# Patient Record
Sex: FEMALE | Race: WHITE | NOT HISPANIC OR LATINO | Employment: OTHER | URBAN - METROPOLITAN AREA
[De-identification: names, ages, dates, MRNs, and addresses within clinical notes are randomized per-mention and may not be internally consistent; named-entity substitution may affect disease eponyms.]

---

## 2017-01-03 ENCOUNTER — ALLSCRIPTS OFFICE VISIT (OUTPATIENT)
Dept: OTHER | Facility: OTHER | Age: 62
End: 2017-01-03

## 2017-03-29 DIAGNOSIS — Z12.11 ENCOUNTER FOR SCREENING FOR MALIGNANT NEOPLASM OF COLON: ICD-10-CM

## 2017-06-14 ENCOUNTER — ALLSCRIPTS OFFICE VISIT (OUTPATIENT)
Dept: OTHER | Facility: OTHER | Age: 62
End: 2017-06-14

## 2017-06-14 DIAGNOSIS — F41.1 GENERALIZED ANXIETY DISORDER: ICD-10-CM

## 2017-06-14 DIAGNOSIS — K21.9 GASTRO-ESOPHAGEAL REFLUX DISEASE WITHOUT ESOPHAGITIS: ICD-10-CM

## 2017-06-14 DIAGNOSIS — E78.5 HYPERLIPIDEMIA: ICD-10-CM

## 2017-06-14 DIAGNOSIS — R60.0 LOCALIZED EDEMA: ICD-10-CM

## 2017-10-19 ENCOUNTER — ALLSCRIPTS OFFICE VISIT (OUTPATIENT)
Dept: OTHER | Facility: OTHER | Age: 62
End: 2017-10-19

## 2017-11-24 ENCOUNTER — ALLSCRIPTS OFFICE VISIT (OUTPATIENT)
Dept: OTHER | Facility: OTHER | Age: 62
End: 2017-11-24

## 2017-11-25 NOTE — PROGRESS NOTES
Assessment    1  Acute bronchitis (466 0) (J20 9)    Plan  Acute bronchitis    · DrRx Zithromax Z-Jhonatan 250 MG #6 pill pack; Take 2 pills on day 1, take 1 pill ondays 2-5   · PredniSONE 5 MG Oral Tablet; Days 1 and 2 take 4 tablets, days 3 and 4 take 3tablets, days 5 and 6 take 2 tablets, days 7 and 8 take 1 tablet  Encounter for screening mammogram for malignant neoplasm of breast    · MAMMO SCREENING BILATERAL W 3D & CAD; Status:Active; Requested for:24Nov2017; Increased BMI    · We recommend that you bring your body mass index down to 26 ; Status:Complete;  Done: 07SQM2956    Discussion/Summary  The patient was counseled regarding instructions for management,-- risk factor reductions,-- prognosis,-- patient and family education,-- risks and benefits of treatment options,-- importance of compliance with treatment  Possible side effects of new medications were reviewed with the patient/guardian today  The treatment plan was reviewed with the patient/guardian  The patient/guardian understands and agrees with the treatment plan      Chief Complaint    1  Cold Symptoms  Singh Mejia is here today with chest cold symptoms x few days  She complains of wheezing and productive cough x 1 week  Denies fevers/chills, denies ST  History of Present Illness  HPI: See chief complaint  Review of Systems   Constitutional: no fever-- and-- no chills  ENT: no earache,-- no sore throat-- and-- no nasal discharge  Cardiovascular: no chest pain-- and-- no palpitations  Respiratory: cough,-- wheezing-- and-- shortness of breath during exertion, but-- no PND  Gastrointestinal: no abdominal pain,-- no constipation-- and-- no diarrhea  Genitourinary: no dysuria-- and-- no incontinence  Musculoskeletal: no arthralgias-- and-- no myalgias  Integumentary: no rashes  ROS reviewed  Active Problems    1  Acute bronchitis (466 0) (J20 9)   2  Acute upper respiratory infection (465 9) (J06 9)   3   Allergic reaction (995 3) (T78 40XA)   4  Allergic rhinitis (477 9) (J30 9)   5  Backache (724 5) (M54 9)   6  Colon cancer screening (V76 51) (Z12 11)   7  Diarrhea (787 91) (R19 7)   8  Encounter for screening mammogram for malignant neoplasm of breast (V76 12) (Z12 31)   9  Esophageal reflux (530 81) (K21 9)   10  Fatigue (780 79) (R53 83)   11  Generalized anxiety disorder (300 02) (F41 1)   12  Hyperlipidemia (272 4) (E78 5)   13  Impetigo (684) (L01 00)   14  Increased BMI (783 9) (R63 8)   15  Insomnia (780 52) (G47 00)   16  Microscopic hematuria (599 72) (R31 29)   17  Need for pneumococcal vaccination (V03 82) (Z23)   18  Need for prophylactic vaccination and inoculation against influenza (V04 81) (Z23)   19  Nephrolithiasis (592 0) (N20 0)   20  Pain of finger, unspecified laterality (729 5) (M79 646)   21  Pap smear for cervical cancer screening (V76 2) (Z12 4)   22  Pedal edema (782 3) (R60 0)   23  Renal colic (179 4) (F01)   24  Renal cyst, acquired (593 2) (N28 1)   25  Restrictive lung disease (518 89) (J98 4)   26  Screening for depression (V79 0) (Z13 89)   27  Shortness of breath (786 05) (R06 02)   28  Sleep apnea (780 57) (G47 30)   29  SVT (supraventricular tachycardia) (427 89) (I47 1)   30  Viral gastroenteritis (008 8) (A08 4)   31  Wheezing (786 07) (R06 2)    Past Medical History  1  Need for pneumococcal vaccination (V03 82) (Z23)   2  Need for prophylactic vaccination and inoculation against influenza (V04 81) (Z23)   3  History of Screening for depression (V79 0) (Z13 89)  Active Problems And Past Medical History Reviewed: The active problems and past medical history were reviewed and updated today  Family History  Mother    1  Family history of Malignant neoplasm of lung, unspecified laterality  Father    2  Family history of Acute Anterobasal Myocardial Infarction (410 10)  Family History    3  Family history of Rheumatoid arthritis with rheumatoid factor (714 0) (M05 9)  Family History Reviewed:    The family history was reviewed and updated today  Social History   · Denied: History of Current Every Day Smoker   · Former smoker (Y26 46) (I75 286)   ·   The social history was reviewed and updated today  The social history was reviewed and is unchanged  Surgical History    1  History of Cholecystectomy   2  History of Diagnostic Cystoscopy   3  History of Tubal Ligation  Surgical History Reviewed: The surgical history was reviewed and updated today  Current Meds   1  Asmanex 30 Metered Doses 220 MCG/INH Inhalation Aerosol Powder Breath Activated; INHALE 1 PUFF BY MOUTH TWICE DAILY; Therapy: 60ZSZ7290 to (Last Rx:84Voh3940)  Requested for: 91XCY4392 Ordered   2  Fluticasone Propionate 50 MCG/ACT Nasal Suspension; USE 2 SPRAYS IN EACH NOSTRIL ONCE DAILY; Therapy: 99XVS3259 to (Evaluate:72Qxj7287)  Requested for: 47Dqi5854; Last Rx:46Ssb9028; Status: ACTIVE - Transmit to Pharmacy - Awaiting Verification Ordered   3  Melatonin TABS Recorded   4  Pantoprazole Sodium 40 MG Oral Tablet Delayed Release; TAKE 1 TABLET TWICE DAILY 30 MINUTES BEFORE BREAKFAST AND DINNER Recorded   5  Simvastatin 20 MG Oral Tablet; TAKE 1 TABLET BY MOUTH AT  BEDTIME; Therapy: 61XYP7163 to (Evaluate:63Pch1732)  Requested for: 61Jkg3240; Last Rx:01Alu0328 Ordered   6  Ventolin  (90 Base) MCG/ACT Inhalation Aerosol Solution; INHALE 2 PUFFS EVERY 6 HOURS AS NEEDED FOR WHEEZING; Therapy: 78VCC6024 to (Evaluate:23Apr2017)  Requested for: 95Ckn8746; Last Rx:08Pqi0549 Ordered   7  Vitamin D3 2000 UNIT Oral Capsule; take 1 capsule daily Recorded    The medication list was reviewed and updated today  Allergies  1   Codeine Sulfate TABS    Vitals   Recorded: 40JGZ2351 10:19AM Recorded: 01XUF5494 09:05AM   Temperature  05 3 F   Systolic  153, LUE, Sitting   Diastolic  76, LUE, Sitting   Height  5 ft 3 5 in   Weight  169 lb 4 0 oz   BMI Calculated  29 51   BSA Calculated  1 81   O2 Saturation 97        Signatures Electronically signed by : Christy Sommers, [facility]; Nov 24 2017 10:20AM EST                       (Author)    Electronically signed by : David Peña DO; Nov 24 2017 12:25PM EST

## 2018-01-11 NOTE — PROGRESS NOTES
Assessment    1  Generalized anxiety disorder (300 02) (F41 1)    Plan  Generalized anxiety disorder    · Follow-up visit in 1 month Evaluation and Treatment  Follow-up  Status: Hold For -  Scheduling  Requested for: 98EON4574  Generalized anxiety disorder, Insomnia    · From  PARoxetine HCl - 10 MG Oral Tablet TAKE 1 TABLET BY MOUTH ONCE  DAILY To PARoxetine HCl - 20 MG Oral Tablet TAKE 1 TABLET DAILY AS DIRECTED    Discussion/Summary    We will increase her Paxil to 20 mg daily in hopes that this will help her sleep better  She will call us in the next week or so if she is having trouble tolerating it  We will see her back in 1-2 months or when necessary  Possible side effects of new medications were reviewed with the patient/guardian today  The treatment plan was reviewed with the patient/guardian  The patient/guardian understands and agrees with the treatment plan      Chief Complaint  Followup      History of Present Illness  Patient here today for followup on anxiety  Patient states that was taking the Paxil at night, and was feeling shaky  Start it in the morning, feels better  Still not able to stay asleep, though she feels more tired when she does get up at night   The patient is being seen for follow-up of generalized anxiety disorder  The patient reports no change in the condition  She has no comorbid illnesses  Interval symptoms:  improved anxiety and stable sleep disruption  Associated symptoms: no grandiosity, no racing thoughts, no periods of euphoria, no hallucinations and no suicidal ideation  Medications:  the patient is adherent to her medication regimen, but she denies medication side effects  Review of Systems    Constitutional: No fever, no chills, feels well, no tiredness, no recent weight gain or weight loss  Cardiovascular: No complaints of slow heart rate, no fast heart rate, no chest pain, no palpitations, no leg claudication, no lower extremity edema     Respiratory: No complaints of shortness of breath, no wheezing, no cough, no SOB on exertion, no orthopnea, no PND  Gastrointestinal: No complaints of abdominal pain, no constipation, no nausea or vomiting, no diarrhea, no bloody stools  Genitourinary: No complaints of dysuria, no incontinence, no pelvic pain, no dysmenorrhea, no vaginal discharge or bleeding  Psychiatric: as noted in HPI  Active Problems    1  Acute bronchitis (466 0) (J20 9)   2  Acute upper respiratory infection (465 9) (J06 9)   3  Allergic reaction (995 3) (T78 40XA)   4  Allergic rhinitis (477 9) (J30 9)   5  Backache (724 5) (M54 9)   6  Encounter for screening mammogram for malignant neoplasm of breast (V76 12)   (Z12 31)   7  Esophageal reflux (530 81) (K21 9)   8  Fatigue (780 79) (R53 83)   9  Generalized anxiety disorder (300 02) (F41 1)   10  Hyperlipidemia (272 4) (E78 5)   11  Impetigo (684) (L01 00)   12  Insomnia (780 52) (G47 00)   13  Microscopic hematuria (599 72) (R31 2)   14  Need for pneumococcal vaccination (V03 82) (Z23)   15  Need for prophylactic vaccination and inoculation against influenza (V04 81) (Z23)   16  Nephrolithiasis (592 0) (N20 0)   17  Pain of finger, unspecified laterality (729 5) (M79 646)   18  Renal colic (853 3) (W93)   19  Renal cyst, acquired (593 2) (N28 1)   20  Screening for depression (V79 0) (Z13 89)   21  Shortness of breath (786 05) (R06 02)   22  Sleep apnea (780 57) (G47 30)   23  SVT (supraventricular tachycardia) (427 89) (I47 1)   24  Viral gastroenteritis (008 8) (A08 4)    Past Medical History    1  Need for pneumococcal vaccination (V03 82) (Z23)   2  Need for prophylactic vaccination and inoculation against influenza (V04 81) (Z23)   3  History of Screening for depression (V79 0) (Z13 89)    The active problems and past medical history were reviewed and updated today  Surgical History    1  History of Cholecystectomy   2  History of Diagnostic Cystoscopy   3   History of Tubal Ligation    The surgical history was reviewed and updated today  Family History    1  Family history of Malignant neoplasm of lung, unspecified laterality    2  Family history of Acute Anterobasal Myocardial Infarction (410 10)    3  Family history of Rheumatoid arthritis with rheumatoid factor (714 0) (M05 9)    The family history was reviewed and updated today  Social History    · Denied: History of Current Every Day Smoker   · Former smoker (T22 76) (I20 425)   ·   The social history was reviewed and updated today  The social history was reviewed and is unchanged  Current Meds   1  ALPRAZolam 0 25 MG Oral Tablet; 1 tab every 6 hours as needed for anxiety; Therapy: 25JVX9214 to (Last Rx:06Jan2016) Ordered   2  Asmanex 30 Metered Doses 220 MCG/INH Inhalation Aerosol Powder Breath Activated;   INHALE 1 PUFF BY MOUTH TWICE DAILY; Therapy: 33TQO6799 to (Last Rx:29Dec2015) Ordered   3  Fluticasone Propionate 50 MCG/ACT Nasal Suspension; USE 2 SPRAYS IN EACH   NOSTRIL ONCE DAILY; Therapy: 70ZJW0273 to (Evaluate:10Jan2016)  Requested for: 34NOX3149; Last   Rx:53Qzf4985 Ordered   4  NexIUM 20 MG Oral Capsule Delayed Release; TAKE 1 CAPSULE Daily Recorded   5  PARoxetine HCl - 10 MG Oral Tablet; TAKE 1 TABLET BY MOUTH ONCE DAILY; Therapy: 81YVX0419 to (Evaluate:04Jul2016)  Requested for: 42TTN8205; Last   Rx:06Jan2016 Ordered   6  PredniSONE 10 MG Oral Tablet; 2 tabs by mouth daily for 3 days, then one tab by mouth   daily for 3 days, then half pill daily for 3 days; Therapy: 30HFV5368 to (Last Rx:39Sih5559)  Requested for: 29Dec2015 Ordered   7  Simvastatin 20 MG Oral Tablet; TAKE 1 TABLET AT BEDTIME; Therapy: 08MLX1356 to (Evaluate:19Mar2016)  Requested for: 26KSC1995; Last   Rx:25Mar2015 Ordered   8  Ventolin  (90 Base) MCG/ACT Inhalation Aerosol Solution; INHALE 2 PUFFS   EVERY 6 HOURS AS NEEDED FOR WHEEZING;    Therapy: 54EPA2122 to (Evaluate:18Jan2016)  Requested for: 50WXG3197; Last   Rx:53Shw8412 Ordered   9  Vitamin D3 2000 UNIT Oral Capsule; take 1 capsule daily Recorded    The medication list was reviewed and updated today  Allergies    1  Codeine Sulfate TABS    Vitals  Vital Signs [Data Includes: Current Encounter]    Recorded: 76JPS3332 35:11QR   Systolic 358, LUE, Sitting   Diastolic 80, LUE, Sitting   Height 5 ft 4 in   Weight 152 lb    BMI Calculated 26 09   BSA Calculated 1 75     Physical Exam    Constitutional   General appearance: No acute distress, well appearing and well nourished  Health Management  Encounter for screening mammogram for malignant neoplasm of breast   Digital Bilateral Screening Mammogram With CAD; every 1 year; Last 01UBC7342; Next Due:  75SIM6596;  Active    Future Appointments    Date/Time Provider Specialty Site   03/09/2016 06:00 PM Elizabet Frias DO Family Medicine [facility]     Signatures   Electronically signed by : Bonita Bae DO; Feb  3 2016  7:10PM EST                       (Author)

## 2018-01-13 VITALS
DIASTOLIC BLOOD PRESSURE: 60 MMHG | BODY MASS INDEX: 28.19 KG/M2 | WEIGHT: 165.13 LBS | HEIGHT: 64 IN | SYSTOLIC BLOOD PRESSURE: 138 MMHG

## 2018-01-13 VITALS
DIASTOLIC BLOOD PRESSURE: 76 MMHG | TEMPERATURE: 98.3 F | HEIGHT: 64 IN | WEIGHT: 169.25 LBS | BODY MASS INDEX: 28.89 KG/M2 | SYSTOLIC BLOOD PRESSURE: 128 MMHG | OXYGEN SATURATION: 97 %

## 2018-01-14 VITALS
WEIGHT: 163.25 LBS | BODY MASS INDEX: 27.87 KG/M2 | TEMPERATURE: 98 F | HEIGHT: 64 IN | DIASTOLIC BLOOD PRESSURE: 68 MMHG | SYSTOLIC BLOOD PRESSURE: 120 MMHG

## 2018-01-15 NOTE — RESULT NOTES
Verified Results  (1) COMPREHENSIVE METABOLIC PANEL 73RAX0180 59:46PP [facility] Sides     Test Name Result Flag Reference   GLUCOSE,RANDM 91 mg/dL     If the patient is fasting, the ADA then defines impaired fasting glucose as > 100 mg/dL and diabetes as > or equal to 123 mg/dL  SODIUM 142 mmol/L  136-145   POTASSIUM 4 2 mmol/L  3 5-5 3   CHLORIDE 107 mmol/L  100-108   CARBON DIOXIDE 27 mmol/L  21-32   ANION GAP (CALC) 8 mmol/L  4-13   BLOOD UREA NITROGEN 17 mg/dL  5-25   CREATININE 0 84 mg/dL  0 60-1 30   Standardized to IDMS reference method   CALCIUM 9 3 mg/dL  8 3-10 1   BILI, TOTAL 1 40 mg/dL H 0 20-1 00   ALK PHOSPHATAS 67 U/L     ALT (SGPT) 63 U/L  12-78   AST(SGOT) 37 U/L  5-45   ALBUMIN 4 3 g/dL  3 5-5 0   TOTAL PROTEIN 7 9 g/dL  6 4-8 2   eGFR Non-African American      >60 0 ml/min/1 73sq [facility] Disease Education Program recommendations are as follows:  GFR calculation is accurate only with a steady state creatinine  Chronic Kidney disease less than 60 ml/min/1 73 sq  meters  Kidney failure less than 15 ml/min/1 73 sq  meters  (1) CBC/PLT/DIFF 92ODR1406 10:28AM Tore[facility] Sides     Test Name Result Flag Reference   WBC COUNT 6 83 Thousand/uL  4 31-10 16   RBC COUNT 4 88 Million/uL  3 81-5 12   HEMOGLOBIN 14 5 g/dL  11 5-15 4   HEMATOCRIT 43 9 %  34 8-46  1   MCV 90 fL  82-98   MCH 29 7 pg  26 8-34 3   MCHC 33 0 g/dL  31 4-37 4   RDW 13 1 %  11 6-15 1   MPV 13 2 fL H 8 9-12 7   PLATELET COUNT 404 Thousands/uL  149-390   nRBC AUTOMATED 0 /100 WBCs     NEUTROPHILS RELATIVE PERCENT 60 %  43-75   LYMPHOCYTES RELATIVE PERCENT 29 %  14-44   MONOCYTES RELATIVE PERCENT 7 %  4-12   EOSINOPHILS RELATIVE PERCENT 3 %  0-6   BASOPHILS RELATIVE PERCENT 1 %  0-1   NEUTROPHILS ABSOLUTE COUNT 4 12 Thousands/?L  1 85-7 62   LYMPHOCYTES ABSOLUTE COUNT 1 97 Thousands/?L  0 60-4 47   MONOCYTES ABSOLUTE COUNT 0 45 Thousand/?L  0 17-1 22   EOSINOPHILS ABSOLUTE COUNT 0 21 Thousand/?L  0 00-0 61 BASOPHILS ABSOLUTE COUNT 0 07 Thousands/?L  0 00-0 10     (1) LIPID PANEL, FASTING 15Ywu3518 10:28AM [facility]     Test Name Result Flag Reference   CHOLESTEROL 213 mg/dL H    HDL,DIRECT 53 mg/dL  40-60   Specimen collection should occur prior to Metamizole administration due to the potential for falsely depressed results  LDL CHOLESTEROL CALCULATED 125 mg/dL H 0-100   Triglyceride:         Normal              <150 mg/dl       Borderline High    150-199 mg/dl       High               200-499 mg/dl       Very High          >499 mg/dl  Cholesterol:         Desirable        <200 mg/dl      Borderline High  200-239 mg/dl      High             >239 mg/dl  HDL Cholesterol:        High    >59 mg/dL      Low     <41 mg/dL  LDL CALCULATED:    This screening LDL is a calculated result  It does not have the accuracy of the Direct Measured LDL in the monitoring of patients with hyperlipidemia and/or statin therapy  Direct Measure LDL (ESI709) must be ordered separately in these patients  TRIGLYCERIDES 177 mg/dL H <=150   Specimen collection should occur prior to N-Acetylcysteine or Metamizole administration due to the potential for falsely depressed results  (1) TSH 53ZXO8523 10:28AM [facility]     Test Name Result Flag Reference   TSH 1 170 uIU/mL  0 358-3 740   Patients undergoing fluorescein dye angiography may retain small amounts of fluorescein in the body for 48-72 hours post procedure  Samples containing fluorescein can produce falsely depressed TSH values  If the patient had this procedure,a specimen should be resubmitted post fluorescein clearance            The recommended reference ranges for TSH during pregnancy are as follows:  First trimester 0 1 to 2 5 uIU/mL  Second trimester  0 2 to 3 0 uIU/mL  Third trimester 0 3 to 3 0 uIU/m

## 2018-01-18 ENCOUNTER — ALLSCRIPTS OFFICE VISIT (OUTPATIENT)
Dept: OTHER | Facility: OTHER | Age: 63
End: 2018-01-18

## 2018-01-19 NOTE — PROGRESS NOTES
Assessment   1  Moderate persistent reactive airway disease without complication (966 03) (W40 73)    Plan   Moderate persistent reactive airway disease without complication    · Tudorza Pressair 400 MCG/ACT Inhalation Aerosol Powder Breath Activated;    INHALE 1 PUFFS Twice daily   · Azithromycin 250 MG Oral Tablet; TAKE 2 TABLETS ON DAY 1 THEN TAKE 1    TABLET A DAY FOR 4 DAYS   · Follow Up if Not Better Evaluation and Treatment  Follow-up  Status: Complete  Done:    59JLH9390 07:53AM   · Follow-up visit in 4 Months Evaluation and Treatment  Follow-up  Status: Hold For -    Scheduling  Requested for: 35BJI9907  PMH: History of allergic rhinitis    · Ventolin  (90 Base) MCG/ACT Inhalation Aerosol Solution; INHALE 2    PUFFS EVERY 6 HOURS AS NEEDED FOR WHEEZING  Screening for cervical cancer    · (1) PAP SMEAR CONVENTIONAL; Status:Temporary Deferral - Pt requests deferral;       1/18/2019  Maturation index required? : No  Screening for colon cancer    · COLONOSCOPY; Status:Temporary Deferral - Pt requests deferral;    1/18/2019    Discussion/Summary      Patient will continue her Asmanex twice a day  Sample of [facility] given  she will take that at least for the next week or so to come her lungs down  If her cold symptoms get any worse, she will start a Z-Jhonatan  She will call us if any problems  Follow-up in 4 months or p r n  Possible side effects of new medications were reviewed with the patient/guardian today  The treatment plan was reviewed with the patient/guardian  The patient/guardian understands and agrees with the treatment plan      Chief Complaint   1  Cold Symptoms  Increase in wheezing      History of Present Illness   HPI: Increase in wheezing over the past 2 weeks  Mild increase in cough  No F/C  no nausea vomiting or diarrhea    Cold Symptoms:      Associated symptoms include dry cough, but-- no fever-- and-- no chills        The patient presents with complaints of constant episodes of moderate wheezing  Episodes started about 2 weeks ago  She is currently experiencing wheezing  Symptoms are unchanged  Review of Systems        Constitutional: No fever, no chills, feels well, no tiredness, no recent weight gain or loss  Cardiovascular: no complaints of slow or fast heart rate, no chest pain, no palpitations, no leg claudication or lower extremity edema  Respiratory: as noted in HPI  Gastrointestinal: no complaints of abdominal pain, no constipation, no nausea or diarrhea, no vomiting, no bloody stools  Genitourinary: no complaints of dysuria, no incontinence, no pelvic pain, no dysmenorrhea, no vaginal discharge or abnormal vaginal bleeding  Active Problems   1  Acute bronchitis (466 0) (J20 9)   2  Esophageal reflux (530 81) (K21 9)   3  Hyperlipidemia (272 4) (E78 5)   4  Insomnia (780 52) (G47 00)   5  Screening for cervical cancer (V76 2) (Z12 4)   6  Screening for colon cancer (V76 51) (Z12 11)   7  Sleep apnea (780 57) (G47 30)    Past Medical History   1  History of Acute upper respiratory infection (465 9) (J06 9)   2  History of Generalized anxiety disorder (300 02) (F41 1)   3  History of allergic reaction (V15 09) (Z88 9)   4  History of allergic rhinitis (V12 69) (Z87 09)   5  History of back pain (V13 59) (Z87 39)   6  History of diarrhea (V12 79) (Z87 898)   7  History of fatigue (V13 89) (Z87 898)   8  History of impetigo (V13 3) (Z87 2)   9  History of renal calculi (V13 01) (Z87 442)   10  History of renal colic (N58 93) (Z83 553)   11  History of restrictive lung disease (V12 69) (Z87 09)   12  History of shortness of breath (V13 89) (Z87 898)   13  History of supraventricular tachycardia (V12 59) (Z86 79)   14  History of viral gastroenteritis (V12 09) (Z86 19)   15  History of wheezing (V12 69) (Z87 898)   16  History of Microscopic hematuria (599 72) (R31 29)   17  History of Pain of finger, unspecified laterality (729 5) (M79 646)   18   History of Pedal edema (782 3) (R60 0)   19  History of Renal cyst, acquired (593 2) (N28 1)   20  History of Screening for depression (V79 0) (Z13 89)  Active Problems And Past Medical History Reviewed: The active problems and past medical history were reviewed and updated today  Family History   Mother    1  Family history of Malignant neoplasm of lung, unspecified laterality  Father    2  Family history of Acute Anterobasal Myocardial Infarction (410 10)  Family History    3  Family history of Rheumatoid arthritis with rheumatoid factor (714 0) (M05 9)  Family History Reviewed: The family history was reviewed and updated today  Social History    · Denied: History of Current Every Day Smoker   · Former smoker (D09 40) (Q81 431)   ·   The social history was reviewed and updated today  The social history was reviewed and is unchanged  Surgical History   1  History of Cholecystectomy   2  History of Diagnostic Cystoscopy   3  History of Tubal Ligation  Surgical History Reviewed: The surgical history was reviewed and updated today  Current Meds    1  Asmanex 30 Metered Doses 220 MCG/INH Inhalation Aerosol Powder Breath Activated;     INHALE 1 PUFF BY MOUTH TWICE DAILY; Therapy: 71SUW5568 to (Last Rx:38Qfn1939)  Requested for: 80EIK9207 Ordered   2  Fluticasone Propionate 50 MCG/ACT Nasal Suspension; USE 2 SPRAYS IN EACH     NOSTRIL ONCE DAILY; Therapy: 71OBK8787 to (Evaluate:84Xtw1982)  Requested for: 51Aqx5786; Last     Rx:29Usb1639; Status: ACTIVE - Transmit to Pharmacy - Awaiting Verification Ordered   3  Melatonin TABS Recorded   4  Pantoprazole Sodium 40 MG Oral Tablet Delayed Release; TAKE 1 TABLET TWICE     DAILY 30 MINUTES BEFORE BREAKFAST AND DINNER Recorded   5  Simvastatin 20 MG Oral Tablet; TAKE 1 TABLET BY MOUTH AT  BEDTIME; Therapy: 69RUF2508 to (Evaluate:91Lua7121)  Requested for: 07Gky3977; Last     Rx:65Ijb3535 Ordered   6   Ventolin  (90 Base) MCG/ACT Inhalation Aerosol Solution; INHALE 2 PUFFS     EVERY 6 HOURS AS NEEDED FOR WHEEZING; Therapy: 11EVV5913 to (Evaluate:23Apr2017)  Requested for: 25Oct2016; Last     Rx:81Tzf7859 Ordered   7  Vitamin D3 2000 UNIT Oral Capsule; take 1 capsule daily Recorded     The medication list was reviewed and updated today  Allergies   1  Codeine Sulfate TABS    Vitals    Recorded: 01GSO2075 07:26AM   Temperature 46 4 F   Systolic 031   Diastolic 80   Height 5 ft 3 5 in   Weight 167 lb 8 oz   BMI Calculated 29 21   BSA Calculated 1 8     Physical Exam        Constitutional      General appearance: No acute distress, well appearing and well nourished  Pulmonary      Respiratory effort: No increased work of breathing or signs of respiratory distress  Auscultation of lungs: Abnormal   wheezing over both bases  Cardiovascular      Auscultation of heart: Normal rate and rhythm, normal S1 and S2, without murmurs         Examination of extremities for edema and/or varicosities: Normal        Psychiatric      Orientation to person, place, and time: Normal        Mood and affect: Normal           Signatures    Electronically signed by : Rody Rendon DO; Jan 18 2018  8:02AM EST                       (Author)

## 2018-01-23 VITALS
BODY MASS INDEX: 28.6 KG/M2 | WEIGHT: 167.5 LBS | HEIGHT: 64 IN | TEMPERATURE: 99.1 F | SYSTOLIC BLOOD PRESSURE: 136 MMHG | DIASTOLIC BLOOD PRESSURE: 80 MMHG

## 2018-05-09 ENCOUNTER — APPOINTMENT (OUTPATIENT)
Dept: LAB | Facility: CLINIC | Age: 63
End: 2018-05-09
Payer: COMMERCIAL

## 2018-05-09 ENCOUNTER — TRANSCRIBE ORDERS (OUTPATIENT)
Dept: LAB | Facility: CLINIC | Age: 63
End: 2018-05-09

## 2018-05-09 DIAGNOSIS — E78.5 HYPERLIPIDEMIA, UNSPECIFIED HYPERLIPIDEMIA TYPE: ICD-10-CM

## 2018-05-09 DIAGNOSIS — F41.1 GENERALIZED ANXIETY DISORDER: ICD-10-CM

## 2018-05-09 DIAGNOSIS — R60.0 LOCALIZED EDEMA: ICD-10-CM

## 2018-05-09 DIAGNOSIS — K21.9 GASTROESOPHAGEAL REFLUX DISEASE WITHOUT ESOPHAGITIS: Primary | ICD-10-CM

## 2018-05-09 DIAGNOSIS — K21.9 GASTROESOPHAGEAL REFLUX DISEASE WITHOUT ESOPHAGITIS: ICD-10-CM

## 2018-05-09 LAB
CHOLEST SERPL-MCNC: 177 MG/DL (ref 50–200)
HDLC SERPL-MCNC: 54 MG/DL (ref 40–60)
LDLC SERPL CALC-MCNC: 94 MG/DL (ref 0–100)
NONHDLC SERPL-MCNC: 123 MG/DL
TRIGL SERPL-MCNC: 144 MG/DL

## 2018-05-09 PROCEDURE — 80061 LIPID PANEL: CPT

## 2018-07-20 DIAGNOSIS — J45.909 REACTIVE AIRWAY DISEASE WITHOUT COMPLICATION, UNSPECIFIED ASTHMA SEVERITY, UNSPECIFIED WHETHER PERSISTENT: Primary | ICD-10-CM

## 2018-08-17 ENCOUNTER — OFFICE VISIT (OUTPATIENT)
Dept: FAMILY MEDICINE CLINIC | Facility: CLINIC | Age: 63
End: 2018-08-17
Payer: COMMERCIAL

## 2018-08-17 VITALS
WEIGHT: 170.6 LBS | SYSTOLIC BLOOD PRESSURE: 122 MMHG | HEIGHT: 64 IN | DIASTOLIC BLOOD PRESSURE: 82 MMHG | BODY MASS INDEX: 29.12 KG/M2

## 2018-08-17 DIAGNOSIS — G43.009 MIGRAINE WITHOUT AURA AND WITHOUT STATUS MIGRAINOSUS, NOT INTRACTABLE: ICD-10-CM

## 2018-08-17 DIAGNOSIS — J01.90 ACUTE SINUSITIS, RECURRENCE NOT SPECIFIED, UNSPECIFIED LOCATION: Primary | ICD-10-CM

## 2018-08-17 DIAGNOSIS — G47.33 OBSTRUCTIVE SLEEP APNEA SYNDROME: ICD-10-CM

## 2018-08-17 PROCEDURE — 99214 OFFICE O/P EST MOD 30 MIN: CPT | Performed by: FAMILY MEDICINE

## 2018-08-17 PROCEDURE — 3008F BODY MASS INDEX DOCD: CPT | Performed by: FAMILY MEDICINE

## 2018-08-17 RX ORDER — BUTALBITAL, ACETAMINOPHEN AND CAFFEINE 50; 325; 40 MG/1; MG/1; MG/1
1 TABLET ORAL EVERY 4 HOURS PRN
Qty: 30 TABLET | Refills: 1 | Status: SHIPPED | OUTPATIENT
Start: 2018-08-17 | End: 2022-06-22

## 2018-08-17 RX ORDER — ACETAMINOPHEN 160 MG
1 TABLET,DISINTEGRATING ORAL DAILY
COMMUNITY

## 2018-08-17 RX ORDER — FLUTICASONE PROPIONATE 50 MCG
2 SPRAY, SUSPENSION (ML) NASAL DAILY
Qty: 16 G | Refills: 0
Start: 2018-08-17 | End: 2019-06-10 | Stop reason: SDUPTHER

## 2018-08-17 RX ORDER — ALBUTEROL SULFATE 90 UG/1
1 AEROSOL, METERED RESPIRATORY (INHALATION) EVERY 6 HOURS
COMMUNITY
End: 2022-06-15

## 2018-08-17 RX ORDER — AMOXICILLIN AND CLAVULANATE POTASSIUM 875; 125 MG/1; MG/1
1 TABLET, FILM COATED ORAL EVERY 12 HOURS SCHEDULED
Qty: 14 TABLET | Refills: 0 | Status: SHIPPED | OUTPATIENT
Start: 2018-08-17 | End: 2018-08-24

## 2018-08-17 RX ORDER — PANTOPRAZOLE SODIUM 40 MG/1
1 TABLET, DELAYED RELEASE ORAL 2 TIMES DAILY
COMMUNITY

## 2018-08-17 RX ORDER — SIMVASTATIN 20 MG
1 TABLET ORAL
COMMUNITY
Start: 2012-01-26 | End: 2019-03-19 | Stop reason: SDUPTHER

## 2018-08-17 NOTE — PROGRESS NOTES
Assessment/Plan: For her sinuses, Rx for Augmentin  Continue Flonase and take Mucinex over-the-counter  Push fluids  Call symptoms continue or increase  For her migraines, Rx for Fioricet, which has helped her in the past   We will order a hospital placed sleep study on her for her sleep apnea  We will see her back in the next couple months or p r n  No problem-specific Assessment & Plan notes found for this encounter  Diagnoses and all orders for this visit:    Acute sinusitis, recurrence not specified, unspecified location  -     amoxicillin-clavulanate (AUGMENTIN) 875-125 mg per tablet; Take 1 tablet by mouth every 12 (twelve) hours for 7 days  -     fluticasone (FLONASE) 50 mcg/act nasal spray; 2 sprays into each nostril daily    Obstructive sleep apnea syndrome  -     Diagnostic Sleep Study; Future    Migraine without aura and without status migrainosus, not intractable  -     butalbital-acetaminophen-caffeine (FIORICET,ESGIC) -40 mg per tablet; Take 1 tablet by mouth every 4 (four) hours as needed for headaches    Other orders  -     albuterol (PROVENTIL HFA,VENTOLIN HFA) 90 mcg/act inhaler; Inhale 1 puff every 6 (six) hours  -     pantoprazole (PROTONIX) 40 mg tablet; Take 1 tablet by mouth  -     simvastatin (ZOCOR) 20 mg tablet; Take 1 tablet by mouth  -     Cholecalciferol (VITAMIN D3) 2000 units capsule; Take 1 capsule by mouth daily          Subjective:      Patient ID: Antoinette Osuna is a 57 y o  female  Patient here today stating since last week has had sinus congestion on the right side  She was using Flonase, but forgot to take it when she was away for the weekend  Symptoms have increased  When she gets sinus pressure, her migraines reoccur  She gets migraines infrequently, maybe once or twice a year  In the past when she has had them, Fiorinal helped  Patient also has trouble with sleep  Only sleeps about 2-3 hours at a time    She does snore, and her  stated that she has apneic episodes  Sinusitis   This is a new problem  The current episode started in the past 7 days  The problem has been gradually worsening since onset  There has been no fever  The pain is moderate  Associated symptoms include congestion, headaches, sinus pressure and swollen glands  Pertinent negatives include no coughing, shortness of breath or sore throat  Past treatments include acetaminophen (Flonase)  The treatment provided mild relief  Migraine    This is a chronic problem  The problem occurs intermittently  The pain is located in the right unilateral region  The pain does not radiate  The quality of the pain is described as aching  The pain is moderate  Associated symptoms include nausea, phonophobia, photophobia, sinus pressure and swollen glands  Pertinent negatives include no coughing, sore throat or vomiting  Nothing aggravates the symptoms  She has tried NSAIDs for the symptoms  The treatment provided mild relief  The following portions of the patient's history were reviewed and updated as appropriate:   She  has no past medical history on file  She   Patient Active Problem List    Diagnosis Date Noted    Obstructive sleep apnea syndrome 08/17/2018    Migraine without aura and without status migrainosus, not intractable 08/17/2018    Reactive airway disease 01/18/2018    Esophageal reflux 03/21/2014    Hyperlipidemia 04/17/2013     She  has no past surgical history on file  Her family history is not on file  She  reports that she has quit smoking  She has never used smokeless tobacco  She reports that she drinks alcohol  Her drug history is not on file    Current Outpatient Prescriptions   Medication Sig Dispense Refill    mometasone (ASMANEX 30 METERED DOSES) 220 MCG/INH inhaler Inhale 1 puff 2 (two) times a day 3 Inhaler 3    simvastatin (ZOCOR) 20 mg tablet Take 1 tablet by mouth      albuterol (PROVENTIL HFA,VENTOLIN HFA) 90 mcg/act inhaler Inhale 1 puff every 6 (six) hours      amoxicillin-clavulanate (AUGMENTIN) 875-125 mg per tablet Take 1 tablet by mouth every 12 (twelve) hours for 7 days 14 tablet 0    butalbital-acetaminophen-caffeine (FIORICET,ESGIC) -40 mg per tablet Take 1 tablet by mouth every 4 (four) hours as needed for headaches 30 tablet 1    Cholecalciferol (VITAMIN D3) 2000 units capsule Take 1 capsule by mouth daily      fluticasone (FLONASE) 50 mcg/act nasal spray 2 sprays into each nostril daily 16 g 0    pantoprazole (PROTONIX) 40 mg tablet Take 1 tablet by mouth       No current facility-administered medications for this visit  Current Outpatient Prescriptions on File Prior to Visit   Medication Sig    mometasone (ASMANEX 30 METERED DOSES) 220 MCG/INH inhaler Inhale 1 puff 2 (two) times a day     No current facility-administered medications on file prior to visit  She is allergic to codeine       Review of Systems   Constitutional: Negative  HENT: Positive for congestion and sinus pressure  Negative for sore throat  Eyes: Positive for photophobia  Respiratory: Negative for cough and shortness of breath  Cardiovascular: Negative  Gastrointestinal: Positive for nausea  Negative for vomiting  Genitourinary: Negative  Neurological: Positive for headaches  Psychiatric/Behavioral: Positive for sleep disturbance  Objective:      /82   Ht 5' 3 5" (1 613 m)   Wt 77 4 kg (170 lb 9 6 oz)   BMI 29 75 kg/m²          Physical Exam   Constitutional: She is oriented to person, place, and time  She appears well-developed and well-nourished  No distress  HENT:   Right Ear: Tympanic membrane normal    Left Ear: Tympanic membrane normal    Nose: Rhinorrhea present  Clear to white postnasal drip   Cardiovascular: Normal rate, regular rhythm and normal heart sounds  Exam reveals no gallop and no friction rub  No murmur heard  Musculoskeletal: She exhibits no edema     Neurological: She is alert and oriented to person, place, and time  Skin: She is not diaphoretic  Psychiatric: She has a normal mood and affect  Her behavior is normal  Judgment and thought content normal    Vitals reviewed

## 2018-08-21 ENCOUNTER — TRANSCRIBE ORDERS (OUTPATIENT)
Dept: SLEEP CENTER | Facility: HOSPITAL | Age: 63
End: 2018-08-21

## 2018-08-21 DIAGNOSIS — G47.33 OBSTRUCTIVE SLEEP APNEA: Primary | ICD-10-CM

## 2018-08-24 ENCOUNTER — TELEPHONE (OUTPATIENT)
Dept: OTOLARYNGOLOGY | Facility: CLINIC | Age: 63
End: 2018-08-24

## 2018-08-24 DIAGNOSIS — G47.33 OBSTRUCTIVE SLEEP APNEA SYNDROME: Primary | ICD-10-CM

## 2018-08-29 ENCOUNTER — TELEPHONE (OUTPATIENT)
Dept: SLEEP CENTER | Facility: HOSPITAL | Age: 63
End: 2018-08-29

## 2018-08-29 DIAGNOSIS — G47.33 OBSTRUCTIVE SLEEP APNEA (ADULT) (PEDIATRIC): Primary | ICD-10-CM

## 2018-09-12 ENCOUNTER — HOSPITAL ENCOUNTER (OUTPATIENT)
Dept: SLEEP CENTER | Facility: HOSPITAL | Age: 63
Discharge: HOME | End: 2018-09-12
Payer: COMMERCIAL

## 2018-09-12 DIAGNOSIS — G47.33 OBSTRUCTIVE SLEEP APNEA: ICD-10-CM

## 2018-09-12 PROCEDURE — G0399 HOME SLEEP TEST/TYPE 3 PORTA: HCPCS

## 2018-10-26 ENCOUNTER — IMMUNIZATION (OUTPATIENT)
Dept: FAMILY MEDICINE CLINIC | Facility: CLINIC | Age: 63
End: 2018-10-26
Payer: COMMERCIAL

## 2018-10-26 DIAGNOSIS — Z23 NEED FOR INFLUENZA VACCINATION: Primary | ICD-10-CM

## 2018-10-26 PROCEDURE — 90471 IMMUNIZATION ADMIN: CPT

## 2018-10-26 PROCEDURE — 90662 IIV NO PRSV INCREASED AG IM: CPT

## 2018-11-16 ENCOUNTER — APPOINTMENT (OUTPATIENT)
Dept: RADIOLOGY | Facility: MEDICAL CENTER | Age: 63
End: 2018-11-16
Payer: COMMERCIAL

## 2018-11-16 ENCOUNTER — OFFICE VISIT (OUTPATIENT)
Dept: FAMILY MEDICINE CLINIC | Facility: CLINIC | Age: 63
End: 2018-11-16
Payer: COMMERCIAL

## 2018-11-16 VITALS
OXYGEN SATURATION: 94 % | HEART RATE: 98 BPM | BODY MASS INDEX: 28.24 KG/M2 | HEIGHT: 64 IN | WEIGHT: 165.4 LBS | SYSTOLIC BLOOD PRESSURE: 142 MMHG | DIASTOLIC BLOOD PRESSURE: 82 MMHG

## 2018-11-16 DIAGNOSIS — Z23 ENCOUNTER FOR IMMUNIZATION: ICD-10-CM

## 2018-11-16 DIAGNOSIS — R07.81 RIB PAIN: Primary | ICD-10-CM

## 2018-11-16 DIAGNOSIS — R07.81 RIB PAIN: ICD-10-CM

## 2018-11-16 PROCEDURE — 71111 X-RAY EXAM RIBS/CHEST4/> VWS: CPT

## 2018-11-16 PROCEDURE — 1036F TOBACCO NON-USER: CPT | Performed by: FAMILY MEDICINE

## 2018-11-16 PROCEDURE — 99213 OFFICE O/P EST LOW 20 MIN: CPT | Performed by: FAMILY MEDICINE

## 2018-11-16 PROCEDURE — 1101F PT FALLS ASSESS-DOCD LE1/YR: CPT | Performed by: FAMILY MEDICINE

## 2018-11-16 PROCEDURE — 3008F BODY MASS INDEX DOCD: CPT | Performed by: FAMILY MEDICINE

## 2018-11-16 RX ORDER — PREDNISONE 10 MG/1
TABLET ORAL
Qty: 30 TABLET | Refills: 0 | Status: SHIPPED | OUTPATIENT
Start: 2018-11-16 | End: 2018-12-07 | Stop reason: ALTCHOICE

## 2018-11-16 NOTE — PROGRESS NOTES
Assessment/Plan: We will get a rib series on her  Rx for prednisone taper to help with her wheezing and pain  We will call her with results of the x-ray  She will call us if symptoms continue or increase  No problem-specific Assessment & Plan notes found for this encounter  Diagnoses and all orders for this visit:    Rib pain  -     XR ribs bilateral 4+ vw w pa chest; Future  -     predniSONE 10 mg tablet; 4 tablets daily for 3 days, then 3 tablets daily for 3 days, then 2 tablets daily for 3 days, then 1 tablet daily for 3 days    Encounter for immunization  -     Cancel: PNEUMOCOCCAL CONJUGATE VACCINE 13-VALENT GREATER THAN 6 MONTHS          Subjective:      Patient ID: Walter Mathew is a 70 y o  female  Patient here today with rib pain, left greater than right on the lateral sides  Patient states this has been the case for the past 2 weeks  Has felt a little more fatigued and is wheezing more  No fever chills  Not coughing more than usual   No nausea vomiting or diarrhea  Chest Pain    This is a new problem  The current episode started 1 to 4 weeks ago  The problem occurs constantly  The problem has been unchanged  The pain is present in the lateral region  The pain is moderate  The quality of the pain is described as sharp  The pain does not radiate  Associated symptoms include malaise/fatigue  Pertinent negatives include no cough, palpitations or shortness of breath  The pain is aggravated by deep breathing (Palpation)  She has tried nothing for the symptoms  The following portions of the patient's history were reviewed and updated as appropriate:   She  has no past medical history on file    She   Patient Active Problem List    Diagnosis Date Noted    Obstructive sleep apnea 08/17/2018    Migraine without aura and without status migrainosus, not intractable 08/17/2018    Reactive airway disease 01/18/2018    Esophageal reflux 03/21/2014    Hyperlipidemia 04/17/2013     She has no past surgical history on file  Her family history is not on file  She  reports that she has quit smoking  She has never used smokeless tobacco  She reports that she drinks alcohol  Her drug history is not on file  Current Outpatient Prescriptions   Medication Sig Dispense Refill    albuterol (PROVENTIL HFA,VENTOLIN HFA) 90 mcg/act inhaler Inhale 1 puff every 6 (six) hours      butalbital-acetaminophen-caffeine (FIORICET,ESGIC) -40 mg per tablet Take 1 tablet by mouth every 4 (four) hours as needed for headaches 30 tablet 1    Cholecalciferol (VITAMIN D3) 2000 units capsule Take 1 capsule by mouth daily      fluticasone (FLONASE) 50 mcg/act nasal spray 2 sprays into each nostril daily 16 g 0    mometasone (ASMANEX 30 METERED DOSES) 220 MCG/INH inhaler Inhale 1 puff 2 (two) times a day 3 Inhaler 3    pantoprazole (PROTONIX) 40 mg tablet Take 1 tablet by mouth      simvastatin (ZOCOR) 20 mg tablet Take 1 tablet by mouth      predniSONE 10 mg tablet 4 tablets daily for 3 days, then 3 tablets daily for 3 days, then 2 tablets daily for 3 days, then 1 tablet daily for 3 days 30 tablet 0     No current facility-administered medications for this visit        Current Outpatient Prescriptions on File Prior to Visit   Medication Sig    albuterol (PROVENTIL HFA,VENTOLIN HFA) 90 mcg/act inhaler Inhale 1 puff every 6 (six) hours    butalbital-acetaminophen-caffeine (FIORICET,ESGIC) -40 mg per tablet Take 1 tablet by mouth every 4 (four) hours as needed for headaches    Cholecalciferol (VITAMIN D3) 2000 units capsule Take 1 capsule by mouth daily    fluticasone (FLONASE) 50 mcg/act nasal spray 2 sprays into each nostril daily    mometasone (ASMANEX 30 METERED DOSES) 220 MCG/INH inhaler Inhale 1 puff 2 (two) times a day    pantoprazole (PROTONIX) 40 mg tablet Take 1 tablet by mouth    simvastatin (ZOCOR) 20 mg tablet Take 1 tablet by mouth     No current facility-administered medications on file prior to visit  She is allergic to codeine       Review of Systems   Constitutional: Positive for malaise/fatigue  Respiratory: Positive for wheezing  Negative for cough and shortness of breath  Cardiovascular: Positive for chest pain  Negative for palpitations  Gastrointestinal: Negative  Genitourinary: Negative  Objective:      /82   Pulse 98   Ht 5' 3 5" (1 613 m)   Wt 75 kg (165 lb 6 4 oz)   SpO2 94%   BMI 28 84 kg/m²          Physical Exam   Constitutional: She is oriented to person, place, and time  She appears well-developed and well-nourished  No distress  HENT:   Head: Normocephalic and atraumatic  Eyes: Conjunctivae are normal    Cardiovascular: Normal rate, regular rhythm and normal heart sounds  Exam reveals no gallop and no friction rub  No murmur heard  Pulmonary/Chest: Effort normal  No respiratory distress  She has wheezes (End expiratory wheezes in the bases bilaterally)  She has no rales  She exhibits tenderness (Tenderness on the lateral sides of her lower chest, left greater than right  )  Musculoskeletal: She exhibits no edema  Neurological: She is alert and oriented to person, place, and time  Skin: She is not diaphoretic  Psychiatric: She has a normal mood and affect  Her behavior is normal  Judgment and thought content normal    Vitals reviewed

## 2018-12-07 ENCOUNTER — APPOINTMENT (OUTPATIENT)
Dept: RADIOLOGY | Facility: MEDICAL CENTER | Age: 63
End: 2018-12-07
Payer: COMMERCIAL

## 2018-12-07 ENCOUNTER — OFFICE VISIT (OUTPATIENT)
Dept: FAMILY MEDICINE CLINIC | Facility: CLINIC | Age: 63
End: 2018-12-07
Payer: COMMERCIAL

## 2018-12-07 VITALS
WEIGHT: 163 LBS | HEART RATE: 68 BPM | OXYGEN SATURATION: 96 % | HEIGHT: 64 IN | SYSTOLIC BLOOD PRESSURE: 150 MMHG | DIASTOLIC BLOOD PRESSURE: 84 MMHG | TEMPERATURE: 99.3 F | BODY MASS INDEX: 27.83 KG/M2

## 2018-12-07 DIAGNOSIS — J06.9 UPPER RESPIRATORY TRACT INFECTION, UNSPECIFIED TYPE: ICD-10-CM

## 2018-12-07 DIAGNOSIS — J06.9 UPPER RESPIRATORY TRACT INFECTION, UNSPECIFIED TYPE: Primary | ICD-10-CM

## 2018-12-07 PROCEDURE — 99214 OFFICE O/P EST MOD 30 MIN: CPT | Performed by: PHYSICIAN ASSISTANT

## 2018-12-07 PROCEDURE — 71046 X-RAY EXAM CHEST 2 VIEWS: CPT

## 2018-12-07 RX ORDER — AZITHROMYCIN 250 MG/1
TABLET, FILM COATED ORAL
Qty: 6 TABLET | Refills: 0 | Status: SHIPPED | OUTPATIENT
Start: 2018-12-07 | End: 2018-12-11

## 2018-12-07 RX ORDER — BENZONATATE 100 MG/1
100 CAPSULE ORAL 3 TIMES DAILY PRN
Qty: 20 CAPSULE | Refills: 0 | Status: SHIPPED | OUTPATIENT
Start: 2018-12-07 | End: 2020-03-21 | Stop reason: ALTCHOICE

## 2018-12-07 NOTE — PROGRESS NOTES
Assessment/Plan:     Diagnoses and all orders for this visit:    Upper respiratory tract infection, unspecified type  -     XR chest pa & lateral; Future  -     azithromycin (ZITHROMAX) 250 mg tablet; Take 2 tablets today then 1 tablet daily x 4 days  -     benzonatate (TESSALON PERLES) 100 mg capsule; Take 1 capsule (100 mg total) by mouth 3 (three) times a day as needed for cough        Will get chest Xray  Started patient on zithromax  Advised patient to use tessalon PRN cough and get Mucinex OTC for symptomatic relief  Advised her to use her albuterol inhaler PRN wheezing  Push fluids  If symptoms do not improve or worsen she was advised to let us know  Subjective:      Patient ID: Huyen Beavers is a 70 y o  female  Patient is a 70year old female who presents with cold symptoms since Sunday  She states that she started with headache, sore throat, runny nose, fatigue, and ear pain  The runny nose has improved, but now she has a cough and a lot of nasal congestion  She states the cough is worse at night when lying down and causes her to become short of breath at times  She states that she has had to use her albuterol inhaler twice this week  She tried NyQuill, which did not provide any relief  She has been taking Allegra, which helped mildly with the runny nose  She denies any nausea, vomiting, chest pains, or abdominal pains  The following portions of the patient's history were reviewed and updated as appropriate:   She  has no past medical history on file  She   Patient Active Problem List    Diagnosis Date Noted    Obstructive sleep apnea 08/17/2018    Migraine without aura and without status migrainosus, not intractable 08/17/2018    Reactive airway disease 01/18/2018    Esophageal reflux 03/21/2014    Hyperlipidemia 04/17/2013     She  has a past surgical history that includes Cholecystectomy; Tubal ligation; and TONSILLECTOMY    Her family history includes Cancer in her mother; Diabetes in her paternal grandmother; Heart attack in her father and maternal grandmother  She  reports that she has quit smoking  She has never used smokeless tobacco  She reports that she drinks alcohol  Her drug history is not on file  Current Outpatient Prescriptions   Medication Sig Dispense Refill    albuterol (PROVENTIL HFA,VENTOLIN HFA) 90 mcg/act inhaler Inhale 1 puff every 6 (six) hours      butalbital-acetaminophen-caffeine (FIORICET,ESGIC) -40 mg per tablet Take 1 tablet by mouth every 4 (four) hours as needed for headaches 30 tablet 1    Cholecalciferol (VITAMIN D3) 2000 units capsule Take 1 capsule by mouth daily      fluticasone (FLONASE) 50 mcg/act nasal spray 2 sprays into each nostril daily 16 g 0    mometasone (ASMANEX 30 METERED DOSES) 220 MCG/INH inhaler Inhale 1 puff 2 (two) times a day 3 Inhaler 3    pantoprazole (PROTONIX) 40 mg tablet Take 1 tablet by mouth      simvastatin (ZOCOR) 20 mg tablet Take 1 tablet by mouth      azithromycin (ZITHROMAX) 250 mg tablet Take 2 tablets today then 1 tablet daily x 4 days 6 tablet 0    benzonatate (TESSALON PERLES) 100 mg capsule Take 1 capsule (100 mg total) by mouth 3 (three) times a day as needed for cough 20 capsule 0     No current facility-administered medications for this visit        Current Outpatient Prescriptions on File Prior to Visit   Medication Sig    albuterol (PROVENTIL HFA,VENTOLIN HFA) 90 mcg/act inhaler Inhale 1 puff every 6 (six) hours    butalbital-acetaminophen-caffeine (FIORICET,ESGIC) -40 mg per tablet Take 1 tablet by mouth every 4 (four) hours as needed for headaches    Cholecalciferol (VITAMIN D3) 2000 units capsule Take 1 capsule by mouth daily    fluticasone (FLONASE) 50 mcg/act nasal spray 2 sprays into each nostril daily    mometasone (ASMANEX 30 METERED DOSES) 220 MCG/INH inhaler Inhale 1 puff 2 (two) times a day    pantoprazole (PROTONIX) 40 mg tablet Take 1 tablet by mouth    simvastatin (ZOCOR) 20 mg tablet Take 1 tablet by mouth    [DISCONTINUED] predniSONE 10 mg tablet 4 tablets daily for 3 days, then 3 tablets daily for 3 days, then 2 tablets daily for 3 days, then 1 tablet daily for 3 days     No current facility-administered medications on file prior to visit  She is allergic to codeine       Review of Systems   Constitutional: Negative for chills, diaphoresis, fatigue and fever  HENT: Positive for congestion, ear pain, postnasal drip, rhinorrhea, sinus pressure and sore throat  Negative for trouble swallowing  Eyes: Negative for pain and visual disturbance  Respiratory: Positive for cough, shortness of breath and wheezing  Negative for apnea  Cardiovascular: Negative for chest pain and palpitations  Gastrointestinal: Negative for abdominal pain, constipation, diarrhea, nausea and vomiting  Genitourinary: Negative for dysuria and hematuria  Musculoskeletal: Negative for arthralgias, gait problem and myalgias  Neurological: Positive for headaches  Negative for dizziness, syncope, weakness, light-headedness and numbness  Psychiatric/Behavioral: Negative for suicidal ideas  The patient is not nervous/anxious  Objective:    /84 (BP Location: Left arm, Patient Position: Sitting)   Temp 99 3 °F (37 4 °C)   Ht 5' 3 5" (1 613 m)   Wt 73 9 kg (163 lb)   BMI 28 42 kg/m²      Physical Exam   Constitutional: She is oriented to person, place, and time  She appears well-developed and well-nourished  HENT:   Head: Normocephalic and atraumatic  Right Ear: External ear and ear canal normal  Tympanic membrane is bulging  Tympanic membrane is not erythematous  Left Ear: External ear and ear canal normal  Tympanic membrane is bulging  Tympanic membrane is not erythematous  Nose: Mucosal edema and rhinorrhea present  Mouth/Throat: Mucous membranes are normal  Posterior oropharyngeal erythema present  No oropharyngeal exudate or posterior oropharyngeal edema  Fluid behind bilateral TMs   Eyes: Pupils are equal, round, and reactive to light  EOM are normal    Neck: Normal range of motion  Neck supple  Cardiovascular: Normal rate, regular rhythm and normal heart sounds  Exam reveals no gallop and no friction rub  No murmur heard  Pulmonary/Chest: Effort normal  No respiratory distress  She has wheezes  She has no rales  Coarse breath sounds in bilateral lung fields with scattered rhonchi and wheezes  Musculoskeletal: Normal range of motion  Lymphadenopathy:     She has no cervical adenopathy  Neurological: She is alert and oriented to person, place, and time  Skin: Skin is warm and dry  Psychiatric: She has a normal mood and affect  Her behavior is normal  Judgment and thought content normal    Vitals reviewed

## 2019-03-14 ENCOUNTER — TELEPHONE (OUTPATIENT)
Dept: FAMILY MEDICINE CLINIC | Facility: CLINIC | Age: 64
End: 2019-03-14

## 2019-03-14 DIAGNOSIS — J06.9 UPPER RESPIRATORY TRACT INFECTION, UNSPECIFIED TYPE: Primary | ICD-10-CM

## 2019-03-14 RX ORDER — PREDNISONE 10 MG/1
TABLET ORAL
Qty: 30 TABLET | Refills: 0 | Status: SHIPPED | OUTPATIENT
Start: 2019-03-14 | End: 2020-03-21 | Stop reason: ALTCHOICE

## 2019-03-14 RX ORDER — AZITHROMYCIN 250 MG/1
TABLET, FILM COATED ORAL
Qty: 6 TABLET | Refills: 0 | Status: SHIPPED | OUTPATIENT
Start: 2019-03-14 | End: 2019-03-19

## 2019-03-14 NOTE — TELEPHONE ENCOUNTER
C/O SINUS INFECTION, CHEST, DRAINING, COUGH  CAN YOU RX ANTIBIOTIC AND STEROID  SHE WANTS TO COME IN FOR APPT BUT UNABLE TO GET OFF WORK   WILL YOU RX?

## 2019-03-14 NOTE — TELEPHONE ENCOUNTER
I put in for a Z-Jhonatan and prednisone taper  Push fluids    Make appointment if symptoms continue or increase

## 2019-03-19 DIAGNOSIS — E78.2 MIXED HYPERLIPIDEMIA: Primary | ICD-10-CM

## 2019-03-19 RX ORDER — SIMVASTATIN 20 MG
20 TABLET ORAL
Qty: 90 TABLET | Refills: 3 | Status: SHIPPED | OUTPATIENT
Start: 2019-03-19 | End: 2020-03-01 | Stop reason: SDUPTHER

## 2019-03-20 ENCOUNTER — OFFICE VISIT (OUTPATIENT)
Dept: FAMILY MEDICINE CLINIC | Facility: CLINIC | Age: 64
End: 2019-03-20
Payer: COMMERCIAL

## 2019-03-20 VITALS — HEIGHT: 64 IN | WEIGHT: 163 LBS | BODY MASS INDEX: 27.83 KG/M2

## 2019-03-20 DIAGNOSIS — Z12.39 SCREENING FOR BREAST CANCER: Primary | ICD-10-CM

## 2019-03-20 DIAGNOSIS — R07.89 RIGHT-SIDED CHEST WALL PAIN: ICD-10-CM

## 2019-03-20 DIAGNOSIS — Z11.59 ENCOUNTER FOR HEPATITIS C SCREENING TEST FOR LOW RISK PATIENT: ICD-10-CM

## 2019-03-20 PROCEDURE — 1036F TOBACCO NON-USER: CPT | Performed by: FAMILY MEDICINE

## 2019-03-20 PROCEDURE — 3008F BODY MASS INDEX DOCD: CPT | Performed by: FAMILY MEDICINE

## 2019-03-20 PROCEDURE — 1160F RVW MEDS BY RX/DR IN RCRD: CPT | Performed by: FAMILY MEDICINE

## 2019-03-20 PROCEDURE — 99213 OFFICE O/P EST LOW 20 MIN: CPT | Performed by: FAMILY MEDICINE

## 2019-03-20 NOTE — PROGRESS NOTES
Assessment/Plan: We will get a CT scan of her chest without contrast to evaluate her right-sided chest wall pain  She will call us if symptoms continue or increase  Otherwise we will see her back in 2-3 months or p r n  No problem-specific Assessment & Plan notes found for this encounter  Diagnoses and all orders for this visit:    Screening for breast cancer  -     Mammo screening bilateral w 3d & cad; Future    Encounter for hepatitis C screening test for low risk patient  -     Hepatitis C antibody; Future    Right-sided chest wall pain  -     CT chest wo contrast; Future          Subjective:      Patient ID: Jefe Cronin is a 70 y o  female  Patient here today stating that she has got right-sided chest wall pain  It hurts worse when she is twisting  She has been doing a lot of twisting at work lately  Today she did not do as much, and it feels better  She denies any shortness of breath with it  Chest Pain    This is a recurrent problem  The current episode started in the past 7 days  The problem occurs constantly  The problem has been gradually improving  The pain is present in the lateral region  The pain is moderate  Quality: Achy  The pain does not radiate  Pertinent negatives include no back pain, diaphoresis, dizziness, exertional chest pressure, fever, irregular heartbeat, malaise/fatigue, nausea or shortness of breath  The pain is aggravated by movement  She has tried NSAIDs for the symptoms  The treatment provided moderate relief  There are no known risk factors  The following portions of the patient's history were reviewed and updated as appropriate:   She  has a past medical history of Allergic reaction, Allergic rhinitis, Generalized anxiety disorder, Microscopic hematuria, Renal calculi, Renal colic, Renal cyst, acquired, Restrictive lung disease, and Supraventricular tachycardia ([facility] [address] )    She   Patient Active Problem List    Diagnosis Date Noted    Right-sided chest wall pain 03/20/2019    Obstructive sleep apnea 08/17/2018    Migraine without aura and without status migrainosus, not intractable 08/17/2018    Reactive airway disease 01/18/2018    Esophageal reflux 03/21/2014    Hyperlipidemia 04/17/2013     She  has a past surgical history that includes Cholecystectomy; Tubal ligation; TONSILLECTOMY; and CYSTOSCOPY (05/07/2014)  Her family history includes Cancer in her mother; Diabetes in her paternal grandmother; Heart attack in her father and maternal grandmother; Lung cancer in her mother; Rheum arthritis in her family  She  reports that she has quit smoking  She has never used smokeless tobacco  She reports that she drinks alcohol  Her drug history is not on file  Current Outpatient Medications   Medication Sig Dispense Refill    albuterol (PROVENTIL HFA,VENTOLIN HFA) 90 mcg/act inhaler Inhale 1 puff every 6 (six) hours      benzonatate (TESSALON PERLES) 100 mg capsule Take 1 capsule (100 mg total) by mouth 3 (three) times a day as needed for cough 20 capsule 0    butalbital-acetaminophen-caffeine (FIORICET,ESGIC) -40 mg per tablet Take 1 tablet by mouth every 4 (four) hours as needed for headaches 30 tablet 1    Cholecalciferol (VITAMIN D3) 2000 units capsule Take 1 capsule by mouth daily      fluticasone (FLONASE) 50 mcg/act nasal spray 2 sprays into each nostril daily 16 g 0    mometasone (ASMANEX 30 METERED DOSES) 220 MCG/INH inhaler Inhale 1 puff 2 (two) times a day 3 Inhaler 3    pantoprazole (PROTONIX) 40 mg tablet Take 1 tablet by mouth      predniSONE 10 mg tablet 4 tablets daily for 3 days, then 3 tablets daily for 3 days, then 2 tablets daily for 3 days, then 1 tablet daily for 3 days 30 tablet 0    simvastatin (ZOCOR) 20 mg tablet Take 1 tablet (20 mg total) by mouth daily at bedtime 90 tablet 3     No current facility-administered medications for this visit        Current Outpatient Medications on File Prior to Visit   Medication Sig    albuterol (PROVENTIL HFA,VENTOLIN HFA) 90 mcg/act inhaler Inhale 1 puff every 6 (six) hours    [] azithromycin (ZITHROMAX) 250 mg tablet Take 2 tablets today then 1 tablet daily x 4 days    benzonatate (TESSALON PERLES) 100 mg capsule Take 1 capsule (100 mg total) by mouth 3 (three) times a day as needed for cough    butalbital-acetaminophen-caffeine (FIORICET,ESGIC) -40 mg per tablet Take 1 tablet by mouth every 4 (four) hours as needed for headaches    Cholecalciferol (VITAMIN D3) 2000 units capsule Take 1 capsule by mouth daily    fluticasone (FLONASE) 50 mcg/act nasal spray 2 sprays into each nostril daily    mometasone (ASMANEX 30 METERED DOSES) 220 MCG/INH inhaler Inhale 1 puff 2 (two) times a day    pantoprazole (PROTONIX) 40 mg tablet Take 1 tablet by mouth    predniSONE 10 mg tablet 4 tablets daily for 3 days, then 3 tablets daily for 3 days, then 2 tablets daily for 3 days, then 1 tablet daily for 3 days    simvastatin (ZOCOR) 20 mg tablet Take 1 tablet (20 mg total) by mouth daily at bedtime     No current facility-administered medications on file prior to visit  She is allergic to codeine       Review of Systems   Constitutional: Negative for diaphoresis, fever and malaise/fatigue  Respiratory: Negative  Negative for shortness of breath  Cardiovascular: Positive for chest pain  Gastrointestinal: Negative  Negative for nausea  Genitourinary: Negative  Musculoskeletal: Negative for back pain  Neurological: Negative for dizziness  Objective:      Ht 5' 3 5" (1 613 m)   Wt 73 9 kg (163 lb)   BMI 28 42 kg/m²          Physical Exam   Constitutional: She is oriented to person, place, and time  She appears well-developed and well-nourished  No distress  HENT:   Head: Normocephalic and atraumatic  Eyes: Conjunctivae are normal    Cardiovascular: Normal rate, regular rhythm and normal heart sounds  Exam reveals no gallop and no friction rub     No murmur heard   Pulmonary/Chest: Effort normal and breath sounds normal  No respiratory distress  She has no wheezes  She has no rales  She exhibits tenderness (Positive chest tenderness around the lateral 5th and 6th ribs)  Musculoskeletal: She exhibits no edema  Neurological: She is alert and oriented to person, place, and time  Skin: She is not diaphoretic  Psychiatric: She has a normal mood and affect  Her behavior is normal  Judgment and thought content normal    Vitals reviewed

## 2019-03-25 ENCOUNTER — HOSPITAL ENCOUNTER (OUTPATIENT)
Dept: CT IMAGING | Facility: HOSPITAL | Age: 64
Discharge: HOME | End: 2019-03-25
Payer: COMMERCIAL

## 2019-03-25 ENCOUNTER — LAB (OUTPATIENT)
Dept: LAB | Facility: HOSPITAL | Age: 64
End: 2019-03-25
Payer: COMMERCIAL

## 2019-03-25 DIAGNOSIS — R07.89 RIGHT-SIDED CHEST WALL PAIN: ICD-10-CM

## 2019-03-25 DIAGNOSIS — Z11.59 ENCOUNTER FOR HEPATITIS C SCREENING TEST FOR LOW RISK PATIENT: ICD-10-CM

## 2019-03-25 PROCEDURE — 71250 CT THORAX DX C-: CPT

## 2019-03-25 PROCEDURE — 86803 HEPATITIS C AB TEST: CPT

## 2019-03-25 PROCEDURE — 36415 COLL VENOUS BLD VENIPUNCTURE: CPT

## 2019-03-26 LAB — HCV AB SER QL: NORMAL

## 2019-05-30 ENCOUNTER — HOSPITAL ENCOUNTER (OUTPATIENT)
Dept: MAMMOGRAPHY | Facility: HOSPITAL | Age: 64
Discharge: HOME | End: 2019-05-30
Payer: COMMERCIAL

## 2019-05-30 VITALS — BODY MASS INDEX: 27.83 KG/M2 | HEIGHT: 64 IN | WEIGHT: 163 LBS

## 2019-05-30 DIAGNOSIS — Z12.39 SCREENING FOR BREAST CANCER: ICD-10-CM

## 2019-05-30 PROCEDURE — 77067 SCR MAMMO BI INCL CAD: CPT

## 2019-05-30 PROCEDURE — 77063 BREAST TOMOSYNTHESIS BI: CPT

## 2019-06-10 ENCOUNTER — TELEPHONE (OUTPATIENT)
Dept: FAMILY MEDICINE CLINIC | Facility: CLINIC | Age: 64
End: 2019-06-10

## 2019-06-10 DIAGNOSIS — J32.9 SINUSITIS, UNSPECIFIED CHRONICITY, UNSPECIFIED LOCATION: Primary | ICD-10-CM

## 2019-06-10 DIAGNOSIS — J01.90 ACUTE SINUSITIS, RECURRENCE NOT SPECIFIED, UNSPECIFIED LOCATION: ICD-10-CM

## 2019-06-10 RX ORDER — AMOXICILLIN AND CLAVULANATE POTASSIUM 875; 125 MG/1; MG/1
1 TABLET, FILM COATED ORAL EVERY 12 HOURS SCHEDULED
Qty: 14 TABLET | Refills: 0 | Status: SHIPPED | OUTPATIENT
Start: 2019-06-10 | End: 2019-06-17

## 2019-06-10 RX ORDER — FLUTICASONE PROPIONATE 50 MCG
2 SPRAY, SUSPENSION (ML) NASAL DAILY
Qty: 16 G | Refills: 3 | Status: SHIPPED | OUTPATIENT
Start: 2019-06-10 | End: 2020-04-08 | Stop reason: SDUPTHER

## 2019-10-16 ENCOUNTER — HOSPITAL ENCOUNTER (OUTPATIENT)
Dept: GASTROENTEROLOGY | Facility: HOSPITAL | Age: 64
Setting detail: OUTPATIENT SURGERY
Discharge: HOME | End: 2019-10-16
Attending: INTERNAL MEDICINE

## 2019-11-20 ENCOUNTER — TELEPHONE (OUTPATIENT)
Dept: FAMILY MEDICINE CLINIC | Facility: CLINIC | Age: 64
End: 2019-11-20

## 2019-11-20 DIAGNOSIS — J06.9 UPPER RESPIRATORY TRACT INFECTION, UNSPECIFIED TYPE: Primary | ICD-10-CM

## 2019-11-20 RX ORDER — AZITHROMYCIN 250 MG/1
TABLET, FILM COATED ORAL
Qty: 6 TABLET | Refills: 0 | Status: SHIPPED | OUTPATIENT
Start: 2019-11-20 | End: 2019-11-25

## 2019-11-20 NOTE — TELEPHONE ENCOUNTER
I put in for a Z-Jhonatan  Push fluids  Consider getting a room humidifier    Call us if symptoms continue or increase

## 2019-11-20 NOTE — TELEPHONE ENCOUNTER
Patient called to request an antibiotic be called in for a sore throat, headache and sinus congestion

## 2020-02-29 DIAGNOSIS — E78.2 MIXED HYPERLIPIDEMIA: ICD-10-CM

## 2020-03-01 RX ORDER — SIMVASTATIN 20 MG
TABLET ORAL
Qty: 90 TABLET | Refills: 3 | Status: SHIPPED | OUTPATIENT
Start: 2020-03-01 | End: 2020-12-15

## 2020-03-21 ENCOUNTER — HOSPITAL ENCOUNTER (EMERGENCY)
Facility: HOSPITAL | Age: 65
Discharge: HOME | End: 2020-03-21
Attending: EMERGENCY MEDICINE | Admitting: EMERGENCY MEDICINE
Payer: COMMERCIAL

## 2020-03-21 ENCOUNTER — APPOINTMENT (EMERGENCY)
Dept: CT IMAGING | Facility: HOSPITAL | Age: 65
End: 2020-03-21
Payer: COMMERCIAL

## 2020-03-21 VITALS
HEART RATE: 69 BPM | TEMPERATURE: 98.4 F | BODY MASS INDEX: 28.35 KG/M2 | SYSTOLIC BLOOD PRESSURE: 158 MMHG | HEIGHT: 63 IN | OXYGEN SATURATION: 98 % | RESPIRATION RATE: 16 BRPM | WEIGHT: 160 LBS | DIASTOLIC BLOOD PRESSURE: 68 MMHG

## 2020-03-21 DIAGNOSIS — R07.81 RIB PAIN: Primary | ICD-10-CM

## 2020-03-21 LAB
ALBUMIN SERPL BCP-MCNC: 4.2 G/DL (ref 3.5–5)
ALP SERPL-CCNC: 74 U/L (ref 46–116)
ALT SERPL W P-5'-P-CCNC: 38 U/L (ref 12–78)
ANION GAP SERPL CALCULATED.3IONS-SCNC: 9 MMOL/L (ref 4–13)
APTT PPP: 31 SECONDS (ref 23–37)
AST SERPL W P-5'-P-CCNC: 20 U/L (ref 5–45)
BASOPHILS # BLD AUTO: 0.08 THOUSANDS/ΜL (ref 0–0.1)
BASOPHILS NFR BLD AUTO: 1 % (ref 0–1)
BILIRUB SERPL-MCNC: 1.2 MG/DL (ref 0.2–1)
BUN SERPL-MCNC: 16 MG/DL (ref 5–25)
CALCIUM SERPL-MCNC: 9.4 MG/DL (ref 8.3–10.1)
CHLORIDE SERPL-SCNC: 104 MMOL/L (ref 100–108)
CO2 SERPL-SCNC: 30 MMOL/L (ref 21–32)
CREAT SERPL-MCNC: 0.85 MG/DL (ref 0.6–1.3)
D DIMER PPP FEU-MCNC: 0.66 UG/ML FEU
EOSINOPHIL # BLD AUTO: 0.08 THOUSAND/ΜL (ref 0–0.61)
EOSINOPHIL NFR BLD AUTO: 1 % (ref 0–6)
ERYTHROCYTE [DISTWIDTH] IN BLOOD BY AUTOMATED COUNT: 11.9 % (ref 11.6–15.1)
GFR SERPL CREATININE-BSD FRML MDRD: 72 ML/MIN/1.73SQ M
GLUCOSE SERPL-MCNC: 86 MG/DL (ref 65–140)
HCT VFR BLD AUTO: 46.2 % (ref 34.8–46.1)
HGB BLD-MCNC: 13.5 G/DL (ref 11.5–15.4)
IMM GRANULOCYTES # BLD AUTO: 0.01 THOUSAND/UL (ref 0–0.2)
IMM GRANULOCYTES NFR BLD AUTO: 0 % (ref 0–2)
INR PPP: 0.96 (ref 0.84–1.19)
LIPASE SERPL-CCNC: 165 U/L (ref 73–393)
LYMPHOCYTES # BLD AUTO: 1.52 THOUSANDS/ΜL (ref 0.6–4.47)
LYMPHOCYTES NFR BLD AUTO: 27 % (ref 14–44)
MAGNESIUM SERPL-MCNC: 1.8 MG/DL (ref 1.6–2.6)
MCH RBC QN AUTO: 29.6 PG (ref 26.8–34.3)
MCHC RBC AUTO-ENTMCNC: 29.2 G/DL (ref 31.4–37.4)
MCV RBC AUTO: 101 FL (ref 82–98)
MONOCYTES # BLD AUTO: 0.45 THOUSAND/ΜL (ref 0.17–1.22)
MONOCYTES NFR BLD AUTO: 8 % (ref 4–12)
NEUTROPHILS # BLD AUTO: 3.57 THOUSANDS/ΜL (ref 1.85–7.62)
NEUTS SEG NFR BLD AUTO: 63 % (ref 43–75)
NRBC BLD AUTO-RTO: 0 /100 WBCS
NT-PROBNP SERPL-MCNC: 83 PG/ML
PLATELET # BLD AUTO: 150 THOUSANDS/UL (ref 149–390)
PMV BLD AUTO: 13.3 FL (ref 8.9–12.7)
POTASSIUM SERPL-SCNC: 3.8 MMOL/L (ref 3.5–5.3)
PROT SERPL-MCNC: 7.7 G/DL (ref 6.4–8.2)
PROTHROMBIN TIME: 12.8 SECONDS (ref 11.6–14.5)
RBC # BLD AUTO: 4.56 MILLION/UL (ref 3.81–5.12)
SODIUM SERPL-SCNC: 143 MMOL/L (ref 136–145)
TROPONIN I SERPL-MCNC: <0.02 NG/ML
WBC # BLD AUTO: 5.71 THOUSAND/UL (ref 4.31–10.16)

## 2020-03-21 PROCEDURE — 85025 COMPLETE CBC W/AUTO DIFF WBC: CPT | Performed by: PHYSICIAN ASSISTANT

## 2020-03-21 PROCEDURE — 99284 EMERGENCY DEPT VISIT MOD MDM: CPT | Performed by: PHYSICIAN ASSISTANT

## 2020-03-21 PROCEDURE — 84484 ASSAY OF TROPONIN QUANT: CPT | Performed by: PHYSICIAN ASSISTANT

## 2020-03-21 PROCEDURE — 83880 ASSAY OF NATRIURETIC PEPTIDE: CPT | Performed by: PHYSICIAN ASSISTANT

## 2020-03-21 PROCEDURE — 96360 HYDRATION IV INFUSION INIT: CPT

## 2020-03-21 PROCEDURE — 74177 CT ABD & PELVIS W/CONTRAST: CPT

## 2020-03-21 PROCEDURE — 99284 EMERGENCY DEPT VISIT MOD MDM: CPT

## 2020-03-21 PROCEDURE — 93005 ELECTROCARDIOGRAM TRACING: CPT

## 2020-03-21 PROCEDURE — 36415 COLL VENOUS BLD VENIPUNCTURE: CPT | Performed by: PHYSICIAN ASSISTANT

## 2020-03-21 PROCEDURE — 85730 THROMBOPLASTIN TIME PARTIAL: CPT | Performed by: PHYSICIAN ASSISTANT

## 2020-03-21 PROCEDURE — 71275 CT ANGIOGRAPHY CHEST: CPT

## 2020-03-21 PROCEDURE — 85379 FIBRIN DEGRADATION QUANT: CPT | Performed by: PHYSICIAN ASSISTANT

## 2020-03-21 PROCEDURE — 80053 COMPREHEN METABOLIC PANEL: CPT | Performed by: PHYSICIAN ASSISTANT

## 2020-03-21 PROCEDURE — 83690 ASSAY OF LIPASE: CPT | Performed by: PHYSICIAN ASSISTANT

## 2020-03-21 PROCEDURE — 83735 ASSAY OF MAGNESIUM: CPT | Performed by: PHYSICIAN ASSISTANT

## 2020-03-21 PROCEDURE — 85610 PROTHROMBIN TIME: CPT | Performed by: PHYSICIAN ASSISTANT

## 2020-03-21 RX ADMIN — IOHEXOL 100 ML: 350 INJECTION, SOLUTION INTRAVENOUS at 10:53

## 2020-03-21 RX ADMIN — SODIUM CHLORIDE 1000 ML: 0.9 INJECTION, SOLUTION INTRAVENOUS at 09:45

## 2020-03-21 NOTE — ED PROVIDER NOTES
History  Chief Complaint   Patient presents with    Rib Pain     patient reports bilateral rib pain starting two days ago, states she has a hx of hernia     Patient presents to the emergency department today with family offering a chief complaint lower bilateral rib pain left greater than right  Worse to touch  Denies trauma  No cough no fever  She does admit to nausea  Sometimes pain is worse after meals however she knows she has underlying diagnosis of hiatal hernia  She denies lower abdominal pain  Denies urinary urgency frequency burning  No vomiting  No abnormal stool  Denies back pain  She denies any history of irregular heartbeat however patient appears to have irregular rhythm via auscultation and via radial pulse  Prior to Admission Medications   Prescriptions Last Dose Informant Patient Reported? Taking?    Cholecalciferol (VITAMIN D3) 2000 units capsule   Yes Yes   Sig: Take 1 capsule by mouth daily   albuterol (PROVENTIL HFA,VENTOLIN HFA) 90 mcg/act inhaler   Yes Yes   Sig: Inhale 1 puff every 6 (six) hours   butalbital-acetaminophen-caffeine (FIORICET,ESGIC) -40 mg per tablet   No Yes   Sig: Take 1 tablet by mouth every 4 (four) hours as needed for headaches   fluticasone (FLONASE) 50 mcg/act nasal spray   No Yes   Si sprays into each nostril daily   mometasone (ASMANEX 30 METERED DOSES) 220 MCG/INH inhaler   No Yes   Sig: Inhale 1 puff 2 (two) times a day   pantoprazole (PROTONIX) 40 mg tablet   Yes Yes   Sig: Take 1 tablet by mouth   simvastatin (ZOCOR) 20 mg tablet   No Yes   Sig: TAKE 1 TABLET BY MOUTH  DAILY AT BEDTIME      Facility-Administered Medications: None       Past Medical History:   Diagnosis Date    Allergic reaction     Resolved 2017     Allergic rhinitis     Resolved 2017     Generalized anxiety disorder     Resolved 2017     High cholesterol     Microscopic hematuria     Resolved 2017     Renal calculi     Resolved 2017  Renal colic     Resolved 41/76/5864     Renal cyst, acquired     Resolved 11/24/2017     Restrictive lung disease     Resolved 11/24/2017     Supraventricular tachycardia ([address] )     Resolved 11/24/2017        Past Surgical History:   Procedure Laterality Date    CHOLECYSTECTOMY      CYSTOSCOPY  05/07/2014    Diagnostic  Last assessed 5/7/2014      TONSILLECTOMY      TUBAL LIGATION         Family History   Problem Relation Age of Onset    Cancer Mother     Lung cancer Mother     Heart attack Father     Heart attack Maternal Grandmother     Diabetes Paternal Grandmother     Rheum arthritis Family         Rheumatoid arthritis with rheumatoid factor     No Known Problems Sister     No Known Problems Sister     No Known Problems Sister     Colon cancer Maternal Aunt     Esophageal cancer Maternal Aunt     No Known Problems Maternal Aunt     No Known Problems Paternal Aunt      I have reviewed and agree with the history as documented  E-Cigarette/Vaping    E-Cigarette Use Never User      E-Cigarette/Vaping Substances     Social History     Tobacco Use    Smoking status: Former Smoker    Smokeless tobacco: Never Used   Substance Use Topics    Alcohol use: Yes     Comment: occasional    Drug use: Never       Review of Systems   Constitutional: Negative  Negative for chills and fever  HENT: Negative  Negative for nosebleeds, sore throat and trouble swallowing  Eyes: Negative  Respiratory: Negative  Negative for cough, shortness of breath and wheezing  Cardiovascular: Positive for chest pain  Negative for leg swelling  Gastrointestinal: Positive for nausea  Negative for abdominal pain, blood in stool and vomiting  Endocrine: Negative  Genitourinary: Negative  Musculoskeletal: Negative  Negative for neck stiffness  Skin: Negative  Allergic/Immunologic: Negative  Neurological: Negative    Negative for dizziness, seizures, speech difficulty, weakness, light-headedness, numbness and headaches  Hematological: Negative  Psychiatric/Behavioral: Negative  All other systems reviewed and are negative  Physical Exam  Physical Exam   Constitutional: She is oriented to person, place, and time  Vital signs are normal  She appears well-developed and well-nourished  She does not have a sickly appearance  She does not appear ill  No distress  HENT:   Right Ear: External ear normal  No swelling  Tympanic membrane is not bulging  Left Ear: External ear normal  No swelling  Tympanic membrane is not bulging  Nose: Nose normal    Mouth/Throat: Oropharynx is clear and moist  No oropharyngeal exudate  Eyes: Pupils are equal, round, and reactive to light  Conjunctivae, EOM and lids are normal    Neck: Normal range of motion  Neck supple  No JVD present  No tracheal deviation, no edema and normal range of motion present  No thyromegaly present  Cardiovascular: Normal rate, normal heart sounds, intact distal pulses and normal pulses  Exam reveals no gallop and no friction rub  No murmur heard  Irregular rhythm   Pulmonary/Chest: Effort normal and breath sounds normal  No stridor  No respiratory distress  She has no wheezes  She has no rales  She exhibits tenderness  Abdominal: Soft  Bowel sounds are normal  She exhibits no distension and no mass  There is no tenderness  There is no rebound, no guarding and negative Limon's sign  No hernia  Musculoskeletal: Normal range of motion  She exhibits no edema or tenderness  Arms:  Patient does exhibit minimal point tenderness in the region of the lower ribs left midclavicular line  There is no evidence of changes in the skin suggesting rash contusion  Lymphadenopathy:     She has no cervical adenopathy  Neurological: She is alert and oriented to person, place, and time  She has normal strength and normal reflexes  No cranial nerve deficit or sensory deficit  GCS eye subscore is 4   GCS verbal subscore is 5  GCS motor subscore is 6  Skin: Skin is warm and dry  Capillary refill takes less than 2 seconds  No rash noted  She is not diaphoretic  No erythema  No pallor  Psychiatric: She has a normal mood and affect  Her speech is normal and behavior is normal    Vitals reviewed        Vital Signs  ED Triage Vitals [03/21/20 0923]   Temperature Pulse Respirations Blood Pressure SpO2   98 4 °F (36 9 °C) 87 16 141/89 98 %      Temp Source Heart Rate Source Patient Position - Orthostatic VS BP Location FiO2 (%)   Temporal Monitor Sitting Left arm --      Pain Score       2           Vitals:    03/21/20 0923 03/21/20 0945 03/21/20 1030   BP: 141/89 168/77 158/69   Pulse: 87 77 71   Patient Position - Orthostatic VS: Sitting Sitting Sitting         Visual Acuity      ED Medications  Medications   sodium chloride 0 9 % bolus 1,000 mL (1,000 mL Intravenous New Bag 3/21/20 0945)   iohexol (OMNIPAQUE) 350 MG/ML injection (SINGLE-DOSE) 100 mL (100 mL Intravenous Given 3/21/20 1053)       Diagnostic Studies  Results Reviewed     Procedure Component Value Units Date/Time    D-Dimer [[id number]]  (Abnormal) Collected:  03/21/20 0941    Lab Status:  Final result Specimen:  Blood from Arm, Left Updated:  03/21/20 1020     D-Dimer, Quant 0 66 ug/ml FEU     NT-BNP PRO [[id number]]  (Normal) Collected:  03/21/20 0941    Lab Status:  Final result Specimen:  Blood from Arm, Left Updated:  03/21/20 1013     NT-proBNP 83 pg/mL     Lipase [[id number]]  (Normal) Collected:  03/21/20 0941    Lab Status:  Final result Specimen:  Blood from Arm, Left Updated:  03/21/20 1013     Lipase 165 u/L     Magnesium [[id number]]  (Normal) Collected:  03/21/20 0941    Lab Status:  Final result Specimen:  Blood from Arm, Left Updated:  03/21/20 1013     Magnesium 1 8 mg/dL     Protime-INR [[id number]]  (Normal) Collected:  03/21/20 0941    Lab Status:  Final result Specimen:  Blood from Arm, Left Updated:  03/21/20 1012     Protime 12 8 seconds      INR 0 96 APTT [[id number]]  (Normal) Collected:  03/21/20 0941    Lab Status:  Final result Specimen:  Blood from Arm, Left Updated:  03/21/20 1012     PTT 31 seconds     Troponin I [[id number]]  (Normal) Collected:  03/21/20 0941    Lab Status:  Final result Specimen:  Blood from Arm, Left Updated:  03/21/20 1009     Troponin I <0 02 ng/mL     CBC and differential [[id number]]  (Abnormal) Collected:  03/21/20 0941    Lab Status:  Final result Specimen:  Blood from Arm, Left Updated:  03/21/20 1009     WBC 5 71 Thousand/uL      RBC 4 56 Million/uL      Hemoglobin 13 5 g/dL      Hematocrit 46 2 %       fL      MCH 29 6 pg      MCHC 29 2 g/dL      RDW 11 9 %      MPV 13 3 fL      Platelets 937 Thousands/uL      nRBC 0 /100 WBCs      Neutrophils Relative 63 %      Immat GRANS % 0 %      Lymphocytes Relative 27 %      Monocytes Relative 8 %      Eosinophils Relative 1 %      Basophils Relative 1 %      Neutrophils Absolute 3 57 Thousands/µL      Immature Grans Absolute 0 01 Thousand/uL      Lymphocytes Absolute 1 52 Thousands/µL      Monocytes Absolute 0 45 Thousand/µL      Eosinophils Absolute 0 08 Thousand/µL      Basophils Absolute 0 08 Thousands/µL     Comprehensive metabolic panel [[id number]]  (Abnormal) Collected:  03/21/20 0941    Lab Status:  Final result Specimen:  Blood from Arm, Left Updated:  03/21/20 1008     Sodium 143 mmol/L      Potassium 3 8 mmol/L      Chloride 104 mmol/L      CO2 30 mmol/L      ANION GAP 9 mmol/L      BUN 16 mg/dL      Creatinine 0 85 mg/dL      Glucose 86 mg/dL      Calcium 9 4 mg/dL      AST 20 U/L      ALT 38 U/L      Alkaline Phosphatase 74 U/L      Total Protein 7 7 g/dL      Albumin 4 2 g/dL      Total Bilirubin 1 20 mg/dL      eGFR 72 ml/min/1 73sq m     Narrative:       [place] guidelines for Chronic Kidney Disease (CKD):     Stage 1 with normal or high GFR (GFR > 90 mL/min/1 73 square meters)    Stage 2 Mild CKD (GFR = 60-89 mL/min/1 73 square meters)    Stage 3A Moderate CKD (GFR = 45-59 mL/min/1 73 square meters)    Stage 3B Moderate CKD (GFR = 30-44 mL/min/1 73 square meters)    Stage 4 Severe CKD (GFR = 15-29 mL/min/1 73 square meters)    Stage 5 End Stage CKD (GFR <15 mL/min/1 73 square meters)  Note: GFR calculation is accurate only with a steady state creatinine                 PE Study with CT abdomen & pelvis with contrast   Final Result by Barbara Crocker MD (03/21 1108)      1  No PE  2  No findings to indicate infection                          Workstation performed: [id number]                    Procedures  ECG 12 Lead Documentation Only  Date/Time: 3/21/2020 9:43 AM  Performed by: Michelle Butler PA-C  Authorized by: Michelle Butler PA-C     Indications / Diagnosis:  Chest pain  ECG reviewed by me, the ED Provider: yes    Patient location:  ED  Previous ECG:     Comparison to cardiac monitor: Yes    Interpretation:     Interpretation: non-specific    Rate:     ECG rate:  83    ECG rate assessment: normal    Rhythm:     Rhythm: sinus rhythm    Ectopy:     Ectopy: bigeminy and PAC    QRS:     QRS axis:  Normal    QRS intervals:  Normal  Conduction:     Conduction: normal    ST segments:     ST segments:  Normal  T waves:     T waves: normal    Comments:      Normal sinus rhythm with frequent PACs at times resembling bigeminy             ED Course  ED Course as of Mar 21 1113   Sat Mar 21, 2020   0928 Blood Pressure: 141/89   0928 Temperature: 98 4 °F (36 9 °C)   0928 Pulse: 87   0928 Respirations: 16   0928 SpO2: 98 %   1013 Lipase: 165   1013 NT-proBNP: 83   1013 INR: 0 96   1013 Magnesium: 1 8   1013 PTT: 31   1013 Troponin I: <0 02   1013 WBC: 5 71   1013 Hemoglobin: 13 5   1013 Platelet Count: 462   1013 Sodium: 143   1013 Potassium: 3 8   1013 Creatinine: 0 85   1013 Glucose, Random: 86   1013 Calcium: 9 4   1013 ALT: 38   1013 Alkaline Phosphatase: 74   1014 eGFR: 72   1014 Total Protein: 7 7   1014 Alkaline Phosphatase: 74   1051 Pt at CT      1109 Awaiting CT read      1111 IMPRESSION:     1  No PE  2  No findings to indicate infection  HEART Risk Score      Most Recent Value   Heart Score Risk Calculator   History  0 Filed at: 03/21/2020 0944   ECG  0 Filed at: 03/21/2020 0944   Age  2 Filed at: 03/21/2020 0944   Risk Factors  1 Filed at: 03/21/2020 0944   Troponin  0 Filed at: 03/21/2020 0944   HEART Score  3 Filed at: 03/21/2020 1045                              MDM      Disposition  Final diagnoses:   Rib pain     Time reflects when diagnosis was documented in both MDM as applicable and the Disposition within this note     Time User Action Codes Description Comment    3/21/2020 11:13 AM Dieudonne ANTOINE Add [R07 81] Rib pain       ED Disposition     ED Disposition Condition Date/Time Comment    Discharge Stable Sat Mar 21, 2020 11:13 AM Silvino Martinez discharge to home/self care  Follow-up Information     Follow up With Specialties Details Why [address], DO Family Medicine Schedule an appointment as soon as possible for a visit  As needed [address]  [facility] [place] [address] [id number] [phone]            Patient's Medications   Discharge Prescriptions    No medications on file     No discharge procedures on file      PDMP Review     None          ED Provider  Electronically Signed by           Kirstin Murphy PA-C  03/21/20 9779

## 2020-03-21 NOTE — ED NOTES
Patient transported to [facility], [address] [place] [address]  03/21/20 4770

## 2020-03-22 LAB
ATRIAL RATE: 83 BPM
P AXIS: 62 DEGREES
PR INTERVAL: 142 MS
QRS AXIS: 50 DEGREES
QRSD INTERVAL: 84 MS
QT INTERVAL: 372 MS
QTC INTERVAL: 437 MS
T WAVE AXIS: 58 DEGREES
VENTRICULAR RATE: 83 BPM

## 2020-03-22 PROCEDURE — 93010 ELECTROCARDIOGRAM REPORT: CPT | Performed by: INTERNAL MEDICINE

## 2020-04-08 DIAGNOSIS — J01.90 ACUTE SINUSITIS, RECURRENCE NOT SPECIFIED, UNSPECIFIED LOCATION: ICD-10-CM

## 2020-04-08 RX ORDER — FLUTICASONE PROPIONATE 50 MCG
2 SPRAY, SUSPENSION (ML) NASAL DAILY
Qty: 3 BOTTLE | Refills: 3 | Status: SHIPPED | OUTPATIENT
Start: 2020-04-08 | End: 2022-07-25

## 2020-12-15 DIAGNOSIS — E78.2 MIXED HYPERLIPIDEMIA: ICD-10-CM

## 2020-12-15 RX ORDER — SIMVASTATIN 20 MG
TABLET ORAL
Qty: 90 TABLET | Refills: 3 | Status: SHIPPED | OUTPATIENT
Start: 2020-12-15 | End: 2021-10-26

## 2021-01-26 ENCOUNTER — HOSPITAL ENCOUNTER (EMERGENCY)
Facility: HOSPITAL | Age: 66
Discharge: HOME | End: 2021-01-26
Attending: EMERGENCY MEDICINE | Admitting: EMERGENCY MEDICINE
Payer: COMMERCIAL

## 2021-01-26 ENCOUNTER — APPOINTMENT (EMERGENCY)
Dept: RADIOLOGY | Facility: HOSPITAL | Age: 66
End: 2021-01-26
Payer: COMMERCIAL

## 2021-01-26 ENCOUNTER — APPOINTMENT (EMERGENCY)
Dept: CT IMAGING | Facility: HOSPITAL | Age: 66
End: 2021-01-26
Payer: COMMERCIAL

## 2021-01-26 VITALS
SYSTOLIC BLOOD PRESSURE: 152 MMHG | HEART RATE: 74 BPM | OXYGEN SATURATION: 99 % | WEIGHT: 169.31 LBS | TEMPERATURE: 98.2 F | RESPIRATION RATE: 19 BRPM | BODY MASS INDEX: 29.99 KG/M2 | DIASTOLIC BLOOD PRESSURE: 83 MMHG

## 2021-01-26 DIAGNOSIS — E86.0 DEHYDRATION: ICD-10-CM

## 2021-01-26 DIAGNOSIS — R55 NEAR SYNCOPE: Primary | ICD-10-CM

## 2021-01-26 DIAGNOSIS — I49.1 PREMATURE ATRIAL CONTRACTIONS: ICD-10-CM

## 2021-01-26 LAB
ANION GAP SERPL CALCULATED.3IONS-SCNC: 11 MMOL/L (ref 4–13)
BASOPHILS # BLD AUTO: 0.08 THOUSANDS/ΜL (ref 0–0.1)
BASOPHILS NFR BLD AUTO: 1 % (ref 0–1)
BUN SERPL-MCNC: 17 MG/DL (ref 5–25)
CALCIUM SERPL-MCNC: 9.3 MG/DL (ref 8.3–10.1)
CHLORIDE SERPL-SCNC: 105 MMOL/L (ref 100–108)
CO2 SERPL-SCNC: 26 MMOL/L (ref 21–32)
CREAT SERPL-MCNC: 1.2 MG/DL (ref 0.6–1.3)
D DIMER PPP FEU-MCNC: 0.69 UG/ML FEU
EOSINOPHIL # BLD AUTO: 0.09 THOUSAND/ΜL (ref 0–0.61)
EOSINOPHIL NFR BLD AUTO: 1 % (ref 0–6)
ERYTHROCYTE [DISTWIDTH] IN BLOOD BY AUTOMATED COUNT: 11.9 % (ref 11.6–15.1)
FLUAV RNA RESP QL NAA+PROBE: NEGATIVE
FLUBV RNA RESP QL NAA+PROBE: NEGATIVE
GFR SERPL CREATININE-BSD FRML MDRD: 47 ML/MIN/1.73SQ M
GLUCOSE SERPL-MCNC: 101 MG/DL (ref 65–140)
GLUCOSE SERPL-MCNC: 130 MG/DL (ref 65–140)
HCT VFR BLD AUTO: 45.5 % (ref 34.8–46.1)
HGB BLD-MCNC: 14.8 G/DL (ref 11.5–15.4)
IMM GRANULOCYTES # BLD AUTO: 0.03 THOUSAND/UL (ref 0–0.2)
IMM GRANULOCYTES NFR BLD AUTO: 0 % (ref 0–2)
LACTATE SERPL-SCNC: 1.4 MMOL/L (ref 0.5–2)
LACTATE SERPL-SCNC: 2.2 MMOL/L (ref 0.5–2)
LYMPHOCYTES # BLD AUTO: 1.88 THOUSANDS/ΜL (ref 0.6–4.47)
LYMPHOCYTES NFR BLD AUTO: 28 % (ref 14–44)
MCH RBC QN AUTO: 28.8 PG (ref 26.8–34.3)
MCHC RBC AUTO-ENTMCNC: 32.5 G/DL (ref 31.4–37.4)
MCV RBC AUTO: 89 FL (ref 82–98)
MONOCYTES # BLD AUTO: 0.38 THOUSAND/ΜL (ref 0.17–1.22)
MONOCYTES NFR BLD AUTO: 6 % (ref 4–12)
NEUTROPHILS # BLD AUTO: 4.25 THOUSANDS/ΜL (ref 1.85–7.62)
NEUTS SEG NFR BLD AUTO: 64 % (ref 43–75)
NRBC BLD AUTO-RTO: 0 /100 WBCS
PLATELET # BLD AUTO: 162 THOUSANDS/UL (ref 149–390)
PMV BLD AUTO: 12.5 FL (ref 8.9–12.7)
POTASSIUM SERPL-SCNC: 3.4 MMOL/L (ref 3.5–5.3)
RBC # BLD AUTO: 5.13 MILLION/UL (ref 3.81–5.12)
RSV RNA RESP QL NAA+PROBE: NEGATIVE
SARS-COV-2 RNA RESP QL NAA+PROBE: NEGATIVE
SODIUM SERPL-SCNC: 142 MMOL/L (ref 136–145)
TROPONIN I SERPL-MCNC: <0.02 NG/ML
WBC # BLD AUTO: 6.71 THOUSAND/UL (ref 4.31–10.16)

## 2021-01-26 PROCEDURE — 84484 ASSAY OF TROPONIN QUANT: CPT | Performed by: EMERGENCY MEDICINE

## 2021-01-26 PROCEDURE — 99284 EMERGENCY DEPT VISIT MOD MDM: CPT | Performed by: EMERGENCY MEDICINE

## 2021-01-26 PROCEDURE — 82948 REAGENT STRIP/BLOOD GLUCOSE: CPT

## 2021-01-26 PROCEDURE — 93005 ELECTROCARDIOGRAM TRACING: CPT

## 2021-01-26 PROCEDURE — 96365 THER/PROPH/DIAG IV INF INIT: CPT

## 2021-01-26 PROCEDURE — 99285 EMERGENCY DEPT VISIT HI MDM: CPT

## 2021-01-26 PROCEDURE — G1004 CDSM NDSC: HCPCS

## 2021-01-26 PROCEDURE — 36415 COLL VENOUS BLD VENIPUNCTURE: CPT | Performed by: EMERGENCY MEDICINE

## 2021-01-26 PROCEDURE — 96366 THER/PROPH/DIAG IV INF ADDON: CPT

## 2021-01-26 PROCEDURE — 0241U HB NFCT DS VIR RESP RNA 4 TRGT: CPT | Performed by: EMERGENCY MEDICINE

## 2021-01-26 PROCEDURE — 71045 X-RAY EXAM CHEST 1 VIEW: CPT

## 2021-01-26 PROCEDURE — 71275 CT ANGIOGRAPHY CHEST: CPT

## 2021-01-26 PROCEDURE — 83605 ASSAY OF LACTIC ACID: CPT | Performed by: EMERGENCY MEDICINE

## 2021-01-26 PROCEDURE — 85379 FIBRIN DEGRADATION QUANT: CPT | Performed by: EMERGENCY MEDICINE

## 2021-01-26 PROCEDURE — 96361 HYDRATE IV INFUSION ADD-ON: CPT

## 2021-01-26 PROCEDURE — 80048 BASIC METABOLIC PNL TOTAL CA: CPT | Performed by: EMERGENCY MEDICINE

## 2021-01-26 PROCEDURE — 85025 COMPLETE CBC W/AUTO DIFF WBC: CPT | Performed by: EMERGENCY MEDICINE

## 2021-01-26 RX ORDER — SODIUM CHLORIDE, SODIUM GLUCONATE, SODIUM ACETATE, POTASSIUM CHLORIDE, MAGNESIUM CHLORIDE, SODIUM PHOSPHATE, DIBASIC, AND POTASSIUM PHOSPHATE .53; .5; .37; .037; .03; .012; .00082 G/100ML; G/100ML; G/100ML; G/100ML; G/100ML; G/100ML; G/100ML
1000 INJECTION, SOLUTION INTRAVENOUS ONCE
Status: COMPLETED | OUTPATIENT
Start: 2021-01-26 | End: 2021-01-26

## 2021-01-26 RX ADMIN — SODIUM CHLORIDE 1000 ML: 0.9 INJECTION, SOLUTION INTRAVENOUS at 09:31

## 2021-01-26 RX ADMIN — SODIUM CHLORIDE, SODIUM GLUCONATE, SODIUM ACETATE, POTASSIUM CHLORIDE, MAGNESIUM CHLORIDE, SODIUM PHOSPHATE, DIBASIC, AND POTASSIUM PHOSPHATE 1000 ML: .53; .5; .37; .037; .03; .012; .00082 INJECTION, SOLUTION INTRAVENOUS at 10:42

## 2021-01-26 RX ADMIN — IOHEXOL 85 ML: 350 INJECTION, SOLUTION INTRAVENOUS at 10:12

## 2021-01-26 NOTE — DISCHARGE INSTRUCTIONS
Please keep track of your fluid intake (keep a small diary with the type of fluid and how much fluid you are drinking)  Try to substitute Ice Tea with water or vitamin water, or gatorade  Follow up with your primary care physician for re-evaluation of symptoms, and please reach out to cardiology  We do not believe your extra heart beats (premature atrial contractions) are of any concern, however, since you had hypotension today, you may benefit from a cardiac echocardiogram and any further evaluation by cardiology

## 2021-01-26 NOTE — ED PROVIDER NOTES
EMERGENCY DEPARTMENT ENCOUNTER NOTE    This note has been generated using a voice recognition software  There may be typographic, grammatic, or word substitution errors that have escaped editorial review  ? CHIEF COMPLAINT  Chief Complaint   Patient presents with    Dizziness     pt c/o dizziness and weakness and one episode of vomiting starting at 0730 this morning  STEFFANIE Velazquez is a 77 y o  female with past medical history of hyperlipidemia, anxiety, preacher atrial contractions, presenting with near syncope  Patient reports feeling overall well last night although she did go to bed a little earlier  This morning, she awakened and went downstairs to the kitchen, however, became very dizzy and lightheaded, needing to sit down  There were no palpitations or shortness of breath  Patient did feel somewhat short of breath  Patient did not lose consciousness  She had no room spinning sensation/vertigo  She did have 1 episode of vomiting when she was feeling very lightheaded  Symptoms persisted prompting patient to seek medical attention  On review of systems, patient has not had any recent illnesses, no fevers or chills  She has no headache  There is no chest pain or palpitations  Nausea and 1 episode of emesis occurred this morning and patient is no longer nauseated  She has not had any abdominal pain  She has not had any diarrhea  She reports eating and drinking without difficulty and reports she has been hydrating well  Patient does report drinking sugar free iced tea  No personal history of venous thromboembolic disease  No recent immobilization  No known sick contacts  REVIEW OF SYSTEMS  Constitutional: ?Denies fevers, chills  Eyes: No changes in vision  ENT: No runny nose, no sore throat  CV: No chest pain, reports history of frequent skipped beats, has been told by Cardiology this is nothing to worry about      Resp:  No cough, however, patient did have shortness of breath while feeling lightheaded  GI: No abdominal pain or diarrhea  : No urinary frequency, urgency, or hematuria  Skin: No new rashes  Neuro: No headaches  Ten systems were reviewed otherwise were unremarkable    PAST MEDICAL HISTORY  Past Medical History:   Diagnosis Date    Allergic reaction     Resolved 11/24/2017     Allergic rhinitis     Resolved 11/24/2017     Generalized anxiety disorder     Resolved 11/24/2017     High cholesterol     Microscopic hematuria     Resolved 11/24/2017     Renal calculi     Resolved 68/38/2719     Renal colic     Resolved 45/16/1201     Renal cyst, acquired     Resolved 11/24/2017     Restrictive lung disease     Resolved 11/24/2017     Supraventricular tachycardia ([address] )     Resolved 11/24/2017        SURGICAL HISTORY  Past Surgical History:   Procedure Laterality Date    CHOLECYSTECTOMY      CYSTOSCOPY  05/07/2014    Diagnostic  Last assessed 5/7/2014      TONSILLECTOMY      TUBAL LIGATION         FAMILY HISTORY  Family History   Problem Relation Age of Onset    Cancer Mother     Lung cancer Mother     Heart attack Father     Heart attack Maternal Grandmother     Diabetes Paternal Grandmother     Rheum arthritis Family         Rheumatoid arthritis with rheumatoid factor     No Known Problems Sister     No Known Problems Sister     No Known Problems Sister     Colon cancer Maternal Aunt     Esophageal cancer Maternal Aunt     No Known Problems Maternal Aunt     No Known Problems Paternal Aunt         CURRENT MEDICATIONS  No current facility-administered medications on file prior to encounter        Current Outpatient Medications on File Prior to Encounter   Medication Sig    albuterol (PROVENTIL HFA,VENTOLIN HFA) 90 mcg/act inhaler Inhale 1 puff every 6 (six) hours    butalbital-acetaminophen-caffeine (FIORICET,ESGIC) -40 mg per tablet Take 1 tablet by mouth every 4 (four) hours as needed for headaches    Cholecalciferol (VITAMIN D3) 2000 units capsule Take 1 capsule by mouth daily    fluticasone (FLONASE) 50 mcg/act nasal spray 2 sprays into each nostril daily    mometasone (ASMANEX 30 METERED DOSES) 220 MCG/INH inhaler Inhale 1 puff 2 (two) times a day    pantoprazole (PROTONIX) 40 mg tablet Take 1 tablet by mouth    simvastatin (ZOCOR) 20 mg tablet TAKE 1 TABLET BY MOUTH  DAILY AT BEDTIME       ALLERGIES  Allergies   Allergen Reactions    Codeine      Other reaction(s):  Other (See Comments)  headaches       SOCIAL HISTORY  Social History     Socioeconomic History    Marital status: /Civil Union     Spouse name: None    Number of children: None    Years of education: None    Highest education level: None   Occupational History    None   Social Needs    Financial resource strain: None    Food insecurity     Worry: None     Inability: None    Transportation needs     Medical: None     Non-medical: None   Tobacco Use    Smoking status: Former Smoker    Smokeless tobacco: Never Used   Substance and Sexual Activity    Alcohol use: Yes     Comment: occasional    Drug use: Never    Sexual activity: None   Lifestyle    Physical activity     Days per week: None     Minutes per session: None    Stress: None   Relationships    Social connections     Talks on phone: None     Gets together: None     Attends Voodoo service: None     Active member of club or organization: None     Attends meetings of clubs or organizations: None     Relationship status: None    Intimate partner violence     Fear of current or ex partner: None     Emotionally abused: None     Physically abused: None     Forced sexual activity: None   Other Topics Concern    None   Social History Narrative    None       PHYSICAL EXAM    /64 (BP Location: Left arm)   Pulse 99   Temp 98 2 °F (36 8 °C) (Temporal)   Resp 18   Wt 76 8 kg (169 lb 5 oz)   SpO2 97%   BMI 29 99 kg/m²   Vital signs and nursing notes reviewed  CONSTITUTIONAL: female appearing stated age resting in bed, in no acute distress  HEENT: atraumatic, normocephalic  Sclera anicteric, conjunctiva are not injected  HEENT exam is limited due to current COVID-19 pandemic, patient is wearing a mask  CARDIOVASCULAR/CHEST: RRR, no M/R/G  2+ radial pulses  PULMONARY: Breathing comfortably on RA  Breath sounds are equal and clear to auscultation  ABDOMEN: non-distended  BS present, normoactive  Non-tender  MSK: moves all extremities, no deformities, no peripheral edema, no calf asymmetry  NEURO: Awake, alert, and oriented x 3  Face symmetric  Moves all extremities spontaneously  No focal neurologic deficits  SKIN: Warm, appears well-perfused  MENTAL STATUS: Normal affect      ? LABS AND TESTS    Results Reviewed     Procedure Component Value Units Date/Time    Lactic acid 2 Hours [[id number]]  (Normal) Collected: 01/26/21 1154    Lab Status: Final result Specimen: Blood from Arm, Right Updated: 01/26/21 1216     LACTIC ACID 1 4 mmol/L     Narrative:      Result may be elevated if tourniquet was used during collection  COVID19, Influenza A/B, RSV PCR, [facility] [[id number]]  (Normal) Collected: 01/26/21 0921    Lab Status: Final result Specimen: Nares from Nasopharyngeal Swab Updated: 01/26/21 1007     SARS-CoV-2 Negative     INFLUENZA A PCR Negative     INFLUENZA B PCR Negative     RSV PCR Negative    Narrative: This test has been authorized by FDA under an EUA (Emergency Use Assay) for use by authorized laboratories  Clinical caution and judgement should be used with the interpretation of these results with consideration of the clinical impression and other laboratory testing  Testing reported as "Positive" or "Negative" has been proven to be accurate according to standard laboratory validation requirements  All testing is performed with control materials showing appropriate reactivity at standard intervals      Lactic acid [[id number]]  (Abnormal) Collected: 01/26/21 0921    Lab Status: Final result Specimen: Blood from Arm, Right Updated: 01/26/21 0950     LACTIC ACID 2 2 mmol/L     Narrative:      Result may be elevated if tourniquet was used during collection      Basic metabolic panel [[id number]]  (Abnormal) Collected: 01/26/21 0848    Lab Status: Final result Specimen: Blood from Arm, Right Updated: 01/26/21 0950     Sodium 142 mmol/L      Potassium 3 4 mmol/L      Chloride 105 mmol/L      CO2 26 mmol/L      ANION GAP 11 mmol/L      BUN 17 mg/dL      Creatinine 1 20 mg/dL      Glucose 130 mg/dL      Calcium 9 3 mg/dL      eGFR 47 ml/min/1 73sq m     Narrative:      [place] guidelines for Chronic Kidney Disease (CKD):     Stage 1 with normal or high GFR (GFR > 90 mL/min/1 73 square meters)    Stage 2 Mild CKD (GFR = 60-89 mL/min/1 73 square meters)    Stage 3A Moderate CKD (GFR = 45-59 mL/min/1 73 square meters)    Stage 3B Moderate CKD (GFR = 30-44 mL/min/1 73 square meters)    Stage 4 Severe CKD (GFR = 15-29 mL/min/1 73 square meters)    Stage 5 End Stage CKD (GFR <15 mL/min/1 73 square meters)  Note: GFR calculation is accurate only with a steady state creatinine    Troponin I [[id number]]  (Normal) Collected: 01/26/21 0848    Lab Status: Final result Specimen: Blood from Arm, Right Updated: 01/26/21 0946     Troponin I <0 02 ng/mL     D-Dimer [[id number]]  (Abnormal) Collected: 01/26/21 0848    Lab Status: Final result Specimen: Blood from Arm, Right Updated: 01/26/21 0943     D-Dimer, Quant 0 69 ug/ml FEU     CBC and differential [[id number]]  (Abnormal) Collected: 01/26/21 0848    Lab Status: Final result Specimen: Blood from Arm, Right Updated: 01/26/21 0928     WBC 6 71 Thousand/uL      RBC 5 13 Million/uL      Hemoglobin 14 8 g/dL      Hematocrit 45 5 %      MCV 89 fL      MCH 28 8 pg      MCHC 32 5 g/dL      RDW 11 9 %      MPV 12 5 fL      Platelets 511 Thousands/uL      nRBC 0 /100 WBCs      Neutrophils Relative 64 %      Immat GRANS % 0 % Lymphocytes Relative 28 %      Monocytes Relative 6 %      Eosinophils Relative 1 %      Basophils Relative 1 %      Neutrophils Absolute 4 25 Thousands/µL      Immature Grans Absolute 0 03 Thousand/uL      Lymphocytes Absolute 1 88 Thousands/µL      Monocytes Absolute 0 38 Thousand/µL      Eosinophils Absolute 0 09 Thousand/µL      Basophils Absolute 0 08 Thousands/µL     Fingerstick Glucose (POCT) [[id number]]  (Normal) Collected: 01/26/21 0915    Lab Status: Final result Updated: 01/26/21 0924     POC Glucose 101 mg/dl           CTA ED chest PE Study   Final Result by Kimberly Lara MD (01/26 1022)      No evidence of pulmonary embolus  Workstation performed: [id number]         XR chest 1 view portable   Final Result by Maurilio Zamorano MD (01/26 1763)      No acute cardiopulmonary disease  Workstation performed: [id number]             ED COURSE & MEDICAL DECISION MAKING  ECG 12 Lead Documentation Only    Date/Time: 1/26/2021 9:33 AM  Performed by: Gail Donald MD  Authorized by: Gail Donald MD     Comments:      Normal sinus rhythm, ventricular rate 85, ND interval 146, QRS 80, , normal axis, no ST/T-wave changes to suggest ischemia, no STEMI  Current EKG does not capture any atrial premature contractions which are present on telemetry  No significant change from prior EKG dated 03/21/2020                 Medications   sodium chloride 0 9 % bolus 1,000 mL (0 mL Intravenous Stopped 1/26/21 1031)   multi-electrolyte (ISOLYTE-S PH 7 4) bolus 1,000 mL (0 mL Intravenous Stopped 1/26/21 1227)   iohexol (OMNIPAQUE) 350 MG/ML injection (SINGLE-DOSE) 85 mL (85 mL Intravenous Given 1/26/21 82)     49-year-old female presenting with near syncope and 1 episode of emesis  Vital signs reviewed, afebrile, borderline tachycardic, not hypotensive, not hypoxic    Differential diagnosis includes near syncope due to dehydration/orthostatic hypotension, vasovagal event, transient dysrhythmia, underlying infection, PE, acute coronary syndrome/myocardial infarction, versus another etiology of symptoms  Clinically, patient appears mildly dehydrated given mild tachycardia  EKG obtained, is reviewed by me, as above, no ischemic changes, EKG does not capture premature atrial contractions seen on the telemetry, patient has no more than 10% APC burden to my review of her telemetry  Orthostatic VS:  01/26/21 0929 Standing 97 18 103/64 97 % None (Room air)   01/26/21 0928 Sitting 79 18 133/68 96 % None (Room air)   01/26/21 0927 Laying down 84 18 112/66 97 % None (Room air)     Orthostatic vital signs do reveal a drop in SBP of 30 mmHg between sitting up and standing up, consistent with orthostatic hypotension  1 L normal saline started  Labs obtained, revealing creatinine of 1 20, which is increased from prior of 0 85, there is mild hypokalemia of 3 4  Troponin x1 is within normal limits  CBC is without leukocytosis, without anemia or thrombocytopenia  Lactate is elevated at 2 2  Second L of crystalloid started (isolyte this time)  Since patient was short of breath during the event, I did obtain D-dimer and it was weakly positive at 0 69  CT chest angiography obtained, revealing no evidence of pulmonary embolism or other acute abnormalities  COVID-19 PCR is negative  After 2nd L of crystalloid, patient is feeling improved  Her lactate normalized at 1 4  I suspect patient has become dehydrated, am wondering if a part of it is due to the sugar free Iced tea which does have a sugar substitute which could act as an osmotic agent and could result in dehydration rather than hydration  Recommend keeping track of type of fluid and amount of fluid consumed during the day to keep track of fluid intake  Patient may benefit from a visit with cardiology for a formal echo and re-evaluation  Return to ER as needed or if symptoms recur or new symptoms develop         MDM  Number of Diagnoses or Management Options  Dehydration: new and requires workup  Near syncope: new and requires workup  Premature atrial contractions: minor     Amount and/or Complexity of Data Reviewed  Clinical lab tests: ordered and reviewed  Tests in the radiology section of CPT®: ordered and reviewed  Tests in the medicine section of CPT®: ordered and reviewed  Review and summarize past medical records: yes  Independent visualization of images, tracings, or specimens: yes    Risk of Complications, Morbidity, and/or Mortality  Presenting problems: high  Diagnostic procedures: high  Management options: moderate    Patient Progress  Patient progress: improved      CLINICAL IMPRESSION  Final diagnoses:   Near syncope   Premature atrial contractions   Dehydration       DISPOSITION  Time reflects when diagnosis was documented in both MDM as applicable and the Disposition within this note     Time User Action Codes Description Comment    1/26/2021  1:00 PM Tamera Garcia [R55] Near syncope     1/26/2021  1:00 PM Tamera Garcia [I49 1] Premature atrial contractions     1/26/2021  1:00 PM Tamera Garcia [E86 0] Dehydration       ED Disposition     ED Disposition Condition Date/Time Comment    Discharge Stable Tue Jan 26, 2021  1:00 PM Debora Crimes discharge to home/self care              Follow-up Information     Follow up With Specialties Details Why Contact Info Additional [facility] Family Medicine Call in 1 day Emergency Room Follow-up [address]  [place] [address] [id number] Cardiology Call in 1 day Emergency Room Follow-up [facility] [id number]  [facility] [place], [address] [place] [address], [address] Linda Mendoza MD  01/28/21 1310

## 2021-01-27 ENCOUNTER — OFFICE VISIT (OUTPATIENT)
Dept: FAMILY MEDICINE CLINIC | Facility: CLINIC | Age: 66
End: 2021-01-27
Payer: COMMERCIAL

## 2021-01-27 VITALS
HEIGHT: 63 IN | TEMPERATURE: 97.3 F | BODY MASS INDEX: 30.12 KG/M2 | DIASTOLIC BLOOD PRESSURE: 88 MMHG | WEIGHT: 170 LBS | SYSTOLIC BLOOD PRESSURE: 158 MMHG

## 2021-01-27 DIAGNOSIS — Z13.31 DEPRESSION SCREENING NEGATIVE: ICD-10-CM

## 2021-01-27 DIAGNOSIS — R42 POSTURAL DIZZINESS WITH PRESYNCOPE: Primary | ICD-10-CM

## 2021-01-27 DIAGNOSIS — I95.1 ORTHOSTATIC HYPOTENSION: ICD-10-CM

## 2021-01-27 DIAGNOSIS — Z09 HOSPITAL DISCHARGE FOLLOW-UP: ICD-10-CM

## 2021-01-27 DIAGNOSIS — G47.19 EXCESSIVE DAYTIME SLEEPINESS: ICD-10-CM

## 2021-01-27 DIAGNOSIS — Z12.31 ENCOUNTER FOR SCREENING MAMMOGRAM FOR MALIGNANT NEOPLASM OF BREAST: ICD-10-CM

## 2021-01-27 DIAGNOSIS — R55 POSTURAL DIZZINESS WITH PRESYNCOPE: Primary | ICD-10-CM

## 2021-01-27 LAB
ATRIAL RATE: 85 BPM
P AXIS: 47 DEGREES
PR INTERVAL: 146 MS
QRS AXIS: 47 DEGREES
QRSD INTERVAL: 80 MS
QT INTERVAL: 372 MS
QTC INTERVAL: 442 MS
T WAVE AXIS: 60 DEGREES
VENTRICULAR RATE: 85 BPM

## 2021-01-27 PROCEDURE — 99214 OFFICE O/P EST MOD 30 MIN: CPT | Performed by: PHYSICIAN ASSISTANT

## 2021-01-27 PROCEDURE — 1101F PT FALLS ASSESS-DOCD LE1/YR: CPT | Performed by: PHYSICIAN ASSISTANT

## 2021-01-27 PROCEDURE — 3008F BODY MASS INDEX DOCD: CPT | Performed by: PHYSICIAN ASSISTANT

## 2021-01-27 PROCEDURE — 1160F RVW MEDS BY RX/DR IN RCRD: CPT | Performed by: PHYSICIAN ASSISTANT

## 2021-01-27 PROCEDURE — 1036F TOBACCO NON-USER: CPT | Performed by: PHYSICIAN ASSISTANT

## 2021-01-27 PROCEDURE — 93010 ELECTROCARDIOGRAM REPORT: CPT | Performed by: INTERNAL MEDICINE

## 2021-01-27 PROCEDURE — 3288F FALL RISK ASSESSMENT DOCD: CPT | Performed by: PHYSICIAN ASSISTANT

## 2021-01-27 PROCEDURE — 3725F SCREEN DEPRESSION PERFORMED: CPT | Performed by: PHYSICIAN ASSISTANT

## 2021-01-27 NOTE — PROGRESS NOTES
Assessment/Plan:    Problem List Items Addressed This Visit     None      Visit Diagnoses     Postural dizziness with presyncope    -  Primary    Relevant Orders    Ambulatory referral to Cardiology    Orthostatic hypotension        Relevant Orders    Ambulatory referral to Cardiology    Hospital discharge follow-up        Relevant Orders    Ambulatory referral to Cardiology    Encounter for screening mammogram for malignant neoplasm of breast        Relevant Orders    Mammo screening bilateral w 3d & cad    Excessive daytime sleepiness        Relevant Orders    Ambulatory referral to Sleep Medicine    Depression screening negative        BMI 30 0-30 9,adult               Diagnoses and all orders for this visit:    Postural dizziness with presyncope  -     Ambulatory referral to Cardiology; Future    Orthostatic hypotension  -     Ambulatory referral to Cardiology; Future    Hospital discharge follow-up  -     Ambulatory referral to Cardiology; Future    Encounter for screening mammogram for malignant neoplasm of breast  -     Mammo screening bilateral w 3d & cad; Future    Excessive daytime sleepiness  -     Ambulatory referral to Sleep Medicine; Future    Depression screening negative    BMI 30 0-30 9,adult        BP today in office is slightly elevated  I am going to refer to cardiology to evaluate for orthostatic hypotension  ER does not have various BP readings documented, and Loida Pedroza does not remember readings  I am not going to start her on a BP medication today because if she is having hypotension, I do not want to drop her BP too low depending on what position she is in  I did encourage her to continue drinking water to stay better hydrated  If dizziness returns, she will let us know  I did tell her that there are multiple conditions to cause dizziness and if symptoms would return we would certainly start additional workup measures  As a side note, she did mention that she is very tired all of the time  Home sleep study in 2018 was not conclusive, and it actually recommended an in-lab study which was never done  I am referring her to Dr Jyotsna George for further evaluation/treatment  PHQ-9 Depression Screening    PHQ-9:   Frequency of the following problems over the past two weeks:      Little interest or pleasure in doing things: 0 - not at all  Feeling down, depressed, or hopeless: 0 - not at all  PHQ-2 Score: 0           Subjective:      Patient ID: Jose F James is a 77 y o  female  Ty Amanda is here today to follow up from ER visit yesterday for dizziness  She woke in the morning and made a cup of tea and felt so dizzy she could not stand up  She said everything looked "shiney " She was nauseated and vomited, and then went to the ER  While at the ER, was told that her BP dropped too much when she stood up, and that she was likely dehydrated as she drinks tea from the time she wakes until 2 pm  At 2 pm, she changes beverage to water  ER recommendation is for Ty Amanda to follow up with cardiology  Since discharge from the ER, she feels fine, has not had any dizziness since  She did stop drinking tea as of today and instead is drinking water to better hydrate herself  The following portions of the patient's history were reviewed and updated as appropriate:   She has a past medical history of Allergic reaction, Allergic rhinitis, Generalized anxiety disorder, High cholesterol, Microscopic hematuria, Renal calculi, Renal colic, Renal cyst, acquired, Restrictive lung disease, and Supraventricular tachycardia ([facility], [address] )  ,  does not have any pertinent problems on file  ,   has a past surgical history that includes Cholecystectomy; Tubal ligation; TONSILLECTOMY; and CYSTOSCOPY (05/07/2014)  ,  family history includes Cancer in her mother; Colon cancer in her maternal aunt; Diabetes in her paternal grandmother; Esophageal cancer in her maternal aunt;  Heart attack in her father and maternal grandmother; Lung cancer in her mother; No Known Problems in her maternal aunt, paternal aunt, sister, sister, and sister; Rheum arthritis in her family  ,   reports that she has quit smoking  She has never used smokeless tobacco  She reports current alcohol use  She reports that she does not use drugs  ,  is allergic to codeine     Current Outpatient Medications   Medication Sig Dispense Refill    butalbital-acetaminophen-caffeine (FIORICET,ESGIC) -40 mg per tablet Take 1 tablet by mouth every 4 (four) hours as needed for headaches 30 tablet 1    Cholecalciferol (VITAMIN D3) 2000 units capsule Take 1 capsule by mouth daily      fluticasone (FLONASE) 50 mcg/act nasal spray 2 sprays into each nostril daily 3 Bottle 3    pantoprazole (PROTONIX) 40 mg tablet Take 1 tablet by mouth      simvastatin (ZOCOR) 20 mg tablet TAKE 1 TABLET BY MOUTH  DAILY AT BEDTIME 90 tablet 3    albuterol (PROVENTIL HFA,VENTOLIN HFA) 90 mcg/act inhaler Inhale 1 puff every 6 (six) hours      mometasone (ASMANEX 30 METERED DOSES) 220 MCG/INH inhaler Inhale 1 puff 2 (two) times a day (Patient not taking: Reported on 1/27/2021) 3 Inhaler 3     No current facility-administered medications for this visit  Review of Systems   Constitutional: Negative for activity change, appetite change, chills, diaphoresis, fatigue, fever and unexpected weight change  HENT: Negative for congestion, ear pain, postnasal drip, rhinorrhea, sinus pressure, sinus pain, sneezing, sore throat, tinnitus and voice change  Eyes: Negative for pain, redness and visual disturbance  Respiratory: Negative for cough, chest tightness, shortness of breath and wheezing  Cardiovascular: Negative for chest pain, palpitations and leg swelling  Gastrointestinal: Negative for abdominal pain, blood in stool, constipation, diarrhea, nausea and vomiting  Genitourinary: Negative for difficulty urinating, dysuria, frequency, hematuria and urgency     Musculoskeletal: Negative for arthralgias, back pain, gait problem, joint swelling, myalgias, neck pain and neck stiffness  Skin: Negative for color change, pallor, rash and wound  Neurological: Negative for dizziness, tremors, weakness, light-headedness and headaches  Psychiatric/Behavioral: Negative for dysphoric mood, self-injury, sleep disturbance and suicidal ideas  The patient is not nervous/anxious  Objective:  Vitals:    01/27/21 1113   BP: 158/88   BP Location: Left arm   Patient Position: Sitting   Cuff Size: Standard   Temp: (!) 97 3 °F (36 3 °C)   TempSrc: Tympanic   Weight: 77 1 kg (170 lb)   Height: 5' 3" (1 6 m)     Body mass index is 30 11 kg/m²  Physical Exam  Vitals signs reviewed  Constitutional:       General: She is not in acute distress  Appearance: She is well-developed  She is not diaphoretic  HENT:      Head: Normocephalic and atraumatic  Right Ear: Hearing, tympanic membrane, ear canal and external ear normal       Left Ear: Hearing, tympanic membrane, ear canal and external ear normal       Mouth/Throat:      Pharynx: Uvula midline  No oropharyngeal exudate  Eyes:      General: No scleral icterus  Right eye: No discharge  Left eye: No discharge  Conjunctiva/sclera: Conjunctivae normal    Neck:      Musculoskeletal: Neck supple  Thyroid: No thyromegaly  Vascular: No carotid bruit  Cardiovascular:      Rate and Rhythm: Normal rate and regular rhythm  Heart sounds: Normal heart sounds  No murmur  Pulmonary:      Effort: Pulmonary effort is normal  No respiratory distress  Breath sounds: Normal breath sounds  No wheezing  Abdominal:      General: Bowel sounds are normal  There is no distension  Palpations: Abdomen is soft  There is no mass  Tenderness: There is no abdominal tenderness  There is no guarding or rebound  Musculoskeletal: Normal range of motion  General: No tenderness     Lymphadenopathy:      Cervical: No cervical adenopathy  Skin:     General: Skin is warm and dry  Findings: No erythema or rash  Neurological:      Mental Status: She is alert and oriented to person, place, and time  Psychiatric:         Behavior: Behavior normal          Thought Content: Thought content normal          Judgment: Judgment normal          BMI Counseling: Body mass index is 30 11 kg/m²  The BMI is above normal  Nutrition recommendations include reducing portion sizes, decreasing overall calorie intake and 3-5 servings of fruits/vegetables daily  Exercise recommendations include exercising 3-5 times per week

## 2021-03-10 DIAGNOSIS — Z23 ENCOUNTER FOR IMMUNIZATION: ICD-10-CM

## 2021-03-11 ENCOUNTER — OFFICE VISIT (OUTPATIENT)
Dept: CARDIOLOGY CLINIC | Facility: HOSPITAL | Age: 66
End: 2021-03-11
Payer: COMMERCIAL

## 2021-03-11 VITALS
HEART RATE: 90 BPM | DIASTOLIC BLOOD PRESSURE: 74 MMHG | BODY MASS INDEX: 29.95 KG/M2 | SYSTOLIC BLOOD PRESSURE: 142 MMHG | WEIGHT: 169 LBS | HEIGHT: 63 IN

## 2021-03-11 DIAGNOSIS — R55 POSTURAL DIZZINESS WITH PRESYNCOPE: ICD-10-CM

## 2021-03-11 DIAGNOSIS — I95.1 ORTHOSTATIC HYPOTENSION: ICD-10-CM

## 2021-03-11 DIAGNOSIS — R42 POSTURAL DIZZINESS WITH PRESYNCOPE: ICD-10-CM

## 2021-03-11 DIAGNOSIS — Z82.41 FH: SUDDEN CARDIAC DEATH (SCD): Primary | ICD-10-CM

## 2021-03-11 DIAGNOSIS — Z09 HOSPITAL DISCHARGE FOLLOW-UP: ICD-10-CM

## 2021-03-11 PROCEDURE — 1160F RVW MEDS BY RX/DR IN RCRD: CPT | Performed by: INTERNAL MEDICINE

## 2021-03-11 PROCEDURE — 93000 ELECTROCARDIOGRAM COMPLETE: CPT | Performed by: INTERNAL MEDICINE

## 2021-03-11 PROCEDURE — 3008F BODY MASS INDEX DOCD: CPT | Performed by: INTERNAL MEDICINE

## 2021-03-11 PROCEDURE — 1036F TOBACCO NON-USER: CPT | Performed by: INTERNAL MEDICINE

## 2021-03-11 PROCEDURE — 99202 OFFICE O/P NEW SF 15 MIN: CPT | Performed by: INTERNAL MEDICINE

## 2021-03-11 NOTE — PROGRESS NOTES
Cardiology Consultation     Salvador Reveles  [id number]  1955  [address] [place] [address] [facility] [place] [address] [place] [address] [address]      Diagnoses and all orders for this visit:    FH: sudden cardiac death (SCD)    Postural dizziness with presyncope  -     Ambulatory referral to Cardiology  -     POCT ECG    Orthostatic hypotension  -     Ambulatory referral to Cardiology  -     POCT ECG  -     Echo complete with contrast if indicated; Future    Hospital discharge follow-up  -     Ambulatory referral to Cardiology        I had the pleasure of seeing aSlvador Reveles for a consultation regarding DIZZINESS requested by Lizzette White    History of the Presenting Illness, Discussion/Summary and my Plan are as follows:::    Agustin Gardner is a very pleasant 18-year-old lady with a history of hyperlipidemia, PACs, no hypertension, no diabetes, no SVT or arrhythmias (history reviewed)  She was recently in the ED with an episode of dizziness-January 2021, woke up from sleep, came down, put her tea in the microwave and then felt profoundly dizzy, without any syncope  Since his symptoms did not improve, came to the ER  Did have nausea and also felt flushed at that time  No palpitations or chest pains  When described both presyncope and vertigo, she had difficulty picking one of them  At that time she was drinking hot tea as well as ice tea with artificial sweeteners  However in the ER she was evidently dehydrated with an elevated lactic acid level, creatinine of 1 2, with baseline of 0 8, orthostatics were also positive with a drop of 30 mm although does not recall if she became dizzy  She also underwent a CT scan that was negative for PE  She does have a family history of hyperlipidemia, father had a fatal heart attack at 61  Cardiac physical exam is unremarkable, ECG shows normal sinus rhythm    Prior ECGs have shown sinus rhythm with PACs  Symptoms have abated with good hydration  Plan:    Dizziness:  Likely secondary to orthostasis, will check an echocardiogram for completeness  Adequate Hydration was advised, counter pressure maneuvers were also advised and demonstrated  Family history of fatal heart attack in her father:  He was a runner but a prior smoker and hyperlipidemic  She did have a negative stress test in November 2015, no symptoms at this time  Follow-up in 3 months  May 2018: , , HDL 54, LDL 94    1  FH: sudden cardiac death (SCD)     2  Postural dizziness with presyncope  Ambulatory referral to Cardiology    POCT ECG   3  Orthostatic hypotension  Ambulatory referral to Cardiology    POCT ECG    Echo complete with contrast if indicated   4   Hospital discharge follow-up  Ambulatory referral to Cardiology     Patient Active Problem List   Diagnosis    Reactive airway disease    Esophageal reflux    Hyperlipidemia    Obstructive sleep apnea    Migraine without aura and without status migrainosus, not intractable    Right-sided chest wall pain    Postural dizziness with presyncope    Orthostatic hypotension   [facility] discharge follow-up     Past Medical History:   Diagnosis Date    Allergic reaction     Resolved 11/24/2017     Allergic rhinitis     Resolved 11/24/2017     Generalized anxiety disorder     Resolved 11/24/2017     High cholesterol     Microscopic hematuria     Resolved 11/24/2017     Renal calculi     Resolved 74/46/0924     Renal colic     Resolved 99/98/7280     Renal cyst, acquired     Resolved 11/24/2017     Restrictive lung disease     Resolved 11/24/2017     Supraventricular tachycardia ([address] )     Resolved 11/24/2017      Social History     Socioeconomic History    Marital status: /Civil Union     Spouse name: Not on file    Number of children: Not on file    Years of education: Not on file    Highest education level: Not on file   Occupational History    Not on file   Social Needs    Financial resource strain: Not on file    Food insecurity     Worry: Not on file     Inability: Not on file    Transportation needs     Medical: Not on file     Non-medical: Not on file   Tobacco Use    Smoking status: Former Smoker    Smokeless tobacco: Never Used   Substance and Sexual Activity    Alcohol use: Yes     Comment: occasional    Drug use: Never    Sexual activity: Not on file   Lifestyle    Physical activity     Days per week: Not on file     Minutes per session: Not on file    Stress: Not on file   Relationships    Social connections     Talks on phone: Not on file     Gets together: Not on file     Attends Worship service: Not on file     Active member of club or organization: Not on file     Attends meetings of clubs or organizations: Not on file     Relationship status: Not on file    Intimate partner violence     Fear of current or ex partner: Not on file     Emotionally abused: Not on file     Physically abused: Not on file     Forced sexual activity: Not on file   Other Topics Concern    Not on file   Social History Narrative    Not on file      Family History   Problem Relation Age of Onset    Cancer Mother     Lung cancer Mother     Heart attack Father     Heart attack Maternal Grandmother     Diabetes Paternal Grandmother     Rheum arthritis Family         Rheumatoid arthritis with rheumatoid factor     No Known Problems Sister     No Known Problems Sister     No Known Problems Sister     Colon cancer Maternal Aunt     Esophageal cancer Maternal Aunt     No Known Problems Maternal Aunt     No Known Problems Paternal Aunt      Past Surgical History:   Procedure Laterality Date    CHOLECYSTECTOMY      CYSTOSCOPY  05/07/2014    Diagnostic   Last assessed 5/7/2014      TONSILLECTOMY      TUBAL LIGATION         Current Outpatient Medications:     butalbital-acetaminophen-caffeine ([facility]) -40 mg per tablet, Take 1 tablet by mouth every 4 (four) hours as needed for headaches, Disp: 30 tablet, Rfl: 1    Cholecalciferol (VITAMIN D3) 2000 units capsule, Take 1 capsule by mouth daily, Disp: , Rfl:     fluticasone (FLONASE) 50 mcg/act nasal spray, 2 sprays into each nostril daily, Disp: 3 Bottle, Rfl: 3    pantoprazole (PROTONIX) 40 mg tablet, Take 1 tablet by mouth 2 (two) times a day , Disp: , Rfl:     simvastatin (ZOCOR) 20 mg tablet, TAKE 1 TABLET BY MOUTH  DAILY AT BEDTIME, Disp: 90 tablet, Rfl: 3    albuterol (PROVENTIL HFA,VENTOLIN HFA) 90 mcg/act inhaler, Inhale 1 puff every 6 (six) hours, Disp: , Rfl:     mometasone (ASMANEX 30 METERED DOSES) 220 MCG/INH inhaler, Inhale 1 puff 2 (two) times a day (Patient not taking: Reported on 1/27/2021), Disp: 3 Inhaler, Rfl: 3  Allergies   Allergen Reactions    Codeine      Other reaction(s): Other (See Comments)  headaches     Vitals:    03/11/21 1425   BP: 142/74   Pulse: 90   Weight: 76 7 kg (169 lb)   Height: 5' 3" (1 6 m)         Imaging: No results found  Review of Systems:  Review of Systems   Constitutional: Negative  HENT: Negative  Eyes: Negative  Respiratory: Negative  Endocrine: Negative  Musculoskeletal: Negative  Neurological: Negative  Physical Exam:    /74   Pulse 90   Ht 5' 3" (1 6 m)   Wt 76 7 kg (169 lb)   BMI 29 94 kg/m²   Physical Exam  Constitutional:       General: She is not in acute distress  Appearance: Normal appearance  She is not ill-appearing  HENT:      Nose: Nose normal  No congestion or rhinorrhea  Mouth/Throat:      Mouth: Mucous membranes are moist       Pharynx: No oropharyngeal exudate or posterior oropharyngeal erythema  Eyes:      General: No scleral icterus  Right eye: No discharge  Left eye: No discharge  Pupils: Pupils are equal, round, and reactive to light  Neck:      Musculoskeletal: Normal range of motion   No neck rigidity or muscular tenderness  Vascular: No carotid bruit  Cardiovascular:      Rate and Rhythm: Normal rate  Heart sounds: No murmur  No friction rub  Comments: No murmurs without or with Valsalva    Pulmonary:      Effort: Pulmonary effort is normal  No respiratory distress  Breath sounds: No stridor  No wheezing or rhonchi  Abdominal:      General: Abdomen is flat  There is no distension  Palpations: There is no mass  Tenderness: There is no abdominal tenderness  Hernia: No hernia is present  Musculoskeletal: Normal range of motion  General: No swelling, tenderness, deformity or signs of injury  Lymphadenopathy:      Cervical: No cervical adenopathy  Neurological:      Mental Status: She is alert  This note was completed in part utilizing [employer] direct voice recognition software  Grammatical errors, random word insertion, spelling mistakes, occasional wrong word or "sound-alike" substitutions and incomplete sentences may be an occasional consequence of the system secondary to software limitations, ambient noise and hardware issues  At the time of dictation, efforts were made to edit, clarify and /or correct errors  Please read the chart carefully and recognize, using context, where substitutions have occurred  If you have any questions or concerns about the context, text or information contained within the body of this dictation, please contact myself, the provider, for further clarification

## 2021-03-11 NOTE — PATIENT INSTRUCTIONS
To avoid dizzy spells the following precautions should be taken to minimize potential triggers and minimize potential harm  Dizziness is usually due to the brain not getting enough blood supply due to a transient lowering of your blood pressure  Try to lie down or at least sit down at the first sign of dizziness  After lying down or sitting down, elevating your legs will further help to restore blood supply to the brain  Drink a glass of ice cold water in the morning prior to waking up from be  (sleep with a flask of ice cold water at night)  Adequate hydration with water (75 ounces) or if not better Gatorade (upto 50 ounces) but watching weight and BP  Increase salt intake in the diet (chips, pretzels etc)  Avoid caffeinated beverages  The following Counterpressure maneuvers may stop or delay a passing out spell long enough for you to lie down    Leg pumping and let crossing while tensing the leg, abdominal and buttock muscles  Hand gripping like gripping a rubber ball or similar object as hard as possible  Tensing your arms with clenched fists  Arm Tensing: gripping one hand with the other simultaneously moving arms away from the body

## 2021-03-18 ENCOUNTER — HOSPITAL ENCOUNTER (OUTPATIENT)
Dept: NON INVASIVE DIAGNOSTICS | Facility: HOSPITAL | Age: 66
Discharge: HOME | End: 2021-03-18
Attending: INTERNAL MEDICINE
Payer: COMMERCIAL

## 2021-03-18 ENCOUNTER — IMMUNIZATIONS (OUTPATIENT)
Dept: FAMILY MEDICINE CLINIC | Facility: HOSPITAL | Age: 66
End: 2021-03-18
Payer: COMMERCIAL

## 2021-03-18 DIAGNOSIS — Z23 ENCOUNTER FOR IMMUNIZATION: Primary | ICD-10-CM

## 2021-03-18 DIAGNOSIS — I95.1 ORTHOSTATIC HYPOTENSION: ICD-10-CM

## 2021-03-18 PROCEDURE — 0011A SARS-COV-2 / COVID-19 MRNA VACCINE (MODERNA) 100 MCG: CPT

## 2021-03-18 PROCEDURE — 91301 SARS-COV-2 / COVID-19 MRNA VACCINE (MODERNA) 100 MCG: CPT

## 2021-03-18 PROCEDURE — 93306 TTE W/DOPPLER COMPLETE: CPT | Performed by: INTERNAL MEDICINE

## 2021-03-18 PROCEDURE — 93306 TTE W/DOPPLER COMPLETE: CPT

## 2021-04-08 ENCOUNTER — HOSPITAL ENCOUNTER (OUTPATIENT)
Dept: NON INVASIVE DIAGNOSTICS | Facility: HOSPITAL | Age: 66
Discharge: HOME | End: 2021-04-08
Attending: INTERNAL MEDICINE
Payer: COMMERCIAL

## 2021-04-08 DIAGNOSIS — Z82.41 FH: SUDDEN CARDIAC DEATH (SCD): ICD-10-CM

## 2021-04-08 PROCEDURE — 93017 CV STRESS TEST TRACING ONLY: CPT

## 2021-04-08 PROCEDURE — 93018 CV STRESS TEST I&R ONLY: CPT | Performed by: INTERNAL MEDICINE

## 2021-04-08 PROCEDURE — 93016 CV STRESS TEST SUPVJ ONLY: CPT | Performed by: INTERNAL MEDICINE

## 2021-04-12 ENCOUNTER — TELEPHONE (OUTPATIENT)
Dept: CARDIOLOGY CLINIC | Facility: HOSPITAL | Age: 66
End: 2021-04-12

## 2021-04-12 DIAGNOSIS — R94.39 ABNORMAL CARDIOVASCULAR STRESS TEST: Primary | ICD-10-CM

## 2021-04-12 NOTE — TELEPHONE ENCOUNTER
Per Dr Glory Hernandez S/w pt re: result of EST that it was equivocal and he is recommending a SE  Pt asking how imperative test is for her to have right now as she currently has a large medical bill that she is paying  Per Dr Glory Hernandez she may have test @ her convenience  Pt informed of same  Advised to call office if she develops any symptoms of chest discomfort  Verbalized understanding

## 2021-04-13 ENCOUNTER — DOCUMENTATION (OUTPATIENT)
Dept: NON INVASIVE DIAGNOSTICS | Facility: HOSPITAL | Age: 66
End: 2021-04-13

## 2021-04-13 NOTE — PROGRESS NOTES
Patient did have a prior stress test that was negative for ischemia-November 2015  Based on my prior office note, I did not see me having ordered an exercise treadmill stress test      however she did have an exercise treadmill stress test that shows borderline ECG changes only, not meeting criteria for ischemia  She is not symptomatic from a chest pain/angina standpoint and is currently reeling from still pending high medical bills-amounting to over 5000 dollars and requested to defer her stress echocardiogram for some time  She will complete her stress echocardiogram when feasible to her, since pretest likelihood is low

## 2021-04-14 LAB
CHEST PAIN STATEMENT: NORMAL
MAX DIASTOLIC BP: 80 MMHG
MAX HEART RATE: 148 BPM
MAX PREDICTED HEART RATE: 153 BPM
MAX. SYSTOLIC BP: 190 MMHG
PROTOCOL NAME: NORMAL
REASON FOR TERMINATION: NORMAL
TARGET HR FORMULA: NORMAL
TIME IN EXERCISE PHASE: NORMAL

## 2021-04-15 ENCOUNTER — IMMUNIZATIONS (OUTPATIENT)
Dept: FAMILY MEDICINE CLINIC | Facility: HOSPITAL | Age: 66
End: 2021-04-15

## 2021-04-15 DIAGNOSIS — Z23 ENCOUNTER FOR IMMUNIZATION: Primary | ICD-10-CM

## 2021-04-15 PROCEDURE — 0012A SARS-COV-2 / COVID-19 MRNA VACCINE (MODERNA) 100 MCG: CPT

## 2021-04-15 PROCEDURE — 91301 SARS-COV-2 / COVID-19 MRNA VACCINE (MODERNA) 100 MCG: CPT

## 2021-08-09 ENCOUNTER — OFFICE VISIT (OUTPATIENT)
Dept: CARDIOLOGY CLINIC | Facility: HOSPITAL | Age: 66
End: 2021-08-09
Payer: COMMERCIAL

## 2021-08-09 VITALS
BODY MASS INDEX: 30.3 KG/M2 | SYSTOLIC BLOOD PRESSURE: 140 MMHG | HEIGHT: 63 IN | HEART RATE: 90 BPM | OXYGEN SATURATION: 96 % | WEIGHT: 171 LBS | DIASTOLIC BLOOD PRESSURE: 89 MMHG

## 2021-08-09 DIAGNOSIS — E78.5 HYPERLIPIDEMIA, UNSPECIFIED HYPERLIPIDEMIA TYPE: ICD-10-CM

## 2021-08-09 DIAGNOSIS — I95.1 ORTHOSTATIC HYPOTENSION: Primary | ICD-10-CM

## 2021-08-09 PROCEDURE — 1036F TOBACCO NON-USER: CPT | Performed by: INTERNAL MEDICINE

## 2021-08-09 PROCEDURE — 1160F RVW MEDS BY RX/DR IN RCRD: CPT | Performed by: INTERNAL MEDICINE

## 2021-08-09 PROCEDURE — 3008F BODY MASS INDEX DOCD: CPT | Performed by: INTERNAL MEDICINE

## 2021-08-09 PROCEDURE — 99214 OFFICE O/P EST MOD 30 MIN: CPT | Performed by: INTERNAL MEDICINE

## 2021-08-09 NOTE — PROGRESS NOTES
Cardiology follow-up    Jeramy Delatorre  [id number]  1955  [address] [place] [address] [facility] [place] [address] [place] [address] [address]      There are no diagnoses linked to this encounter  I had the pleasure of seeing Jeramy Delatorre for a follow-up visit    History of the Presenting Illness, Discussion/Summary and my Plan are as follows:::    Lucius Garcia is a very pleasant 68-year-old lady with a history of hyperlipidemia, PACs, no hypertension, no diabetes, no SVT or arrhythmias (history reviewed)  She was in the ED with an episode of dizziness-January 2021, woke up from sleep, came down, put her tea in the microwave and then felt profoundly dizzy, without any syncope  Since his symptoms did not improve, came to the ER  Did have nausea and also felt flushed at that time  No palpitations or chest pains  At that time she was drinking hot tea as well as ice tea with artificial sweeteners  However in the ER she was evidently dehydrated with an elevated lactic acid level, creatinine of 1 2, with baseline of 0 8, orthostatics were also positive with a drop of 30 mm although does not recall if she became dizzy  She also underwent a CT scan that was negative for PE  She does have a family history of hyperlipidemia, father had a fatal heart attack at 61  Cardiac physical exam is unremarkable, ECG shows normal sinus rhythm  Prior ECGs have shown sinus rhythm with PACs  Symptoms have abated with good hydration  Plan:    Dizziness:  Likely secondary to orthostasis +/- vagal episode compounded by dehydration,   Normal echocardiogram - March 2021  Adequate Hydration was advised, counter pressure maneuvers were also advised and demonstrated  Family history of fatal heart attack in her father:  He was a runner but a prior smoker and hyperlipidemic    She did have a negative stress test in November 2015, no symptoms at this time  She was ordered another stress test that was borderline/equivocal-March 2021-recommended a stress echo but due to financial difficulties, she would like to Hold off  Pretest probability is low and this is reasonable since she was quite stressed out from her family's prior hospital bills  Has been doing a lot of walking and hiking without any cardiac symptoms  Follow-up in 6 months  May 2018: , , HDL 54, LDL 94    Results for Josep Brown (MRN [id number]) as of 8/9/2021 13:23   Ref  Range 9/8/2016 10:28 5/9/2018 08:03   Cholesterol Latest Ref Range: 50 - 200 mg/dL 213 (H) 177   Triglycerides Latest Ref Range: <=150 mg/dL 177 (H) 144   HDL Latest Ref Range: 40 - 60 mg/dL 53 54   Non-HDL Cholesterol Latest Units: mg/dl  123   LDL Calculated Latest Ref Range: 0 - 100 mg/dL 125 (H) 94       No diagnosis found    Patient Active Problem List   Diagnosis    Reactive airway disease    Esophageal reflux    Hyperlipidemia    Obstructive sleep apnea    Migraine without aura and without status migrainosus, not intractable    Right-sided chest wall pain    Postural dizziness with presyncope    Orthostatic hypotension   [facility] discharge follow-up     Past Medical History:   Diagnosis Date    Allergic reaction     Resolved 11/24/2017     Allergic rhinitis     Resolved 11/24/2017     Generalized anxiety disorder     Resolved 11/24/2017     High cholesterol     Microscopic hematuria     Resolved 11/24/2017     Renal calculi     Resolved 07/35/9597     Renal colic     Resolved 58/94/8473     Renal cyst, acquired     Resolved 11/24/2017     Restrictive lung disease     Resolved 11/24/2017     Supraventricular tachycardia ([address] )     Resolved 11/24/2017      Social History     Socioeconomic History    Marital status: /Civil Union     Spouse name: Not on file    Number of children: Not on file    Years of education: Not on file    Highest education level: Not on file   Occupational History    Not on file   Tobacco Use    Smoking status: Former Smoker    Smokeless tobacco: Never Used   Vaping Use    Vaping Use: Never used   Substance and Sexual Activity    Alcohol use: Yes     Comment: occasional    Drug use: Never    Sexual activity: Not on file   Other Topics Concern    Not on file   Social History Narrative    Not on file     Social Determinants of Health     Financial Resource Strain:     Difficulty of Paying Living Expenses:    Food Insecurity:     Worried About Running Out of Food in the Last Year:     [address] in the Last Year:    Transportation Needs:     Lack of Transportation (Medical):  Lack of Transportation (Non-Medical):    Physical Activity:     Days of Exercise per Week:     Minutes of Exercise per Session:    Stress:     Feeling of Stress :    Social Connections:     Frequency of Communication with Friends and Family:     Frequency of Social Gatherings with Friends and Family:     Attends [employer] Services:     Active Member of Clubs or Organizations:     Attends Club or Organization Meetings:     Marital Status:    Intimate Partner Violence:     Fear of Current or Ex-Partner:     Emotionally Abused:     Physically Abused:     Sexually Abused:       Family History   Problem Relation Age of Onset    Cancer Mother     Lung cancer Mother     Heart attack Father     Heart attack Maternal Grandmother     Diabetes Paternal Grandmother     Rheum arthritis Family         Rheumatoid arthritis with rheumatoid factor     No Known Problems Sister     No Known Problems Sister     No Known Problems Sister     Colon cancer Maternal Aunt     Esophageal cancer Maternal Aunt     No Known Problems Maternal Aunt     No Known Problems Paternal Aunt      Past Surgical History:   Procedure Laterality Date    CHOLECYSTECTOMY      CYSTOSCOPY  05/07/2014    Diagnostic   Last assessed 5/7/2014      TONSILLECTOMY      TUBAL LIGATION         Current Outpatient Medications:     albuterol (PROVENTIL HFA,VENTOLIN HFA) 90 mcg/act inhaler, Inhale 1 puff every 6 (six) hours, Disp: , Rfl:     butalbital-acetaminophen-caffeine (FIORICET,ESGIC) -40 mg per tablet, Take 1 tablet by mouth every 4 (four) hours as needed for headaches, Disp: 30 tablet, Rfl: 1    Cholecalciferol (VITAMIN D3) 2000 units capsule, Take 1 capsule by mouth daily, Disp: , Rfl:     fluticasone (FLONASE) 50 mcg/act nasal spray, 2 sprays into each nostril daily, Disp: 3 Bottle, Rfl: 3    pantoprazole (PROTONIX) 40 mg tablet, Take 1 tablet by mouth 2 (two) times a day , Disp: , Rfl:     simvastatin (ZOCOR) 20 mg tablet, TAKE 1 TABLET BY MOUTH  DAILY AT BEDTIME, Disp: 90 tablet, Rfl: 3    mometasone (ASMANEX 30 METERED DOSES) 220 MCG/INH inhaler, Inhale 1 puff 2 (two) times a day (Patient not taking: Reported on 1/27/2021), Disp: 3 Inhaler, Rfl: 3  Allergies   Allergen Reactions    Codeine      Other reaction(s): Other (See Comments)  headaches     Vitals:    08/09/21 1308   BP: 140/89   Pulse: 90   SpO2: 96%   Weight: 77 6 kg (171 lb)   Height: 5' 3" (1 6 m)         Imaging: No results found  Review of Systems:  Review of Systems   Constitutional: Negative  HENT: Negative  Eyes: Negative  Respiratory: Negative  Cardiovascular: Negative  Gastrointestinal: Negative  Endocrine: Negative  Musculoskeletal: Negative  Allergic/Immunologic: Negative  Neurological: Negative  Physical Exam:    /89   Pulse 90   Ht 5' 3" (1 6 m)   Wt 77 6 kg (171 lb)   SpO2 96%   BMI 30 29 kg/m²   Physical Exam  Constitutional:       General: She is not in acute distress  Appearance: Normal appearance  She is not ill-appearing  HENT:      Nose: Nose normal  No congestion or rhinorrhea  Mouth/Throat:      Mouth: Mucous membranes are moist       Pharynx: No oropharyngeal exudate or posterior oropharyngeal erythema     Eyes:      General: No scleral icterus  Right eye: No discharge  Left eye: No discharge  Pupils: Pupils are equal, round, and reactive to light  Neck:      Vascular: No carotid bruit  Cardiovascular:      Rate and Rhythm: Normal rate  Heart sounds: No murmur heard  No friction rub  Comments: No murmurs without or with Valsalva    Pulmonary:      Effort: Pulmonary effort is normal  No respiratory distress  Breath sounds: No stridor  No wheezing or rhonchi  Abdominal:      General: Abdomen is flat  There is no distension  Palpations: There is no mass  Tenderness: There is no abdominal tenderness  Hernia: No hernia is present  Musculoskeletal:         General: No swelling, tenderness, deformity or signs of injury  Normal range of motion  Cervical back: Normal range of motion  No rigidity  No muscular tenderness  Lymphadenopathy:      Cervical: No cervical adenopathy  Neurological:      Mental Status: She is alert  This note was completed in part utilizing [employer] direct voice recognition software  Grammatical errors, random word insertion, spelling mistakes, occasional wrong word or "sound-alike" substitutions and incomplete sentences may be an occasional consequence of the system secondary to software limitations, ambient noise and hardware issues  At the time of dictation, efforts were made to edit, clarify and /or correct errors  Please read the chart carefully and recognize, using context, where substitutions have occurred  If you have any questions or concerns about the context, text or information contained within the body of this dictation, please contact myself, the provider, for further clarification

## 2021-10-21 ENCOUNTER — IMMUNIZATIONS (OUTPATIENT)
Dept: FAMILY MEDICINE CLINIC | Facility: CLINIC | Age: 66
End: 2021-10-21
Payer: COMMERCIAL

## 2021-10-21 DIAGNOSIS — Z23 NEED FOR INFLUENZA VACCINATION: Primary | ICD-10-CM

## 2021-10-21 PROCEDURE — 90662 IIV NO PRSV INCREASED AG IM: CPT | Performed by: FAMILY MEDICINE

## 2021-10-21 PROCEDURE — 90471 IMMUNIZATION ADMIN: CPT | Performed by: FAMILY MEDICINE

## 2021-10-26 DIAGNOSIS — E78.2 MIXED HYPERLIPIDEMIA: ICD-10-CM

## 2021-10-26 RX ORDER — SIMVASTATIN 20 MG
TABLET ORAL
Qty: 90 TABLET | Refills: 3 | Status: SHIPPED | OUTPATIENT
Start: 2021-10-26

## 2021-12-28 ENCOUNTER — TELEPHONE (OUTPATIENT)
Dept: FAMILY MEDICINE CLINIC | Facility: CLINIC | Age: 66
End: 2021-12-28

## 2021-12-28 DIAGNOSIS — J01.90 ACUTE SINUSITIS, RECURRENCE NOT SPECIFIED, UNSPECIFIED LOCATION: Primary | ICD-10-CM

## 2021-12-28 RX ORDER — AZITHROMYCIN 250 MG/1
TABLET, FILM COATED ORAL
Qty: 6 TABLET | Refills: 0 | Status: SHIPPED | OUTPATIENT
Start: 2021-12-28 | End: 2022-01-01

## 2022-06-15 ENCOUNTER — OFFICE VISIT (OUTPATIENT)
Dept: CARDIOLOGY CLINIC | Facility: HOSPITAL | Age: 67
End: 2022-06-15
Payer: COMMERCIAL

## 2022-06-15 VITALS
HEIGHT: 63 IN | WEIGHT: 172.4 LBS | SYSTOLIC BLOOD PRESSURE: 150 MMHG | BODY MASS INDEX: 30.55 KG/M2 | DIASTOLIC BLOOD PRESSURE: 90 MMHG | HEART RATE: 108 BPM

## 2022-06-15 DIAGNOSIS — E78.5 HYPERLIPIDEMIA, UNSPECIFIED HYPERLIPIDEMIA TYPE: ICD-10-CM

## 2022-06-15 DIAGNOSIS — R42 POSTURAL DIZZINESS WITH PRESYNCOPE: ICD-10-CM

## 2022-06-15 DIAGNOSIS — I95.1 ORTHOSTATIC HYPOTENSION: Primary | ICD-10-CM

## 2022-06-15 DIAGNOSIS — R94.39 ABNORMAL STRESS TEST: ICD-10-CM

## 2022-06-15 DIAGNOSIS — R55 POSTURAL DIZZINESS WITH PRESYNCOPE: ICD-10-CM

## 2022-06-15 PROCEDURE — 99214 OFFICE O/P EST MOD 30 MIN: CPT | Performed by: INTERNAL MEDICINE

## 2022-06-15 PROCEDURE — 93000 ELECTROCARDIOGRAM COMPLETE: CPT | Performed by: INTERNAL MEDICINE

## 2022-06-15 NOTE — PROGRESS NOTES
Cardiology follow-up    Brianna Acuña  [id number]  1955  [address] [place] [address] [facility] [place] [address] [place] [address] [address]      Diagnoses and all orders for this visit:    Orthostatic hypotension  -     POCT ECG    Hyperlipidemia, unspecified hyperlipidemia type  -     POCT ECG  -     Lipid Panel with Direct LDL reflex; Future    Postural dizziness with presyncope  -     POCT ECG    Abnormal stress test  -     Echo stress test, exercise; Future        I had the pleasure of seeing Brianna Acuña for a follow-up visit    History of the Presenting Illness, Discussion/Summary and my Plan are as follows:::    Adeline Gutierrez is a very pleasant 76-year-old lady with a history of hyperlipidemia, PACs, no hypertension, no diabetes, no SVT or arrhythmias (history reviewed)  Smoking  - 15 py - quit around 2017  She was originally in the ED with an episode of dizziness-January 2021, woke up from sleep, came down, put her tea in the microwave and then felt profoundly dizzy, without any syncope  Since his symptoms did not improve, came to the ER  Did have nausea and also felt flushed at that time  No palpitations or chest pains  At that time she was drinking hot tea as well as ice tea with artificial sweeteners  However in the ER she was evidently dehydrated with an elevated lactic acid level, creatinine of 1 2, with baseline of 0 8, orthostatics were also positive with a drop of 30 mm although does not recall if she became dizzy  She also underwent a CT scan that was negative for PE  No episodes since  She does have a family history of hyperlipidemia, father had a fatal heart attack at 61  Cardiac physical exam is unremarkable, ECG shows normal sinus rhythm  Prior ECGs have shown sinus rhythm with PACs  Symptoms abated with good hydration      Walks a decent amount and yard work, no symptoms but understands she can get more active and currently less active than last year      Plan:    Elevated BPs: advised to check at home twice daily for a week    History of Dizziness:  Likely secondary to orthostasis +/- vagal episode compounded by dehydration,   Normal echocardiogram - March 2021  Adequate Hydration was advised, counter pressure maneuvers were also advised and demonstrated  Family history of fatal heart attack in her father:  He was a runner but a prior smoker and hyperlipidemic  She did have a negative stress test in November 2015, no symptoms at this time  She was ordered another stress test that was borderline/equivocal-March 2021-recommended a stress echo but due to financial difficulties, last year, held off - she was quite stressed out from her family's prior hospital bills,  has Crohn's  Will complete it this year-reordered    Follow-up in 6 months  May 2018: , , HDL 54, LDL 94 - needs an updated one    Results for Colleen Rice (MRN [id number]) as of 8/9/2021 13:23   Ref  Range 9/8/2016 10:28 5/9/2018 08:03   Cholesterol Latest Ref Range: 50 - 200 mg/dL 213 (H) 177   Triglycerides Latest Ref Range: <=150 mg/dL 177 (H) 144   HDL Latest Ref Range: 40 - 60 mg/dL 53 54   Non-HDL Cholesterol Latest Units: mg/dl  123   LDL Calculated Latest Ref Range: 0 - 100 mg/dL 125 (H) 94       1  Orthostatic hypotension  POCT ECG   2  Hyperlipidemia, unspecified hyperlipidemia type  POCT ECG    Lipid Panel with Direct LDL reflex   3  Postural dizziness with presyncope  POCT ECG   4   Abnormal stress test  Echo stress test, exercise     Patient Active Problem List   Diagnosis    Reactive airway disease    Esophageal reflux    Hyperlipidemia    Obstructive sleep apnea    Migraine without aura and without status migrainosus, not intractable    Right-sided chest wall pain    Postural dizziness with presyncope    Orthostatic hypotension   [facility] discharge follow-up     Past Medical History:   Diagnosis Date    Allergic reaction     Resolved 11/24/2017     Allergic rhinitis     Resolved 11/24/2017     Generalized anxiety disorder     Resolved 11/24/2017     High cholesterol     Microscopic hematuria     Resolved 11/24/2017     Renal calculi     Resolved 44/95/1502     Renal colic     Resolved 92/15/7884     Renal cyst, acquired     Resolved 11/24/2017     Restrictive lung disease     Resolved 11/24/2017     Supraventricular tachycardia ([address] )     Resolved 11/24/2017      Social History     Socioeconomic History    Marital status: /Civil Union     Spouse name: Not on file    Number of children: Not on file    Years of education: Not on file    Highest education level: Not on file   Occupational History    Not on file   Tobacco Use    Smoking status: Former Smoker    Smokeless tobacco: Never Used   Vaping Use    Vaping Use: Never used   Substance and Sexual Activity    Alcohol use: Yes     Comment: occasional    Drug use: Never    Sexual activity: Not on file   Other Topics Concern    Not on file   Social History Narrative    Not on file     Social Determinants of Health     Financial Resource Strain: Not on file   Food Insecurity: Not on file   Transportation Needs: Not on file   Physical Activity: Not on file   Stress: Not on file   Social Connections: Not on file   Intimate Partner Violence: Not on file   Housing Stability: Not on file      Family History   Problem Relation Age of Onset    Cancer Mother     Lung cancer Mother     Heart attack Father     Heart attack Maternal Grandmother     Diabetes Paternal Grandmother     Rheum arthritis Family         Rheumatoid arthritis with rheumatoid factor     No Known Problems Sister     No Known Problems Sister     No Known Problems Sister     Colon cancer Maternal Aunt     Esophageal cancer Maternal Aunt     No Known Problems Maternal Aunt     No Known Problems Paternal Aunt      Past Surgical History:   Procedure Laterality Date    CHOLECYSTECTOMY      CYSTOSCOPY  05/07/2014    Diagnostic  Last assessed 5/7/2014      TONSILLECTOMY      TUBAL LIGATION         Current Outpatient Medications:     butalbital-acetaminophen-caffeine (FIORICET,ESGIC) -40 mg per tablet, Take 1 tablet by mouth every 4 (four) hours as needed for headaches, Disp: 30 tablet, Rfl: 1    Cholecalciferol (VITAMIN D3) 2000 units capsule, Take 1 capsule by mouth daily, Disp: , Rfl:     fluticasone (FLONASE) 50 mcg/act nasal spray, 2 sprays into each nostril daily, Disp: 3 Bottle, Rfl: 3    pantoprazole (PROTONIX) 40 mg tablet, Take 1 tablet by mouth 2 (two) times a day , Disp: , Rfl:     simvastatin (ZOCOR) 20 mg tablet, TAKE 1 TABLET BY MOUTH  DAILY AT BEDTIME, Disp: 90 tablet, Rfl: 3  Allergies   Allergen Reactions    Codeine      Other reaction(s): Other (See Comments)  headaches     Vitals:    06/15/22 1405 06/15/22 1434   BP: 144/84 150/90   BP Location: Left arm    Patient Position: Sitting    Cuff Size: Standard    Pulse: (!) 108    Weight: 78 2 kg (172 lb 6 4 oz)    Height: 5' 3" (1 6 m)          Imaging: No results found  Review of Systems:  Review of Systems   Constitutional: Negative  HENT: Negative  Eyes: Negative  Respiratory: Negative  Cardiovascular: Negative  Gastrointestinal: Negative  Endocrine: Negative  Musculoskeletal: Negative  Allergic/Immunologic: Negative  Neurological: Negative  Physical Exam:    /90   Pulse (!) 108   Ht 5' 3" (1 6 m)   Wt 78 2 kg (172 lb 6 4 oz)   BMI 30 54 kg/m²   Physical Exam  Constitutional:       General: She is not in acute distress  Appearance: Normal appearance  She is not ill-appearing  HENT:      Nose: Nose normal  No congestion or rhinorrhea  Mouth/Throat:      Mouth: Mucous membranes are moist       Pharynx: No oropharyngeal exudate or posterior oropharyngeal erythema  Eyes:      General: No scleral icterus  Right eye: No discharge  Left eye: No discharge  Pupils: Pupils are equal, round, and reactive to light  Neck:      Vascular: No carotid bruit  Cardiovascular:      Rate and Rhythm: Normal rate  Heart sounds: No murmur heard  No friction rub  Comments: No murmurs without or with Valsalva    Pulmonary:      Effort: Pulmonary effort is normal  No respiratory distress  Breath sounds: No stridor  No wheezing or rhonchi  Abdominal:      General: Abdomen is flat  There is no distension  Palpations: There is no mass  Tenderness: There is no abdominal tenderness  Hernia: No hernia is present  Musculoskeletal:         General: No swelling, tenderness, deformity or signs of injury  Normal range of motion  Cervical back: Normal range of motion  No rigidity  No muscular tenderness  Lymphadenopathy:      Cervical: No cervical adenopathy  Neurological:      Mental Status: She is alert  This note was completed in part utilizing [employer] direct voice recognition software  Grammatical errors, random word insertion, spelling mistakes, occasional wrong word or "sound-alike" substitutions and incomplete sentences may be an occasional consequence of the system secondary to software limitations, ambient noise and hardware issues  At the time of dictation, efforts were made to edit, clarify and /or correct errors  Please read the chart carefully and recognize, using context, where substitutions have occurred  If you have any questions or concerns about the context, text or information contained within the body of this dictation, please contact myself, the provider, for further clarification

## 2022-06-21 ENCOUNTER — HOSPITAL ENCOUNTER (EMERGENCY)
Facility: HOSPITAL | Age: 67
Discharge: HOME | End: 2022-06-21
Attending: EMERGENCY MEDICINE | Admitting: EMERGENCY MEDICINE
Payer: COMMERCIAL

## 2022-06-21 ENCOUNTER — APPOINTMENT (EMERGENCY)
Dept: CT IMAGING | Facility: HOSPITAL | Age: 67
End: 2022-06-21
Payer: COMMERCIAL

## 2022-06-21 VITALS
OXYGEN SATURATION: 95 % | DIASTOLIC BLOOD PRESSURE: 82 MMHG | TEMPERATURE: 98.1 F | HEART RATE: 59 BPM | SYSTOLIC BLOOD PRESSURE: 160 MMHG | RESPIRATION RATE: 16 BRPM

## 2022-06-21 DIAGNOSIS — S09.93XA FACIAL INJURY, INITIAL ENCOUNTER: ICD-10-CM

## 2022-06-21 DIAGNOSIS — S00.83XA FACIAL CONTUSION, INITIAL ENCOUNTER: Primary | ICD-10-CM

## 2022-06-21 PROCEDURE — 99283 EMERGENCY DEPT VISIT LOW MDM: CPT

## 2022-06-21 PROCEDURE — 99282 EMERGENCY DEPT VISIT SF MDM: CPT | Performed by: EMERGENCY MEDICINE

## 2022-06-21 PROCEDURE — 70486 CT MAXILLOFACIAL W/O DYE: CPT

## 2022-06-21 PROCEDURE — G1004 CDSM NDSC: HCPCS

## 2022-06-21 RX ORDER — ACETAMINOPHEN 325 MG/1
650 TABLET ORAL ONCE
Status: COMPLETED | OUTPATIENT
Start: 2022-06-21 | End: 2022-06-21

## 2022-06-21 RX ADMIN — ACETAMINOPHEN 650 MG: 325 TABLET ORAL at 12:26
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
## 2022-06-22 ENCOUNTER — OFFICE VISIT (OUTPATIENT)
Dept: FAMILY MEDICINE CLINIC | Facility: CLINIC | Age: 67
End: 2022-06-22
Payer: COMMERCIAL

## 2022-06-22 VITALS
HEIGHT: 63 IN | TEMPERATURE: 98.4 F | SYSTOLIC BLOOD PRESSURE: 210 MMHG | DIASTOLIC BLOOD PRESSURE: 100 MMHG | WEIGHT: 175 LBS | OXYGEN SATURATION: 96 % | BODY MASS INDEX: 31.01 KG/M2 | HEART RATE: 101 BPM

## 2022-06-22 DIAGNOSIS — S05.11XD: ICD-10-CM

## 2022-06-22 DIAGNOSIS — Z12.31 ENCOUNTER FOR SCREENING MAMMOGRAM FOR MALIGNANT NEOPLASM OF BREAST: ICD-10-CM

## 2022-06-22 DIAGNOSIS — I10 ESSENTIAL HYPERTENSION: Primary | ICD-10-CM

## 2022-06-22 DIAGNOSIS — E78.5 HYPERLIPIDEMIA, UNSPECIFIED HYPERLIPIDEMIA TYPE: ICD-10-CM

## 2022-06-22 DIAGNOSIS — Z78.0 POST-MENOPAUSE: ICD-10-CM

## 2022-06-22 DIAGNOSIS — G43.009 MIGRAINE WITHOUT AURA AND WITHOUT STATUS MIGRAINOSUS, NOT INTRACTABLE: ICD-10-CM

## 2022-06-22 PROBLEM — Z09 HOSPITAL DISCHARGE FOLLOW-UP: Status: RESOLVED | Noted: 2021-03-11 | Resolved: 2022-06-22

## 2022-06-22 PROBLEM — G47.33 OBSTRUCTIVE SLEEP APNEA: Status: RESOLVED | Noted: 2018-08-17 | Resolved: 2022-06-22

## 2022-06-22 PROCEDURE — 3008F BODY MASS INDEX DOCD: CPT | Performed by: FAMILY MEDICINE

## 2022-06-22 PROCEDURE — 1101F PT FALLS ASSESS-DOCD LE1/YR: CPT | Performed by: FAMILY MEDICINE

## 2022-06-22 PROCEDURE — 1036F TOBACCO NON-USER: CPT | Performed by: FAMILY MEDICINE

## 2022-06-22 PROCEDURE — 1160F RVW MEDS BY RX/DR IN RCRD: CPT | Performed by: FAMILY MEDICINE

## 2022-06-22 PROCEDURE — 3288F FALL RISK ASSESSMENT DOCD: CPT | Performed by: FAMILY MEDICINE

## 2022-06-22 PROCEDURE — 3725F SCREEN DEPRESSION PERFORMED: CPT | Performed by: FAMILY MEDICINE

## 2022-06-22 PROCEDURE — 99214 OFFICE O/P EST MOD 30 MIN: CPT | Performed by: FAMILY MEDICINE

## 2022-06-22 RX ORDER — BUTALBITAL, ACETAMINOPHEN AND CAFFEINE 50; 325; 40 MG/1; MG/1; MG/1
1 TABLET ORAL EVERY 4 HOURS PRN
Qty: 30 TABLET | Refills: 0 | Status: SHIPPED | OUTPATIENT
Start: 2022-06-22

## 2022-06-22 RX ORDER — LOSARTAN POTASSIUM 50 MG/1
50 TABLET ORAL DAILY
Qty: 30 TABLET | Refills: 5 | Status: SHIPPED | OUTPATIENT
Start: 2022-06-22 | End: 2022-07-25 | Stop reason: SDUPTHER
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
## 2022-06-23 ENCOUNTER — APPOINTMENT (OUTPATIENT)
Dept: LAB | Facility: CLINIC | Age: 67
End: 2022-06-23
Payer: COMMERCIAL

## 2022-06-23 DIAGNOSIS — E78.5 HYPERLIPIDEMIA, UNSPECIFIED HYPERLIPIDEMIA TYPE: ICD-10-CM

## 2022-06-23 LAB
ALBUMIN SERPL BCP-MCNC: 3.8 G/DL (ref 3.5–5)
ALP SERPL-CCNC: 66 U/L (ref 46–116)
ALT SERPL W P-5'-P-CCNC: 32 U/L (ref 12–78)
ANION GAP SERPL CALCULATED.3IONS-SCNC: 6 MMOL/L (ref 4–13)
AST SERPL W P-5'-P-CCNC: 15 U/L (ref 5–45)
BASOPHILS # BLD AUTO: 0.08 THOUSANDS/ΜL (ref 0–0.1)
BASOPHILS NFR BLD AUTO: 1 % (ref 0–1)
BILIRUB SERPL-MCNC: 1.03 MG/DL (ref 0.2–1)
BUN SERPL-MCNC: 22 MG/DL (ref 5–25)
CALCIUM SERPL-MCNC: 9.2 MG/DL (ref 8.3–10.1)
CHLORIDE SERPL-SCNC: 106 MMOL/L (ref 100–108)
CHOLEST SERPL-MCNC: 186 MG/DL
CO2 SERPL-SCNC: 26 MMOL/L (ref 21–32)
CREAT SERPL-MCNC: 0.92 MG/DL (ref 0.6–1.3)
EOSINOPHIL # BLD AUTO: 0.15 THOUSAND/ΜL (ref 0–0.61)
EOSINOPHIL NFR BLD AUTO: 2 % (ref 0–6)
ERYTHROCYTE [DISTWIDTH] IN BLOOD BY AUTOMATED COUNT: 12.2 % (ref 11.6–15.1)
GFR SERPL CREATININE-BSD FRML MDRD: 64 ML/MIN/1.73SQ M
GLUCOSE P FAST SERPL-MCNC: 109 MG/DL (ref 65–99)
HCT VFR BLD AUTO: 40.1 % (ref 34.8–46.1)
HDLC SERPL-MCNC: 46 MG/DL
HGB BLD-MCNC: 12.8 G/DL (ref 11.5–15.4)
IMM GRANULOCYTES # BLD AUTO: 0.01 THOUSAND/UL (ref 0–0.2)
IMM GRANULOCYTES NFR BLD AUTO: 0 % (ref 0–2)
LDLC SERPL CALC-MCNC: 118 MG/DL (ref 0–100)
LYMPHOCYTES # BLD AUTO: 1.84 THOUSANDS/ΜL (ref 0.6–4.47)
LYMPHOCYTES NFR BLD AUTO: 27 % (ref 14–44)
MCH RBC QN AUTO: 28.2 PG (ref 26.8–34.3)
MCHC RBC AUTO-ENTMCNC: 31.9 G/DL (ref 31.4–37.4)
MCV RBC AUTO: 88 FL (ref 82–98)
MONOCYTES # BLD AUTO: 0.65 THOUSAND/ΜL (ref 0.17–1.22)
MONOCYTES NFR BLD AUTO: 10 % (ref 4–12)
NEUTROPHILS # BLD AUTO: 4.14 THOUSANDS/ΜL (ref 1.85–7.62)
NEUTS SEG NFR BLD AUTO: 60 % (ref 43–75)
NRBC BLD AUTO-RTO: 0 /100 WBCS
PLATELET # BLD AUTO: 164 THOUSANDS/UL (ref 149–390)
PMV BLD AUTO: 12.5 FL (ref 8.9–12.7)
POTASSIUM SERPL-SCNC: 3.9 MMOL/L (ref 3.5–5.3)
PROT SERPL-MCNC: 7.5 G/DL (ref 6.4–8.2)
RBC # BLD AUTO: 4.54 MILLION/UL (ref 3.81–5.12)
SODIUM SERPL-SCNC: 138 MMOL/L (ref 136–145)
TRIGL SERPL-MCNC: 108 MG/DL
TSH SERPL DL<=0.05 MIU/L-ACNC: 1.58 UIU/ML (ref 0.45–4.5)
WBC # BLD AUTO: 6.87 THOUSAND/UL (ref 4.31–10.16)

## 2022-06-23 PROCEDURE — 36415 COLL VENOUS BLD VENIPUNCTURE: CPT | Performed by: FAMILY MEDICINE

## 2022-06-23 PROCEDURE — 85025 COMPLETE CBC W/AUTO DIFF WBC: CPT | Performed by: FAMILY MEDICINE

## 2022-06-23 PROCEDURE — 84443 ASSAY THYROID STIM HORMONE: CPT | Performed by: FAMILY MEDICINE

## 2022-06-23 PROCEDURE — 80061 LIPID PANEL: CPT

## 2022-06-23 PROCEDURE — 80053 COMPREHEN METABOLIC PANEL: CPT | Performed by: FAMILY MEDICINE

## 2022-07-05 ENCOUNTER — TELEPHONE (OUTPATIENT)
Dept: FAMILY MEDICINE CLINIC | Facility: CLINIC | Age: 67
End: 2022-07-05

## 2022-07-05 DIAGNOSIS — M79.671 RIGHT FOOT PAIN: ICD-10-CM

## 2022-07-05 DIAGNOSIS — G89.29 CHRONIC PAIN OF RIGHT ANKLE: Primary | ICD-10-CM

## 2022-07-05 DIAGNOSIS — M25.571 CHRONIC PAIN OF RIGHT ANKLE: Primary | ICD-10-CM

## 2022-07-05 NOTE — TELEPHONE ENCOUNTER
Can you make referral for her for her right foot and ankle? She was seeing Dr Uzair Rendon but he is not doing anything  Can you suggest someone?

## 2022-07-12 ENCOUNTER — HOSPITAL ENCOUNTER (OUTPATIENT)
Dept: BONE DENSITY | Facility: HOSPITAL | Age: 67
Discharge: HOME | End: 2022-07-12
Payer: COMMERCIAL

## 2022-07-12 ENCOUNTER — HOSPITAL ENCOUNTER (OUTPATIENT)
Dept: MAMMOGRAPHY | Facility: HOSPITAL | Age: 67
Discharge: HOME | End: 2022-07-12
Payer: COMMERCIAL

## 2022-07-12 VITALS — HEIGHT: 63 IN | BODY MASS INDEX: 31.01 KG/M2 | WEIGHT: 175 LBS

## 2022-07-12 DIAGNOSIS — M81.0 AGE-RELATED OSTEOPOROSIS WITHOUT CURRENT PATHOLOGICAL FRACTURE: Primary | ICD-10-CM

## 2022-07-12 DIAGNOSIS — Z12.31 ENCOUNTER FOR SCREENING MAMMOGRAM FOR MALIGNANT NEOPLASM OF BREAST: ICD-10-CM

## 2022-07-12 DIAGNOSIS — Z78.0 POST-MENOPAUSE: ICD-10-CM

## 2022-07-12 PROCEDURE — 77067 SCR MAMMO BI INCL CAD: CPT

## 2022-07-12 PROCEDURE — 77080 DXA BONE DENSITY AXIAL: CPT

## 2022-07-12 PROCEDURE — 77063 BREAST TOMOSYNTHESIS BI: CPT

## 2022-07-12 RX ORDER — ALENDRONATE SODIUM 70 MG/1
70 TABLET ORAL
Qty: 4 TABLET | Refills: 5 | Status: SHIPPED | OUTPATIENT
Start: 2022-07-12 | End: 2022-08-22

## 2022-07-20 ENCOUNTER — APPOINTMENT (OUTPATIENT)
Dept: RADIOLOGY | Facility: CLINIC | Age: 67
End: 2022-07-20
Payer: COMMERCIAL

## 2022-07-20 ENCOUNTER — OFFICE VISIT (OUTPATIENT)
Dept: PODIATRY | Facility: CLINIC | Age: 67
End: 2022-07-20
Payer: COMMERCIAL

## 2022-07-20 VITALS
DIASTOLIC BLOOD PRESSURE: 93 MMHG | HEIGHT: 63 IN | BODY MASS INDEX: 31.01 KG/M2 | WEIGHT: 175 LBS | SYSTOLIC BLOOD PRESSURE: 161 MMHG | HEART RATE: 81 BPM

## 2022-07-20 DIAGNOSIS — Q66.221 METATARSUS ADDUCTUS OF RIGHT FOOT: Primary | ICD-10-CM

## 2022-07-20 DIAGNOSIS — I73.9 INTERMITTENT CLAUDICATION (HCC): ICD-10-CM

## 2022-07-20 DIAGNOSIS — G89.29 CHRONIC PAIN OF RIGHT ANKLE: ICD-10-CM

## 2022-07-20 DIAGNOSIS — M79.671 RIGHT FOOT PAIN: ICD-10-CM

## 2022-07-20 DIAGNOSIS — M25.571 CHRONIC PAIN OF RIGHT ANKLE: ICD-10-CM

## 2022-07-20 DIAGNOSIS — G57.51 TARSAL TUNNEL SYNDROME OF RIGHT SIDE: ICD-10-CM

## 2022-07-20 DIAGNOSIS — M72.2 PLANTAR FASCIITIS: ICD-10-CM

## 2022-07-20 DIAGNOSIS — M76.821 POSTERIOR TIBIAL TENDINITIS OF RIGHT LOWER EXTREMITY: ICD-10-CM

## 2022-07-20 PROCEDURE — 73610 X-RAY EXAM OF ANKLE: CPT

## 2022-07-20 PROCEDURE — 73630 X-RAY EXAM OF FOOT: CPT

## 2022-07-20 PROCEDURE — 3080F DIAST BP >= 90 MM HG: CPT | Performed by: PODIATRIST

## 2022-07-20 PROCEDURE — 99204 OFFICE O/P NEW MOD 45 MIN: CPT | Performed by: PODIATRIST

## 2022-07-20 PROCEDURE — 3077F SYST BP >= 140 MM HG: CPT | Performed by: PODIATRIST

## 2022-07-20 NOTE — PATIENT INSTRUCTIONS
Voltaren gel    What is metatarsus adductus    Metatarsus adductus, also known as metatarsus varus, is a common foot deformity noted at birth that causes the front half of the foot, or forefoot, to turn inward  Metatarsus adductus may also be referred to as "flexible" (the foot can be straightened to a degree by hand) or "nonflexible" (the foot cannot be straightened by hand)  What causes metatarsus adductus? The cause of metatarsus adductus is not known  It occurs in approximately 1 to 2 per 1,000 live births and is more common in first born children  Babies born with metatarsus adductus rarely need treatment as they grow  They may, however, be at increased risk for developmental dysplasia of the hip, a condition of the hip joint in which the top of the thigh (femur) slips in and out of its socket, because the socket is too shallow to keep the joint intact  How is metatarsus adductus diagnosed? A doctor makes the diagnosis of metatarsus adductus with a physical examination  During the examination, the doctor will obtain a complete birth history of the child and ask if other family members were known to have metatarsus adductus  Diagnostic procedures are not usually necessary to evaluate metatarsus adductus  However, X-rays (a diagnostic test that uses invisible electromagnetic energy beams to produce images of internal tissues, bones, and organs onto film) of the feet are often done in the case of nonflexible metatarsus adductus  An infant with metatarsus adductus has a high arch and the big toe has a wide separation from the second toe and deviates inward  Flexible metatarsus adductus is diagnosed if the heel and forefoot can be aligned with each other with gentle pressure on the forefoot while holding the heel steady  This technique is known as passive manipulation  If the forefoot is more difficult to align with the heel, it is considered a nonflexible, or stiff foot      Treatment for metatarsus adductus  Specific treatment for metatarsus adductus will be determined by your child's doctor based on: Your child's age, overall health, and medical history  The extent of the condition  Your child's tolerance for specific medications, procedures, or therapies  Expectations for the course of the condition  Your opinion or preference    The goal of treatment is to straighten the position of the forefoot and heel  Treatment options vary for infants, and may include:  Observation, for those with a supple, or flexible, forefoot  stretching or passive manipulation exercises  casts  surgery    Studies have shown that metatarsus adductus may resolve spontaneously (without treatment) in the majority of affected children  Your child's doctor or nurse may instruct you on how to perform passive manipulation exercises on your child's feet during diaper changes  A change in sleeping positions may also be recommended  Suggestions may include side-lying positioning  In rare instances, the foot does not respond to the stretching program, long leg casts may be applied  Casts are used to help stretch the soft tissues of the forefoot  The plaster casts are changed every 1 to 2 weeks by your child's pediatric orthopaedist   If the foot responds to casting, straight cast shoes may be prescribed to help hold the forefoot in place  Straight last shoes are made without a curve in the bottom of the shoe  For those infants with very rigid or severe metatarsus adductus, surgery may be required to release the forefoot joints  Following surgery, casts are applied to hold the forefoot in place as it heals  Long-term outlook for a child with metatarsus adductus  Metatarsus adductus is a common problem with more than 90% resolving on their own  When needed treatment will depend on the degree of flexibility in the affected foot  Plantar Fasciitis Exercises   WHAT YOU NEED TO KNOW:   What are plantar fasciitis exercises?   Plantar fasciitis exercises help stretch and strengthen your foot muscles  This helps decrease stress on the plantar fascia  It may also help prevent plantar fasciitis from getting worse or coming back  Ask your healthcare provider when to start these exercises and how often to do them  Stop if you have pain  How are plantar fasciitis exercises done? Slant board stretch:  Stand on a slanted board with your toes higher than your heel  Press your heel into the board  Keep your knee slightly bent  Hold this position for 1 minute  Repeat 5 times  Heel raise:  Stand on your injured foot  Lift your other foot off the ground by bending your knee  Hold onto a chair or wall for balance  Raise up on your toes  Hold for 1 second and slowly lower to the ground  Repeat 10 times  Calf stretch:  Step forward so that your uninjured foot is in front of your injured foot  Stand with your forward leg bent and your back leg straight  Gently lean forward until you feel your calf stretch  Hold for 10 seconds and relax  Repeat 20 times  Toe curls:  Place a towel on the floor  Put your foot flat on the towel  Grab the towel with your toes by curling them under  Slowly straighten and curl your toes to pull the towel toward you  Toe taps:  Sit down and place your foot flat on the floor  Keep your heel on the floor  Point all your toes up toward the ceiling  While the 4 smaller toes are pointed up, bend your big toe down and tap it on the ground  Do 10 to 50 taps  Point all 5 toes up toward the ceiling again  This time keep your big toe pointed up and tap the 4 smaller toes on the ground  Do 10 to 50 taps each time  When should I contact my healthcare provider? Your pain and swelling increase  You develop new knee, hip, or back pain  You have questions or concerns about your condition or care  CARE AGREEMENT:   You have the right to help plan your care   Learn about your health condition and how it may be treated  Discuss treatment options with your caregivers to decide what care you want to receive  You always have the right to refuse treatment  The above information is an  only  It is not intended as medical advice for individual conditions or treatments  Talk to your doctor, nurse or pharmacist before following any medical regimen to see if it is safe and effective for you  © 2016 [address] [employer] is for End User's use only and may not be sold, redistributed or otherwise used for commercial purposes  All illustrations and images included in CareNotes® are the copyrighted property of [employer]  or Roman Douglass  Tarsal Tunnel Syndrome   AMBULATORY CARE:   Tarsal tunnel syndrome (TTS)  is a condition that causes pain, numbness, and weakness in your toes or foot  The tarsal tunnel is between the bump on the inside of your ankle and ligaments stretched across your foot  Nerves, arteries, and tendons within the tunnel help your foot move and be flexible  TTS is caused by repeated pressure and swelling on the nerve that runs from your ankle to your foot  Signs and symptoms of TTS  may be worse at night, or when you move or stand on your feet:  Dull, sharp, or shooting pain in your foot or toes    Numbness, tingling, or a burning feeling in your toes    Weakness or swelling in your foot    An electric shock feeling around your ankle or on the bottom of your foot    Seek care immediately if:   You suddenly lose feeling in your foot or toes  Your foot suddenly changes color  Call your doctor or podiatrist if:   Your symptoms get worse  You have questions or concerns about your condition or care  Treatment for TTS  may include any of the following if your symptoms continue or are severe:  Medicines:      NSAIDs , such as ibuprofen, help decrease swelling, pain, and fever  This medicine is available with or without a doctor's order  NSAIDs can cause stomach bleeding or kidney problems in certain people  If you take blood thinner medicine, always ask your healthcare provider if NSAIDs are safe for you  Always read the medicine label and follow directions  A pain cream  can be applied to your ankle and foot  The cream may contain an NSAID or other pain medicine  Nerve medicine  may be applied as a cream or taken as a pill  A steroid injection  may help decrease pain and swelling  Steroids are injected into the tarsal tunnel  Compression stockings  may help lower swelling or treat varicose veins  Your healthcare provider can tell you which kind to get, and where to get them  Transcutaneous electric nerve stimulation (TENS)  uses mild electrical impulses to help decrease your ankle and foot pain  Surgery  called, tarsal tunnel release, may be used to take pressure off of the nerve in your ankle  Manage TTS:   Apply ice on your ankle  Apply ice for 15 to 20 minutes every hour or as directed  Use an ice pack, or put crushed ice in a plastic bag  Cover it with a towel before you apply it  Ice helps prevent tissue damage and decreases swelling and pain  Rest your feet  Let your ankles and feet rest for a short time between repetitive motions  If you feel pain, stop what you are doing and gently massage your ankle or foot  Your healthcare provider may recommend a controlled ankle movement (CAM) boot for a short time  A CAM boot will keep your ankle from moving  Get physical and occupational therapy, if directed  Physical therapists will show you ways to exercise and strengthen your ankle  Occupational therapists will show you safe ways to use your ankle and feet while you do your usual activities  Use a splint as directed  A splint will support your ankle and decrease pressure on the nerve by letting your ankle and foot rest  You may need to wear the splint for up to 8 weeks   Your healthcare provider will tell you how long to wear it each day  He or she may want you to wear it all the time, or only at night  Wear properly fitting shoes  Your shoes should have some space at the top, near your toes  Do not wear tight or pointed shoes  Do not wear shoes that have a high heel  Your healthcare provider may also recommend orthotics or padding to put into your shoes  Follow up with your doctor or podiatrist as directed:  He or she may want to monitor your condition  Write down your questions so you remember to ask them during your visits  © Copyright [employer] 2022 Information is for End User's use only and may not be sold, redistributed or otherwise used for commercial purposes  All illustrations and images included in CareNotes® are the copyrighted property of [employer]  or Margarita Jones  The above information is an  only  It is not intended as medical advice for individual conditions or treatments  Talk to your doctor, nurse or pharmacist before following any medical regimen to see if it is safe and effective for you  Peripheral Vascular Disease   AMBULATORY CARE:   Peripheral vascular disease (PVD)  is a condition that causes decreased blood flow to your limbs because of blocked blood vessels  The blockage is usually caused by material such as cholesterol or a blood clot that sticks to the blood vessels and makes them narrow  Common symptoms include the following:   Painful cramps in your hip, thigh, or calf muscles, especially after you walk or climb stairs    Burning pain in your hands, fingers, feet, or toes    Shiny, tight, cold skin, and uneven hair growth on your skin    A change in your skin color    Sores on your skin that do not heal    Weakness or numbness of your hands or feet    Call your local emergency number (911 in the [place]) for any of the following:    You have any of the following signs of a heart attack:      Squeezing, pressure, or pain in your chest    You may  also have any of the following:     Discomfort or pain in your back, neck, jaw, stomach, or arm    Shortness of breath    Nausea or vomiting    Lightheadedness or a sudden cold sweat    You have any of the following signs of a stroke:      Numbness or drooping on one side of your face     Weakness in an arm or leg    Confusion or difficulty speaking    Dizziness, a severe headache, or vision loss    Seek care immediately if:   Your arm or leg feels warm, tender, and painful  It may look swollen and red  You have leg pain that does not go away with rest     You have dark areas on the skin of your legs  You cannot see out of one or both eyes  Call your doctor if:   Your signs and symptoms get worse or do not get better, even after treatment  You have a sore or ulcer that is not healing or gets worse  You have questions or concerns about your condition or care  Treatment for PVD  may include any of the following:  Medicines  may be given to help decrease your cholesterol level, open blood vessels, or prevent blood clots  Procedures  may be used to open blocked blood vessels  Metal or plastic stents (tubes) may be put in where the artery was blocked to keep it open  Surgery may be used to place a new blood vessel near the blocked vessel  Surgery may be used to replace the area of the blood vessel  Manage PVD:   Do not smoke  Nicotine and other chemicals in cigarettes and cigars can damage your blood vessels  Ask your healthcare provider for information if you currently smoke and need help to quit  E-cigarettes or smokeless tobacco still contain nicotine  Talk to your healthcare provider before you use these products  Exercise as directed  Your healthcare provider can help you create a safe exercise plan  He or she may recommend walking  Walking is a low-impact way to exercise and increase your blood flow  Stop and rest if you have pain in your legs  Care for your feet    Look closely at your feet every day  Check for cracks or sores  Wash your feet daily with mild soap and dry them well  Do not walk barefoot in case you step on a hard or sharp object  Change your sleep position  You may have pain in your legs or feet when you sleep  Raise the head of your bed 4 inches, or use pillows to prop your upper body higher than your legs  This may help more blood go to your feet, decreasing pain  Protect and cushion your feet and hands  If you have ulcers on your feet, you may need to wear bandages with heel pads  You may also wear foam rubber booties  Hand or foot warmers may decrease pain in your hands or feet  Prevent PVD:   Eat a variety of healthy foods  Healthy foods include fruits, vegetables, whole-grain breads, low-fat dairy products, beans, lean meats, and fish  Ask if you need to be on a special diet  Maintain a healthy weight  Ask your healthcare provider what a healthy weight is for you  Ask him or her to help you create a weight loss plan if you are overweight  Manage diabetes  Keep your blood sugar level in the range recommended by your healthcare provider  Check your blood sugar level as often as directed  Ask your provider if you should make nutrition, exercise, or medicine changes  Follow up with your doctor as directed:  Write down your questions so you remember to ask them during your visits  © Copyright [employer] 2022 Information is for End User's use only and may not be sold, redistributed or otherwise used for commercial purposes  All illustrations and images included in CareNotes® are the copyrighted property of [employer]  or [facility] Bailee Kay   The above information is an  only  It is not intended as medical advice for individual conditions or treatments  Talk to your doctor, nurse or pharmacist before following any medical regimen to see if it is safe and effective for you

## 2022-07-20 NOTE — PROGRESS NOTES
HISTORY AND PHYSICAL EXAM  - [facility]    PATIENT:  Flory Bowser    1955      Assessment/Plan     Problem List Items Addressed This Visit    None     Visit Diagnoses     Metatarsus adductus of right foot    -  Primary    Chronic pain of right ankle        Relevant Orders    X-ray ankle right 3+ views    Right foot pain        Relevant Orders    X-ray foot right 3+ views    Posterior tibial tendinitis of right lower extremity        Plantar fasciitis        Intermittent claudication (HCC)        Tarsal tunnel syndrome of right side               Diagnoses and all orders for this visit:    Metatarsus adductus of right foot    Chronic pain of right ankle  -     Ambulatory Referral to Podiatry  -     X-ray ankle right 3+ views; Future    Right foot pain  -     Ambulatory Referral to Podiatry  -     X-ray foot right 3+ views; Future    Posterior tibial tendinitis of right lower extremity    Plantar fasciitis    Intermittent claudication (HCC)    Tarsal tunnel syndrome of right side     -x-rays taken reviewed and show three views weight-bearing with significant metatarsus adductus to the right foot with some faulting of the midfoot with elevated calcaneal inclination angle   -I will place her in physical therapy for resolution of equinus and also to address her weekend posterior tibial tendon   -Rx given for night splint and also custom orthotics  -she is to return in 1 month for further assessment    History of Present Illness   Flory Bowser is a 74 y o  female who presents with chronic right ankle and foot pain that has been going on for a couple years  She has had tried inserts in the past and has had a history of tendinitis on the inside of her right ankle  She is having pain around the right ankle, complains of occasional burning pain, contains a 1st step in the morning pain and also bunion formation on the right with also occasional midfoot pain       Review of Systems Constitutional: Negative for chills and fever  HENT: Negative for ear pain and sore throat  Eyes: Negative for pain and visual disturbance  Respiratory: Negative for cough and shortness of breath  Cardiovascular: Negative for chest pain and palpitations  Gastrointestinal: Negative for abdominal pain and vomiting  Genitourinary: Negative for dysuria and hematuria  Musculoskeletal: Negative for arthralgias and back pain  Skin: Negative for color change and rash  Neurological: Negative for seizures and syncope  All other systems reviewed and are negative  Historical Information   Past Medical History:   Diagnosis Date    Allergic reaction     Resolved 11/24/2017     Allergic rhinitis     Resolved 11/24/2017     Generalized anxiety disorder     Resolved 11/24/2017     High cholesterol     Microscopic hematuria     Resolved 11/24/2017     Renal calculi     Resolved 73/04/7901     Renal colic     Resolved 23/57/0902     Renal cyst, acquired     Resolved 11/24/2017     Restrictive lung disease     Resolved 11/24/2017     Supraventricular tachycardia ([address] )     Resolved 11/24/2017      Past Surgical History:   Procedure Laterality Date    CHOLECYSTECTOMY      CYSTOSCOPY  05/07/2014    Diagnostic  Last assessed 5/7/2014      TONSILLECTOMY      TUBAL LIGATION       Social History   Social History     Substance and Sexual Activity   Alcohol Use Yes    Comment: occasional     Social History     Substance and Sexual Activity   Drug Use Never     Social History     Tobacco Use   Smoking Status Former Smoker   Smokeless Tobacco Never Used     Family History: non-contributory    Meds/Allergies   all medications and allergies reviewed  Allergies   Allergen Reactions    Codeine      Other reaction(s): Other (See Comments)  headaches       Objective   Vitals: Blood pressure 161/93, pulse 81, height 5' 3" (1 6 m), weight 79 4 kg (175 lb)  ,Body mass index is 31 kg/m²        Physical Exam  Vitals reviewed  Constitutional:       Appearance: Normal appearance  HENT:      Head: Normocephalic and atraumatic  Nose: Nose normal       Mouth/Throat:      Mouth: Mucous membranes are moist    Eyes:      Pupils: Pupils are equal, round, and reactive to light  Pulmonary:      Effort: Pulmonary effort is normal    Musculoskeletal:         General: Swelling, tenderness and deformity present  Comments: Pes planus with arch collapse of the right foot, there is bunion formation with hypermobility and severe metatarsus adductus on the right foot  There is tenderness to palpation along the course of the posterior tibial tendon, she is able to perform a single heel rise with weakness  But no pain  Positive Tinel's right foot, tenderness to palpation right plantar fascia   Skin:     Capillary Refill: Capillary refill takes 2 to 3 seconds  Neurological:      General: No focal deficit present  Mental Status: She is alert and oriented to person, place, and time           Ortho Exam

## 2022-07-21 ENCOUNTER — TELEPHONE (OUTPATIENT)
Dept: PODIATRY | Facility: CLINIC | Age: 67
End: 2022-07-21

## 2022-07-21 NOTE — TELEPHONE ENCOUNTER
Pal Smith asked her insurance company if they cover PT  They told her it must be authorized first   Who does that our office or physical therapy?

## 2022-07-21 NOTE — TELEPHONE ENCOUNTER
Laurita Castelan is scheduled for a stress test on 7/28/22  She wants to know if she should go for the test or not because of her foot

## 2022-07-25 ENCOUNTER — OFFICE VISIT (OUTPATIENT)
Dept: FAMILY MEDICINE CLINIC | Facility: CLINIC | Age: 67
End: 2022-07-25
Payer: COMMERCIAL

## 2022-07-25 VITALS
DIASTOLIC BLOOD PRESSURE: 84 MMHG | TEMPERATURE: 96.9 F | HEIGHT: 63 IN | WEIGHT: 173 LBS | HEART RATE: 84 BPM | BODY MASS INDEX: 30.65 KG/M2 | SYSTOLIC BLOOD PRESSURE: 138 MMHG | OXYGEN SATURATION: 96 %

## 2022-07-25 DIAGNOSIS — I10 ESSENTIAL HYPERTENSION: Primary | ICD-10-CM

## 2022-07-25 DIAGNOSIS — J01.90 ACUTE SINUSITIS, RECURRENCE NOT SPECIFIED, UNSPECIFIED LOCATION: ICD-10-CM

## 2022-07-25 PROCEDURE — 1160F RVW MEDS BY RX/DR IN RCRD: CPT | Performed by: FAMILY MEDICINE

## 2022-07-25 PROCEDURE — 99213 OFFICE O/P EST LOW 20 MIN: CPT | Performed by: FAMILY MEDICINE

## 2022-07-25 PROCEDURE — 3725F SCREEN DEPRESSION PERFORMED: CPT | Performed by: FAMILY MEDICINE

## 2022-07-25 RX ORDER — LOSARTAN POTASSIUM 100 MG/1
100 TABLET ORAL DAILY
Qty: 30 TABLET | Refills: 5 | Status: SHIPPED | OUTPATIENT
Start: 2022-07-25 | End: 2022-08-22

## 2022-07-25 RX ORDER — FLUTICASONE PROPIONATE 50 MCG
2 SPRAY, SUSPENSION (ML) NASAL DAILY
Qty: 16 G | Refills: 3 | Status: SHIPPED | OUTPATIENT
Start: 2022-07-25

## 2022-07-25 NOTE — PROGRESS NOTES
Assessment/Plan:  I will increase her losartan to 100 mg a day  She will continue to monitor her blood pressure and call us with results  Follow-up with Podiatry and Cardiology as scheduled  I will see her back in 3 months or p r n     Problem List Items Addressed This Visit        Cardiovascular and Mediastinum    Essential hypertension - Primary    Relevant Medications    losartan (Cozaar) 100 MG tablet      Other Visit Diagnoses     Acute sinusitis, recurrence not specified, unspecified location        Relevant Medications    fluticasone (FLONASE) 50 mcg/act nasal spray           Diagnoses and all orders for this visit:    Essential hypertension  -     losartan (Cozaar) 100 MG tablet; Take 1 tablet (100 mg total) by mouth daily    Acute sinusitis, recurrence not specified, unspecified location  -     fluticasone (FLONASE) 50 mcg/act nasal spray; 2 sprays into each nostril daily      No problem-specific Assessment & Plan notes found for this encounter  Subjective:      Patient ID: Talya Eugene is a 74 y o  female  Patient here today for follow-up  Patient has been monitoring her blood pressure  At home it has been as high as 745 over 27R systolic, generally in the 164 systolic  Patient is tolerating the medication well  Patient is continuing to follow-up with Podiatry for her right foot pain  She is going to get a cardiac stress test, along with a peripheral arterial scan  The following portions of the patient's history were reviewed and updated as appropriate:   She has a past medical history of Allergic reaction, Allergic rhinitis, Generalized anxiety disorder, High cholesterol, Microscopic hematuria, Renal calculi, Renal colic, Renal cyst, acquired, Restrictive lung disease, and Supraventricular tachycardia ([address] )  ,  does not have any pertinent problems on file  ,   has a past surgical history that includes Cholecystectomy;  Tubal ligation; TONSILLECTOMY; and CYSTOSCOPY (05/07/2014)  ,  family history includes Cancer in her mother; Colon cancer in her maternal aunt; Diabetes in her paternal grandmother; Esophageal cancer in her maternal aunt; Heart attack in her father and maternal grandmother; Lung cancer in her mother; No Known Problems in her maternal aunt, paternal aunt, sister, sister, and sister; Rheum arthritis in her family  ,   reports that she has quit smoking  She has never used smokeless tobacco  She reports current alcohol use  She reports that she does not use drugs  ,  is allergic to codeine     Current Outpatient Medications   Medication Sig Dispense Refill    alendronate (Fosamax) 70 mg tablet Take 1 tablet (70 mg total) by mouth every 7 days 4 tablet 5    butalbital-acetaminophen-caffeine (FIORICET,ESGIC) -40 mg per tablet Take 1 tablet by mouth every 4 (four) hours as needed for headaches 30 tablet 0    Cholecalciferol (VITAMIN D3) 2000 units capsule Take 1 capsule by mouth daily      fluticasone (FLONASE) 50 mcg/act nasal spray 2 sprays into each nostril daily 16 g 3    losartan (Cozaar) 100 MG tablet Take 1 tablet (100 mg total) by mouth daily 30 tablet 5    pantoprazole (PROTONIX) 40 mg tablet Take 1 tablet by mouth 2 (two) times a day       simvastatin (ZOCOR) 20 mg tablet TAKE 1 TABLET BY MOUTH  DAILY AT BEDTIME 90 tablet 3     No current facility-administered medications for this visit  Review of Systems   Constitutional: Negative  Respiratory: Negative  Cardiovascular: Negative  Gastrointestinal: Negative  Genitourinary: Negative  Objective:  Vitals:    07/25/22 1312   BP: 138/84   Pulse: 84   Temp: (!) 96 9 °F (36 1 °C)   SpO2: 96%   Weight: 78 5 kg (173 lb)   Height: 5' 3" (1 6 m)     Body mass index is 30 65 kg/m²  Physical Exam  Vitals reviewed  Constitutional:       General: She is not in acute distress  Appearance: Normal appearance  She is well-developed   She is not ill-appearing, toxic-appearing or diaphoretic  HENT:      Head: Normocephalic and atraumatic  Eyes:      Conjunctiva/sclera: Conjunctivae normal    Cardiovascular:      Rate and Rhythm: Normal rate and regular rhythm  Heart sounds: Normal heart sounds  No murmur heard  No friction rub  No gallop  Pulmonary:      Effort: Pulmonary effort is normal  No respiratory distress  Breath sounds: Normal breath sounds  No wheezing, rhonchi or rales  Musculoskeletal:      Right lower leg: No edema  Left lower leg: No edema  Neurological:      General: No focal deficit present  Mental Status: She is alert and oriented to person, place, and time  Psychiatric:         Mood and Affect: Mood normal          Behavior: Behavior normal          Thought Content:  Thought content normal          Judgment: Judgment normal

## 2022-07-28 ENCOUNTER — HOSPITAL ENCOUNTER (OUTPATIENT)
Dept: NON INVASIVE DIAGNOSTICS | Facility: HOSPITAL | Age: 67
Discharge: HOME | End: 2022-07-28
Attending: INTERNAL MEDICINE
Payer: COMMERCIAL

## 2022-07-28 VITALS
SYSTOLIC BLOOD PRESSURE: 150 MMHG | WEIGHT: 172 LBS | DIASTOLIC BLOOD PRESSURE: 76 MMHG | HEIGHT: 63 IN | HEART RATE: 75 BPM | BODY MASS INDEX: 30.48 KG/M2

## 2022-07-28 DIAGNOSIS — R94.39 ABNORMAL STRESS TEST: ICD-10-CM

## 2022-07-28 LAB
AORTIC VALVE MEAN VELOCITY: 13.6 M/S
AV MEAN GRADIENT: 9 MMHG
AV PEAK GRADIENT: 21 MMHG
DOP CALC AO PEAK VEL: 2.31 M/S
DOP CALC AO VTI: 40.94 CM
MAX HR PERCENT: 97 %
MAX HR: 148 BPM
RATE PRESSURE PRODUCT: NORMAL
SL CV LV EF: 65
SL CV STRESS RECOVERY BP: NORMAL MMHG
SL CV STRESS RECOVERY HR: 93 BPM
SL CV STRESS RECOVERY O2 SAT: 98 %
STRESS BASELINE BP: NORMAL MMHG
STRESS BASELINE HR: 75 BPM
STRESS O2 SAT REST: 97 %
STRESS PEAK HR: 148 BPM
STRESS POST ESTIMATED WORKLOAD: 6.5 METS
STRESS POST EXERCISE DUR MIN: 4 MIN
STRESS POST EXERCISE DUR SEC: 36 SEC
STRESS POST PEAK BP: 160 MMHG
STRESS ST DEPRESSION: 0 MM

## 2022-07-28 PROCEDURE — 93350 STRESS TTE ONLY: CPT

## 2022-07-28 PROCEDURE — 93351 STRESS TTE COMPLETE: CPT | Performed by: INTERNAL MEDICINE

## 2022-08-01 ENCOUNTER — EVALUATION (OUTPATIENT)
Dept: PHYSICAL THERAPY | Facility: HOME HEALTHCARE | Age: 67
End: 2022-08-01
Payer: COMMERCIAL

## 2022-08-01 DIAGNOSIS — M72.2 PLANTAR FASCIITIS: ICD-10-CM

## 2022-08-01 DIAGNOSIS — Q66.221 METATARSUS ADDUCTUS OF RIGHT FOOT: ICD-10-CM

## 2022-08-01 DIAGNOSIS — M76.821 POSTERIOR TIBIAL TENDINITIS OF RIGHT LOWER EXTREMITY: ICD-10-CM

## 2022-08-01 DIAGNOSIS — G57.51 TARSAL TUNNEL SYNDROME OF RIGHT SIDE: ICD-10-CM

## 2022-08-01 PROCEDURE — 97162 PT EVAL MOD COMPLEX 30 MIN: CPT

## 2022-08-01 PROCEDURE — 97140 MANUAL THERAPY 1/> REGIONS: CPT

## 2022-08-01 PROCEDURE — 97110 THERAPEUTIC EXERCISES: CPT

## 2022-08-01 NOTE — PROGRESS NOTES
PT Evaluation     Today's date: 2022  Patient name: Talya Eugene  : 1955  MRN: [id number]  Referring provider: Gabi Singleton  Dx:   Encounter Diagnosis     ICD-10-CM    1  Metatarsus adductus of right foot  Q66 221 Ambulatory referral to Physical Therapy   2  Posterior tibial tendinitis of right lower extremity  M76 821 Ambulatory referral to Physical Therapy   3  Plantar fasciitis  M72 2 Ambulatory referral to Physical Therapy   4  Tarsal tunnel syndrome of right side  G57 51 Ambulatory referral to Physical Therapy       Start Time: 1000  Stop Time: 1045  Total time in clinic (min): 45 minutes    Assessment  Assessment details: Pt is a 75 y/o female presenting with chronic R foot pain consistent with diagnosis of plantar fasciitis and tibialis posterior tendonitis  Pt is presenting with significant pain levels, ROM deficits, strength deficits, and decreased overall functional mobility  Pt demonstrated R ankle inversion weakness and significant ankle DF ROM deficits due to gastrocnemius tightness, which is likely contributing to her symptoms  Pt requires skilled PT in order to improve in pain, strength, ROM, functional mobility, quality of life, and return to PLOF  Thank you! Impairments: abnormal gait, abnormal or restricted ROM, abnormal movement, activity intolerance, impaired balance, impaired physical strength, lacks appropriate home exercise program, pain with function, weight-bearing intolerance and poor body mechanics  Understanding of Dx/Px/POC: good   Prognosis: good    Goals  STG: 3-4 weeks  Pt will be independent with HEP in order to continue to progress outside of PT treatment sessions  Pt will improve in quality of life as demonstrated by worst pain levels of 5/10 or less  Pt will improve in functional mobility as demonstrated by R ankle DF PROM of 5 deg or greater    LT-8 weeks  Pt will improve in quality of life as demonstrated by worst pain levels of 2/10 or less   Pt will improve in functional mobility as demonstrated by strength levels of 4+/5 or greater  Pt will improve in functional mobility as demonstrated by R ankle ROM WFL  Pt will improve in functional mobility as demonstrated by ability to perform full ADLs without any limitations due to pain or weakness  Plan  Patient would benefit from: skilled physical therapy  Planned modality interventions: cryotherapy, low level laser therapy and thermotherapy: hydrocollator packs  Planned therapy interventions: body mechanics training, flexibility, functional ROM exercises, gait training, home exercise program, joint mobilization, manual therapy, neuromuscular re-education, patient education, postural training, strengthening, stretching, therapeutic activities and therapeutic exercise  Frequency: 2x week  Duration in weeks: 4  Plan of Care beginning date: 2022  Plan of Care expiration date: 2022  Treatment plan discussed with: patient        Subjective Evaluation    History of Present Illness  Mechanism of injury: Pt reports that her MD sad her whole foot is shifted out, which is pulling on her foot and causing pain on the medial aspect of her foot/heel  Pt reports that the pain in her heel has been going on for a little less than a year  She reports that the pain is not constant, but that at times, she gets some numbness into her last two toes  Pt reports that she has to be very careful when walking and to avoid quick turns or pronation to prevent the pain  Pt reports that she does a wear a brace at times to add stability to her foot  Pt reports that she has flat feet  Pt reports that she feels like her foot wants to give out at times    Pain  Current pain ratin  At best pain ratin  At worst pain ratin  Quality: sharp  Relieving factors: support  Aggravating factors: stair climbing and walking (Going up stairs)      Diagnostic Tests  X-ray: abnormal  Patient Goals  Patient goals for therapy: decreased pain, increased strength, independence with ADLs/IADLs and return to sport/leisure activities  Patient goal: To be able to wear sandals and regular shoes        Objective     Observations     Additional Observation Details  Warren pes planus    Palpation     Right   Tenderness of the posterior tibialis  Tenderness     Right Ankle/Foot   Tenderness in the medial malleolus, navicular, peroneal tendon and plantar fascia  Neurological Testing     Sensation     Ankle/Foot   Left Ankle/Foot   Intact: light touch    Right Ankle/Foot   Intact: light touch     Passive Range of Motion   Left Ankle/Foot    Dorsiflexion (ke): 0 degrees   Plantar flexion: 50 degrees   Inversion: 48 degrees   Eversion: 18 degrees     Right Ankle/Foot    Dorsiflexion (ke): -2 degrees   Plantar flexion: 56 degrees   Inversion: 48 degrees   Eversion: 14 degrees     Strength/Myotome Testing     Left Ankle/Foot   Dorsiflexion: 5  Plantar flexion: 4+  Inversion: 4  Eversion: 4+    Right Ankle/Foot   Dorsiflexion: 5  Plantar flexion: 4+  Inversion: 3+  Eversion: 4    Tests     Right Ankle/Foot   Positive for navicular drop  Negative for Tinel's sign (tarsal tunnel)  Ambulation     Observational Gait   Left foot contact pattern: foot flat  Right foot contact pattern: foot flat    Quality of Movement During Gait     Foot Alignment    Foot Alignment (Left): Positive flat foot  Foot Alignment (Right): Positive flat foot                Precautions: None    Re-eval Date: 9/1/2022      Manuals 8/1            R ankle PROM 8'            IASTM plantar fascia                                       Neuro Re-Ed             Balance as appropriate                                       Ther Ex             [facility] or [address] [facility]             HR/TR 1x10            Runner's stretch 3x20" warren            Calf stretch w/ strap HEP            Prostretch             Towel scrunches             Ankle 4-way Red Inv 2x10            Eccentric S/L HR on step Ther Activity                                       Gait Training                                       Modalities

## 2022-08-03 ENCOUNTER — OFFICE VISIT (OUTPATIENT)
Dept: PHYSICAL THERAPY | Facility: HOME HEALTHCARE | Age: 67
End: 2022-08-03
Payer: COMMERCIAL

## 2022-08-03 DIAGNOSIS — G57.51 TARSAL TUNNEL SYNDROME OF RIGHT SIDE: ICD-10-CM

## 2022-08-03 DIAGNOSIS — Q66.221 METATARSUS ADDUCTUS OF RIGHT FOOT: Primary | ICD-10-CM

## 2022-08-03 DIAGNOSIS — M72.2 PLANTAR FASCIITIS: ICD-10-CM

## 2022-08-03 DIAGNOSIS — M76.821 POSTERIOR TIBIAL TENDINITIS OF RIGHT LOWER EXTREMITY: ICD-10-CM

## 2022-08-03 PROCEDURE — 97140 MANUAL THERAPY 1/> REGIONS: CPT

## 2022-08-03 PROCEDURE — 97110 THERAPEUTIC EXERCISES: CPT

## 2022-08-03 NOTE — PROGRESS NOTES
Daily Note     Today's date: 8/3/2022  Patient name: Opal Nguyen  : 1955  MRN: [id number]  Referring provider: Samantha Clifton  Dx:   Encounter Diagnosis     ICD-10-CM    1  Metatarsus adductus of right foot  Q66 221    2  Posterior tibial tendinitis of right lower extremity  M76 821    3  Plantar fasciitis  M72 2    4  Tarsal tunnel syndrome of right side  G57 51        Start Time: 1045  Stop Time: 1138  Total time in clinic (min): 53 minutes    Subjective: Pt reports that her foot is feeling a lot better and that her foot hasn't been feeling like it wants to give out  Objective: See treatment diary below      Assessment: Pt demonstrated good tolerance to treatment session  Pt continues to demonstrate ankle DF ROM deficits due to gastroc tightness, but improved slightly throughout the session with passive stretching  Pt did require some verbal and tactile cues when performing ankle 4-way exercise due to hip ER/IR compensations  Pt would benefit from continued PT  Plan: Continue per plan of care        Precautions: None    Re-eval Date: 2022      Manuals 8/1 8/3           Ginette ankle PROM 8' 10'           IASTM plantar fascia                                       Neuro Re-Ed             Balance as appropriate                                       Ther Ex             [facility] or [facility] L1 6'           HR/TR 1x10 2x10 ea           Runner's stretch 3x20" ginette 3x20" ginette           Calf stretch w/ strap HEP            Prostretch  3x20" ginette           Towel scrunches             Ankle 4-way Red Inv 2x10 Red 2x10 ginette           Eccentric S/L HR on step  2x10 ginette                                                               Ther Activity                                       Gait Training                                       Modalities

## 2022-08-05 ENCOUNTER — HOSPITAL ENCOUNTER (OUTPATIENT)
Dept: NON INVASIVE DIAGNOSTICS | Facility: HOSPITAL | Age: 67
Discharge: HOME | End: 2022-08-05
Payer: COMMERCIAL

## 2022-08-05 DIAGNOSIS — I73.9 INTERMITTENT CLAUDICATION (HCC): ICD-10-CM

## 2022-08-05 PROCEDURE — 93925 LOWER EXTREMITY STUDY: CPT

## 2022-08-05 PROCEDURE — 93923 UPR/LXTR ART STDY 3+ LVLS: CPT

## 2022-08-06 PROCEDURE — 93922 UPR/L XTREMITY ART 2 LEVELS: CPT | Performed by: SURGERY

## 2022-08-06 PROCEDURE — 93925 LOWER EXTREMITY STUDY: CPT | Performed by: SURGERY

## 2022-08-09 ENCOUNTER — OFFICE VISIT (OUTPATIENT)
Dept: PHYSICAL THERAPY | Facility: HOME HEALTHCARE | Age: 67
End: 2022-08-09
Payer: COMMERCIAL

## 2022-08-09 DIAGNOSIS — Q66.221 METATARSUS ADDUCTUS OF RIGHT FOOT: Primary | ICD-10-CM

## 2022-08-09 DIAGNOSIS — M72.2 PLANTAR FASCIITIS: ICD-10-CM

## 2022-08-09 DIAGNOSIS — M76.821 POSTERIOR TIBIAL TENDINITIS OF RIGHT LOWER EXTREMITY: ICD-10-CM

## 2022-08-09 DIAGNOSIS — G57.51 TARSAL TUNNEL SYNDROME OF RIGHT SIDE: ICD-10-CM

## 2022-08-09 PROCEDURE — 97110 THERAPEUTIC EXERCISES: CPT

## 2022-08-09 PROCEDURE — 97140 MANUAL THERAPY 1/> REGIONS: CPT

## 2022-08-09 NOTE — PROGRESS NOTES
Daily Note     Today's date: 2022  Patient name: Deepa Lentz  : 1955  MRN: [id number]  Referring provider: Jonathan Bonilla  Dx:   Encounter Diagnosis     ICD-10-CM    1  Metatarsus adductus of right foot  Q66 221    2  Posterior tibial tendinitis of right lower extremity  M76 821    3  Plantar fasciitis  M72 2    4  Tarsal tunnel syndrome of right side  G57 51        Start Time: 1000  Stop Time: 1045  Total time in clinic (min): 45 minutes    Subjective: Pt reports that her foot feel okay today, but that she has not done a lot of walking recently  Objective: See treatment diary below      Assessment: Pt demonstrated good tolerance to treatment session, but did require verbal cues for proper form of exercises  Pt continues to demonstrate bilateral ankle DF ROM deficits due to gastrocnemius tightness, but improved with passive stretching  Pt would benefit from continued PT  Plan: Continue per plan of care        Precautions: None    Re-eval Date: 2022      Manuals 8/1 8/3 8/9          Warren ankle PROM 8' 10' 10'          IASTM plantar fascia                                       Neuro Re-Ed             Balance as appropriate                                       Ther Ex             [facility] or [facility] L1 6' [facility] L1 6'          HR/TR 1x10 2x10 ea 2x10 ea          Runner's stretch 3x20" warren 3x20" warren 3x20" warren          Calf stretch w/ strap HEP            Prostretch  3x20" warren 3x20" warren          Towel scrunches             Ankle 4-way Red Inv 2x10 Red 2x10 warren Red 2x10 warren          Eccentric S/L HR on step  2x10 warren 2x10 warren                                                              Ther Activity                                       Gait Training                                       Modalities

## 2022-08-11 ENCOUNTER — OFFICE VISIT (OUTPATIENT)
Dept: PHYSICAL THERAPY | Facility: HOME HEALTHCARE | Age: 67
End: 2022-08-11
Payer: COMMERCIAL

## 2022-08-11 DIAGNOSIS — Q66.221 METATARSUS ADDUCTUS OF RIGHT FOOT: Primary | ICD-10-CM

## 2022-08-11 PROCEDURE — 97110 THERAPEUTIC EXERCISES: CPT

## 2022-08-11 PROCEDURE — 97140 MANUAL THERAPY 1/> REGIONS: CPT

## 2022-08-11 NOTE — PROGRESS NOTES
Daily Note     Today's date: 2022  Patient name: Jerrell Handley  : 1955  MRN: [id number]  Referring provider: Sebas Conley  Dx: No diagnosis found  Start Time: 1000          Subjective: I don't have pain until I walk and then I get pain at the medial heel and arch region  My Dr is suggesting possible Tarsal tunnel surg  Objective: See treatment diary below    Assessment: Tolerated treatment well  VC's needed for correct form especially with eccentric heel raise  Pt with end range tightness in all planes of Warren ankle movements noted during PROM  Pt with mild pitting edema noted at R ankle and states she will use CP at home  Patient would benefit from continued PT    Plan: Continue per plan of care        Precautions: None    Re-eval Date: 2022      Manuals 8/1 8/3 8/9 8-         Warren ankle PROM 8' 10' 10' 10'         IASTM plantar fascia                                       Neuro Re-Ed             Balance as appropriate                                       Ther Ex             [facility] or [facility] L1 6' [facility] L1 6' [facility]  L1  6'         HR/TR 1x10 2x10 ea 2x10 ea 2x10 ea         Runner's stretch 3x20" warren 3x20" warren 3x20" warren 3x20"         Calf stretch w/ strap HEP            Prostretch  3x20" warren 3x20" warren 3x20"         Towel scrunches             Ankle 4-way Red Inv 2x10 Red 2x10 warren Red 2x10 warren Red   2x10  warren         Eccentric S/L HR on step  2x10 warren 2x10 warren 2x10 warren                                                             Ther Activity                                       Gait Training                                       Modalities

## 2022-08-15 ENCOUNTER — OFFICE VISIT (OUTPATIENT)
Dept: PHYSICAL THERAPY | Facility: HOME HEALTHCARE | Age: 67
End: 2022-08-15
Payer: COMMERCIAL

## 2022-08-15 DIAGNOSIS — M76.821 POSTERIOR TIBIAL TENDINITIS OF RIGHT LOWER EXTREMITY: ICD-10-CM

## 2022-08-15 DIAGNOSIS — Q66.221 METATARSUS ADDUCTUS OF RIGHT FOOT: Primary | ICD-10-CM

## 2022-08-15 DIAGNOSIS — M72.2 PLANTAR FASCIITIS: ICD-10-CM

## 2022-08-15 DIAGNOSIS — G57.51 TARSAL TUNNEL SYNDROME OF RIGHT SIDE: ICD-10-CM

## 2022-08-15 PROCEDURE — 97110 THERAPEUTIC EXERCISES: CPT

## 2022-08-15 PROCEDURE — 97140 MANUAL THERAPY 1/> REGIONS: CPT

## 2022-08-15 NOTE — PROGRESS NOTES
Daily Note     Today's date: 8/15/2022  Patient name: Kimberly Mcgill  : 1955  MRN: [id number]  Referring provider: Denver Cowan  Dx:   Encounter Diagnosis     ICD-10-CM    1  Metatarsus adductus of right foot  Q66 221    2  Posterior tibial tendinitis of right lower extremity  M76 821    3  Plantar fasciitis  M72 2    4  Tarsal tunnel syndrome of right side  G57 51        Start Time: 1000  Stop Time: 1046  Total time in clinic (min): 46 minutes    Subjective: Pt reports that her foot feels a little better and that she did a lot of walking over the weekend  Pt reports that her foot doesn't feel like it wants to give out as much now  Objective: See treatment diary below      Assessment: Pt demonstrated good tolerance to treatment session  She continues to demonstrate ankle DF ROM deficits, but has improved since beginning PT  Pt required some verbal cues for proper form of ankle 4-way exercises  Pt would benefit from continued PT  Plan: Continue per plan of care        Precautions: None    Re-eval Date: 2022      Manuals 8/1 8/3 8/9 8-11 8/15        Warren ankle PROM 8' 10' 10' 10' 8'        IASTM plantar fascia                                       Neuro Re-Ed             Balance as appropriate                                       Ther Ex             [facility] or [facility] L1 6' [facility] L1 6' [facility]  L1  6' [facility] L1 6'        HR/TR 1x10 2x10 ea 2x10 ea 2x10 ea 2x10 ea        Runner's stretch 3x20" warren 3x20" warren 3x20" warren 3x20" 3x20" warren        Calf stretch w/ strap HEP            Prostretch  3x20" warren 3x20" warren 3x20" 3x20" warren        Towel scrunches             Ankle 4-way Red Inv 2x10 Red 2x10 warren Red 2x10 warren Red   2x10  warren Red   2x10  warren        Eccentric S/L HR on step  2x10 warren 2x10 warren 2x10 warren 2x10 warren                                                            Ther Activity                                       Gait Training                                       Modalities

## 2022-08-18 ENCOUNTER — APPOINTMENT (OUTPATIENT)
Dept: PHYSICAL THERAPY | Facility: HOME HEALTHCARE | Age: 67
End: 2022-08-18
Payer: COMMERCIAL

## 2022-08-19 ENCOUNTER — OFFICE VISIT (OUTPATIENT)
Dept: PHYSICAL THERAPY | Facility: HOME HEALTHCARE | Age: 67
End: 2022-08-19
Payer: COMMERCIAL

## 2022-08-19 DIAGNOSIS — Q66.221 METATARSUS ADDUCTUS OF RIGHT FOOT: Primary | ICD-10-CM

## 2022-08-19 PROCEDURE — 97110 THERAPEUTIC EXERCISES: CPT

## 2022-08-19 PROCEDURE — 97140 MANUAL THERAPY 1/> REGIONS: CPT

## 2022-08-19 NOTE — PROGRESS NOTES
Daily Note     Today's date: 2022  Patient name: Neeru Troncoso  : 1955  MRN: [id number]  Referring provider: José Miguel Tovar  Dx: No diagnosis found  Start Time:           Subjective: My R foot is bothering me more lately  Objective: See treatment diary below    Assessment: Tolerated treatment well  Pt with good form with self stretches  She requires vc's during eccentric heel raise to perform correctly  Pt with Warren ankle strength and ROM deficits R>L noted with T-band and MT  Patient would benefit from continued PT    Plan: Continue per plan of care        Precautions: None    Re-eval Date: 2022      Manuals 8/1 8/3 8/9 8-11 8/15 8-19       Warren ankle PROM 8' 10' 10' 10' 8' 8'       IASTM plantar fascia                                       Neuro Re-Ed           Balance as appropriate                                       Ther Ex           [facility] or [facility] L1 6[facility] L1 6' [facility]  L1  6' [facility] L1 6[facility]  L2  6'       HR/TR 1x10 2x10 ea 2x10 ea 2x10 ea 2x10 ea 2x10 ea       Runner's stretch 3x20" warren 3x20" warren 3x20" warren 3x20" 3x20" warren 3x20"  warren       Calf stretch w/ strap HEP            Prostretch  3x20" warren 3x20" warren 3x20" 3x20" warren 3x20"  warren       Towel scrunches             Ankle 4-way Red Inv 2x10 Red 2x10 warren Red 2x10 warren Red   2x10  warren Red   2x10  warren Red 2x10 ea Warren       Eccentric S/L HR on step  2x10 warren 2x10 warren 2x10 warren 2x10 warren 2x10 warren                                                           Ther Activity                                       Gait Training                                       Modalities

## 2022-08-22 ENCOUNTER — OFFICE VISIT (OUTPATIENT)
Dept: PHYSICAL THERAPY | Facility: HOME HEALTHCARE | Age: 67
End: 2022-08-22
Payer: COMMERCIAL

## 2022-08-22 ENCOUNTER — OFFICE VISIT (OUTPATIENT)
Dept: FAMILY MEDICINE CLINIC | Facility: CLINIC | Age: 67
End: 2022-08-22
Payer: COMMERCIAL

## 2022-08-22 ENCOUNTER — TELEPHONE (OUTPATIENT)
Dept: FAMILY MEDICINE CLINIC | Facility: CLINIC | Age: 67
End: 2022-08-22

## 2022-08-22 VITALS
DIASTOLIC BLOOD PRESSURE: 84 MMHG | TEMPERATURE: 96.9 F | HEART RATE: 81 BPM | BODY MASS INDEX: 31.4 KG/M2 | SYSTOLIC BLOOD PRESSURE: 138 MMHG | HEIGHT: 63 IN | OXYGEN SATURATION: 95 % | WEIGHT: 177.2 LBS

## 2022-08-22 DIAGNOSIS — M81.0 AGE-RELATED OSTEOPOROSIS WITHOUT CURRENT PATHOLOGICAL FRACTURE: ICD-10-CM

## 2022-08-22 DIAGNOSIS — I10 ESSENTIAL HYPERTENSION: Primary | ICD-10-CM

## 2022-08-22 DIAGNOSIS — M81.0 AGE-RELATED OSTEOPOROSIS WITHOUT CURRENT PATHOLOGICAL FRACTURE: Primary | ICD-10-CM

## 2022-08-22 DIAGNOSIS — Q66.221 METATARSUS ADDUCTUS OF RIGHT FOOT: Primary | ICD-10-CM

## 2022-08-22 PROBLEM — R55 POSTURAL DIZZINESS WITH PRESYNCOPE: Status: RESOLVED | Noted: 2021-03-11 | Resolved: 2022-08-22

## 2022-08-22 PROBLEM — R42 POSTURAL DIZZINESS WITH PRESYNCOPE: Status: RESOLVED | Noted: 2021-03-11 | Resolved: 2022-08-22

## 2022-08-22 PROCEDURE — 1160F RVW MEDS BY RX/DR IN RCRD: CPT | Performed by: FAMILY MEDICINE

## 2022-08-22 PROCEDURE — 97110 THERAPEUTIC EXERCISES: CPT

## 2022-08-22 PROCEDURE — 3725F SCREEN DEPRESSION PERFORMED: CPT | Performed by: FAMILY MEDICINE

## 2022-08-22 PROCEDURE — 3075F SYST BP GE 130 - 139MM HG: CPT | Performed by: FAMILY MEDICINE

## 2022-08-22 PROCEDURE — 97140 MANUAL THERAPY 1/> REGIONS: CPT

## 2022-08-22 PROCEDURE — 99214 OFFICE O/P EST MOD 30 MIN: CPT | Performed by: FAMILY MEDICINE

## 2022-08-22 PROCEDURE — 3079F DIAST BP 80-89 MM HG: CPT | Performed by: FAMILY MEDICINE

## 2022-08-22 RX ORDER — AMLODIPINE BESYLATE 5 MG/1
5 TABLET ORAL DAILY
Qty: 30 TABLET | Refills: 5 | Status: SHIPPED | OUTPATIENT
Start: 2022-08-22 | End: 2022-09-14

## 2022-08-22 NOTE — PROGRESS NOTES
Daily Note     Today's date: 2022  Patient name: Radha Maldonado  : 1955  MRN: [id number]  Referring provider: Donte Brown  Dx: No diagnosis found  Start Time: 1110          Subjective: The pain that I had at the inside area of my foot has improved since [facility]  The bunion at the big toe is making my foot turn  I have an appt with Dr Jenny Rodrigues on Wed to see what can be done  Objective: See treatment diary below    Assessment: Tolerated treatment well  Pt was able to complete session without c/o increased pain  Pt does report tenderness at R medial great toe/bunion region  Improved form with eccentric step up with less vc's needed  Patient would benefit from continued PT    Plan: Continue per plan of care        Precautions: None    Re-eval Date: 2022      Manuals 8/1 8/3 8/9 8-11 8/15 8-19 8-22      Ginette ankle PROM 8' 10' 10' 10' 8' 8' 8'      IASTM plantar fascia                                       Neuro Re-Ed          Balance as appropriate                                       Ther Ex          [facility]ustep or [facility]  [facility] L1 6' [facility] L1 6' [facility]  L1  6' [facility] L1 6' [facility]  L2  6' [facility]  L2 6'      HR/TR 1x10 2x10 ea 2x10 ea 2x10 ea 2x10 ea 2x10 ea 2x10 ea      Runner's stretch 3x20" ginette 3x20" ginette 3x20" ginette 3x20" 3x20" ginette 3x20"  ginette 3x20"   ginette      Calf stretch w/ strap HEP            Prostretch  3x20" ginette 3x20" ginette 3x20" 3x20" ginette 3x20"  ginette 3x20" ginette      Towel scrunches             Ankle 4-way Red Inv 2x10 Red 2x10 ginette Red 2x10 ginette Red   2x10  ginette Red   2x10  ginette Red 2x10 ea Ginette Red   2x10      Eccentric S/L HR on step  2x10 ginette 2x10 ginette 2x10 ginette 2x10 ginette 2x10 ginette 2x10 ginette                                                          Ther Activity                                       Gait Training                                       Modalities

## 2022-08-22 NOTE — TELEPHONE ENCOUNTER
Patient is intolerant of alendronate (Fosamax)  It gives her nausea and GERD  Can we see if her insurance will cover Prolia?

## 2022-08-22 NOTE — TELEPHONE ENCOUNTER
Was in for appt today and forgot to mention she is having pain in the top of her legs or hips  Should she have a test done or wait until she sees foot doctor?

## 2022-08-22 NOTE — PROGRESS NOTES
Assessment/Plan: For her blood pressure, I will switch her from losartan to amlodipine 5 mg a day  She will continue to monitor her pressure and call us if she does not tolerate it emotionally  For her osteoporosis, she will discontinue Fosamax due to GI disturbance  We will try to see if we can get Prolia covered  I will see her back in 2 months or p r n  Problem List Items Addressed This Visit        Cardiovascular and Mediastinum    Essential hypertension - Primary    Relevant Medications    amLODIPine (NORVASC) 5 mg tablet       Musculoskeletal and Integument    Age-related osteoporosis without current pathological fracture           Diagnoses and all orders for this visit:    Essential hypertension  -     amLODIPine (NORVASC) 5 mg tablet; Take 1 tablet (5 mg total) by mouth daily    Age-related osteoporosis without current pathological fracture      No problem-specific Assessment & Plan notes found for this encounter  Subjective:      Patient ID: Tej Clifford is a 74 y o  female  Patient here today for follow-up  Patient has not been tolerating her Fosamax  Patient gets nauseous on it and reflux symptoms even couple days after  Patient also has been more emotional since starting the losartan  It comes on suddenly  Patient does not feel she has anything to be emotional about  She has been monitoring her pressure  It ranges in the 593C 430D systolic, down to the 686M systolic  The following portions of the patient's history were reviewed and updated as appropriate:   She has a past medical history of Allergic reaction, Allergic rhinitis, Generalized anxiety disorder, High cholesterol, Microscopic hematuria, Postural dizziness with presyncope (3/11/2021), Renal calculi, Renal colic, Renal cyst, acquired, Restrictive lung disease, and Supraventricular tachycardia ([facility] [address] )  ,  does not have any pertinent problems on file  ,   has a past surgical history that includes Cholecystectomy; Tubal ligation; TONSILLECTOMY; and CYSTOSCOPY (05/07/2014)  ,  family history includes Cancer in her mother; Colon cancer in her maternal aunt; Diabetes in her paternal grandmother; Esophageal cancer in her maternal aunt; Heart attack in her father and maternal grandmother; Lung cancer in her mother; No Known Problems in her maternal aunt, paternal aunt, sister, sister, and sister; Rheum arthritis in her family  ,   reports that she has quit smoking  She has never used smokeless tobacco  She reports current alcohol use  She reports that she does not use drugs  ,  is allergic to codeine and fosamax [alendronate]     Current Outpatient Medications   Medication Sig Dispense Refill    amLODIPine (NORVASC) 5 mg tablet Take 1 tablet (5 mg total) by mouth daily 30 tablet 5    butalbital-acetaminophen-caffeine (FIORICET,ESGIC) -40 mg per tablet Take 1 tablet by mouth every 4 (four) hours as needed for headaches 30 tablet 0    Cholecalciferol (VITAMIN D3) 2000 units capsule Take 1 capsule by mouth daily      fluticasone (FLONASE) 50 mcg/act nasal spray 2 sprays into each nostril daily 16 g 3    pantoprazole (PROTONIX) 40 mg tablet Take 1 tablet by mouth 2 (two) times a day       simvastatin (ZOCOR) 20 mg tablet TAKE 1 TABLET BY MOUTH  DAILY AT BEDTIME 90 tablet 3     No current facility-administered medications for this visit  Review of Systems   Constitutional: Negative  Respiratory: Negative  Cardiovascular: Negative  Gastrointestinal: Negative  Genitourinary: Negative  Objective:  Vitals:    08/22/22 0904   BP: 138/84   Pulse: 81   Temp: (!) 96 9 °F (36 1 °C)   SpO2: 95%   Weight: 80 4 kg (177 lb 3 2 oz)   Height: 5' 3" (1 6 m)     Body mass index is 31 39 kg/m²  Physical Exam  Vitals reviewed  Constitutional:       General: She is not in acute distress  Appearance: Normal appearance  She is well-developed  She is not ill-appearing, toxic-appearing or diaphoretic     HENT: Head: Normocephalic and atraumatic  Eyes:      Conjunctiva/sclera: Conjunctivae normal    Cardiovascular:      Rate and Rhythm: Normal rate and regular rhythm  Heart sounds: Normal heart sounds  No murmur heard  No friction rub  No gallop  Pulmonary:      Effort: Pulmonary effort is normal  No respiratory distress  Breath sounds: Normal breath sounds  No wheezing, rhonchi or rales  Musculoskeletal:      Right lower leg: No edema  Left lower leg: No edema  Neurological:      General: No focal deficit present  Mental Status: She is alert and oriented to person, place, and time  Psychiatric:         Mood and Affect: Mood normal          Behavior: Behavior normal          Thought Content:  Thought content normal          Judgment: Judgment normal

## 2022-08-24 ENCOUNTER — OFFICE VISIT (OUTPATIENT)
Dept: PODIATRY | Facility: CLINIC | Age: 67
End: 2022-08-24
Payer: COMMERCIAL

## 2022-08-24 VITALS — BODY MASS INDEX: 31.36 KG/M2 | WEIGHT: 177 LBS | HEIGHT: 63 IN

## 2022-08-24 DIAGNOSIS — M76.821 POSTERIOR TIBIAL TENDINITIS OF RIGHT LOWER EXTREMITY: Primary | ICD-10-CM

## 2022-08-24 DIAGNOSIS — G57.51 TARSAL TUNNEL SYNDROME OF RIGHT SIDE: ICD-10-CM

## 2022-08-24 DIAGNOSIS — D36.13 NEUROMA OF FOOT: ICD-10-CM

## 2022-08-24 DIAGNOSIS — Q66.221 METATARSUS ADDUCTUS OF RIGHT FOOT: ICD-10-CM

## 2022-08-24 PROCEDURE — 64455 NJX AA&/STRD PLTR COM DG NRV: CPT | Performed by: PODIATRIST

## 2022-08-24 PROCEDURE — 99213 OFFICE O/P EST LOW 20 MIN: CPT | Performed by: PODIATRIST

## 2022-08-24 RX ORDER — LIDOCAINE HYDROCHLORIDE 10 MG/ML
1.5 INJECTION, SOLUTION EPIDURAL; INFILTRATION; INTRACAUDAL; PERINEURAL ONCE
Status: COMPLETED | OUTPATIENT
Start: 2022-08-24 | End: 2022-08-24

## 2022-08-24 RX ORDER — TRIAMCINOLONE ACETONIDE 40 MG/ML
20 INJECTION, SUSPENSION INTRA-ARTICULAR; INTRAMUSCULAR ONCE
Status: COMPLETED | OUTPATIENT
Start: 2022-08-24 | End: 2022-08-24

## 2022-08-24 RX ADMIN — TRIAMCINOLONE ACETONIDE 20 MG: 40 INJECTION, SUSPENSION INTRA-ARTICULAR; INTRAMUSCULAR at 13:44

## 2022-08-24 RX ADMIN — LIDOCAINE HYDROCHLORIDE 1.5 ML: 10 INJECTION, SOLUTION EPIDURAL; INFILTRATION; INTRACAUDAL; PERINEURAL at 13:45

## 2022-08-24 NOTE — PROGRESS NOTES
Assessment/Plan:    No problem-specific Assessment & Plan notes found for this encounter  Diagnoses and all orders for this visit:    Posterior tibial tendinitis of right lower extremity    Metatarsus adductus of right foot    Tarsal tunnel syndrome of right side    Neuroma of foot  -     lidocaine (PF) (XYLOCAINE-MPF) 1 % injection 1 5 mL  -     triamcinolone acetonide (KENALOG-40) 40 mg/mL injection 20 mg  -     Nerve block      -physical therapy drastically improved her posterior tibial tendinitis, she states she knows this pain will come back so she starts walking, I discussed with her continued conservative care, supportive shoes, eventual orthotic management     -she is now having some issues of neuroma in her right 4th interspace, 1st injection today, she is to supposed to follow-up in 3 weeks for continued pain   -she is also complaining of bunion pain discussed that we would address on her next visit  -dispensed and advised use of metatarsal pads, and rigid shoes  She is to consider use of custom orthotics and eventual personally custom orthotics in the future  Nerve block    Date/Time: 8/24/2022 1:22 PM  Performed by: Joi Rodney DPM  Authorized by: Joi Rodney DPM     Patient location:  Bedside  [place] Protocol:  Consent: Verbal consent obtained  Consent given by: patient  Timeout called at: 8/24/2022 1:22 PM   Patient understanding: patient states understanding of the procedure being performed  Patient consent: the patient's understanding of the procedure matches consent given  Patient identity confirmed: verbally with patient      Indications:     Indications:  Pain relief  Location:     Body area:  Lower extremity    Lower extremity nerve:  Digital    Laterality:  Right  Pre-procedure details:     Skin preparation:  Alcohol  Post-procedure details:     Dressing:  Sterile dressing    Outcome:  Anesthesia achieved    Patient tolerance of procedure:   Tolerated well, no immediate complications  Comments:      Number I injection of the right 4th interspace        Subjective:      Patient ID: Lanette Odom is a 74 y o  female  Patient presents for evaluation management of her right foot, states that her pain that she was initially seeing me for is now gone, she is having numbness interdigitally between the right 4, 5 toes of the right foot  This does bother her in shoes and gets worse as the day goes on  She is not done anything to treat this pain  The following portions of the patient's history were reviewed and updated as appropriate: allergies, current medications, past family history, past medical history, past social history, past surgical history and problem list     Review of Systems   Constitutional: Negative for chills and fever  HENT: Negative for ear pain and sore throat  Eyes: Negative for pain and visual disturbance  Respiratory: Negative for cough and shortness of breath  Cardiovascular: Negative for chest pain and palpitations  Gastrointestinal: Negative for abdominal pain and vomiting  Genitourinary: Negative for dysuria and hematuria  Musculoskeletal: Negative for arthralgias and back pain  Skin: Negative for color change and rash  Neurological: Negative for seizures and syncope  All other systems reviewed and are negative  Objective:      Ht 5' 3" (1 6 m)   Wt 80 3 kg (177 lb)   BMI 31 35 kg/m²          Physical Exam  Vitals reviewed  Constitutional:       Appearance: Normal appearance  She is obese  HENT:      Head: Normocephalic and atraumatic  Nose: Nose normal       Mouth/Throat:      Mouth: Mucous membranes are moist    Eyes:      Pupils: Pupils are equal, round, and reactive to light  Musculoskeletal:      Comments: Numbness and tingling in between the right 4th interspace, pain with more compression  She is having stabbing pains in the interspace     Skin:     Capillary Refill: Capillary refill takes 2 to 3 seconds  Neurological:      General: No focal deficit present  Mental Status: She is alert and oriented to person, place, and time

## 2022-08-25 ENCOUNTER — OFFICE VISIT (OUTPATIENT)
Dept: PHYSICAL THERAPY | Facility: HOME HEALTHCARE | Age: 67
End: 2022-08-25
Payer: COMMERCIAL

## 2022-08-25 ENCOUNTER — TELEPHONE (OUTPATIENT)
Dept: FAMILY MEDICINE CLINIC | Facility: CLINIC | Age: 67
End: 2022-08-25

## 2022-08-25 DIAGNOSIS — M25.551 BILATERAL HIP PAIN: Primary | ICD-10-CM

## 2022-08-25 DIAGNOSIS — Q66.221 METATARSUS ADDUCTUS OF RIGHT FOOT: Primary | ICD-10-CM

## 2022-08-25 DIAGNOSIS — M25.552 BILATERAL HIP PAIN: Primary | ICD-10-CM

## 2022-08-25 PROCEDURE — 97110 THERAPEUTIC EXERCISES: CPT

## 2022-08-25 PROCEDURE — 97140 MANUAL THERAPY 1/> REGIONS: CPT

## 2022-08-25 NOTE — TELEPHONE ENCOUNTER
Pt seen the other day for hip pain  Also, seen Dr Alon Sharma yesterday and asked him what he thought was causing the pain and he said he did not know because he is not a hip doctor  Asking for suggestions

## 2022-08-25 NOTE — PROGRESS NOTES
PT Re-Evaluation     Today's date: 2022  Patient name: Lana Angulo  : 1955  MRN: [id number]  Referring provider: Yessica Roman  Dx:   Encounter Diagnosis     ICD-10-CM    1  Metatarsus adductus of right foot  Q66 221        Start Time: 1000          Assessment  Assessment details: Pt has made significant improvements in foot pain, ROM, strength, and overall mobility since beginning PT  She has met her short-term goals for independence with HEP, pain, and ROM, however, she is still progressing toward her long-term goals for strength, ROM, and functional mobility  Pt would benefit from continued PT in order to further improve in pain, strength, ROM, functional mobility, quality of life, and return to PLOF  Thank you! Impairments: abnormal gait, abnormal or restricted ROM, abnormal movement, activity intolerance, impaired balance, impaired physical strength, pain with function, weight-bearing intolerance and poor body mechanics  Understanding of Dx/Px/POC: good   Prognosis: good    Goals  STG: 3-4 weeks  Pt will be independent with HEP in order to continue to progress outside of PT treatment sessions  Met  Pt will improve in quality of life as demonstrated by worst pain levels of 5/10 or less  Met  Pt will improve in functional mobility as demonstrated by R ankle DF PROM of 5 deg or greater  Met  LT-8 weeks  Pt will improve in quality of life as demonstrated by worst pain levels of 2/10 or less  Met  Pt will improve in functional mobility as demonstrated by strength levels of 4+/5 or greater  Progressing  Pt will improve in functional mobility as demonstrated by R ankle ROM WFL  Progressing  Pt will improve in functional mobility as demonstrated by ability to perform full ADLs without any limitations due to pain or weakness   Progressing    Plan  Patient would benefit from: skilled physical therapy  Planned modality interventions: cryotherapy, low level laser therapy and thermotherapy: hydrocollator packs  Planned therapy interventions: body mechanics training, flexibility, functional ROM exercises, gait training, home exercise program, joint mobilization, manual therapy, neuromuscular re-education, patient education, postural training, strengthening, stretching, therapeutic activities and therapeutic exercise  Frequency: 2x week  Duration in weeks: 4  Plan of Care beginning date: 2022  Plan of Care expiration date: 2022  Treatment plan discussed with: patient        Subjective Evaluation    History of Present Illness  Mechanism of injury: Pt reports that her MD sad her whole foot is shifted out, which is pulling on her foot and causing pain on the medial aspect of her foot/heel  Pt reports that the pain in her heel has been going on for a little less than a year  She reports that the pain is not constant, but that at times, she gets some numbness into her last two toes  Pt reports that she has to be very careful when walking and to avoid quick turns or pronation to prevent the pain  Pt reports that she does a wear a brace at times to add stability to her foot  Pt reports that she has flat feet  Pt reports that she feels like her foot wants to give out at times  UPDATE 2022:  Pt reports that physical therapy has helped a lot and that she is in very little pain now  Pain  Current pain ratin  At best pain ratin  At worst pain ratin  Quality: sharp  Relieving factors: support  Aggravating factors: stair climbing and walking (Going up stairs)      Diagnostic Tests  X-ray: abnormal  Patient Goals  Patient goals for therapy: decreased pain, increased strength, independence with ADLs/IADLs and return to sport/leisure activities  Patient goal: To be able to wear sandals and regular shoes        Objective     Observations     Additional Observation Details  Warren pes planus    Palpation     Right   Tenderness of the posterior tibialis       Tenderness Right Ankle/Foot   Tenderness in the medial malleolus, navicular, peroneal tendon and plantar fascia  Neurological Testing     Sensation     Ankle/Foot   Left Ankle/Foot   Intact: light touch    Right Ankle/Foot   Intact: light touch     Passive Range of Motion   Left Ankle/Foot    Dorsiflexion (ke): 6 degrees   Plantar flexion: 50 degrees   Inversion: 48 degrees   Eversion: 28 degrees     Right Ankle/Foot    Dorsiflexion (ke): 6 degrees   Plantar flexion: 56 degrees   Inversion: 48 degrees   Eversion: 28 degrees     Strength/Myotome Testing     Left Ankle/Foot   Dorsiflexion: 5  Plantar flexion: 4+  Inversion: 4+  Eversion: 5    Right Ankle/Foot   Dorsiflexion: 5  Plantar flexion: 4+  Inversion: 4  Eversion: 4+    Tests     Right Ankle/Foot   Positive for navicular drop  Negative for Tinel's sign (tarsal tunnel)  Ambulation     Observational Gait   Left foot contact pattern: foot flat  Right foot contact pattern: foot flat    Quality of Movement During Gait     Foot Alignment    Foot Alignment (Left): Positive flat foot  Foot Alignment (Right): Positive flat foot         Flowsheet Rows    Flowsheet Row Most Recent Value   PT/OT G-Codes    Current Score 65   Projected Score 67             Precautions: None    Re-eval Date: 9/1/2022

## 2022-08-25 NOTE — PROGRESS NOTES
Daily Note     Today's date: 2022  Patient name: Neeru Troncoso  : 1955  MRN: [id number]  Referring provider: José Miguel Tovar  Dx: No diagnosis found  Start Time: 1000          Subjective: I saw Dr Ange Sanchez yesterday and he gave me an injection between my R ittle toe and the one next to it  My foot feels numb today  Objective: See treatment diary below    Assessment: Tolerated treatment well  Pt was able to complete TE and self stretch without incident  Pt slowly improving with strength and pain free PROM  Patient exhibited good technique with therapeutic exercises and would benefit from continued PT    Plan: Continue per plan of care  Add balance activities NV        Precautions: None    Re-eval Date: 2022      Manuals 8/1 8/3 8/9 8-11 8/15 8-19 8-22 8-25     Ginette ankle PROM 8' 10' 10' 10' 8' 8' 8' 8'     IASTM plantar fascia                                       Neuro Re-Ed         Balance as appropriate                                       Ther Ex         [facility]ustep or [facility] L1 6' [facility] L1 6' [facility]  L1  6' [facility] L1 6' [facility]  L2  6' [facility]  L2 6' S[facility]  L3 6'     HR/TR 1x10 2x10 ea 2x10 ea 2x10 ea 2x10 ea 2x10 ea 2x10 ea 2x10 ea     Runner's stretch 3x20" ginette 3x20" ginette 3x20" ginette 3x20" 3x20" ginette 3x20"  ginette 3x20"   ginette 3x20"  ginette     Calf stretch w/ strap HEP            Prostretch  3x20" ginette 3x20" ginette 3x20" 3x20" ginette 3x20"  ginette 3x20" ginette 3x20"  Ginette     Towel scrunches             Ankle 4-way Red Inv 2x10 Red 2x10 ginette Red 2x10 ginette Red   2x10  ginette Red   2x10  ginette Red 2x10 ea Ginette Red   2x10 Red   2x10     Eccentric S/L HR on step  2x10 ginette 2x10 ginette 2x10 ginette 2x10 ginette 2x10 ginette 2x10 ginette 2x10  ginette                                                         Ther Activity                                       Gait Training                                       Modalities

## 2022-08-26 ENCOUNTER — APPOINTMENT (OUTPATIENT)
Dept: LAB | Facility: HOSPITAL | Age: 67
End: 2022-08-26
Payer: COMMERCIAL

## 2022-08-26 ENCOUNTER — HOSPITAL ENCOUNTER (OUTPATIENT)
Dept: RADIOLOGY | Facility: HOSPITAL | Age: 67
Discharge: HOME | End: 2022-08-26
Payer: COMMERCIAL

## 2022-08-26 DIAGNOSIS — M25.552 BILATERAL HIP PAIN: ICD-10-CM

## 2022-08-26 DIAGNOSIS — M25.551 BILATERAL HIP PAIN: ICD-10-CM

## 2022-08-26 DIAGNOSIS — M25.551 BILATERAL HIP PAIN: Primary | ICD-10-CM

## 2022-08-26 DIAGNOSIS — M25.552 BILATERAL HIP PAIN: Primary | ICD-10-CM

## 2022-08-26 LAB — CRP SERPL QL: <0.5 MG/L

## 2022-08-26 PROCEDURE — 86038 ANTINUCLEAR ANTIBODIES: CPT

## 2022-08-26 PROCEDURE — 86140 C-REACTIVE PROTEIN: CPT

## 2022-08-26 PROCEDURE — 73522 X-RAY EXAM HIPS BI 3-4 VIEWS: CPT

## 2022-08-26 PROCEDURE — 86200 CCP ANTIBODY: CPT

## 2022-08-26 PROCEDURE — 36415 COLL VENOUS BLD VENIPUNCTURE: CPT

## 2022-08-26 PROCEDURE — 86430 RHEUMATOID FACTOR TEST QUAL: CPT

## 2022-08-27 LAB
RHEUMATOID FACT SER QL LA: NEGATIVE
RYE IGE QN: NEGATIVE

## 2022-08-30 LAB — CCP AB SER IA-ACNC: 0.9

## 2022-09-07 ENCOUNTER — OFFICE VISIT (OUTPATIENT)
Dept: PHYSICAL THERAPY | Facility: HOME HEALTHCARE | Age: 67
End: 2022-09-07
Payer: COMMERCIAL

## 2022-09-07 ENCOUNTER — TELEPHONE (OUTPATIENT)
Dept: FAMILY MEDICINE CLINIC | Facility: CLINIC | Age: 67
End: 2022-09-07

## 2022-09-07 DIAGNOSIS — M25.552 BILATERAL HIP PAIN: Primary | ICD-10-CM

## 2022-09-07 DIAGNOSIS — M76.821 POSTERIOR TIBIAL TENDINITIS OF RIGHT LOWER EXTREMITY: ICD-10-CM

## 2022-09-07 DIAGNOSIS — M72.2 PLANTAR FASCIITIS: ICD-10-CM

## 2022-09-07 DIAGNOSIS — G57.51 TARSAL TUNNEL SYNDROME OF RIGHT SIDE: ICD-10-CM

## 2022-09-07 DIAGNOSIS — Q66.221 METATARSUS ADDUCTUS OF RIGHT FOOT: Primary | ICD-10-CM

## 2022-09-07 DIAGNOSIS — M25.551 BILATERAL HIP PAIN: Primary | ICD-10-CM

## 2022-09-07 PROCEDURE — 97110 THERAPEUTIC EXERCISES: CPT

## 2022-09-07 PROCEDURE — 97140 MANUAL THERAPY 1/> REGIONS: CPT

## 2022-09-07 NOTE — PROGRESS NOTES
Daily Note     Today's date: 2022  Patient name: Marylee Shire  : 1955  MRN: [id number]  Referring provider: Blaine Valentin  Dx:   Encounter Diagnosis     ICD-10-CM    1  Metatarsus adductus of right foot  Q66 221    2  Posterior tibial tendinitis of right lower extremity  M76 821    3  Plantar fasciitis  M72 2    4  Tarsal tunnel syndrome of right side  G57 51        Start Time: 955  Stop Time:   Total time in clinic (min): 40 minutes    Subjective: Pt reports that her foot has been feeling good and that she just gets some numbness in her last two toes  She reports that she was in [facility] last week, but didn't have any problems with her feet  She reports that her hips bother her more now  Objective: See treatment diary below      Assessment: Pt demonstrated good tolerance to treatment session and was able to perform her exercises with good form  She demonstrated very minor ankle DF ROM deficits, but improved with PROM  Pt has demonstrated independence with her HEP and she feels she will be ready for D/C to HEP after her next PT visit  Plan: Continue per plan of care        Precautions: None    Re-eval Date: 2022      Manuals 8/1 8/3 8/9 8-11 8/15 8-19 8-22 8-25 9/7    Warren ankle PROM 8' 10' 10' 10' 8' 8' 8' 8' 10'    IASTM plantar fascia                                       Neuro Re-Ed         Balance as appropriate                                       Ther Ex         Nustep or [facility] L1 6' [facility] L1 6' [facility]  L1  6' [facility] L1 6' [facility]  L2  6' [facility]  L2 6' [facility]  L3 6' [facility] L2 6'    HR/TR 1x10 2x10 ea 2x10 ea 2x10 ea 2x10 ea 2x10 ea 2x10 ea 2x10 ea 2x10    Runner's stretch 3x20" warren 3x20" warren 3x20" warren 3x20" 3x20" warren 3x20"  warren 3x20"   warren 3x20"  warren 3x20" warren    Calf stretch w/ strap HEP            Prostretch  3x20" warren 3x20" warren 3x20" 3x20" warren 3x20"  warren 3x20" warren 3x20"  Warren 3x20" Warren    Towel scrunches             Ankle 4-way Red Inv 2x10 Red 2x10 warren Red 2x10 warren Red   2x10  warren Red   2x10  warren Red 2x10 ea Warren Red   2x10 Red   2x10 Red   2x10    Eccentric S/L HR on step  2x10 warren 2x10 warren 2x10 warren 2x10 warren 2x10 warren 2x10 warren 2x10  warren 2x10 warren                                                        Ther Activity                                       Gait Training                                       Modalities

## 2022-09-09 ENCOUNTER — OFFICE VISIT (OUTPATIENT)
Dept: PHYSICAL THERAPY | Facility: HOME HEALTHCARE | Age: 67
End: 2022-09-09
Payer: COMMERCIAL

## 2022-09-09 ENCOUNTER — TELEPHONE (OUTPATIENT)
Dept: FAMILY MEDICINE CLINIC | Facility: CLINIC | Age: 67
End: 2022-09-09

## 2022-09-09 DIAGNOSIS — M54.2 NECK PAIN: Primary | ICD-10-CM

## 2022-09-09 DIAGNOSIS — M76.821 POSTERIOR TIBIAL TENDINITIS OF RIGHT LOWER EXTREMITY: ICD-10-CM

## 2022-09-09 DIAGNOSIS — Q66.221 METATARSUS ADDUCTUS OF RIGHT FOOT: Primary | ICD-10-CM

## 2022-09-09 DIAGNOSIS — G57.51 TARSAL TUNNEL SYNDROME OF RIGHT SIDE: ICD-10-CM

## 2022-09-09 DIAGNOSIS — M72.2 PLANTAR FASCIITIS: ICD-10-CM

## 2022-09-09 PROCEDURE — 97140 MANUAL THERAPY 1/> REGIONS: CPT

## 2022-09-09 PROCEDURE — 97110 THERAPEUTIC EXERCISES: CPT

## 2022-09-09 RX ORDER — METHOCARBAMOL 500 MG/1
500 TABLET, FILM COATED ORAL 3 TIMES DAILY PRN
Qty: 30 TABLET | Refills: 0 | Status: SHIPPED | OUTPATIENT
Start: 2022-09-09 | End: 2022-10-18

## 2022-09-09 NOTE — TELEPHONE ENCOUNTER
I put in for methocarbamol, muscle relaxer, and also referred her to physical therapy to help with the neck discomfort  Patient should call us her symptoms continue or increase

## 2022-09-09 NOTE — PROGRESS NOTES
PT Discharge    Today's date: 2022  Patient name: Kirsten Newell  : 1955  MRN: [id number]  Referring provider: Melody Lazcano  Dx:   Encounter Diagnosis     ICD-10-CM    1  Metatarsus adductus of right foot  Q66 221    2  Posterior tibial tendinitis of right lower extremity  M76 821    3  Plantar fasciitis  M72 2    4  Tarsal tunnel syndrome of right side  G57 51        Start Time: 1000  Stop Time: 1045  Total time in clinic (min): 45 minutes    Assessment  Assessment details: Pt has made significant improvements in foot pain, ROM, strength, and overall mobility since beginning PT  She has met all of her goals for pain, strength, ROM, and overall functional mobility  She has demonstrated independence with her HEP and she is confident in her ability to adhere to her HEP and further progress on her own  Pt will be D/C to HEP at this time  Understanding of Dx/Px/POC: good   Prognosis: good    Goals  STG: 3-4 weeks  Pt will be independent with HEP in order to continue to progress outside of PT treatment sessions  Met  Pt will improve in quality of life as demonstrated by worst pain levels of 5/10 or less  Met  Pt will improve in functional mobility as demonstrated by R ankle DF PROM of 5 deg or greater  Met  LT-8 weeks  Pt will improve in quality of life as demonstrated by worst pain levels of 2/10 or less  Met  Pt will improve in functional mobility as demonstrated by strength levels of 4+/5 or greater  Met  Pt will improve in functional mobility as demonstrated by R ankle ROM WFL  Met  Pt will improve in functional mobility as demonstrated by ability to perform full ADLs without any limitations due to pain or weakness   Met    Plan  Plan details: D/C to HEP  Treatment plan discussed with: patient        Subjective Evaluation    History of Present Illness  Mechanism of injury: Pt reports that her MD sad her whole foot is shifted out, which is pulling on her foot and causing pain on the medial aspect of her foot/heel  Pt reports that the pain in her heel has been going on for a little less than a year  She reports that the pain is not constant, but that at times, she gets some numbness into her last two toes  Pt reports that she has to be very careful when walking and to avoid quick turns or pronation to prevent the pain  Pt reports that she does a wear a brace at times to add stability to her foot  Pt reports that she has flat feet  Pt reports that she feels like her foot wants to give out at times  UPDATE 2022:  Pt reports that physical therapy has helped a lot and that she is in very little pain now  UPDATE 2022:  Pt reports that her feet have been feeling good and that she has no pain  Pain  Current pain ratin  At best pain ratin  At worst pain ratin  Quality: sharp  Relieving factors: support  Aggravating factors: stair climbing and walking (Going up stairs)      Diagnostic Tests  X-ray: abnormal  Patient Goals  Patient goals for therapy: decreased pain, increased strength, independence with ADLs/IADLs and return to sport/leisure activities  Patient goal: To be able to wear sandals and regular shoes        Objective     Observations     Additional Observation Details  Warren pes planus    Palpation     Right   Tenderness of the posterior tibialis       Neurological Testing     Sensation     Ankle/Foot   Left Ankle/Foot   Intact: light touch    Right Ankle/Foot   Intact: light touch     Passive Range of Motion   Left Ankle/Foot    Dorsiflexion (ke): 10 degrees   Plantar flexion: 50 degrees   Inversion: 48 degrees   Eversion: 28 degrees     Right Ankle/Foot    Dorsiflexion (ke): 10 degrees   Plantar flexion: 56 degrees   Inversion: 48 degrees   Eversion: 28 degrees     Strength/Myotome Testing     Left Ankle/Foot   Dorsiflexion: 5  Plantar flexion: 4+  Inversion: 4+  Eversion: 5    Right Ankle/Foot   Dorsiflexion: 5  Plantar flexion: 4+  Inversion: 4+  Eversion: 4+    Tests     Right Ankle/Foot   Positive for navicular drop  Negative for Tinel's sign (tarsal tunnel)  Ambulation     Observational Gait   Left foot contact pattern: foot flat  Right foot contact pattern: foot flat    Quality of Movement During Gait     Foot Alignment    Foot Alignment (Left): Positive flat foot  Foot Alignment (Right): Positive flat foot         Flowsheet Rows    Flowsheet Row Most Recent Value   PT/OT G-Codes    Current Score 91   Projected Score 67             Precautions: None    Re-eval Date: 9/1/2022       Manuals 8/1 8/3 8/9 8-11 8/15 8-19 8-22 8-25 9/7 9/9   Warren ankle PROM 8' 10' 10' 10' 8' 8' 8' 8' 10' 10'   IASTM plantar fascia                                       Neuro Re-Ed    8-11 8-19 8-22 8-25     Balance as appropriate                                       Ther Ex    8-11 8-19 8-22 8-25     Nustep or [facility] L1 6[facility] L1 6' [facility]  L1  [address] [place] [address] L1 [address] [place] [address]  L2  [address] [place] [address]  L2 6' SCR  L3 [address] [place] [address] L2 [address] [place] [address] L2 8'   HR/TR 1x10 2x10 ea 2x10 ea 2x10 ea 2x10 ea 2x10 ea 2x10 ea 2x10 ea 2x10 2x10   Runner's stretch 3x20" warren 3x20" warren 3x20" warren 3x20" 3x20" warren 3x20"  warren 3x20"   warren 3x20"  warren 3x20" warren 3x20" warren   Calf stretch w/ strap HEP            Prostretch  3x20" warren 3x20" warren 3x20" 3x20" warren 3x20"  warren 3x20" warren 3x20"  Warren 3x20" Warren 3x20" Warren   Towel scrunches             Ankle 4-way Red Inv 2x10 Red 2x10 warren Red 2x10 warren Red   2x10  warren Red   2x10  warren Red 2x10 ea Warren Red   2x10 Red   2x10 Red   2x10 Red 2x10 warren   Eccentric S/L HR on step  2x10 warren 2x10 warren 2x10 warren 2x10 warren 2x10 warren 2x10 warren 2x10  warren 2x10 warren 2x10 warren                                                       Ther Activity                                       Gait Training                                       Modalities

## 2022-09-12 ENCOUNTER — TELEPHONE (OUTPATIENT)
Dept: FAMILY MEDICINE CLINIC | Facility: CLINIC | Age: 67
End: 2022-09-12

## 2022-09-14 ENCOUNTER — TELEPHONE (OUTPATIENT)
Dept: FAMILY MEDICINE CLINIC | Facility: CLINIC | Age: 67
End: 2022-09-14

## 2022-09-14 NOTE — TELEPHONE ENCOUNTER
Pt called stating you prescribed her a muscle relaxer that did not help at all  She actually ended up getting a migraine the other day  She stopped the blood pressure pill yesterday and is feeling much better today

## 2022-09-14 NOTE — TELEPHONE ENCOUNTER
Patient came in for blood pressure check today  It was 160/80  Patient stop taking amlodipine, and states she feels better, no charley horses or neck pain  I added amlodipine to her allergy list   Have her continue to monitor pressure at home  Have her come into the office later this week for another blood pressure check  Have her make a follow-up appointment sometime next week

## 2022-09-16 ENCOUNTER — TELEPHONE (OUTPATIENT)
Dept: FAMILY MEDICINE CLINIC | Facility: CLINIC | Age: 67
End: 2022-09-16

## 2022-09-16 DIAGNOSIS — I10 ESSENTIAL HYPERTENSION: Primary | ICD-10-CM

## 2022-09-16 RX ORDER — VALSARTAN 40 MG/1
40 TABLET ORAL DAILY
Qty: 30 TABLET | Refills: 3 | Status: SHIPPED | OUTPATIENT
Start: 2022-09-16 | End: 2022-09-21

## 2022-09-16 NOTE — TELEPHONE ENCOUNTER
Patient came in for nurse blood pressure check  Blood pressure was 142/68  She states since stopping medications feels more fatigued  Takes blood pressure at home  Blood pressure varies, generally in the 168/80 range    I will start her on low-dose valsartan 40 mg a day  I will follow-up with her next week for an appointment    She is already scheduled

## 2022-09-20 ENCOUNTER — EVALUATION (OUTPATIENT)
Dept: PHYSICAL THERAPY | Facility: HOME HEALTHCARE | Age: 67
End: 2022-09-20
Payer: COMMERCIAL

## 2022-09-20 DIAGNOSIS — M25.551 BILATERAL HIP PAIN: Primary | ICD-10-CM

## 2022-09-20 DIAGNOSIS — M25.552 BILATERAL HIP PAIN: Primary | ICD-10-CM

## 2022-09-20 DIAGNOSIS — M54.2 NECK PAIN: ICD-10-CM

## 2022-09-20 PROCEDURE — 97140 MANUAL THERAPY 1/> REGIONS: CPT

## 2022-09-20 PROCEDURE — 97162 PT EVAL MOD COMPLEX 30 MIN: CPT

## 2022-09-20 PROCEDURE — 97110 THERAPEUTIC EXERCISES: CPT

## 2022-09-20 NOTE — PROGRESS NOTES
PT Evaluation     Today's date: 2022  Patient name: Sinai Jones  : 1955  MRN: [id number]  Referring provider: Nicole Gibson DO  Dx:   Encounter Diagnosis     ICD-10-CM    1  Bilateral hip pain  M25 551     M25 552    2  Neck pain  M54 2                   Assessment  Assessment details: Pt is a 70 y/o female presenting to OPPT with chronic bilateral hip pain and acute bilateral neck pain consistent with diagnosis of bilateral hip OA and bilateral UT/LS/scalene muscle spasms  Pt is presenting with significant pain levels, ROM deficits, strength deficits, and decreased functional mobility, which is affecting her ability to perform ADLs  Pt requires skilled PT in order to improve in pain, strength, ROM, functional mobility, quality of life, and return to PLOF  Thank you! Impairments: abnormal or restricted ROM, abnormal movement, activity intolerance, impaired physical strength, lacks appropriate home exercise program, pain with function, weight-bearing intolerance, poor posture  and poor body mechanics  Understanding of Dx/Px/POC: good   Prognosis: good    Goals  STG: 3-4 weeks  Pt will be independent with HEP in order to continue to progress outside of PT treatment sessions  Pt will improve in quality of life as demonstrated by worst pain levels of 5/10 or less  Pt will improve in functional mobility as demonstrated by bilateral hip flex ROM of 105 deg or greater  LT-8 weeks  Pt will improve in quality of life as demonstrated by worst pain levels of 2/10 or less  Pt will improve in functional mobility as demonstrated by strength levels of 4+/5 or greater  Pt will improve in functional mobility as demonstrated by ginette hip ROM WFL  Pt will improve in functional mobility as demonstrated by c/s ROM WFL  Pt will improve in functional mobility as demonstrated by ability to perform full ADLs without any limitations due to pain      Plan  Patient would benefit from: skilled physical therapy  Planned modality interventions: cryotherapy, low level laser therapy and thermotherapy: hydrocollator packs  Planned therapy interventions: body mechanics training, balance/weight bearing training, flexibility, functional ROM exercises, gait training, home exercise program, joint mobilization, manual therapy, massage, neuromuscular re-education, patient education, postural training, strengthening, stretching, therapeutic activities and therapeutic exercise  Frequency: 2x week  Duration in weeks: 4  Plan of Care beginning date: 2022  Plan of Care expiration date: 10/20/2022  Treatment plan discussed with: patient        Subjective Evaluation    History of Present Illness  Mechanism of injury: Pt is presenting to OPPT with a script for bilateral hip and neck pain, with her hip pain being her primary complaint  Pt reports that most of the pain is in the groin and that both sides are about the same  Pt reports that she was on her feet a lot over the weekend so she is a little more sore now  Pt reports that the hip pain has been ongoing for a few years, but the severity depends on how much she is on her feet  For her neck, the pt reports that the neck pain is in her bilateral upper traps and that the pain started after a change in blood pressure medication a few weeks ago  She does reports having a significant improvement in neck symptoms since discontinuing the medication  Although improving, the pt continues to have some residual neck symptoms and would like to be treated for this secondary to the hips    Pain  Current pain ratin  At best pain ratin  At worst pain ratin  Location: Anterior groin  Quality: discomfort (Soreness)  Relieving factors: rest, relaxation and support  Aggravating factors: walking, standing and stair climbing (Pronlonged walking)    Social Support  Stairs in house: yes       Diagnostic Tests  X-ray: abnormal (Mild hip OA)  Patient Goals  Patient goals for therapy: decreased pain, increased motion, increased strength, return to sport/leisure activities and independence with ADLs/IADLs  Patient goal: To be able to walk more and exercise more        Objective     Postural Observations  Seated posture: fair  Standing posture: fair        Palpation   Left   Hypertonic in the cervical paraspinals, scalenes, suboccipitals and upper trapezius  Tenderness of the cervical paraspinals, scalenes, suboccipitals and upper trapezius  Trigger point to scalenes, suboccipitals and upper trapezius  Right   Hypertonic in the cervical paraspinals, scalenes, suboccipitals and upper trapezius  Tenderness of the cervical paraspinals, scalenes, suboccipitals and upper trapezius  Trigger point to scalenes, suboccipitals and upper trapezius  Tenderness     Left Hip   Tenderness in the greater trochanter  Right Hip   Tenderness in the greater trochanter       Neurological Testing     Sensation   Cervical/Thoracic   Left   Intact: light touch    Right   Intact: light touch    Hip   Left Hip   Intact: light touch    Right Hip   Intact: light touch    Active Range of Motion   Cervical/Thoracic Spine       Cervical  Subcranial protraction:  WFL   Subcranial retraction:  with pain   Restriction level: moderate  Flexion: 60 degrees  with pain  Extension: 35 degrees      Left lateral flexion: 45 degrees     with pain  Right lateral flexion: 38 degrees     with pain  Left rotation: 75 degrees  Right rotation: 65 degrees           Lumbar   Flexion:  Restriction level: minimal  Extension:  Restriction level: minimal  Left lateral flexion:  Restriction level: minimal  Right lateral flexion:  Restriction level: minimal  Left rotation:  Restriction level: minimal  Right rotation:  Restriction level: minimal  Left Hip   Flexion: 100 degrees   Abduction: 15 degrees with pain  External rotation (90/90): 35 degrees   Internal rotation (90/90): 35 degrees     Right Hip   Flexion: 100 degrees Abduction: 20 degrees with pain  External rotation (90/90): 35 degrees   Internal rotation (90/90): 35 degrees     Strength/Myotome Testing   Cervical Spine     Left   Normal strength    Right   Normal strength    Left Hip   Planes of Motion   Flexion: 3+  Extension: 3+  Abduction: 4-  Adduction: 5    Right Hip   Planes of Motion   Flexion: 4  Extension: 4-  Abduction: 4-  Adduction: 5    Left Knee   Flexion: 5  Extension: 5    Right Knee   Flexion: 5  Extension: 5    Tests     Left Hip   Positive FADIR  Negative ALBARO  Right Hip   Positive FADIR  Negative ALBARO                Precautions: None    Re-eval Date: 10/20/2022      Manuals 9/20            Warren calf, hs, glute stretch             Hip LAD mobilization             Hip lateral distraction mobilization             C/S distraction, SOR, UT/LS STM 10'            Neuro Re-Ed             C/S retractions             Scap retractions 5"x10            MTP/LTP             DNF endurance             Warren UT/LS stretch 3x20" ea warren                                      Ther Ex             Nustep             Bridges HEP            Clamshells HEP            SLR HEP            S/L Hip abd             Prone hip ext             Standing hip flex/ext/abd             Squat             Leg press             Hip abd machine             Ther Activity                                       Gait Training                                       Modalities             P

## 2022-09-21 ENCOUNTER — OFFICE VISIT (OUTPATIENT)
Dept: FAMILY MEDICINE CLINIC | Facility: CLINIC | Age: 67
End: 2022-09-21
Payer: COMMERCIAL

## 2022-09-21 ENCOUNTER — APPOINTMENT (OUTPATIENT)
Dept: LAB | Facility: MEDICAL CENTER | Age: 67
End: 2022-09-21
Payer: COMMERCIAL

## 2022-09-21 VITALS
WEIGHT: 177.4 LBS | BODY MASS INDEX: 31.43 KG/M2 | TEMPERATURE: 97.3 F | HEIGHT: 63 IN | OXYGEN SATURATION: 94 % | DIASTOLIC BLOOD PRESSURE: 86 MMHG | SYSTOLIC BLOOD PRESSURE: 190 MMHG | HEART RATE: 77 BPM

## 2022-09-21 DIAGNOSIS — I10 ESSENTIAL HYPERTENSION: ICD-10-CM

## 2022-09-21 DIAGNOSIS — I10 RESISTANT HYPERTENSION: Primary | ICD-10-CM

## 2022-09-21 LAB
ANION GAP SERPL CALCULATED.3IONS-SCNC: 2 MMOL/L (ref 4–13)
BASOPHILS # BLD AUTO: 0.08 THOUSANDS/ΜL (ref 0–0.1)
BASOPHILS NFR BLD AUTO: 1 % (ref 0–1)
BUN SERPL-MCNC: 17 MG/DL (ref 5–25)
CALCIUM SERPL-MCNC: 9.5 MG/DL (ref 8.3–10.1)
CHLORIDE SERPL-SCNC: 106 MMOL/L (ref 96–108)
CO2 SERPL-SCNC: 28 MMOL/L (ref 21–32)
CREAT SERPL-MCNC: 1.02 MG/DL (ref 0.6–1.3)
EOSINOPHIL # BLD AUTO: 0.09 THOUSAND/ΜL (ref 0–0.61)
EOSINOPHIL NFR BLD AUTO: 1 % (ref 0–6)
ERYTHROCYTE [DISTWIDTH] IN BLOOD BY AUTOMATED COUNT: 12.5 % (ref 11.6–15.1)
GFR SERPL CREATININE-BSD FRML MDRD: 56 ML/MIN/1.73SQ M
GLUCOSE P FAST SERPL-MCNC: 93 MG/DL (ref 65–99)
HCT VFR BLD AUTO: 44.5 % (ref 34.8–46.1)
HGB BLD-MCNC: 13.8 G/DL (ref 11.5–15.4)
IMM GRANULOCYTES # BLD AUTO: 0.02 THOUSAND/UL (ref 0–0.2)
IMM GRANULOCYTES NFR BLD AUTO: 0 % (ref 0–2)
LYMPHOCYTES # BLD AUTO: 1.69 THOUSANDS/ΜL (ref 0.6–4.47)
LYMPHOCYTES NFR BLD AUTO: 26 % (ref 14–44)
MCH RBC QN AUTO: 28.3 PG (ref 26.8–34.3)
MCHC RBC AUTO-ENTMCNC: 31 G/DL (ref 31.4–37.4)
MCV RBC AUTO: 91 FL (ref 82–98)
MONOCYTES # BLD AUTO: 0.49 THOUSAND/ΜL (ref 0.17–1.22)
MONOCYTES NFR BLD AUTO: 8 % (ref 4–12)
NEUTROPHILS # BLD AUTO: 4.13 THOUSANDS/ΜL (ref 1.85–7.62)
NEUTS SEG NFR BLD AUTO: 64 % (ref 43–75)
NRBC BLD AUTO-RTO: 0 /100 WBCS
PLATELET # BLD AUTO: 181 THOUSANDS/UL (ref 149–390)
PMV BLD AUTO: 12.5 FL (ref 8.9–12.7)
POTASSIUM SERPL-SCNC: 4.4 MMOL/L (ref 3.5–5.3)
RBC # BLD AUTO: 4.88 MILLION/UL (ref 3.81–5.12)
SODIUM SERPL-SCNC: 136 MMOL/L (ref 135–147)
WBC # BLD AUTO: 6.5 THOUSAND/UL (ref 4.31–10.16)

## 2022-09-21 PROCEDURE — 3725F SCREEN DEPRESSION PERFORMED: CPT | Performed by: FAMILY MEDICINE

## 2022-09-21 PROCEDURE — 3079F DIAST BP 80-89 MM HG: CPT | Performed by: FAMILY MEDICINE

## 2022-09-21 PROCEDURE — 85025 COMPLETE CBC W/AUTO DIFF WBC: CPT | Performed by: FAMILY MEDICINE

## 2022-09-21 PROCEDURE — 80048 BASIC METABOLIC PNL TOTAL CA: CPT | Performed by: FAMILY MEDICINE

## 2022-09-21 PROCEDURE — 3077F SYST BP >= 140 MM HG: CPT | Performed by: FAMILY MEDICINE

## 2022-09-21 PROCEDURE — 99214 OFFICE O/P EST MOD 30 MIN: CPT | Performed by: FAMILY MEDICINE

## 2022-09-21 PROCEDURE — 1160F RVW MEDS BY RX/DR IN RCRD: CPT | Performed by: FAMILY MEDICINE

## 2022-09-21 PROCEDURE — 36415 COLL VENOUS BLD VENIPUNCTURE: CPT | Performed by: FAMILY MEDICINE

## 2022-09-21 RX ORDER — METOPROLOL SUCCINATE 25 MG/1
25 TABLET, EXTENDED RELEASE ORAL DAILY
Qty: 30 TABLET | Refills: 5 | Status: SHIPPED | OUTPATIENT
Start: 2022-09-21

## 2022-09-21 NOTE — PROGRESS NOTES
Name: Opal Nguyen      : 1955      MRN: [id number]  Encounter Provider: Sonia Hunt DO  Encounter Date: 2022   Encounter department: [facility] [address]   Patient will stop the valsartan  She will see over the next day or 2 if she feels better with her neck pain and leg pain  She will get blood work today  I also ordered a renal artery ultrasound  Referral made to Nephrology to help with her resistant hypertension  If she feels better, over the weekend she can start Toprol-XL 25 mg daily  She will continue to monitor her pressure like she has been  She will call us with results  I will see her back in 1 month or p r n  1  Resistant hypertension  -     Basic metabolic panel  -     VAS renal artery complete; Future; Expected date: 2022  -     Ambulatory Referral to Nephrology; Future  -     metoprolol succinate (Toprol XL) 25 mg 24 hr tablet; Take 1 tablet (25 mg total) by mouth daily  -     CBC and differential    2  Essential hypertension  -     Basic metabolic panel  -     VAS renal artery complete; Future; Expected date: 2022  -     Ambulatory Referral to Nephrology; Future  -     metoprolol succinate (Toprol XL) 25 mg 24 hr tablet; Take 1 tablet (25 mg total) by mouth daily  -     CBC and differential    3  BMI 31 0-31 9,adult    BMI Counseling: Body mass index is 31 42 kg/m²  The BMI is above normal  Nutrition recommendations include decreasing portion sizes, encouraging healthy choices of fruits and vegetables, decreasing fast food intake, consuming healthier snacks, limiting drinks that contain sugar, moderation in carbohydrate intake, increasing intake of lean protein, reducing intake of saturated and trans fat and reducing intake of cholesterol  No pharmacotherapy was ordered  Rationale for BMI follow-up plan is due to patient being overweight or obese       Depression Screening and Follow-up Plan: Patient was screened for depression during today's encounter  They screened negative with a PHQ-2 score of 0  Subjective     Patient here today for follow-up  Patient started new blood pressure pill, valsartan last week  Patient states the next day started with her neck pain and leg pain again  Patient was checking her pressure at home, was down into the 189F systolic  Patient did not take the valsartan last night  Blood pressure at home was 160 over 80  Here in the office it was 190/96  She denies any chest pain or shortness of breath  Has less neck pain this morning, and no leg pain  Review of Systems   Constitutional: Negative  Respiratory: Negative  Cardiovascular: Negative  Gastrointestinal: Negative  Genitourinary: Negative  Past Medical History:   Diagnosis Date    Allergic reaction     Resolved 11/24/2017     Allergic rhinitis     Resolved 11/24/2017     Generalized anxiety disorder     Resolved 11/24/2017     High cholesterol     Microscopic hematuria     Resolved 11/24/2017     Postural dizziness with presyncope 3/11/2021    Renal calculi     Resolved 00/87/9944     Renal colic     Resolved 22/94/6244     Renal cyst, acquired     Resolved 11/24/2017     Restrictive lung disease     Resolved 11/24/2017     Supraventricular tachycardia ([address] )     Resolved 11/24/2017      Past Surgical History:   Procedure Laterality Date    CHOLECYSTECTOMY      CYSTOSCOPY  05/07/2014    Diagnostic   Last assessed 5/7/2014      TONSILLECTOMY      TUBAL LIGATION       Family History   Problem Relation Age of Onset    Cancer Mother     Lung cancer Mother     Heart attack Father     No Known Problems Sister     No Known Problems Sister     No Known Problems Sister     Heart attack Maternal Grandmother     Diabetes Paternal Grandmother     Colon cancer Maternal Aunt     Esophageal cancer Maternal Aunt     No Known Problems Maternal Aunt     No Known Problems Paternal Aunt     Rheum arthritis Family Rheumatoid arthritis with rheumatoid factor      Social History     Socioeconomic History    Marital status: /Civil Union     Spouse name: None    Number of children: None    Years of education: None    Highest education level: None   Occupational History    None   Tobacco Use    Smoking status: Former Smoker    Smokeless tobacco: Never Used   Vaping Use    Vaping Use: Never used   Substance and Sexual Activity    Alcohol use: Yes     Comment: occasional    Drug use: Never    Sexual activity: None   Other Topics Concern    None   Social History Narrative    None     Social Determinants of Health     Financial Resource Strain: Not on file   Food Insecurity: Not on file   Transportation Needs: Not on file   Physical Activity: Not on file   Stress: Not on file   Social Connections: Not on file   Intimate Partner Violence: Not on file   Housing Stability: Not on file     Current Outpatient Medications on File Prior to Visit   Medication Sig    butalbital-acetaminophen-caffeine (FIORICET,ESGIC) -40 mg per tablet Take 1 tablet by mouth every 4 (four) hours as needed for headaches    Cholecalciferol (VITAMIN D3) 2000 units capsule Take 1 capsule by mouth daily    fluticasone (FLONASE) 50 mcg/act nasal spray 2 sprays into each nostril daily    pantoprazole (PROTONIX) 40 mg tablet Take 1 tablet by mouth 2 (two) times a day     simvastatin (ZOCOR) 20 mg tablet TAKE 1 TABLET BY MOUTH  DAILY AT BEDTIME    [DISCONTINUED] valsartan (DIOVAN) 40 mg tablet Take 1 tablet (40 mg total) by mouth daily    denosumab (PROLIA) 60 mg/mL Inject 1 mL (60 mg total) under the skin once for 1 dose    methocarbamol (ROBAXIN) 500 mg tablet Take 1 tablet (500 mg total) by mouth 3 (three) times a day as needed for muscle spasms (Patient not taking: Reported on 9/21/2022)     Allergies   Allergen Reactions    Amlodipine Myalgia    Codeine      Other reaction(s):  Other (See Comments)  headaches    Fosamax [Alendronate] GI Intolerance     Immunization History   Administered Date(s) Administered    COVID-19 MODERNA VACC 0 5 ML IM 03/18/2021, 04/15/2021    INFLUENZA 10/26/2018, 10/05/2019, 11/01/2020    Influenza Quadrivalent Preservative Free 3 years and older IM 10/28/2015, 10/19/2017    Influenza Quadrivalent, 6-35 Months IM 11/12/2014    Influenza Split High Dose Preservative Free IM 10/05/2019    Influenza, high dose seasonal 0 7 mL 10/26/2018, 10/10/2020, 10/21/2021    Influenza, seasonal, injectable 12/11/2013    Pneumococcal Polysaccharide PPV23 12/11/2013       Objective     BP (!) 190/86 (BP Location: Left arm, Patient Position: Sitting, Cuff Size: Standard)   Pulse 77   Temp (!) 97 3 °F (36 3 °C)   Ht 5' 3" (1 6 m)   Wt 80 5 kg (177 lb 6 4 oz)   SpO2 94%   BMI 31 42 kg/m²     Physical Exam  Vitals reviewed  Constitutional:       General: She is not in acute distress  Appearance: Normal appearance  She is well-developed  She is not ill-appearing, toxic-appearing or diaphoretic  HENT:      Head: Normocephalic and atraumatic  Eyes:      Conjunctiva/sclera: Conjunctivae normal    Cardiovascular:      Rate and Rhythm: Normal rate and regular rhythm  Heart sounds: Normal heart sounds  No murmur heard  No friction rub  No gallop  Pulmonary:      Effort: Pulmonary effort is normal  No respiratory distress  Breath sounds: Normal breath sounds  No wheezing, rhonchi or rales  Musculoskeletal:      Right lower leg: No edema  Left lower leg: No edema  Neurological:      General: No focal deficit present  Mental Status: She is alert and oriented to person, place, and time  Psychiatric:         Mood and Affect: Mood normal          Behavior: Behavior normal          Thought Content: Thought content normal          Judgment: Judgment normal        Chadwick Veras,   BMI Counseling: Body mass index is 31 42 kg/m²   The BMI is above normal  Nutrition recommendations include reducing portion sizes, decreasing overall calorie intake, 3-5 servings of fruits/vegetables daily, reducing fast food intake, consuming healthier snacks, decreasing soda and/or juice intake, moderation in carbohydrate intake, increasing intake of lean protein, reducing intake of saturated fat and trans fat and reducing intake of cholesterol

## 2022-09-21 NOTE — PATIENT INSTRUCTIONS

## 2022-09-23 ENCOUNTER — OFFICE VISIT (OUTPATIENT)
Dept: PHYSICAL THERAPY | Facility: HOME HEALTHCARE | Age: 67
End: 2022-09-23
Payer: COMMERCIAL

## 2022-09-23 DIAGNOSIS — M25.552 BILATERAL HIP PAIN: ICD-10-CM

## 2022-09-23 DIAGNOSIS — M25.551 BILATERAL HIP PAIN: ICD-10-CM

## 2022-09-23 DIAGNOSIS — M54.2 NECK PAIN: Primary | ICD-10-CM

## 2022-09-23 PROCEDURE — 97112 NEUROMUSCULAR REEDUCATION: CPT

## 2022-09-23 PROCEDURE — 97140 MANUAL THERAPY 1/> REGIONS: CPT

## 2022-09-23 PROCEDURE — 97110 THERAPEUTIC EXERCISES: CPT

## 2022-09-23 NOTE — PROGRESS NOTES
Daily Note     Today's date: 2022  Patient name: Opal Nguyen  : 1955  MRN: [id number]  Referring provider: Shiv Maldonado DO  Dx:   Encounter Diagnosis     ICD-10-CM    1  Neck pain  M54 2    2  Bilateral hip pain  M25 551     M25 552        Start Time: 915  Stop Time: 1000  Total time in clinic (min): 45 minutes    Subjective: Pt reports that her hips were a little sore after her initial evaluation  Pt reports that her neck felt good for approximately 2 hours after her initial evaluation, but that the pain and tightness came back again  Pt reports that she discontinued her BP medicine, which caused the tightness, and is now on another new one  Objective: See treatment diary below      Assessment: Pt demonstrated good tolerance to treatment session  Pt did have some tenderness to her c/s paraspinal musculature, but had a decrease in symptoms following STM  Pt was able to progress with her hip strengthening exercises, but did require some verbal cues for proper form  Pt would benefit from continued PT  Plan: Continue per plan of care        Precautions: None    Re-eval Date: 10/20/2022      Manuals            Ginette calf, hs, glute stretch  5'           Hip LAD mobilization  Gr III 2x10 ginette           Hip lateral distraction mobilization             C/S distraction, SOR, UT/LS STM 10' 5'           Neuro Re-Ed             C/S retractions             Scap retractions 5"x10 HEP           MTP/LTP             DNF endurance             Ginette UT/LS stretch 3x20" ea ginette 3x20" ea ginette                                     Ther Ex             Nustep  L2 10'           Bridges HEP 2x10           Clamshells HEP 2x10 ginette           SLR HEP 2x10 Ginette           S/L Hip abd  2x10 ginette           Prone hip ext             Standing hip flex/ext/abd             Squat             Leg press             Hip abd machine             Ther Activity                                       Gait Training Modalities             P

## 2022-09-27 ENCOUNTER — OFFICE VISIT (OUTPATIENT)
Dept: PHYSICAL THERAPY | Facility: HOME HEALTHCARE | Age: 67
End: 2022-09-27
Payer: COMMERCIAL

## 2022-09-27 DIAGNOSIS — M25.552 BILATERAL HIP PAIN: ICD-10-CM

## 2022-09-27 DIAGNOSIS — M54.2 NECK PAIN: Primary | ICD-10-CM

## 2022-09-27 DIAGNOSIS — M25.551 BILATERAL HIP PAIN: ICD-10-CM

## 2022-09-27 PROCEDURE — 97110 THERAPEUTIC EXERCISES: CPT

## 2022-09-27 PROCEDURE — 97140 MANUAL THERAPY 1/> REGIONS: CPT

## 2022-09-27 PROCEDURE — 97112 NEUROMUSCULAR REEDUCATION: CPT

## 2022-09-27 NOTE — PROGRESS NOTES
Daily Note     Today's date: 2022  Patient name: Jorden Child  : 1955  MRN: [id number]  Referring provider: Aryan Winslow DO  Dx: No diagnosis found  Start Time: 920          Subjective: I started taking new BP medication on  and my neck pain/tightness seems to be less today  Both of my hips hurt with standing or walking a lot  Objective: See treatment diary below    Assessment: Tolerated treatment well  Pt was able to complete TE as per flow sheet without c/o increased cerv or B hip pain  Pt does have B cerv and B hip tightness in all planes  Patient would benefit from continued PT    Plan: Continue per plan of care        Precautions: None    Re-eval Date: 10/20/2022      Manuals           Warren calf, hs, glute stretch  5' 5'          Hip LAD mobilization  Gr III 2x10 warren           Hip lateral distraction mobilization             C/S distraction, SOR, UT/LS STM 10' 5' 5'          Neuro Re-Ed             C/S retractions             Scap retractions 5"x10 HEP           MTP/LTP             DNF endurance             Warren UT/LS stretch 3x20" ea warren 3x20" ea warren 3x20"  Ea warren                                    Ther Ex             Nustep  L2 10' L2 10'          Bridges HEP 2x10 2x10          Clamshells HEP 2x10 warren 2x10 warren          SLR HEP 2x10 Warren 2x10 awrren          S/L Hip abd  2x10 warren 2x10 warren          Prone hip ext             Standing hip flex/ext/abd   NV          Squat   NV          Leg press             Hip abd machine             Ther Activity                                       Gait Training                                       Modalities             P

## 2022-09-29 ENCOUNTER — OFFICE VISIT (OUTPATIENT)
Dept: PHYSICAL THERAPY | Facility: HOME HEALTHCARE | Age: 67
End: 2022-09-29
Payer: COMMERCIAL

## 2022-09-29 DIAGNOSIS — M25.552 BILATERAL HIP PAIN: Primary | ICD-10-CM

## 2022-09-29 DIAGNOSIS — M25.551 BILATERAL HIP PAIN: Primary | ICD-10-CM

## 2022-09-29 DIAGNOSIS — M54.2 NECK PAIN: ICD-10-CM

## 2022-09-29 PROCEDURE — 97112 NEUROMUSCULAR REEDUCATION: CPT

## 2022-09-29 PROCEDURE — 97140 MANUAL THERAPY 1/> REGIONS: CPT

## 2022-09-29 PROCEDURE — 97110 THERAPEUTIC EXERCISES: CPT

## 2022-09-29 NOTE — PROGRESS NOTES
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  Neck pain 3/10  B hip pain /10  Objective: See treatment diary below    Assessment: Tolerated treatment well  Challenged pt with addition of TE as per flow sheet without c/o increased s/s  Pt with cerv and B hip ROM deficits and tightness in all planes  Pt reports scap area tightness with cerv rot to the L  Pt denied increased pain at end of tx  Patient would benefit from continued PT    Plan: Continue per plan of care        Precautions: None    Re-eval Date: 10/20/2022      Manuals          Warren calf, hs, glute stretch  5' 5' 5'         Hip LAD mobilization  Gr III 2x10 warren           Hip lateral distraction mobilization             C/S distraction, SOR, UT/LS STM 10' 5' 5' 5'         Neuro Re-Ed            C/S retractions             Scap retractions 5"x10 HEP           MTP/LTP             DNF endurance             Warren UT/LS stretch 3x20" ea warren 3x20" ea warren 3x20"  Ea warren 3x20"  ea warren                                   Ther Ex            Nustep  L2 10' L2 10' L2  8'         Bridges HEP 2x10 2x10 2x10         Clamshells HEP 2x10 warren 2x10 warren 2x10 warren         SLR HEP 2x10 Warren 2x10 warren 2x10  warren         S/L Hip abd  2x10 warren 2x10 warren 2x10   warren         Prone hip ext             Standing hip flex/ext/abd   NV 1x10   ea warren         Squat   NV 1x10         Leg press             Hip abd machine    NV         Ther Activity                                       Gait Training                                       Modalities             MHP

## 2022-09-30 ENCOUNTER — HOSPITAL ENCOUNTER (OUTPATIENT)
Dept: NON INVASIVE DIAGNOSTICS | Facility: HOSPITAL | Age: 67
Discharge: HOME | End: 2022-09-30
Payer: COMMERCIAL

## 2022-09-30 DIAGNOSIS — I10 ESSENTIAL HYPERTENSION: ICD-10-CM

## 2022-09-30 DIAGNOSIS — I10 RESISTANT HYPERTENSION: ICD-10-CM

## 2022-09-30 PROCEDURE — 93975 VASCULAR STUDY: CPT | Performed by: SURGERY

## 2022-09-30 PROCEDURE — 93975 VASCULAR STUDY: CPT

## 2022-10-04 ENCOUNTER — OFFICE VISIT (OUTPATIENT)
Dept: PHYSICAL THERAPY | Facility: HOME HEALTHCARE | Age: 67
End: 2022-10-04
Payer: COMMERCIAL

## 2022-10-04 DIAGNOSIS — M25.551 BILATERAL HIP PAIN: Primary | ICD-10-CM

## 2022-10-04 DIAGNOSIS — M25.552 BILATERAL HIP PAIN: Primary | ICD-10-CM

## 2022-10-04 DIAGNOSIS — M54.2 NECK PAIN: ICD-10-CM

## 2022-10-04 PROCEDURE — 97110 THERAPEUTIC EXERCISES: CPT

## 2022-10-04 PROCEDURE — 97140 MANUAL THERAPY 1/> REGIONS: CPT

## 2022-10-04 PROCEDURE — 97112 NEUROMUSCULAR REEDUCATION: CPT

## 2022-10-04 NOTE — PROGRESS NOTES
Daily Note     Today's date: 10/4/2022  Patient name: Jorden Child  : 1955  MRN: [id number]  Referring provider: Aryan Winslow DO  Dx:   Encounter Diagnosis     ICD-10-CM    1  Bilateral hip pain  M25 551     M25 552    2  Neck pain  M54 2        Start Time: 915  Stop Time: 1005  Total time in clinic (min): 50 minutes    Subjective: Pt reports that her hips aren't bad today, but it depends on how much she is on her feet  Pt reports that her neck has been a little better  Objective: See treatment diary below      Assessment: Pt demonstrated good tolerance to treatment session  She continues to present with some tenderness to her L scalene and levator scapula musculature, but had a decrease in symptoms following STM  Pt was able to progress with hip strengthening exercises today with minimal symptoms  Pt did require some verbal cues to avoid lumbar rotation compensation when performing clamshells  Pt would benefit from continued PT  Plan: Continue per plan of care        Precautions: None    Re-eval Date: 10/20/2022      Manuals  10/        Warren calf, hs, glute stretch  5' 5' 5' 5'        Hip LAD mobilization  Gr III 2x10 warren           Hip lateral distraction mobilization             C/S distraction, SOR, UT/LS STM 10' 5' 5' 5' 5'        Neuro Re-Ed            C/S retractions             Scap retractions 5"x10 HEP           MTP/LTP             DNF endurance             Warren UT/LS stretch 3x20" ea warren 3x20" ea warren 3x20"  Ea warren 3x20"  ea warren 3x20" ea warren                                  Ther Ex    10/        Nustep  L2 10' L2 10' L2  8' L2 10'        Bridges HEP 2x10 2x10 2x10 2x10        Clamshells HEP 2x10 warren 2x10 warren 2x10 warren 2x10 warren        SLR HEP 2x10 Warren 2x10 warren 2x10  warren 2x10 warren        S/L Hip abd  2x10 warren 2x10 warren 2x10   warren 2x10 warren        Prone hip ext             Standing hip flex/ext/abd   NV 1x10   ea warren 1x20 ea warren        Squat   NV 1x10 1x10        Leg press             Hip abd machine    NV 30# 3x10        Ther Activity                                       Gait Training                                       Modalities             P

## 2022-10-06 ENCOUNTER — OFFICE VISIT (OUTPATIENT)
Dept: PHYSICAL THERAPY | Facility: HOME HEALTHCARE | Age: 67
End: 2022-10-06
Payer: COMMERCIAL

## 2022-10-06 DIAGNOSIS — M54.2 NECK PAIN: ICD-10-CM

## 2022-10-06 DIAGNOSIS — M25.551 BILATERAL HIP PAIN: Primary | ICD-10-CM

## 2022-10-06 DIAGNOSIS — M25.552 BILATERAL HIP PAIN: Primary | ICD-10-CM

## 2022-10-06 PROCEDURE — 97140 MANUAL THERAPY 1/> REGIONS: CPT

## 2022-10-06 PROCEDURE — 97110 THERAPEUTIC EXERCISES: CPT

## 2022-10-06 PROCEDURE — 97112 NEUROMUSCULAR REEDUCATION: CPT

## 2022-10-06 NOTE — PROGRESS NOTES
Daily Note     Today's date: 10/6/2022  Patient name: Neeru Troncoso  : 1955  MRN: [id number]  Referring provider: Basia Dela Cruz DO  Dx:   Encounter Diagnosis     ICD-10-CM    1  Bilateral hip pain  M25 551     M25 552    2  Neck pain  M54 2        Start Time: 915  Stop Time: 1005  Total time in clinic (min): 50 minutes    Subjective: Pt reports that her hips have been feeling okay, but it depends on how much she is on her feet  Pt reports that her neck has been feeling a little better with the new BP medications  Objective: See treatment diary below      Assessment: Pt demonstrated good tolerance to treatment session  She was able to progress with strengthening exercises today and was able to perform her exercises with good form  Pt had some improvement in neck symptoms following STM  Pt would benefit from continued PT  Plan: Continue per plan of care        Precautions: None    Re-eval Date: 10/20/2022      Manuals 9/20 9/23 9-27 9-29 10/4 10/6       Warren calf, hs, glute stretch  5' 5' 5' 5' 5'       Hip LAD mobilization  Gr III 2x10 warren    Gr III 1x10 warren       Hip lateral distraction mobilization             C/S distraction, SOR, UT/LS STM 10' 5' 5' 5' 5' 5'       Neuro Re-Ed            C/S retractions             Scap retractions 5"x10 HEP           MTP/LTP             DNF endurance             Warren UT/LS stretch 3x20" ea warren 3x20" ea warren 3x20"  Ea warren 3x20"  ea warren 3x20" ea warren 3x20" ea warren                                 Ther Ex   9-27 9-29 10/4 10/6       Nustep  L2 10' L2 10' L2  8' L2 10' L3 10'       Bridges HEP 2x10 2x10 2x10 2x10 3x10       Clamshells HEP 2x10 warren 2x10 warren 2x10 warren 2x10 warren 2x10 warren       SLR HEP 2x10 Warren 2x10 warren 2x10  warren 2x10 warren 2x10 warren       S/L Hip abd  2x10 warren 2x10 warren 2x10   warren 2x10 warren 2x10 warren       Prone hip ext             Standing hip flex/ext/abd   NV 1x10   ea warren 1x20 ea warren 1x20 ea warren       Squat   NV 1x10 1x10 1x10       Leg press             Hip abd machine    NV 30# 3x10 30# 3x10       Ther Activity                                       Gait Training                                       Modalities             P

## 2022-10-10 ENCOUNTER — OFFICE VISIT (OUTPATIENT)
Dept: PHYSICAL THERAPY | Facility: HOME HEALTHCARE | Age: 67
End: 2022-10-10
Payer: COMMERCIAL

## 2022-10-10 DIAGNOSIS — M54.2 NECK PAIN: ICD-10-CM

## 2022-10-10 DIAGNOSIS — M25.552 BILATERAL HIP PAIN: Primary | ICD-10-CM

## 2022-10-10 DIAGNOSIS — M25.551 BILATERAL HIP PAIN: Primary | ICD-10-CM

## 2022-10-10 PROCEDURE — 97110 THERAPEUTIC EXERCISES: CPT

## 2022-10-10 PROCEDURE — 97140 MANUAL THERAPY 1/> REGIONS: CPT

## 2022-10-10 NOTE — PROGRESS NOTES
Daily Note     Today's date: 10/10/2022  Patient name: Deepa Lentz  : 1955  MRN: [id number]  Referring provider: Anum Leo DO  Dx: No diagnosis found  Start Time: 1110          Subjective: I have some pain and soreness in B hips from walking in the mall over the weekend  My neck isn't to bad  Objective: See treatment diary below    Assessment: Tolerated treatment well  Pt progressing well toward I HEP with a few vc's needed for correct form  She reports B hip and thigh soreness more than pain and reports minimal cerv tightness  Patient would benefit from continued PT to address deficits  Plan: Continue per plan of care        Precautions: None    Re-eval Date: 10/20/2022      Manuals 9/20 9/23 9-27 9-29 10/4 10/6 10-10      Warren calf, hs, glute stretch  5' 5' 5' 5' 5' 5'      Hip LAD mobilization  Gr III 2x10 warren    Gr III 1x10 warren       Hip lateral distraction mobilization             C/S distraction, SOR, UT/LS STM 10' 5' 5' 5' 5' 5' 5'      Neuro Re-Ed      10-10      C/S retractions             Scap retractions 5"x10 HEP           MTP/LTP             DNF endurance             Warren UT/LS stretch 3x20" ea warren 3x20" ea warren 3x20"  Ea warren 3x20"  ea warren 3x20" ea warren 3x20" ea warren 3x20"  Ea warren                                Ther Ex   9-27 9-29 10/4 10 10-10      Nustep  L2 10' L2 10' L2  8' L2 10' L3 10' L3  10'      Bridges HEP 2x10 2x10 2x10 2x10 3x10 3x10      Clamshells HEP 2x10 warren 2x10 warren 2x10 warren 2x10 warren 2x10 warren 2x10 warren      SLR HEP 2x10 Warren 2x10 warren 2x10  warren 2x10 warren 2x10 warren 2x10 warren      S/L Hip abd  2x10 warren 2x10 warren 2x10   warren 2x10 warren 2x10 warren 2x10  warren      Prone hip ext             Standing hip flex/ext/abd   NV 1x10   ea warren 1x20 ea warren 1x20 ea warren 1x20  Ea warren      Squat   NV 1x10 1x10 1x10 1x25      Leg press             Hip abd machine    NV 30# 3x10 30# 3x10 35#  3x10      Ther Activity                                       Gait Training Modalities             P

## 2022-10-11 ENCOUNTER — APPOINTMENT (OUTPATIENT)
Dept: PHYSICAL THERAPY | Facility: HOME HEALTHCARE | Age: 67
End: 2022-10-11

## 2022-10-12 ENCOUNTER — TELEPHONE (OUTPATIENT)
Dept: FAMILY MEDICINE CLINIC | Facility: CLINIC | Age: 67
End: 2022-10-12

## 2022-10-12 NOTE — TELEPHONE ENCOUNTER
Please try and contact the [employer] representitive to see if we can get help in trying to get this for her

## 2022-10-13 ENCOUNTER — OFFICE VISIT (OUTPATIENT)
Dept: PHYSICAL THERAPY | Facility: HOME HEALTHCARE | Age: 67
End: 2022-10-13
Payer: COMMERCIAL

## 2022-10-13 DIAGNOSIS — M25.552 BILATERAL HIP PAIN: Primary | ICD-10-CM

## 2022-10-13 DIAGNOSIS — M25.551 BILATERAL HIP PAIN: Primary | ICD-10-CM

## 2022-10-13 DIAGNOSIS — M54.2 NECK PAIN: ICD-10-CM

## 2022-10-13 PROCEDURE — 97140 MANUAL THERAPY 1/> REGIONS: CPT

## 2022-10-13 PROCEDURE — 97110 THERAPEUTIC EXERCISES: CPT

## 2022-10-13 NOTE — PROGRESS NOTES
PT Discharge    Today's date: 10/13/2022  Patient name: Marylee Shire  : 1955  MRN: [id number]  Referring provider: Luzmaria Calhoun DO  Dx:   Encounter Diagnosis     ICD-10-CM    1  Bilateral hip pain  M25 551     M25 552    2  Neck pain  M54 2        Start Time: 915  Stop Time: 1000  Total time in clinic (min): 45 minutes    Assessment  Assessment details: Pt made improvements in c/s pain since beginning PT, but reports little to no improvement with her bilateral hip pain  Pt was referred to an Orthopedic MD for her hip pain and the pt expressed that she would like to be D/C to HEP at this time until after she goes to the MD   Pt has demonstrated independence with her HEP and she is confident in her ability to adhere to her HEP and further progress on her own  Pt will be D/C to HEP at this time  Understanding of Dx/Px/POC: good   Prognosis: good    Goals  STG: 3-4 weeks  Pt will be independent with HEP in order to continue to progress outside of PT treatment sessions  Pt will improve in quality of life as demonstrated by worst pain levels of 5/10 or less  Pt will improve in functional mobility as demonstrated by bilateral hip flex ROM of 105 deg or greater  LT-8 weeks  Pt will improve in quality of life as demonstrated by worst pain levels of 2/10 or less  Pt will improve in functional mobility as demonstrated by strength levels of 4+/5 or greater  Pt will improve in functional mobility as demonstrated by ginette hip ROM WFL  Pt will improve in functional mobility as demonstrated by c/s ROM WFL  Pt will improve in functional mobility as demonstrated by ability to perform full ADLs without any limitations due to pain  Plan  Plan details: D/C to HEP  Treatment plan discussed with: patient        Subjective Evaluation    History of Present Illness  Mechanism of injury: Pt is presenting to [facility] with a script for bilateral hip and neck pain, with her hip pain being her primary complaint    Pt reports that most of the pain is in the groin and that both sides are about the same  Pt reports that she was on her feet a lot over the weekend so she is a little more sore now  Pt reports that the hip pain has been ongoing for a few years, but the severity depends on how much she is on her feet  For her neck, the pt reports that the neck pain is in her bilateral upper traps and that the pain started after a change in blood pressure medication a few weeks ago  She does reports having a significant improvement in neck symptoms since discontinuing the medication  Although improving, the pt continues to have some residual neck symptoms and would like to be treated for this secondary to the hips  Pain  Current pain ratin  At best pain ratin  At worst pain ratin  Location: Anterior groin  Quality: discomfort (Soreness)  Relieving factors: rest, relaxation and support  Aggravating factors: walking, standing and stair climbing (Pronlonged walking)    Social Support  Stairs in house: yes       Diagnostic Tests  X-ray: abnormal (Mild hip OA)  Patient Goals  Patient goals for therapy: decreased pain, increased motion, increased strength, return to sport/leisure activities and independence with ADLs/IADLs  Patient goal: To be able to walk more and exercise more        Objective     Postural Observations  Seated posture: fair  Standing posture: fair        Palpation   Left   Hypertonic in the cervical paraspinals, scalenes, suboccipitals and upper trapezius  Tenderness of the cervical paraspinals, scalenes, suboccipitals and upper trapezius  Trigger point to scalenes, suboccipitals and upper trapezius  Right   Hypertonic in the cervical paraspinals, scalenes, suboccipitals and upper trapezius  Tenderness of the cervical paraspinals, scalenes, suboccipitals and upper trapezius  Trigger point to scalenes, suboccipitals and upper trapezius       Tenderness     Left Hip   Tenderness in the greater trochanter  Right Hip   Tenderness in the greater trochanter  Neurological Testing     Sensation   Cervical/Thoracic   Left   Intact: light touch    Right   Intact: light touch    Hip   Left Hip   Intact: light touch    Right Hip   Intact: light touch    Active Range of Motion   Cervical/Thoracic Spine       Cervical  Subcranial protraction:  WFL   Subcranial retraction:  with pain   Restriction level: moderate  Flexion: 60 degrees  with pain  Extension: 35 degrees      Left lateral flexion: 45 degrees     with pain  Right lateral flexion: 38 degrees     with pain  Left rotation: 75 degrees  Right rotation: 65 degrees           Lumbar   Flexion:  Restriction level: minimal  Extension:  Restriction level: minimal  Left lateral flexion:  Restriction level: minimal  Right lateral flexion:  Restriction level: minimal  Left rotation:  Restriction level: minimal  Right rotation:  Restriction level: minimal  Left Hip   Flexion: 100 degrees   Abduction: 15 degrees with pain  External rotation (90/90): 35 degrees   Internal rotation (90/90): 35 degrees     Right Hip   Flexion: 100 degrees   Abduction: 20 degrees with pain  External rotation (90/90): 35 degrees   Internal rotation (90/90): 35 degrees     Strength/Myotome Testing   Cervical Spine     Left   Normal strength    Right   Normal strength    Left Hip   Planes of Motion   Flexion: 3+  Extension: 3+  Abduction: 4-  Adduction: 5    Right Hip   Planes of Motion   Flexion: 4  Extension: 4-  Abduction: 4-  Adduction: 5    Left Knee   Flexion: 5  Extension: 5    Right Knee   Flexion: 5  Extension: 5    Tests     Left Hip   Positive FADIR  Negative ALBARO  Right Hip   Positive FADIR  Negative ALBARO                Precautions: None    Re-eval Date: 10/20/2022

## 2022-10-13 NOTE — PROGRESS NOTES
Daily Note     Today's date: 10/13/2022  Patient name: Lana Angulo  : 1955  MRN: [id number]  Referring provider: Wally Riojas DO  Dx:   Encounter Diagnosis     ICD-10-CM    1  Bilateral hip pain  M25 551     M25 552    2  Neck pain  M54 2        Start Time: 915  Stop Time: 1000  Total time in clinic (min): 45 minutes    Subjective: Pt reports that she started to have hip pain again over this past weekend  She reports that she is being referred to an Ortho MD and that she would like this to be her last day of PT for now until she sees the doctor  Objective: See treatment diary below      Assessment: Pt demonstrated good tolerance to treatment session  She has demonstrated independence with her HEP and she is confident in her ability to adhere to her HEP  Plan: Continue per plan of care        Precautions: None    Re-eval Date: 10/20/2022      Manuals 9/20 9/23 9-27 9-29 10/4 10/6 10-10 10/13     Warren calf, hs, glute stretch  5' 5' 5' 5' 5' 5' 10'     Hip LAD mobilization  Gr III 2x10 warren    Gr III 1x10 warren       Hip lateral distraction mobilization             C/S distraction, SOR, UT/LS STM 10' 5' 5' 5' 5' 5' 5'      Neuro Re-Ed      10-10      C/S retractions             Scap retractions 5"x10 HEP           MTP/LTP             DNF endurance             Warren UT/LS stretch 3x20" ea warren 3x20" ea warren 3x20"  Ea warren 3x20"  ea warren 3x20" ea warren 3x20" ea warren 3x20"  Ea warren                                Ther Ex   9-27 9-29 10/4 10/6 10-10 10/13     Nustep  L2 10' L2 10' L2  8' L2 10' L3 10' L3  10' L3 10'     Bridges HEP 2x10 2x10 2x10 2x10 3x10 3x10 3x10     Clamshells HEP 2x10 warren 2x10 warren 2x10 warren 2x10 warren 2x10 warren 2x10 warren 2x10 warren     SLR HEP 2x10 Warren 2x10 warren 2x10  warren 2x10 warren 2x10 warren 2x10 warren 2x10 warren     S/L Hip abd  2x10 warren 2x10 warren 2x10   warren 2x10 warren 2x10 warren 2x10  warren 2x10 warren     Prone hip ext             Standing hip flex/ext/abd   NV 1x10   ea warren 1x20 ea warren 1x20 ea ginette 1x20  Ea ginette 1x20 ea ginette     Squat   NV 1x10 1x10 1x10 1x25 1x20     Leg press             Hip abd machine    NV 30# 3x10 30# 3x10 35#  3x10 35# 3x10     Ther Activity                                       Gait Training                                       Modalities             P

## 2022-10-14 DIAGNOSIS — E78.2 MIXED HYPERLIPIDEMIA: ICD-10-CM

## 2022-10-14 RX ORDER — SIMVASTATIN 20 MG
TABLET ORAL
Qty: 90 TABLET | Refills: 3 | Status: SHIPPED | OUTPATIENT
Start: 2022-10-14

## 2022-10-18 ENCOUNTER — APPOINTMENT (OUTPATIENT)
Dept: PHYSICAL THERAPY | Facility: HOME HEALTHCARE | Age: 67
End: 2022-10-18

## 2022-10-18 ENCOUNTER — CONSULT (OUTPATIENT)
Dept: NEPHROLOGY | Facility: CLINIC | Age: 67
End: 2022-10-18
Payer: COMMERCIAL

## 2022-10-18 ENCOUNTER — TELEPHONE (OUTPATIENT)
Dept: NEPHROLOGY | Facility: CLINIC | Age: 67
End: 2022-10-18

## 2022-10-18 VITALS
BODY MASS INDEX: 31.71 KG/M2 | OXYGEN SATURATION: 99 % | HEIGHT: 63 IN | SYSTOLIC BLOOD PRESSURE: 130 MMHG | WEIGHT: 179 LBS | HEART RATE: 82 BPM | DIASTOLIC BLOOD PRESSURE: 68 MMHG

## 2022-10-18 DIAGNOSIS — I10 RESISTANT HYPERTENSION: ICD-10-CM

## 2022-10-18 DIAGNOSIS — I70.1 RAS (RENAL ARTERY STENOSIS) (HCC): ICD-10-CM

## 2022-10-18 DIAGNOSIS — I10 ESSENTIAL HYPERTENSION: Primary | ICD-10-CM

## 2022-10-18 LAB
SL AMB  POCT GLUCOSE, UA: NEGATIVE
SL AMB LEUKOCYTE ESTERASE,UA: NEGATIVE
SL AMB POCT BILIRUBIN,UA: NEGATIVE
SL AMB POCT BLOOD,UA: ABNORMAL
SL AMB POCT CLARITY,UA: CLEAR
SL AMB POCT COLOR,UA: ABNORMAL
SL AMB POCT KETONES,UA: NEGATIVE
SL AMB POCT NITRITE,UA: NEGATIVE
SL AMB POCT PH,UA: 5
SL AMB POCT SPECIFIC GRAVITY,UA: 1.01
SL AMB POCT URINE PROTEIN: NEGATIVE
SL AMB POCT UROBILINOGEN: NORMAL

## 2022-10-18 PROCEDURE — 81002 URINALYSIS NONAUTO W/O SCOPE: CPT | Performed by: INTERNAL MEDICINE

## 2022-10-18 PROCEDURE — 99204 OFFICE O/P NEW MOD 45 MIN: CPT | Performed by: INTERNAL MEDICINE

## 2022-10-18 RX ORDER — LOSARTAN POTASSIUM 25 MG/1
25 TABLET ORAL DAILY
Qty: 30 TABLET | Refills: 2 | Status: SHIPPED | OUTPATIENT
Start: 2022-10-18

## 2022-10-18 NOTE — ASSESSMENT & PLAN NOTE
We reviewed the patient's renal artery Dopplers, she most likely has right renal artery stenosis  We discussed pros and cons of further evaluation at this point I do not believe it is clinically indicated to further evaluate  Will want to check the patient's renal artery Dopplers at least once yearly for now  Will consider further evaluation as well as potential intervention if the patient's blood pressures start to become uncontrollable with medication use

## 2022-10-18 NOTE — TELEPHONE ENCOUNTER
Spoke to patient and is aware regarding, she can reduce the metoprolol to 1/2 tab daily once she starts to take the losartan 25 mg daily

## 2022-10-18 NOTE — ASSESSMENT & PLAN NOTE
Blood pressure in the office here is appropriate and at goal, patient is aware that we want her blood pressures to be consistently 130/70 mmHg and below  Home blood pressures were reviewed in detail which are not at goal at this time  Continue with metoprolol succinate 25 mg once daily, will add a low-dose losartan 25 mg once daily for now  Continue to focus on a low-sodium diet

## 2022-10-18 NOTE — TELEPHONE ENCOUNTER
Patient called forgot to talk to you about her neck pain she has due to taking metoprolol 25 mg  She ask could it possibly be lowered  Please advise
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
## 2022-10-20 ENCOUNTER — APPOINTMENT (OUTPATIENT)
Dept: PHYSICAL THERAPY | Facility: HOME HEALTHCARE | Age: 67
End: 2022-10-20

## 2022-10-24 ENCOUNTER — APPOINTMENT (OUTPATIENT)
Dept: LAB | Facility: HOSPITAL | Age: 67
End: 2022-10-24
Attending: INTERNAL MEDICINE
Payer: COMMERCIAL

## 2022-10-24 DIAGNOSIS — I10 ESSENTIAL HYPERTENSION: ICD-10-CM

## 2022-10-24 LAB
ANION GAP SERPL CALCULATED.3IONS-SCNC: 9 MMOL/L (ref 4–13)
BUN SERPL-MCNC: 16 MG/DL (ref 5–25)
CALCIUM SERPL-MCNC: 8.2 MG/DL (ref 8.3–10.1)
CHLORIDE SERPL-SCNC: 106 MMOL/L (ref 96–108)
CO2 SERPL-SCNC: 28 MMOL/L (ref 21–32)
CREAT SERPL-MCNC: 0.98 MG/DL (ref 0.6–1.3)
GFR SERPL CREATININE-BSD FRML MDRD: 59 ML/MIN/1.73SQ M
GLUCOSE P FAST SERPL-MCNC: 106 MG/DL (ref 65–99)
MAGNESIUM SERPL-MCNC: 1.7 MG/DL (ref 1.6–2.6)
POTASSIUM SERPL-SCNC: 3.9 MMOL/L (ref 3.5–5.3)
SODIUM SERPL-SCNC: 143 MMOL/L (ref 135–147)

## 2022-10-24 PROCEDURE — 80048 BASIC METABOLIC PNL TOTAL CA: CPT

## 2022-10-24 PROCEDURE — 36415 COLL VENOUS BLD VENIPUNCTURE: CPT

## 2022-10-24 PROCEDURE — 83735 ASSAY OF MAGNESIUM: CPT

## 2022-10-26 ENCOUNTER — OFFICE VISIT (OUTPATIENT)
Dept: FAMILY MEDICINE CLINIC | Facility: CLINIC | Age: 67
End: 2022-10-26
Payer: COMMERCIAL

## 2022-10-26 VITALS
HEIGHT: 63 IN | TEMPERATURE: 96.6 F | OXYGEN SATURATION: 95 % | HEART RATE: 76 BPM | DIASTOLIC BLOOD PRESSURE: 74 MMHG | SYSTOLIC BLOOD PRESSURE: 140 MMHG | WEIGHT: 181.4 LBS | BODY MASS INDEX: 32.14 KG/M2

## 2022-10-26 DIAGNOSIS — M25.552 BILATERAL HIP PAIN: Primary | ICD-10-CM

## 2022-10-26 DIAGNOSIS — I10 ESSENTIAL HYPERTENSION: ICD-10-CM

## 2022-10-26 DIAGNOSIS — M25.551 BILATERAL HIP PAIN: Primary | ICD-10-CM

## 2022-10-26 DIAGNOSIS — Z23 NEED FOR INFLUENZA VACCINATION: ICD-10-CM

## 2022-10-26 DIAGNOSIS — I70.1 RAS (RENAL ARTERY STENOSIS) (HCC): ICD-10-CM

## 2022-10-26 PROCEDURE — 99214 OFFICE O/P EST MOD 30 MIN: CPT | Performed by: FAMILY MEDICINE

## 2022-10-26 PROCEDURE — 90471 IMMUNIZATION ADMIN: CPT | Performed by: FAMILY MEDICINE

## 2022-10-26 PROCEDURE — 90662 IIV NO PRSV INCREASED AG IM: CPT | Performed by: FAMILY MEDICINE

## 2022-10-26 NOTE — PROGRESS NOTES
Name: Iraida Cole      : 1955      MRN: [id number]  Encounter Provider: Nay Durán DO  Encounter Date: 10/26/2022   Encounter department: [address]   I will refer her to Orthopedics for her bilateral hip pain  Continue to follow up with other specialists as scheduled  Flu shot given today  Follow-up here in 6 months or p r n  1  Bilateral hip pain  -     Ambulatory Referral to Orthopedic Surgery; Future    2  Essential hypertension    3  Need for influenza vaccination  -     influenza vaccine, high-dose, PF 0 7 mL (FLUZONE HIGH-DOSE)    4  EL (renal artery stenosis) ([facility] )      Depression Screening and Follow-up Plan: Patient was screened for depression during today's encounter  They screened negative with a PHQ-2 score of 0  Subjective     Patient here today follow-up  Patient denies any chest pain or shortness of breath  Patient saw Nephrology they have adjusted her medications  They will continue to follow up with her, next appointment in January  Patient continues to have bilateral hip pain, worse with walking  It also hurts if she sits too long  She has tried therapy, without much relief  Review of Systems   Constitutional: Negative  Respiratory: Negative  Cardiovascular: Negative  Gastrointestinal: Negative  Genitourinary: Negative  Musculoskeletal: Positive for arthralgias (B/L hips)         Past Medical History:   Diagnosis Date   • Allergic reaction     Resolved 2017    • Allergic rhinitis     Resolved 2017    • Generalized anxiety disorder     Resolved 2017    • High cholesterol    • Microscopic hematuria     Resolved 2017    • Postural dizziness with presyncope 3/11/2021   • Renal calculi     Resolved     • Renal colic     Resolved     • Renal cyst, acquired     Resolved 2017    • Restrictive lung disease     Resolved 2017    • Supraventricular tachycardia ([facility] ) Resolved 11/24/2017      Past Surgical History:   Procedure Laterality Date   • CHOLECYSTECTOMY     • CYSTOSCOPY  05/07/2014    Diagnostic   Last assessed 5/7/2014     • TONSILLECTOMY     • TUBAL LIGATION       Family History   Problem Relation Age of Onset   • Cancer Mother    • Lung cancer Mother    • Heart attack Father    • No Known Problems Sister    • No Known Problems Sister    • No Known Problems Sister    • Heart attack Maternal Grandmother    • Diabetes Paternal Grandmother    • Colon cancer Maternal Aunt    • Esophageal cancer Maternal Aunt    • No Known Problems Maternal Aunt    • No Known Problems Paternal Aunt    • Rheum arthritis Family         Rheumatoid arthritis with rheumatoid factor      Social History     Socioeconomic History   • Marital status: /Civil Union     Spouse name: None   • Number of children: None   • Years of education: None   • Highest education level: None   Occupational History   • None   Tobacco Use   • Smoking status: Former Smoker   • Smokeless tobacco: Never Used   Vaping Use   • Vaping Use: Never used   Substance and Sexual Activity   • Alcohol use: Yes     Comment: occasional   • Drug use: Never   • Sexual activity: None   Other Topics Concern   • None   Social History Narrative   • None     Social Determinants of Health     Financial Resource Strain: Not on file   Food Insecurity: Not on file   Transportation Needs: Not on file   Physical Activity: Not on file   Stress: Not on file   Social Connections: Not on file   Intimate Partner Violence: Not on file   Housing Stability: Not on file     Current Outpatient Medications on File Prior to Visit   Medication Sig   • butalbital-acetaminophen-caffeine (FIORICET,ESGIC) -40 mg per tablet Take 1 tablet by mouth every 4 (four) hours as needed for headaches   • Cholecalciferol (VITAMIN D3) 2000 units capsule Take 1 capsule by mouth daily   • fluticasone (FLONASE) 50 mcg/act nasal spray 2 sprays into each nostril daily   • losartan (COZAAR) 25 mg tablet Take 1 tablet (25 mg total) by mouth daily   • metoprolol succinate (Toprol XL) 25 mg 24 hr tablet Take 1 tablet (25 mg total) by mouth daily (Patient taking differently: Take 25 mg by mouth daily PT taking 1/2 tablet daily)   • pantoprazole (PROTONIX) 40 mg tablet Take 1 tablet by mouth 2 (two) times a day    • simvastatin (ZOCOR) 20 mg tablet TAKE 1 TABLET BY MOUTH  DAILY AT BEDTIME   • denosumab (PROLIA) 60 mg/mL Inject 1 mL (60 mg total) under the skin once for 1 dose     Allergies   Allergen Reactions   • Amlodipine Myalgia   • Codeine      Other reaction(s): Other (See Comments)  headaches   • Fosamax [Alendronate] GI Intolerance     Immunization History   Administered Date(s) Administered   • COVID-19 MODERNA VACC 0 5 ML IM 03/18/2021, 04/15/2021   • INFLUENZA 10/26/2018, 10/05/2019, 11/01/2020   • Influenza Quadrivalent Preservative Free 3 years and older IM 10/28/2015, 10/19/2017   • Influenza Quadrivalent, 6-35 Months IM 11/12/2014   • Influenza Split High Dose Preservative Free IM 10/05/2019   • Influenza, high dose seasonal 0 7 mL 10/26/2018, 10/10/2020, 10/21/2021, 10/26/2022   • Influenza, seasonal, injectable 12/11/2013   • Pneumococcal Polysaccharide PPV23 12/11/2013       Objective     /74   Pulse 76   Temp (!) 96 6 °F (35 9 °C)   Ht 5' 3" (1 6 m)   Wt 82 3 kg (181 lb 6 4 oz)   SpO2 95%   BMI 32 13 kg/m²     Physical Exam  Vitals reviewed  Constitutional:       General: She is not in acute distress  Appearance: Normal appearance  She is well-developed  She is not ill-appearing, toxic-appearing or diaphoretic  HENT:      Head: Normocephalic and atraumatic  Eyes:      Conjunctiva/sclera: Conjunctivae normal    Cardiovascular:      Rate and Rhythm: Normal rate and regular rhythm  Heart sounds: Normal heart sounds  No murmur heard  No friction rub  No gallop     Pulmonary:      Effort: Pulmonary effort is normal  No respiratory distress  Breath sounds: Normal breath sounds  No wheezing, rhonchi or rales  Musculoskeletal:      Right lower leg: No edema  Left lower leg: No edema  Neurological:      General: No focal deficit present  Mental Status: She is alert and oriented to person, place, and time  Psychiatric:         Mood and Affect: Mood normal          Behavior: Behavior normal          Thought Content:  Thought content normal          Judgment: Judgment normal        Maninder Marcoser, DO

## 2022-11-01 ENCOUNTER — TELEPHONE (OUTPATIENT)
Dept: NEPHROLOGY | Facility: CLINIC | Age: 67
End: 2022-11-01

## 2022-11-01 NOTE — TELEPHONE ENCOUNTER
Please ask her or her blood pressures have been running  As a reminder I would like for blood pressures to be consistently less than 130 for the top number  I do not think we should continue the losartan if these are truly symptom she is feeling as side effects  I will say that metoprolol is associated with reduced mood/depression much more commonly then losartan, and typically constipation is a neutral issue with respect to side effects of with these medications  But, we can stop losartan and see how she does with her symptoms  I would like a washout time frame to allow the medication to work its way out of her system prior to making any other adjustments to her medications  Please reminder her to focus, as much as possible, on a low-sodium diet

## 2022-11-01 NOTE — TELEPHONE ENCOUNTER
Patient called stating the losartan and metoprolol is making her feel depressed,she is tension in her neck and her stools are being affected by it as well  Constipation issues

## 2022-11-01 NOTE — TELEPHONE ENCOUNTER
Lets stay on the same dosing at 25 mg for the metoprolol for now and keep an eye on her BPs daily for now  As the losartan leaves her system, we will see the BP start to creep up    At that point, so long as her heart rate is over 70, we will probably increase the metoprolol to 50 mg

## 2022-11-01 NOTE — TELEPHONE ENCOUNTER
Spoke with patient, she is aware  Patient asked if she should stay on the same dosage of metoprolol? Patient stated her B/P's have been running between 128-144 as a top number

## 2022-11-02 ENCOUNTER — TELEPHONE (OUTPATIENT)
Dept: FAMILY MEDICINE CLINIC | Facility: CLINIC | Age: 67
End: 2022-11-02

## 2022-11-02 NOTE — TELEPHONE ENCOUNTER
[address] [employer] # [id number] -9/13/2022 -09/13/2023  LEFT VM FOR PATIENT AS [facility] WILL BE CALLING TO SCHEDULE DELIVERY TO OUR OFFICE AND THEN NURSE VISIT WILL BE SCHEDULED  [address] [facility] PHONE #  [phone]

## 2022-11-03 ENCOUNTER — OFFICE VISIT (OUTPATIENT)
Dept: OBGYN CLINIC | Facility: CLINIC | Age: 67
End: 2022-11-03

## 2022-11-03 VITALS — WEIGHT: 181 LBS | BODY MASS INDEX: 32.07 KG/M2 | HEIGHT: 63 IN

## 2022-11-03 DIAGNOSIS — M70.62 TROCHANTERIC BURSITIS OF LEFT HIP: ICD-10-CM

## 2022-11-03 DIAGNOSIS — M70.61 TROCHANTERIC BURSITIS OF RIGHT HIP: Primary | ICD-10-CM

## 2022-11-03 DIAGNOSIS — M25.551 BILATERAL HIP PAIN: ICD-10-CM

## 2022-11-03 DIAGNOSIS — M25.552 BILATERAL HIP PAIN: ICD-10-CM

## 2022-11-03 RX ORDER — BUPIVACAINE HYDROCHLORIDE 2.5 MG/ML
4 INJECTION, SOLUTION INFILTRATION; PERINEURAL
Status: COMPLETED | OUTPATIENT
Start: 2022-11-03 | End: 2022-11-03

## 2022-11-03 RX ORDER — TRIAMCINOLONE ACETONIDE 40 MG/ML
80 INJECTION, SUSPENSION INTRA-ARTICULAR; INTRAMUSCULAR
Status: COMPLETED | OUTPATIENT
Start: 2022-11-03 | End: 2022-11-03

## 2022-11-03 RX ADMIN — BUPIVACAINE HYDROCHLORIDE 4 ML: 2.5 INJECTION, SOLUTION INFILTRATION; PERINEURAL at 11:58

## 2022-11-03 RX ADMIN — TRIAMCINOLONE ACETONIDE 80 MG: 40 INJECTION, SUSPENSION INTRA-ARTICULAR; INTRAMUSCULAR at 11:58

## 2022-11-03 NOTE — PROGRESS NOTES
Assessment:  1  Trochanteric bursitis of right hip     2  Bilateral hip pain  Ambulatory Referral to Orthopedic Surgery   3  Trochanteric bursitis of left hip         Plan:  Bilateral trochanteric bursitis  The patient was provided with bilateral trochanteric bursa steroid injections  The patient tolerated the procedure well  The patient should follow up in 8 weeks  The patient has trochanteric bursitis of her bilateral hips  Both hips were injected with Kenalog and Marcaine  She tolerated procedures quite well  Return back in 2 months evaluation  If her condition changes, she will not hesitate to let us know      To do next visit:  Return in about 8 weeks (around 12/29/2022)  The above stated was discussed in layman's terms and the patient expressed understanding  All questions were answered to the patient's satisfaction  Scribe Attestation    I,:  Hugh Figueroa am acting as a scribe while in the presence of the attending physician :       I,:  Juan Boothe, DO personally performed the services described in this documentation    as scribed in my presence :             Subjective:   Jenise García is a 69 y o  female who presents for initial evaluation of bilateral hips  She has had symptoms for several years with no injury  Today she complains of bilateral anterior groin pain and lateral hip pain  Walking aggravates while rest alleviates  She does use Tylenol as needed  She did physical therapy with limited benefit  She denies past hip or spine injections or surgery  She does work with rheumatologist for osteopenia          Review of systems negative unless otherwise specified in HPI    Past Medical History:   Diagnosis Date   • Allergic reaction     Resolved 11/24/2017    • Allergic rhinitis     Resolved 11/24/2017    • Generalized anxiety disorder     Resolved 11/24/2017    • High cholesterol    • Microscopic hematuria     Resolved 11/24/2017    • Postural dizziness with presyncope 3/11/2021   • Renal calculi     Resolved 62/12/7551    • Renal colic     Resolved 73/88/1437    • Renal cyst, acquired     Resolved 11/24/2017    • Restrictive lung disease     Resolved 11/24/2017    • Supraventricular tachycardia ([address] )     Resolved 11/24/2017        Past Surgical History:   Procedure Laterality Date   • CHOLECYSTECTOMY     • CYSTOSCOPY  05/07/2014    Diagnostic   Last assessed 5/7/2014     • TONSILLECTOMY     • TUBAL LIGATION         Family History   Problem Relation Age of Onset   • Cancer Mother    • Lung cancer Mother    • Heart attack Father    • No Known Problems Sister    • No Known Problems Sister    • No Known Problems Sister    • Heart attack Maternal Grandmother    • Diabetes Paternal Grandmother    • Colon cancer Maternal Aunt    • Esophageal cancer Maternal Aunt    • No Known Problems Maternal Aunt    • No Known Problems Paternal Aunt    • Rheum arthritis Family         Rheumatoid arthritis with rheumatoid factor        Social History     Occupational History   • Not on file   Tobacco Use   • Smoking status: Former Smoker   • Smokeless tobacco: Never Used   Vaping Use   • Vaping Use: Never used   Substance and Sexual Activity   • Alcohol use: Yes     Comment: occasional   • Drug use: Never   • Sexual activity: Not on file         Current Outpatient Medications:   •  butalbital-acetaminophen-caffeine (FIORICET,ESGIC) -40 mg per tablet, Take 1 tablet by mouth every 4 (four) hours as needed for headaches, Disp: 30 tablet, Rfl: 0  •  Cholecalciferol (VITAMIN D3) 2000 units capsule, Take 1 capsule by mouth daily, Disp: , Rfl:   •  fluticasone (FLONASE) 50 mcg/act nasal spray, 2 sprays into each nostril daily, Disp: 16 g, Rfl: 3  •  metoprolol succinate (Toprol XL) 25 mg 24 hr tablet, Take 1 tablet (25 mg total) by mouth daily (Patient taking differently: Take 25 mg by mouth daily PT taking 1/2 tablet daily), Disp: 30 tablet, Rfl: 5  •  pantoprazole (PROTONIX) 40 mg tablet, Take 1 tablet by mouth 2 (two) times a day , Disp: , Rfl:   •  simvastatin (ZOCOR) 20 mg tablet, TAKE 1 TABLET BY MOUTH  DAILY AT BEDTIME, Disp: 90 tablet, Rfl: 3  •  denosumab (PROLIA) 60 mg/mL, Inject 1 mL (60 mg total) under the skin once for 1 dose, Disp: 1 mL, Rfl: 0  •  losartan (COZAAR) 25 mg tablet, Take 1 tablet (25 mg total) by mouth daily (Patient not taking: Reported on 11/3/2022), Disp: 30 tablet, Rfl: 2    Allergies   Allergen Reactions   • Amlodipine Myalgia   • Codeine      Other reaction(s): Other (See Comments)  headaches   • Fosamax [Alendronate] GI Intolerance          There were no vitals filed for this visit  Objective:  Physical exam  · General: Awake, Alert, Oriented  · Eyes: Pupils equal, round and reactive to light  · Heart: regular rate and rhythm  · Lungs: No audible wheezing  · Abdomen: soft                    Ortho Exam  Bilateral hips:  TTP over greater trochanter  Good arc of motion  Patient sits comfortably in chair with hip flexed at 90 degrees  Patient stands from seated position without assistance  Calf compartments soft and supple  Sensation intact  Toes are warm sensate and mobile      Diagnostics, reviewed and taken today if performed as documented: The attending physician has personally reviewed the pertinent films in PACS and interpretation is as follows:  Bilateral hip/pelvis x-ray 8/26/2022:  Mild bilateral hip arthritic changes  Procedures, if performed today:    Large joint arthrocentesis: R greater trochanteric bursa  Universal Protocol:  Consent: Verbal consent obtained  Risks and benefits: risks, benefits and alternatives were discussed  Consent given by: patient  Time out: Immediately prior to procedure a "time out" was called to verify the correct patient, procedure, equipment, support staff and site/side marked as required    Timeout called at: 11/3/2022 11:56 AM   Patient understanding: patient states understanding of the procedure being performed  Site marked: the operative site was marked  Patient identity confirmed: verbally with patient    Supporting Documentation  Indications: pain   Procedure Details  Location: hip - R greater trochanteric bursa  Preparation: Patient was prepped and draped in the usual sterile fashion  Needle size: 22 G  Ultrasound guidance: no  Approach: anterolateral  Medications administered: 4 mL bupivacaine 0 25 %; 80 mg triamcinolone acetonide 40 mg/mL    Patient tolerance: patient tolerated the procedure well with no immediate complications  Dressing:  Sterile dressing applied    Large joint arthrocentesis: L greater trochanteric bursa  Universal Protocol:  Consent: Verbal consent obtained  Risks and benefits: risks, benefits and alternatives were discussed  Consent given by: patient  Time out: Immediately prior to procedure a "time out" was called to verify the correct patient, procedure, equipment, support staff and site/side marked as required  Timeout called at: 11/3/2022 11:56 AM   Patient understanding: patient states understanding of the procedure being performed  Site marked: the operative site was marked  Patient identity confirmed: verbally with patient    Supporting Documentation  Indications: pain   Procedure Details  Location: hip - L greater trochanteric bursa  Preparation: Patient was prepped and draped in the usual sterile fashion  Needle size: 22 G  Ultrasound guidance: no  Approach: anterolateral  Medications administered: 4 mL bupivacaine 0 25 %; 80 mg triamcinolone acetonide 40 mg/mL    Patient tolerance: patient tolerated the procedure well with no immediate complications  Dressing:  Sterile dressing applied            Portions of the record may have been created with voice recognition software  Occasional wrong word or "sound a like" substitutions may have occurred due to the inherent limitations of voice recognition software    Read the chart carefully and recognize, using context, where substitutions have occurred

## 2022-11-15 ENCOUNTER — TELEPHONE (OUTPATIENT)
Dept: FAMILY MEDICINE CLINIC | Facility: CLINIC | Age: 67
End: 2022-11-15

## 2022-11-15 NOTE — TELEPHONE ENCOUNTER
PATIENT INQUIRING ABOUT PROLIA DELIVERY   RENEE FROM [facility] STATED IT WILL ARRIVE AT OUR OFFICE 11/16

## 2022-11-16 ENCOUNTER — CLINICAL SUPPORT (OUTPATIENT)
Dept: FAMILY MEDICINE CLINIC | Facility: CLINIC | Age: 67
End: 2022-11-16

## 2022-11-16 DIAGNOSIS — M81.0 OSTEOPOROSIS, UNSPECIFIED OSTEOPOROSIS TYPE, UNSPECIFIED PATHOLOGICAL FRACTURE PRESENCE: Primary | ICD-10-CM

## 2022-12-16 ENCOUNTER — TELEPHONE (OUTPATIENT)
Dept: FAMILY MEDICINE CLINIC | Facility: CLINIC | Age: 67
End: 2022-12-16

## 2022-12-16 DIAGNOSIS — J06.9 UPPER RESPIRATORY TRACT INFECTION, UNSPECIFIED TYPE: Primary | ICD-10-CM

## 2022-12-16 RX ORDER — AMOXICILLIN 500 MG/1
500 CAPSULE ORAL EVERY 8 HOURS SCHEDULED
Qty: 21 CAPSULE | Refills: 0 | Status: SHIPPED | OUTPATIENT
Start: 2022-12-16 | End: 2022-12-23

## 2022-12-16 NOTE — TELEPHONE ENCOUNTER
Rx put in for amoxicillin  Get generic Mucinex over-the-counter  Push fluids  Make appointment if symptoms continue or increase  [Dear  ___] : Dear  [unfilled], [( Thank you for referring [unfilled] for consultation for _____ )] : Thank you for referring [unfilled] for consultation for [unfilled] [Please see my note below.] : Please see my note below. [Consult Closing:] : Thank you very much for allowing me to participate in the care of this patient.  If you have any questions, please do not hesitate to contact me. [Sincerely,] : Sincerely, [DrEsther  ___] : Dr. BURK [FreeTextEntry3] : Michael Matson MD

## 2022-12-29 ENCOUNTER — TELEPHONE (OUTPATIENT)
Dept: FAMILY MEDICINE CLINIC | Facility: CLINIC | Age: 67
End: 2022-12-29

## 2022-12-30 ENCOUNTER — OFFICE VISIT (OUTPATIENT)
Dept: FAMILY MEDICINE CLINIC | Facility: CLINIC | Age: 67
End: 2022-12-30

## 2022-12-30 ENCOUNTER — TELEPHONE (OUTPATIENT)
Dept: OTHER | Facility: OTHER | Age: 67
End: 2022-12-30

## 2022-12-30 VITALS
BODY MASS INDEX: 32.11 KG/M2 | HEART RATE: 86 BPM | SYSTOLIC BLOOD PRESSURE: 142 MMHG | TEMPERATURE: 98.2 F | WEIGHT: 181.2 LBS | OXYGEN SATURATION: 95 % | DIASTOLIC BLOOD PRESSURE: 80 MMHG | HEIGHT: 63 IN

## 2022-12-30 DIAGNOSIS — J01.00 ACUTE NON-RECURRENT MAXILLARY SINUSITIS: Primary | ICD-10-CM

## 2022-12-30 RX ORDER — AZITHROMYCIN 250 MG/1
TABLET, FILM COATED ORAL
Qty: 6 TABLET | Refills: 0 | Status: SHIPPED | OUTPATIENT
Start: 2022-12-30 | End: 2023-01-03

## 2022-12-30 NOTE — TELEPHONE ENCOUNTER
Pt believes she has a sinus infection and a sore throat from the mucus  Pt would like to be seen today  Please call to schedule an apt

## 2022-12-30 NOTE — PROGRESS NOTES
Assessment/Plan:    Problem List Items Addressed This Visit    None  Visit Diagnoses     Acute non-recurrent maxillary sinusitis    -  Primary    Relevant Medications    azithromycin (ZITHROMAX) 250 mg tablet           Diagnoses and all orders for this visit:    Acute non-recurrent maxillary sinusitis  -     azithromycin (ZITHROMAX) 250 mg tablet; Take 2 tablets today then 1 tablet daily x 4 days      Script for Zithromax  Recommended that she continue symptomatic relief  She will notify us of any new or worsening symptoms    Subjective:      Patient ID: Eliceo Vera is a 69 y o  female  Rojas Maya is a pleasant 69year old female who is here today complaining of sinus pressure, congestion, post nasal drip, and cough for the past few days  She had a cold a few weeks ago, but she did not finish her antibiotics and she feels like it came back  She denies any fevers  She has been taking Allegra with some relief  The following portions of the patient's history were reviewed and updated as appropriate:   She has a past medical history of Allergic reaction, Allergic rhinitis, Generalized anxiety disorder, High cholesterol, Microscopic hematuria, Postural dizziness with presyncope (3/11/2021), Renal calculi, Renal colic, Renal cyst, acquired, Restrictive lung disease, and Supraventricular tachycardia ([facility] [address] )  ,  does not have any pertinent problems on file  ,   has a past surgical history that includes Cholecystectomy; Tubal ligation; TONSILLECTOMY; and CYSTOSCOPY (05/07/2014)  ,  family history includes Cancer in her mother; Colon cancer in her maternal aunt; Diabetes in her paternal grandmother; Esophageal cancer in her maternal aunt; Heart attack in her father and maternal grandmother; Lung cancer in her mother; No Known Problems in her maternal aunt, paternal aunt, sister, sister, and sister; Rheum arthritis in her family  ,   reports that she has quit smoking   She has never used smokeless tobacco  She reports current alcohol use  She reports that she does not use drugs  ,  is allergic to amlodipine, codeine, and fosamax [alendronate]     Current Outpatient Medications   Medication Sig Dispense Refill   • azithromycin (ZITHROMAX) 250 mg tablet Take 2 tablets today then 1 tablet daily x 4 days 6 tablet 0   • butalbital-acetaminophen-caffeine (FIORICET,ESGIC) -40 mg per tablet Take 1 tablet by mouth every 4 (four) hours as needed for headaches 30 tablet 0   • Cholecalciferol (VITAMIN D3) 2000 units capsule Take 1 capsule by mouth daily     • fluticasone (FLONASE) 50 mcg/act nasal spray 2 sprays into each nostril daily 16 g 3   • metoprolol succinate (Toprol XL) 25 mg 24 hr tablet Take 1 tablet (25 mg total) by mouth daily (Patient taking differently: Take 25 mg by mouth daily PT taking 1/2 tablet daily) 30 tablet 5   • pantoprazole (PROTONIX) 40 mg tablet Take 1 tablet by mouth 2 (two) times a day      • simvastatin (ZOCOR) 20 mg tablet TAKE 1 TABLET BY MOUTH  DAILY AT BEDTIME 90 tablet 3   • denosumab (PROLIA) 60 mg/mL Inject 1 mL (60 mg total) under the skin once for 1 dose 1 mL 0   • losartan (COZAAR) 25 mg tablet Take 1 tablet (25 mg total) by mouth daily (Patient not taking: Reported on 11/3/2022) 30 tablet 2     No current facility-administered medications for this visit  Review of Systems   Constitutional: Negative for chills, diaphoresis, fatigue and fever  HENT: Positive for congestion, postnasal drip, rhinorrhea, sinus pressure and sore throat  Negative for ear pain, sneezing and trouble swallowing  Eyes: Negative for pain and visual disturbance  Respiratory: Positive for cough  Negative for apnea, shortness of breath and wheezing  Cardiovascular: Negative for chest pain and palpitations  Gastrointestinal: Negative for abdominal pain, constipation, diarrhea, nausea and vomiting  Genitourinary: Negative for dysuria and hematuria     Musculoskeletal: Negative for arthralgias, gait problem and myalgias  Neurological: Negative for dizziness, syncope, weakness, light-headedness, numbness and headaches  Hematological: Positive for adenopathy  Psychiatric/Behavioral: Negative for suicidal ideas  The patient is not nervous/anxious  Objective:  Vitals:    12/30/22 1323   BP: 142/80   Pulse: 86   Temp: 98 2 °F (36 8 °C)   SpO2: 95%   Weight: 82 2 kg (181 lb 3 2 oz)   Height: 5' 3" (1 6 m)     Body mass index is 32 1 kg/m²  Physical Exam  Vitals and nursing note reviewed  Constitutional:       Appearance: She is well-developed  HENT:      Head: Normocephalic and atraumatic  Right Ear: Tympanic membrane, ear canal and external ear normal       Left Ear: Tympanic membrane, ear canal and external ear normal       Nose: Mucosal edema, congestion and rhinorrhea present  Mouth/Throat:      Pharynx: Posterior oropharyngeal erythema present  No oropharyngeal exudate  Eyes:      Extraocular Movements: Extraocular movements intact  Cardiovascular:      Rate and Rhythm: Normal rate and regular rhythm  Heart sounds: Normal heart sounds  No murmur heard  No friction rub  No gallop  Pulmonary:      Effort: Pulmonary effort is normal  No respiratory distress  Breath sounds: Normal breath sounds  No wheezing or rales  Musculoskeletal:         General: Normal range of motion  Cervical back: Normal range of motion and neck supple  Lymphadenopathy:      Cervical: Cervical adenopathy present  Skin:     General: Skin is warm and dry  Neurological:      Mental Status: She is alert and oriented to person, place, and time  Psychiatric:         Behavior: Behavior normal          Thought Content:  Thought content normal          Judgment: Judgment normal

## 2023-01-05 ENCOUNTER — OFFICE VISIT (OUTPATIENT)
Dept: OBGYN CLINIC | Facility: CLINIC | Age: 68
End: 2023-01-05

## 2023-01-05 VITALS
SYSTOLIC BLOOD PRESSURE: 142 MMHG | DIASTOLIC BLOOD PRESSURE: 84 MMHG | HEIGHT: 63 IN | HEART RATE: 87 BPM | WEIGHT: 181 LBS | BODY MASS INDEX: 32.07 KG/M2

## 2023-01-05 DIAGNOSIS — M70.61 TROCHANTERIC BURSITIS OF RIGHT HIP: Primary | ICD-10-CM

## 2023-01-05 DIAGNOSIS — M70.62 TROCHANTERIC BURSITIS OF LEFT HIP: ICD-10-CM

## 2023-01-05 NOTE — PROGRESS NOTES
Assessment:   Diagnosis ICD-10-CM Associated Orders   1  Trochanteric bursitis of right hip  M70 61       2  Trochanteric bursitis of left hip  M70 62           Plan:  Reviewed today's physical exam findings and x-ray findings with patient at time of visit  The patient is s/p cortisone injections to her bilateral hips  Doing quite well and fairly asymptomatic  Denied another injection of cortisone at this time  Should her symptoms worsen or fail to improve, the patient may call the office to schedule a follow-up  Patient expresses understanding and is in agreement with this treatment plan  The patient was given the opportunity to ask questions or present concerns  Patient is asymptomatic from her trochanteric bursitis of her hips  Strength and motion intact  Continue home exercise program   Follow-up on as-needed basis  If her condition changes, she will not hesitate to let us know      To do next visit:  Return if symptoms worsen or fail to improve  The above stated was discussed in layman's terms and the patient expressed understanding  All questions were answered to the patient's satisfaction  Scribe Attestation    I,:  Wyatt Mayorga am acting as a scribe while in the presence of the attending physician :       I,:  Sabi Collier, DO personally performed the services described in this documentation    as scribed in my presence :             Subjective:   Miladis Rosales is a 69 y o  female who presents today for a follow-up evaluation of her bilateral hips due to chronic pain, known trochanteric bursitis  Patient was last seen on 11/03/2022 at which time she received an injection of cortisone to provide symptomatic relief  On today's presentation, the patient states that she is doing quite well and fairly asymptomatic following the lats injection  She denied another injection of cortisone at this time   She denies any recent bruising, numbness, paresthesias, weakness, or feelings of instability  She denies any fevers, chills, dizziness, headaches, chest pain, shortness of breath, palpitations, abdominal pain, nausea, vomiting, diarrhea, lower extremity pain/swelling/edema  Review of systems negative unless otherwise specified in HPI  Review of Systems   Constitutional: Negative for chills and fever  HENT: Negative for ear pain and sore throat  Eyes: Negative for pain and visual disturbance  Respiratory: Negative for cough and shortness of breath  Cardiovascular: Negative for chest pain and palpitations  Gastrointestinal: Negative for abdominal pain and vomiting  Genitourinary: Negative for dysuria and hematuria  Musculoskeletal: Negative for arthralgias and back pain  Skin: Negative for color change and rash  Neurological: Negative for seizures and syncope  All other systems reviewed and are negative  Past Medical History:   Diagnosis Date   • Allergic reaction     Resolved 11/24/2017    • Allergic rhinitis     Resolved 11/24/2017    • Generalized anxiety disorder     Resolved 11/24/2017    • High cholesterol    • Microscopic hematuria     Resolved 11/24/2017    • Postural dizziness with presyncope 3/11/2021   • Renal calculi     Resolved 47/07/1362    • Renal colic     Resolved 38/67/3194    • Renal cyst, acquired     Resolved 11/24/2017    • Restrictive lung disease     Resolved 11/24/2017    • Supraventricular tachycardia ([address] )     Resolved 11/24/2017        Past Surgical History:   Procedure Laterality Date   • CHOLECYSTECTOMY     • CYSTOSCOPY  05/07/2014    Diagnostic   Last assessed 5/7/2014     • TONSILLECTOMY     • TUBAL LIGATION         Family History   Problem Relation Age of Onset   • Cancer Mother    • Lung cancer Mother    • Heart attack Father    • No Known Problems Sister    • No Known Problems Sister    • No Known Problems Sister    • Heart attack Maternal Grandmother    • Diabetes Paternal Grandmother    • Colon cancer Maternal Aunt    • Esophageal cancer Maternal Aunt    • No Known Problems Maternal Aunt    • No Known Problems Paternal Aunt    • Rheum arthritis Family         Rheumatoid arthritis with rheumatoid factor        Social History     Occupational History   • Not on file   Tobacco Use   • Smoking status: Former   • Smokeless tobacco: Never   Vaping Use   • Vaping Use: Never used   Substance and Sexual Activity   • Alcohol use: Yes     Comment: occasional   • Drug use: Never   • Sexual activity: Not on file         Current Outpatient Medications:   •  butalbital-acetaminophen-caffeine (FIORICET,ESGIC) -40 mg per tablet, Take 1 tablet by mouth every 4 (four) hours as needed for headaches, Disp: 30 tablet, Rfl: 0  •  Cholecalciferol (VITAMIN D3) 2000 units capsule, Take 1 capsule by mouth daily, Disp: , Rfl:   •  denosumab (PROLIA) 60 mg/mL, Inject 1 mL (60 mg total) under the skin once for 1 dose, Disp: 1 mL, Rfl: 0  •  fluticasone (FLONASE) 50 mcg/act nasal spray, 2 sprays into each nostril daily, Disp: 16 g, Rfl: 3  •  losartan (COZAAR) 25 mg tablet, Take 1 tablet (25 mg total) by mouth daily (Patient not taking: Reported on 11/3/2022), Disp: 30 tablet, Rfl: 2  •  metoprolol succinate (Toprol XL) 25 mg 24 hr tablet, Take 1 tablet (25 mg total) by mouth daily (Patient taking differently: Take 25 mg by mouth daily PT taking 1/2 tablet daily), Disp: 30 tablet, Rfl: 5  •  pantoprazole (PROTONIX) 40 mg tablet, Take 1 tablet by mouth 2 (two) times a day , Disp: , Rfl:   •  simvastatin (ZOCOR) 20 mg tablet, TAKE 1 TABLET BY MOUTH  DAILY AT BEDTIME, Disp: 90 tablet, Rfl: 3    Allergies   Allergen Reactions   • Amlodipine Myalgia   • Codeine      Other reaction(s):  Other (See Comments)  headaches   • Fosamax [Alendronate] GI Intolerance          Vitals:    01/05/23 1111   BP: 142/84   Pulse: 87       Objective:                    Ortho Exam  bilateral Hip -  Patient presents with no anatomical deformity  Ambulates with steady gait pattern  Uses No assistive devices  No TTP  ROM: normal  Without painful passive circumduction   MMT: 5/5 throughout  Knee flexor and extensor mechanism intact  - ALBARO, - FADIR  - Log roll  2+ TP and DP pulses with brisk capillary refill to the toes  Sural, saphenous, tibial, superficial and deep peroneal motor and sensory distribution intact  Sensation to light touch     Diagnostics, reviewed and taken today if performed as documented:    None performed    Procedures, if performed today:    None performed    Portions of the record may have been created with voice recognition software  Occasional wrong word or "sound a like" substitutions may have occurred due to the inherent limitations of voice recognition software  Read the chart carefully and recognize, using context, where substitutions have occurred

## 2023-01-13 ENCOUNTER — OFFICE VISIT (OUTPATIENT)
Dept: OBGYN CLINIC | Facility: CLINIC | Age: 68
End: 2023-01-13

## 2023-01-13 ENCOUNTER — TELEPHONE (OUTPATIENT)
Dept: NEPHROLOGY | Facility: CLINIC | Age: 68
End: 2023-01-13

## 2023-01-13 VITALS
HEART RATE: 79 BPM | BODY MASS INDEX: 32.07 KG/M2 | WEIGHT: 181 LBS | DIASTOLIC BLOOD PRESSURE: 81 MMHG | HEIGHT: 63 IN | SYSTOLIC BLOOD PRESSURE: 155 MMHG

## 2023-01-13 DIAGNOSIS — M70.62 TROCHANTERIC BURSITIS OF LEFT HIP: ICD-10-CM

## 2023-01-13 DIAGNOSIS — M70.61 TROCHANTERIC BURSITIS OF RIGHT HIP: Primary | ICD-10-CM

## 2023-01-13 DIAGNOSIS — I10 ESSENTIAL HYPERTENSION: Primary | ICD-10-CM

## 2023-01-13 RX ORDER — TRIAMCINOLONE ACETONIDE 40 MG/ML
40 INJECTION, SUSPENSION INTRA-ARTICULAR; INTRAMUSCULAR
Status: COMPLETED | OUTPATIENT
Start: 2023-01-13 | End: 2023-01-13

## 2023-01-13 RX ORDER — BUPIVACAINE HYDROCHLORIDE 2.5 MG/ML
4 INJECTION, SOLUTION INFILTRATION; PERINEURAL
Status: COMPLETED | OUTPATIENT
Start: 2023-01-13 | End: 2023-01-13

## 2023-01-13 RX ADMIN — BUPIVACAINE HYDROCHLORIDE 4 ML: 2.5 INJECTION, SOLUTION INFILTRATION; PERINEURAL at 09:31

## 2023-01-13 RX ADMIN — TRIAMCINOLONE ACETONIDE 40 MG: 40 INJECTION, SUSPENSION INTRA-ARTICULAR; INTRAMUSCULAR at 09:31

## 2023-01-13 NOTE — PROGRESS NOTES
Assessment:   Diagnosis ICD-10-CM Associated Orders   1  Trochanteric bursitis of right hip  M70 61       2  Trochanteric bursitis of left hip  M70 62           Plan:  She was offered, accepted, performed an injection(s) of cortisone to her Bilateral hip (trochanteric bursa) for symptomatic relief of pain and inflammation  Patient tolerated the treatment(s) well  Ice and post injection protocol advised  Weightbearing activities as tolerated  She will be seen for follow-up in in 3 months for re-evaluation and consideration for repeat injections as necessary  Patient expresses understanding and is in agreement with this treatment plan  The patient was given the opportunity to ask questions or present concerns  the patient has bilateral hip bursitis has recurred  Under aseptic technique, both hips were injected with Kenalog and Marcaine  She tolerated procedures quite well  Return back in 3 months for evaluation  If her condition changes, she will not hesitate to let us know    To do next visit:  Return in about 3 months (around 4/13/2023) for Recheck  The above stated was discussed in layman's terms and the patient expressed understanding  All questions were answered to the patient's satisfaction  Scribe Attestation    I,:  Mark Davis am acting as a scribe while in the presence of the attending physician :       I,:  Suzi Love,  personally performed the services described in this documentation    as scribed in my presence :             Subjective:   Eleazar Serna is a 69 y o  female who presents today for a repeat evaluation of her bilateral hip due to chronic pain, known trochanteric bursitis  Patient is doing well but reports pain with prolonged weight-bearing activities especially ambulating stairs and side lying  The patient states that She is point tender along the lateral aspect of her bilateral aspect of her hips and radiates down her thigh   Patient would like an injection of cortisone to her bilateral hip  She denies any recent bruising, numbness, tingling, or feelings of instability  She also denies any fevers, chills or shortness of breath  Review of systems negative unless otherwise specified in HPI  Review of Systems   Constitutional: Negative for chills and fever  HENT: Negative for ear pain and sore throat  Eyes: Negative for pain and visual disturbance  Respiratory: Negative for cough and shortness of breath  Cardiovascular: Negative for chest pain and palpitations  Gastrointestinal: Negative for abdominal pain and vomiting  Genitourinary: Negative for dysuria and hematuria  Musculoskeletal: Negative for arthralgias and back pain  Skin: Negative for color change and rash  Neurological: Negative for seizures and syncope  All other systems reviewed and are negative  Past Medical History:   Diagnosis Date   • Allergic reaction     Resolved 11/24/2017    • Allergic rhinitis     Resolved 11/24/2017    • Generalized anxiety disorder     Resolved 11/24/2017    • High cholesterol    • Microscopic hematuria     Resolved 11/24/2017    • Postural dizziness with presyncope 3/11/2021   • Renal calculi     Resolved 47/41/3460    • Renal colic     Resolved 93/69/4499    • Renal cyst, acquired     Resolved 11/24/2017    • Restrictive lung disease     Resolved 11/24/2017    • Supraventricular tachycardia ([address] )     Resolved 11/24/2017        Past Surgical History:   Procedure Laterality Date   • CHOLECYSTECTOMY     • CYSTOSCOPY  05/07/2014    Diagnostic   Last assessed 5/7/2014     • TONSILLECTOMY     • TUBAL LIGATION         Family History   Problem Relation Age of Onset   • Cancer Mother    • Lung cancer Mother    • Heart attack Father    • No Known Problems Sister    • No Known Problems Sister    • No Known Problems Sister    • Heart attack Maternal Grandmother    • Diabetes Paternal Grandmother    • Colon cancer Maternal Aunt    • Esophageal cancer Maternal Aunt    • No Known Problems Maternal Aunt    • No Known Problems Paternal Aunt    • Rheum arthritis Family         Rheumatoid arthritis with rheumatoid factor        Social History     Occupational History   • Not on file   Tobacco Use   • Smoking status: Former   • Smokeless tobacco: Never   Vaping Use   • Vaping Use: Never used   Substance and Sexual Activity   • Alcohol use: Yes     Comment: occasional   • Drug use: Never   • Sexual activity: Not on file         Current Outpatient Medications:   •  butalbital-acetaminophen-caffeine (FIORICET,ESGIC) -40 mg per tablet, Take 1 tablet by mouth every 4 (four) hours as needed for headaches, Disp: 30 tablet, Rfl: 0  •  Cholecalciferol (VITAMIN D3) 2000 units capsule, Take 1 capsule by mouth daily, Disp: , Rfl:   •  fluticasone (FLONASE) 50 mcg/act nasal spray, 2 sprays into each nostril daily, Disp: 16 g, Rfl: 3  •  metoprolol succinate (Toprol XL) 25 mg 24 hr tablet, Take 1 tablet (25 mg total) by mouth daily (Patient taking differently: Take 25 mg by mouth daily PT taking 1/2 tablet daily), Disp: 30 tablet, Rfl: 5  •  pantoprazole (PROTONIX) 40 mg tablet, Take 1 tablet by mouth 2 (two) times a day , Disp: , Rfl:   •  simvastatin (ZOCOR) 20 mg tablet, TAKE 1 TABLET BY MOUTH  DAILY AT BEDTIME, Disp: 90 tablet, Rfl: 3  •  denosumab (PROLIA) 60 mg/mL, Inject 1 mL (60 mg total) under the skin once for 1 dose, Disp: 1 mL, Rfl: 0  •  losartan (COZAAR) 25 mg tablet, Take 1 tablet (25 mg total) by mouth daily (Patient not taking: Reported on 11/3/2022), Disp: 30 tablet, Rfl: 2    Allergies   Allergen Reactions   • Amlodipine Myalgia   • Codeine      Other reaction(s):  Other (See Comments)  headaches   • Fosamax [Alendronate] GI Intolerance          Vitals:    01/13/23 0917   BP: 155/81   Pulse: 79       Objective:                    Ortho Exam  bilateral Hip -  Patient presents with no anatomical deformity  Ambulates with antalgic gait pattern  Uses No assistive devices  TTP over greater trochanter / trochanteric bursa  ROM: normal  Smooth passive circumduction   MMT: 4/5 throughout  Knee flexor and extensor mechanism intact  2+ TP and DP pulses with brisk capillary refill to the toes  Sural, saphenous, tibial, superficial and deep peroneal motor and sensory distribution intact  Sensation to light touch     Diagnostics, reviewed and taken today if performed as documented:    None performed    Procedures, if performed today:    Large joint arthrocentesis: bilateral greater trochanteric bursa  Universal Protocol:  Consent: Verbal consent obtained  Risks and benefits: risks, benefits and alternatives were discussed  Consent given by: patient  Time out: Immediately prior to procedure a "time out" was called to verify the correct patient, procedure, equipment, support staff and site/side marked as required  Timeout called at: 1/13/2023 9:30 AM   Patient understanding: patient states understanding of the procedure being performed  Site marked: the operative site was marked  Patient identity confirmed: verbally with patient    Supporting Documentation  Indications: pain and diagnostic evaluation   Procedure Details  Location: hip - bilateral greater trochanteric bursa  Needle size: 22 G  Ultrasound guidance: no  Approach: anterolateral    Medications (Right): 4 mL bupivacaine 0 25 %; 40 mg triamcinolone acetonide 40 mg/mLMedications (Left): 4 mL bupivacaine 0 25 %; 40 mg triamcinolone acetonide 40 mg/mL   Patient tolerance: patient tolerated the procedure well with no immediate complications  Dressing:  Sterile dressing applied      Portions of the record may have been created with voice recognition software  Occasional wrong word or "sound a like" substitutions may have occurred due to the inherent limitations of voice recognition software  Read the chart carefully and recognize, using context, where substitutions have occurred

## 2023-01-16 ENCOUNTER — APPOINTMENT (OUTPATIENT)
Dept: LAB | Facility: CLINIC | Age: 68
End: 2023-01-16

## 2023-01-16 DIAGNOSIS — I10 ESSENTIAL HYPERTENSION: ICD-10-CM

## 2023-01-16 LAB
ALBUMIN SERPL BCP-MCNC: 3.9 G/DL (ref 3.5–5)
ALP SERPL-CCNC: 55 U/L (ref 46–116)
ALT SERPL W P-5'-P-CCNC: 46 U/L (ref 12–78)
ANION GAP SERPL CALCULATED.3IONS-SCNC: 4 MMOL/L (ref 4–13)
AST SERPL W P-5'-P-CCNC: 16 U/L (ref 5–45)
BILIRUB SERPL-MCNC: 0.79 MG/DL (ref 0.2–1)
BUN SERPL-MCNC: 23 MG/DL (ref 5–25)
CALCIUM SERPL-MCNC: 10.3 MG/DL (ref 8.3–10.1)
CHLORIDE SERPL-SCNC: 107 MMOL/L (ref 96–108)
CO2 SERPL-SCNC: 29 MMOL/L (ref 21–32)
CREAT SERPL-MCNC: 0.96 MG/DL (ref 0.6–1.3)
GFR SERPL CREATININE-BSD FRML MDRD: 60 ML/MIN/1.73SQ M
GLUCOSE P FAST SERPL-MCNC: 123 MG/DL (ref 65–99)
POTASSIUM SERPL-SCNC: 4.2 MMOL/L (ref 3.5–5.3)
PROT SERPL-MCNC: 7.7 G/DL (ref 6.4–8.4)
SODIUM SERPL-SCNC: 140 MMOL/L (ref 135–147)

## 2023-01-19 ENCOUNTER — TELEPHONE (OUTPATIENT)
Dept: PAIN MEDICINE | Facility: CLINIC | Age: 68
End: 2023-01-19

## 2023-01-19 ENCOUNTER — TELEPHONE (OUTPATIENT)
Dept: OBGYN CLINIC | Facility: HOSPITAL | Age: 68
End: 2023-01-19

## 2023-01-19 NOTE — TELEPHONE ENCOUNTER
Called and spoke w/pt and what she really wanted was another shot as this one did not work  Informed pt that medication is still in her system and therefore cannot repeat injection  Sometimes they are effective and sometimes less effective  Pain management may be able to offer different alternatives to address her pain  She will think about pain management referral and call us back if she decides to go that route

## 2023-01-19 NOTE — TELEPHONE ENCOUNTER
Called and notified pt with Dr Galvez Favors  Given contact number for [facility] for pt to call

## 2023-01-19 NOTE — TELEPHONE ENCOUNTER
Caller: Morris Medina    Doctor: Chetan Briceno    Reason for call: patient called in stating that she had injections last week and the left hip is not feeling any better      Call back#: [phone]

## 2023-01-20 ENCOUNTER — OFFICE VISIT (OUTPATIENT)
Dept: NEPHROLOGY | Facility: CLINIC | Age: 68
End: 2023-01-20

## 2023-01-20 VITALS
OXYGEN SATURATION: 98 % | HEIGHT: 63 IN | SYSTOLIC BLOOD PRESSURE: 144 MMHG | HEART RATE: 64 BPM | WEIGHT: 176 LBS | DIASTOLIC BLOOD PRESSURE: 68 MMHG | BODY MASS INDEX: 31.18 KG/M2

## 2023-01-20 DIAGNOSIS — M54.2 NECK PAIN: ICD-10-CM

## 2023-01-20 DIAGNOSIS — I10 ESSENTIAL HYPERTENSION: Primary | ICD-10-CM

## 2023-01-20 DIAGNOSIS — I70.1 RAS (RENAL ARTERY STENOSIS) (HCC): ICD-10-CM

## 2023-01-20 DIAGNOSIS — R73.01 IMPAIRED FASTING BLOOD SUGAR: ICD-10-CM

## 2023-01-20 RX ORDER — LOSARTAN POTASSIUM 25 MG/1
12.5 TABLET ORAL DAILY
Qty: 30 TABLET | Refills: 2
Start: 2023-01-20 | End: 2023-01-23 | Stop reason: SDUPTHER

## 2023-01-20 NOTE — ASSESSMENT & PLAN NOTE
Patient unfortunately was not able to tolerate 25 mg of losartan  Home blood pressures have noted systolic readings essentially 150-170 mmHg  Blood pressure here in the office only slightly above goal   We will reintroduce losartan at 12 5 mg, half tablet, once daily  I hope is that after the patient is able to stay on this medication for 4-8 weeks, we can then increase to 25 mg daily, and see if she is able to tolerate a slow steady increase in these medications  With respect to the patient's side effect of the back of her neck cramping, I am not sure if she has additional spots of arterial stenosis in the vertebral arteries or other parts of her vascular system  From musculoskeletal standpoint, I asked her to try to stretch the area and have soft tissue relaxation applied to that area as well

## 2023-01-20 NOTE — ASSESSMENT & PLAN NOTE
Patient's blood flow first time was greater than 120 mg/deciliter  She will work on improving her diet as best as possible which has been higher in carbohydrates recently

## 2023-01-20 NOTE — ASSESSMENT & PLAN NOTE
Patient to continue to experience occasional episodes of neck pain, but severe enough and worrisome enough that the patient needed to stop losartan 25 mg after the most recent office visit  As noted above, patient was asked to perform stretching exercises for her neck, and also have soft tissue massage applied to the neck area in order to prep her for this medication  However, there may also be a vascular issue that may correlate with these medications being initiated and then the patient subsequently having neck pain

## 2023-01-20 NOTE — ASSESSMENT & PLAN NOTE
Imaging reviewed with the patient  Discussed why renal artery stenosis can lead to elevated blood pressures from the perspective of the kidney on the other side of the narrowing  Continue to manage blood pressures as best as possible, avoid tobacco use

## 2023-01-20 NOTE — PROGRESS NOTES
[facility] [place], [place]    Name: Rubin Kohli  YOB: 1955      Assessment/Plan:    Essential hypertension  Patient unfortunately was not able to tolerate 25 mg of losartan  Home blood pressures have noted systolic readings essentially 150-170 mmHg  Blood pressure here in the office only slightly above goal   We will reintroduce losartan at 12 5 mg, half tablet, once daily  I hope is that after the patient is able to stay on this medication for 4-8 weeks, we can then increase to 25 mg daily, and see if she is able to tolerate a slow steady increase in these medications  With respect to the patient's side effect of the back of her neck cramping, I am not sure if she has additional spots of arterial stenosis in the vertebral arteries or other parts of her vascular system  From musculoskeletal standpoint, I asked her to try to stretch the area and have soft tissue relaxation applied to that area as well  EL (renal artery stenosis) ([facility] [address] )  Imaging reviewed with the patient  Discussed why renal artery stenosis can lead to elevated blood pressures from the perspective of the kidney on the other side of the narrowing  Continue to manage blood pressures as best as possible, avoid tobacco use  Neck pain  Patient to continue to experience occasional episodes of neck pain, but severe enough and worrisome enough that the patient needed to stop losartan 25 mg after the most recent office visit  As noted above, patient was asked to perform stretching exercises for her neck, and also have soft tissue massage applied to the neck area in order to prep her for this medication  However, there may also be a vascular issue that may correlate with these medications being initiated and then the patient subsequently having neck pain  Impaired fasting blood sugar  Patient's blood flow first time was greater than 120 mg/deciliter    She will work on improving her diet as best as possible which has been higher in carbohydrates recently  Problem List Items Addressed This Visit        Endocrine    Impaired fasting blood sugar     Patient's blood flow first time was greater than 120 mg/deciliter  She will work on improving her diet as best as possible which has been higher in carbohydrates recently  Cardiovascular and Mediastinum    Essential hypertension - Primary     Patient unfortunately was not able to tolerate 25 mg of losartan  Home blood pressures have noted systolic readings essentially 150-170 mmHg  Blood pressure here in the office only slightly above goal   We will reintroduce losartan at 12 5 mg, half tablet, once daily  I hope is that after the patient is able to stay on this medication for 4-8 weeks, we can then increase to 25 mg daily, and see if she is able to tolerate a slow steady increase in these medications  With respect to the patient's side effect of the back of her neck cramping, I am not sure if she has additional spots of arterial stenosis in the vertebral arteries or other parts of her vascular system  From musculoskeletal standpoint, I asked her to try to stretch the area and have soft tissue relaxation applied to that area as well  Relevant Medications    losartan (COZAAR) 25 mg tablet    EL (renal artery stenosis) ([address] )     Imaging reviewed with the patient  Discussed why renal artery stenosis can lead to elevated blood pressures from the perspective of the kidney on the other side of the narrowing  Continue to manage blood pressures as best as possible, avoid tobacco use  Other    Neck pain     Patient to continue to experience occasional episodes of neck pain, but severe enough and worrisome enough that the patient needed to stop losartan 25 mg after the most recent office visit    As noted above, patient was asked to perform stretching exercises for her neck, and also have soft tissue massage applied to the neck area in order to prep her for this medication  However, there may also be a vascular issue that may correlate with these medications being initiated and then the patient subsequently having neck pain  Continue to apply small but measured changes to the patient's medications to optimize blood pressure  Please refer above regarding specific side effects and management goals  We will see the patient back for regular appointment in 6 months, in the meantime we will continue to make adjustments to her medications over the phone and consider to bring into the office if needed in the meantime  Subjective:      Patient ID: Nigel Gamboa is a 69 y o  female  Hypertension  This is a chronic problem  The current episode started more than 1 year ago  The problem is unchanged  The problem is controlled  Pertinent negatives include no chest pain, orthopnea or peripheral edema  There are no associated agents to hypertension  Risk factors for coronary artery disease include post-menopausal state, obesity and sedentary lifestyle  Past treatments include lifestyle changes, beta blockers and angiotensin blockers  There are no compliance problems  The following portions of the patient's history were reviewed and updated as appropriate: allergies, current medications, past family history, past medical history, past social history, past surgical history and problem list     Review of Systems   Cardiovascular: Negative for chest pain and orthopnea  All other systems reviewed and are negative          Social History     Socioeconomic History   • Marital status: /Civil Union     Spouse name: None   • Number of children: None   • Years of education: None   • Highest education level: None   Occupational History   • None   Tobacco Use   • Smoking status: Former   • Smokeless tobacco: Never   Vaping Use   • Vaping Use: Never used   Substance and Sexual Activity   • Alcohol use: Yes     Comment: occasional   • Drug use: Never   • Sexual activity: None   Other Topics Concern   • None   Social History Narrative   • None     Social Determinants of Health     Financial Resource Strain: Not on file   Food Insecurity: Not on file   Transportation Needs: Not on file   Physical Activity: Not on file   Stress: Not on file   Social Connections: Not on file   Intimate Partner Violence: Not on file   Housing Stability: Not on file     Past Medical History:   Diagnosis Date   • Allergic reaction     Resolved 11/24/2017    • Allergic rhinitis     Resolved 11/24/2017    • Generalized anxiety disorder     Resolved 11/24/2017    • High cholesterol    • Microscopic hematuria     Resolved 11/24/2017    • Postural dizziness with presyncope 3/11/2021   • Renal calculi     Resolved 68/99/7787    • Renal colic     Resolved 05/79/7892    • Renal cyst, acquired     Resolved 11/24/2017    • Restrictive lung disease     Resolved 11/24/2017    • Supraventricular tachycardia ([facility] [address] )     Resolved 11/24/2017      Past Surgical History:   Procedure Laterality Date   • CHOLECYSTECTOMY     • CYSTOSCOPY  05/07/2014    Diagnostic   Last assessed 5/7/2014     • TONSILLECTOMY     • TUBAL LIGATION         Current Outpatient Medications:   •  butalbital-acetaminophen-caffeine (FIORICET,ESGIC) -40 mg per tablet, Take 1 tablet by mouth every 4 (four) hours as needed for headaches, Disp: 30 tablet, Rfl: 0  •  fluticasone (FLONASE) 50 mcg/act nasal spray, 2 sprays into each nostril daily, Disp: 16 g, Rfl: 3  •  losartan (COZAAR) 25 mg tablet, Take 0 5 tablets (12 5 mg total) by mouth daily, Disp: 30 tablet, Rfl: 2  •  metoprolol succinate (Toprol XL) 25 mg 24 hr tablet, Take 1 tablet (25 mg total) by mouth daily, Disp: 30 tablet, Rfl: 5  •  pantoprazole (PROTONIX) 40 mg tablet, Take 1 tablet by mouth 2 (two) times a day , Disp: , Rfl:   •  simvastatin (ZOCOR) 20 mg tablet, TAKE 1 TABLET BY MOUTH  DAILY AT BEDTIME, Disp: 90 tablet, Rfl: 3  •  denosumab (PROLIA) 60 mg/mL, Inject 1 mL (60 mg total) under the skin once for 1 dose, Disp: 1 mL, Rfl: 0    Lab Results   Component Value Date     05/28/2015    SODIUM 140 01/16/2023    K 4 2 01/16/2023     01/16/2023    CO2 29 01/16/2023    ANIONGAP 5 05/28/2015    AGAP 4 01/16/2023    BUN 23 01/16/2023    CREATININE 0 96 01/16/2023    GLUC 130 01/26/2021    GLUF 123 (H) 01/16/2023    CALCIUM 10 3 (H) 01/16/2023    AST 16 01/16/2023    ALT 46 01/16/2023    ALKPHOS 55 01/16/2023    PROT 7 7 05/28/2015    TP 7 7 01/16/2023    BILITOT 1 32 (H) 05/28/2015    TBILI 0 79 01/16/2023    EGFR 60 01/16/2023     Lab Results   Component Value Date    WBC 6 50 09/21/2022    HGB 13 8 09/21/2022    HCT 44 5 09/21/2022    MCV 91 09/21/2022     09/21/2022     Lab Results   Component Value Date    CHOLESTEROL 186 06/23/2022    CHOLESTEROL 177 05/09/2018    CHOLESTEROL 213 (H) 09/08/2016     Lab Results   Component Value Date    HDL 46 (L) 06/23/2022    HDL 54 05/09/2018    HDL 53 09/08/2016     Lab Results   Component Value Date    LDLCALC 118 (H) 06/23/2022    LDLCALC 94 05/09/2018    LDLCALC 125 (H) 09/08/2016     Lab Results   Component Value Date    TRIG 108 06/23/2022    TRIG 144 05/09/2018    TRIG 177 (H) 09/08/2016     No results found for: [place], [place]  Lab Results   Component Value Date    [id number] 1 580 06/23/2022     Lab Results   Component Value Date    CALCIUM 10 3 (H) 01/16/2023     No results found for: SPEP, UPEP  No results found for: ELAINEALBUR, [id number]        Objective:      /68 (BP Location: Left arm, Patient Position: Sitting, Cuff Size: Standard)   Pulse 64   Ht 5' 3" (1 6 m)   Wt 79 8 kg (176 lb)   SpO2 98%   BMI 31 18 kg/m²          Physical Exam  Vitals reviewed  Constitutional:       General: She is not in acute distress  Appearance: She is well-developed  HENT:      Head: Normocephalic and atraumatic     Eyes:      Conjunctiva/sclera: Conjunctivae normal  Cardiovascular:      Rate and Rhythm: Normal rate and regular rhythm  Pulmonary:      Effort: Pulmonary effort is normal       Breath sounds: Normal breath sounds  Abdominal:      Palpations: Abdomen is soft  Musculoskeletal:      Cervical back: Neck supple  Skin:     General: Skin is warm  Findings: No rash  Neurological:      Mental Status: She is alert and oriented to person, place, and time  Cranial Nerves: No cranial nerve deficit     Psychiatric:         Behavior: Behavior normal

## 2023-01-23 DIAGNOSIS — I10 ESSENTIAL HYPERTENSION: ICD-10-CM

## 2023-01-23 RX ORDER — LOSARTAN POTASSIUM 25 MG/1
12.5 TABLET ORAL DAILY
Qty: 30 TABLET | Refills: 2 | Status: SHIPPED | OUTPATIENT
Start: 2023-01-23 | End: 2023-02-02

## 2023-02-02 ENCOUNTER — TELEPHONE (OUTPATIENT)
Dept: NEPHROLOGY | Facility: CLINIC | Age: 68
End: 2023-02-02

## 2023-02-02 DIAGNOSIS — I10 ESSENTIAL HYPERTENSION: Primary | ICD-10-CM

## 2023-02-02 RX ORDER — DOXAZOSIN MESYLATE 1 MG/1
1 TABLET ORAL
Qty: 30 TABLET | Refills: 3 | Status: SHIPPED | OUTPATIENT
Start: 2023-02-02

## 2023-02-02 NOTE — TELEPHONE ENCOUNTER
I removed losartan from her medication list and added the class of medications as an intolerance on her chart  I sent in for doxazosin 1 mg to be taken at night just prior to sleep  Hopefully she'll be able to tolerate this better  Usual starting dose is 2 mg, but we'll start a little lower and see how she does

## 2023-02-02 NOTE — TELEPHONE ENCOUNTER
Patient called stated she is on metoprolol and you put her on 1/2 pill of losartan at her last visit  Patient stated she stopped the losartan Monday do to metallic taste and joint pain  Patient stated  Metoprolol gave her the metallic also but not as bad  Patient asked if there is anything else that she can take

## 2023-02-03 NOTE — TELEPHONE ENCOUNTER
I am not involved in this patient's care, but from reading at Dr Solo Zimmerman note, it appears that the losartan and doxazosin changes were made on top of her existing antihypertensive regimen, which would indicate she should continue the metoprolol

## 2023-02-03 NOTE — TELEPHONE ENCOUNTER
Patient called back, she will continue taking metoprolol daily  She will contact office with any questions/concerns

## 2023-02-10 ENCOUNTER — TELEPHONE (OUTPATIENT)
Dept: CARDIOLOGY CLINIC | Facility: HOSPITAL | Age: 68
End: 2023-02-10

## 2023-02-17 ENCOUNTER — TELEPHONE (OUTPATIENT)
Dept: FAMILY MEDICINE CLINIC | Facility: CLINIC | Age: 68
End: 2023-02-17

## 2023-02-17 DIAGNOSIS — J01.00 ACUTE NON-RECURRENT MAXILLARY SINUSITIS: Primary | ICD-10-CM

## 2023-02-17 RX ORDER — AMOXICILLIN AND CLAVULANATE POTASSIUM 875; 125 MG/1; MG/1
1 TABLET, FILM COATED ORAL EVERY 12 HOURS SCHEDULED
Qty: 14 TABLET | Refills: 0 | Status: SHIPPED | OUTPATIENT
Start: 2023-02-17 | End: 2023-02-24

## 2023-02-17 NOTE — TELEPHONE ENCOUNTER
Rx put in for Augmentin  Push fluids  Get Mucinex over-the-counter    Call and make an appointment if symptoms continue or increase

## 2023-02-28 ENCOUNTER — OFFICE VISIT (OUTPATIENT)
Dept: FAMILY MEDICINE CLINIC | Facility: CLINIC | Age: 68
End: 2023-02-28

## 2023-02-28 VITALS
TEMPERATURE: 97.1 F | OXYGEN SATURATION: 97 % | WEIGHT: 180.8 LBS | HEART RATE: 100 BPM | BODY MASS INDEX: 32.04 KG/M2 | HEIGHT: 63 IN | SYSTOLIC BLOOD PRESSURE: 148 MMHG | DIASTOLIC BLOOD PRESSURE: 100 MMHG

## 2023-02-28 DIAGNOSIS — I10 RESISTANT HYPERTENSION: ICD-10-CM

## 2023-02-28 DIAGNOSIS — R42 DIZZINESS: ICD-10-CM

## 2023-02-28 DIAGNOSIS — M54.2 NECK PAIN: ICD-10-CM

## 2023-02-28 DIAGNOSIS — I10 ESSENTIAL HYPERTENSION: Primary | ICD-10-CM

## 2023-02-28 DIAGNOSIS — G43.009 MIGRAINE WITHOUT AURA AND WITHOUT STATUS MIGRAINOSUS, NOT INTRACTABLE: ICD-10-CM

## 2023-02-28 DIAGNOSIS — R11.0 NAUSEA: ICD-10-CM

## 2023-02-28 RX ORDER — METOPROLOL SUCCINATE 25 MG/1
12.5 TABLET, EXTENDED RELEASE ORAL DAILY
Qty: 30 TABLET | Refills: 5
Start: 2023-02-28

## 2023-02-28 RX ORDER — DOXAZOSIN MESYLATE 1 MG/1
0.5 TABLET ORAL
Qty: 30 TABLET | Refills: 3
Start: 2023-02-28
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
## 2023-03-08 ENCOUNTER — TELEPHONE (OUTPATIENT)
Dept: NEPHROLOGY | Facility: CLINIC | Age: 68
End: 2023-03-08

## 2023-03-08 NOTE — TELEPHONE ENCOUNTER
Spoke with patient, she is aware  Patient will stop the doxazosin and still take  1/2 of the metoprolol  Patient asked if she can take   the muscle relaxer that Dr Celso Carmona prescribed  Patient stated she has some left but no refills

## 2023-03-08 NOTE — TELEPHONE ENCOUNTER
I agree with the studies that were ordered by Dr Francisco Carbajal  How are her blood pressures doing at home? Last time I had seen her, I also thought she might benefit from stretching her back with regular exercise and potentially placing her on a muscle relaxant to help with the tension in the back of her neck which is known to cause the headaches that she was describing, these are tension headaches

## 2023-03-08 NOTE — TELEPHONE ENCOUNTER
Spoke with patient, she is aware  Patient stated when at PCP last week her B/P was real low and she was dizzy and light headed  Before going to her PCP B/P was 95 over something  (Patient did not remember) Patient stated B/P's are running 122-160  Patient also stated the muscle relaxer she was put on really didn't do anything for her  Patient stated she thinks these headaches are now migraines and are effecting her sinuses

## 2023-03-08 NOTE — TELEPHONE ENCOUNTER
Patient called stating she is on metoprolol and doxazosin  Patient stated she was getting headaches from them so, her PCP told her to cut them in half  Patient stated she cut them in 1/2 over a week ago and is still having headaches, tension in the back of her neck and shakiness  Patient stated her PCP ordered a CT Scan of her head and a vascular study, she is going tomorrow for both of them  Patient stated she thinks it's all from these medications  Patient asked your recommendation

## 2023-03-08 NOTE — TELEPHONE ENCOUNTER
Okay, I am much more comfortable reducing these medications with blood pressures that are controlled  Would only stop the doxazosin for now, continue the metoprolol for now, and see how she does

## 2023-03-08 NOTE — TELEPHONE ENCOUNTER
Yes, she can also take the muscle relaxant to help her, especially at night prior to sleep since the main side-effect can be sleepiness from those medications

## 2023-03-09 ENCOUNTER — HOSPITAL ENCOUNTER (OUTPATIENT)
Dept: NON INVASIVE DIAGNOSTICS | Facility: HOSPITAL | Age: 68
Discharge: HOME | End: 2023-03-09

## 2023-03-09 ENCOUNTER — HOSPITAL ENCOUNTER (OUTPATIENT)
Dept: CT IMAGING | Facility: HOSPITAL | Age: 68
End: 2023-03-09

## 2023-03-09 DIAGNOSIS — I10 ESSENTIAL HYPERTENSION: ICD-10-CM

## 2023-03-09 DIAGNOSIS — R42 DIZZINESS: ICD-10-CM

## 2023-03-09 DIAGNOSIS — M54.2 NECK PAIN: ICD-10-CM

## 2023-03-09 DIAGNOSIS — R11.0 NAUSEA: ICD-10-CM

## 2023-03-09 DIAGNOSIS — G43.009 MIGRAINE WITHOUT AURA AND WITHOUT STATUS MIGRAINOSUS, NOT INTRACTABLE: ICD-10-CM

## 2023-03-09 PROBLEM — I65.23 MILD ATHEROSCLEROSIS OF BOTH CAROTID ARTERIES: Status: ACTIVE | Noted: 2023-03-09

## 2023-03-26 ENCOUNTER — APPOINTMENT (EMERGENCY)
Dept: CT IMAGING | Facility: HOSPITAL | Age: 68
End: 2023-03-26

## 2023-03-26 ENCOUNTER — HOSPITAL ENCOUNTER (EMERGENCY)
Facility: HOSPITAL | Age: 68
Discharge: HOME | End: 2023-03-26
Attending: EMERGENCY MEDICINE

## 2023-03-26 ENCOUNTER — APPOINTMENT (EMERGENCY)
Dept: RADIOLOGY | Facility: HOSPITAL | Age: 68
End: 2023-03-26

## 2023-03-26 VITALS
HEART RATE: 70 BPM | SYSTOLIC BLOOD PRESSURE: 186 MMHG | TEMPERATURE: 97.4 F | DIASTOLIC BLOOD PRESSURE: 79 MMHG | RESPIRATION RATE: 19 BRPM | OXYGEN SATURATION: 95 %

## 2023-03-26 DIAGNOSIS — I10 CHRONIC HYPERTENSION: ICD-10-CM

## 2023-03-26 DIAGNOSIS — I25.10 CORONARY ARTERY CALCIFICATION SEEN ON CT SCAN: ICD-10-CM

## 2023-03-26 DIAGNOSIS — M54.6 ACUTE RIGHT-SIDED THORACIC BACK PAIN: Primary | ICD-10-CM

## 2023-03-26 DIAGNOSIS — R79.89 POSITIVE D DIMER: ICD-10-CM

## 2023-03-26 DIAGNOSIS — M47.814: ICD-10-CM

## 2023-03-26 DIAGNOSIS — E04.1 LEFT THYROID NODULE: ICD-10-CM

## 2023-03-26 LAB
ALBUMIN SERPL BCP-MCNC: 4.3 G/DL (ref 3.5–5)
ALP SERPL-CCNC: 43 U/L (ref 34–104)
ALT SERPL W P-5'-P-CCNC: 30 U/L (ref 7–52)
ANION GAP SERPL CALCULATED.3IONS-SCNC: 9 MMOL/L (ref 4–13)
APTT PPP: 28 SECONDS (ref 23–37)
AST SERPL W P-5'-P-CCNC: 16 U/L (ref 13–39)
BASOPHILS # BLD AUTO: 0.08 THOUSANDS/ÂΜL (ref 0–0.1)
BASOPHILS NFR BLD AUTO: 1 % (ref 0–1)
BILIRUB SERPL-MCNC: 0.82 MG/DL (ref 0.2–1)
BUN SERPL-MCNC: 14 MG/DL (ref 5–25)
CALCIUM SERPL-MCNC: 9.4 MG/DL (ref 8.4–10.2)
CARDIAC TROPONIN I PNL SERPL HS: 4 NG/L
CHLORIDE SERPL-SCNC: 105 MMOL/L (ref 96–108)
CO2 SERPL-SCNC: 26 MMOL/L (ref 21–32)
CREAT SERPL-MCNC: 0.8 MG/DL (ref 0.6–1.3)
D DIMER PPP FEU-MCNC: 1.26 UG/ML FEU
EOSINOPHIL # BLD AUTO: 0.09 THOUSAND/ÂΜL (ref 0–0.61)
EOSINOPHIL NFR BLD AUTO: 1 % (ref 0–6)
ERYTHROCYTE [DISTWIDTH] IN BLOOD BY AUTOMATED COUNT: 12.4 % (ref 11.6–15.1)
GFR SERPL CREATININE-BSD FRML MDRD: 75 ML/MIN/1.73SQ M
GLUCOSE SERPL-MCNC: 103 MG/DL (ref 65–140)
HCT VFR BLD AUTO: 41.2 % (ref 34.8–46.1)
HGB BLD-MCNC: 13.8 G/DL (ref 11.5–15.4)
IMM GRANULOCYTES # BLD AUTO: 0.04 THOUSAND/UL (ref 0–0.2)
IMM GRANULOCYTES NFR BLD AUTO: 1 % (ref 0–2)
INR PPP: 0.88 (ref 0.84–1.19)
LYMPHOCYTES # BLD AUTO: 1.65 THOUSANDS/ÂΜL (ref 0.6–4.47)
LYMPHOCYTES NFR BLD AUTO: 23 % (ref 14–44)
MCH RBC QN AUTO: 30.3 PG (ref 26.8–34.3)
MCHC RBC AUTO-ENTMCNC: 33.5 G/DL (ref 31.4–37.4)
MCV RBC AUTO: 90 FL (ref 82–98)
MONOCYTES # BLD AUTO: 0.6 THOUSAND/ÂΜL (ref 0.17–1.22)
MONOCYTES NFR BLD AUTO: 8 % (ref 4–12)
NEUTROPHILS # BLD AUTO: 4.8 THOUSANDS/ÂΜL (ref 1.85–7.62)
NEUTS SEG NFR BLD AUTO: 66 % (ref 43–75)
NRBC BLD AUTO-RTO: 0 /100 WBCS
PLATELET # BLD AUTO: 236 THOUSANDS/UL (ref 149–390)
PMV BLD AUTO: 11.6 FL (ref 8.9–12.7)
POTASSIUM SERPL-SCNC: 3.9 MMOL/L (ref 3.5–5.3)
PROT SERPL-MCNC: 7 G/DL (ref 6.4–8.4)
PROTHROMBIN TIME: 12.2 SECONDS (ref 11.6–14.5)
RBC # BLD AUTO: 4.56 MILLION/UL (ref 3.81–5.12)
SODIUM SERPL-SCNC: 140 MMOL/L (ref 135–147)
WBC # BLD AUTO: 7.26 THOUSAND/UL (ref 4.31–10.16)

## 2023-03-26 RX ORDER — KETOROLAC TROMETHAMINE 30 MG/ML
15 INJECTION, SOLUTION INTRAMUSCULAR; INTRAVENOUS ONCE
Status: COMPLETED | OUTPATIENT
Start: 2023-03-26 | End: 2023-03-26

## 2023-03-26 RX ORDER — DICLOFENAC SODIUM 75 MG/1
75 TABLET, DELAYED RELEASE ORAL 2 TIMES DAILY
Qty: 20 TABLET | Refills: 0 | Status: SHIPPED | OUTPATIENT
Start: 2023-03-26

## 2023-03-26 RX ADMIN — IOHEXOL 100 ML: 350 INJECTION, SOLUTION INTRAVENOUS at 12:51

## 2023-03-26 RX ADMIN — KETOROLAC TROMETHAMINE 15 MG: 30 INJECTION, SOLUTION INTRAMUSCULAR; INTRAVENOUS at 14:31

## 2023-03-26 NOTE — DISCHARGE INSTRUCTIONS
Alternate ice/heat, topical muscle rubs, etc   Take anti-inflammatory as directed with food  Can continue robaxin  Can supplement with OTC tylenol  Follow up with your PCP for recheck or return to ER as needed  Follow up with cardiologist as well  Continue to monitor your blood pressures

## 2023-03-26 NOTE — ED NOTES
Provider at bedside       Florecita Marcelo, [facility]  03/26/23 1021

## 2023-03-26 NOTE — ED PROVIDER NOTES
History  Chief Complaint   Patient presents with   • Back Pain     Right upper back pain that is worse with movement x1 week  Denies injury  Also complains of SOB with movement       69year old female with PMH HTN, HLD, kidney stones, osteoporosis presents ambulatory from home for evaluation of right sided back pain  Pt reports symptoms started about a week ago  She reports pain in her right back/ribs  This has been intermittent in nature, worse with movement, better at rest   She denies injury, trauma or recent falls  She states she initially thought she had maybe pulled something as it kind of feels like a sore muscle  She has been taking robaxin without significant relief  Denies chest pain  Notes she is short of breath at times on exertion  She states she had a head cold about 2 weeks ago which has been gradually improving  She states she initially productive but now has been dry in nature  She notes still having some head congestion  Denies fever, chills  Reports some nausea  Denies V/D/C, abdominal pain  Denies any urinary complaints such as dysuria, frequency, urgency, hematuria  She has history of kidney stones but this feels different  No prior evaluation since onset of symptoms  She was seen by her PCP last month and states they are going through changes of her BP meds as this has been all over the place and trying to find something that works for her  She states the pain she is having is reminiscent of when she had similar pain on her opposite side in the past and had pneumonia  Denies personal h/o DVT or PE  She does not take any current anticoagulation  No reported leg swelling or pain        History provided by:  Patient and medical records   used: No    Back Pain  Location:  Thoracic spine  Radiates to:  Does not radiate  Timing:  Intermittent  Chronicity:  New  Context: recent illness    Context: not falling, not lifting heavy objects and not recent injury Relieved by:  Being still  Worsened by: Movement  Ineffective treatments:  OTC medications and muscle relaxants  Associated symptoms: no abdominal pain, no bladder incontinence, no bowel incontinence, no chest pain, no dysuria, no fever, no headaches, no leg pain, no numbness, no paresthesias and no tingling    Risk factors: hx of osteoporosis    Risk factors: no hx of cancer and no recent surgery        Prior to Admission Medications   Prescriptions Last Dose Informant Patient Reported? Taking?    butalbital-acetaminophen-caffeine (FIORICET,ESGIC) -40 mg per tablet   No No   Sig: Take 1 tablet by mouth every 4 (four) hours as needed for headaches   denosumab (PROLIA) 60 mg/mL   No No   Sig: Inject 1 mL (60 mg total) under the skin once for 1 dose   doxazosin (CARDURA) 1 mg tablet   No No   Sig: Take 0 5 tablets (0 5 mg total) by mouth daily at bedtime   fluticasone (FLONASE) 50 mcg/act nasal spray   No No   Si sprays into each nostril daily   metoprolol succinate (Toprol XL) 25 mg 24 hr tablet   No No   Sig: Take 0 5 tablets (12 5 mg total) by mouth daily   pantoprazole (PROTONIX) 40 mg tablet   Yes No   Sig: Take 1 tablet by mouth 2 (two) times a day    simvastatin (ZOCOR) 20 mg tablet   No No   Sig: TAKE 1 TABLET BY MOUTH  DAILY AT BEDTIME      Facility-Administered Medications: None       Past Medical History:   Diagnosis Date   • Allergic reaction     Resolved 2017    • Allergic rhinitis     Resolved 2017    • Generalized anxiety disorder     Resolved 2017    • High cholesterol    • Hypertension    • Microscopic hematuria     Resolved 2017    • Postural dizziness with presyncope 2021   • Renal calculi     Resolved     • Renal colic     Resolved     • Renal cyst, acquired     Resolved 2017    • Restrictive lung disease     Resolved 2017    • Supraventricular tachycardia ([address] )     Resolved 2017        Past Surgical History: Procedure Laterality Date   • CHOLECYSTECTOMY     • CYSTOSCOPY  05/07/2014    Diagnostic  Last assessed 5/7/2014     • TONSILLECTOMY     • TUBAL LIGATION         Family History   Problem Relation Age of Onset   • Cancer Mother    • Lung cancer Mother    • Heart attack Father    • No Known Problems Sister    • No Known Problems Sister    • No Known Problems Sister    • Heart attack Maternal Grandmother    • Diabetes Paternal Grandmother    • Colon cancer Maternal Aunt    • Esophageal cancer Maternal Aunt    • No Known Problems Maternal Aunt    • No Known Problems Paternal Aunt    • Rheum arthritis Family         Rheumatoid arthritis with rheumatoid factor      I have reviewed and agree with the history as documented  E-Cigarette/Vaping   • E-Cigarette Use Never User      E-Cigarette/Vaping Substances   • Nicotine No    • THC No    • CBD No    • Flavoring No    • Other No    • Unknown No      Social History     Tobacco Use   • Smoking status: Former   • Smokeless tobacco: Never   Vaping Use   • Vaping Use: Never used   Substance Use Topics   • Alcohol use: Yes     Comment: occasional   • Drug use: Never       Review of Systems   Constitutional: Negative  Negative for chills, fatigue and fever  HENT: Positive for congestion  Negative for ear pain, rhinorrhea and sore throat  Eyes: Negative  Negative for visual disturbance  Respiratory: Positive for cough  Negative for shortness of breath and wheezing  Cardiovascular: Negative  Negative for chest pain, palpitations and leg swelling  Gastrointestinal: Positive for nausea  Negative for abdominal pain, bowel incontinence, constipation, diarrhea and vomiting  Genitourinary: Negative  Negative for bladder incontinence, dysuria, flank pain, frequency and hematuria  Musculoskeletal: Positive for back pain  Negative for neck pain  Skin: Negative  Negative for rash  Neurological: Negative    Negative for dizziness, tingling, syncope, light-headedness, numbness, headaches and paresthesias  Psychiatric/Behavioral: Negative  All other systems reviewed and are negative  Physical Exam  Physical Exam  Vitals and nursing note reviewed  Constitutional:       General: She is awake  She is not in acute distress  Appearance: She is well-developed and overweight  She is not toxic-appearing  HENT:      Head: Normocephalic and atraumatic  Right Ear: Hearing and external ear normal       Left Ear: Hearing and external ear normal       Nose: Nose normal       Mouth/Throat:      Mouth: Mucous membranes are moist       Tongue: Tongue does not deviate from midline  Pharynx: Oropharynx is clear  Uvula midline  Eyes:      General: Lids are normal  No scleral icterus  Extraocular Movements: Extraocular movements intact  Conjunctiva/sclera: Conjunctivae normal       Pupils: Pupils are equal, round, and reactive to light  Neck:      Trachea: Trachea and phonation normal  No tracheal deviation  Cardiovascular:      Rate and Rhythm: Normal rate and regular rhythm  Pulses: Normal pulses  Radial pulses are 2+ on the right side and 2+ on the left side  Dorsalis pedis pulses are 2+ on the right side and 2+ on the left side  Posterior tibial pulses are 2+ on the right side and 2+ on the left side  Heart sounds: Normal heart sounds, S1 normal and S2 normal  No murmur heard  Pulmonary:      Effort: Pulmonary effort is normal  No tachypnea or respiratory distress  Breath sounds: Normal breath sounds  No wheezing, rhonchi or rales  Comments: No overlying skin changes in area of pain/tenderness to suggest process such as zoster  Abdominal:      General: Bowel sounds are normal  There is no distension  Palpations: Abdomen is soft  Tenderness: There is no abdominal tenderness  There is no right CVA tenderness, left CVA tenderness, guarding or rebound     Musculoskeletal: Cervical back: Normal range of motion and neck supple  No bony tenderness  Thoracic back: No bony tenderness  Lumbar back: No bony tenderness  Right lower leg: No edema  Left lower leg: No edema  Skin:     General: Skin is warm and dry  Capillary Refill: Capillary refill takes less than 2 seconds  Findings: No abrasion, bruising or rash  Neurological:      General: No focal deficit present  Mental Status: She is alert and oriented to person, place, and time  GCS: GCS eye subscore is 4  GCS verbal subscore is 5  GCS motor subscore is 6  Cranial Nerves: No cranial nerve deficit  Sensory: Sensation is intact  No sensory deficit  Motor: Motor function is intact  No weakness or abnormal muscle tone        Gait: Gait normal       Comments: Pt ambulatory to exam room in zone 2   Psychiatric:         Mood and Affect: Mood normal          Speech: Speech normal          Behavior: Behavior normal          Vital Signs  ED Triage Vitals   Temperature Pulse Respirations Blood Pressure SpO2   03/26/23 1108 03/26/23 1108 03/26/23 1108 03/26/23 1130 03/26/23 1108   (!) 97 4 °F (36 3 °C) 89 16 170/82 96 %      Temp Source Heart Rate Source Patient Position - Orthostatic VS BP Location FiO2 (%)   03/26/23 1108 03/26/23 1108 -- 03/26/23 1130 --   Temporal Monitor  Right arm       Pain Score       --                  Vitals:    03/26/23 1230 03/26/23 1300 03/26/23 1330 03/26/23 1400   BP: (!) 167/103 (!) 176/81 (!) 188/80 (!) 186/79   Pulse: 75 91 68 70         Visual Acuity      ED Medications  Medications   iohexol (OMNIPAQUE) 350 MG/ML injection (SINGLE-DOSE) 100 mL (100 mL Intravenous Given 3/26/23 1251)   ketorolac (TORADOL) injection 15 mg (15 mg Intravenous Given 3/26/23 1431)       Diagnostic Studies  Results Reviewed     Procedure Component Value Units Date/Time    HS Troponin 0hr (reflex protocol) [[id number]]  (Normal) Collected: 03/26/23 1126    Lab Status: Final result Specimen: Blood from Arm, Left Updated: 03/26/23 1156     hs TnI 0hr 4 ng/L     D-Dimer [[id number]]  (Abnormal) Collected: 03/26/23 1126    Lab Status: Final result Specimen: Blood from Arm, Left Updated: 03/26/23 1153     D-Dimer, Quant 1 26 ug/ml FEU     Narrative: In the evaluation for possible pulmonary embolism, in the appropriate (Well's Score of 4 or less) patient, the age adjusted d-dimer cutoff for this patient can be calculated as:    Age x 0 01 (in ug/mL) for Age-adjusted D-dimer exclusion threshold for a patient over 50 years      Comprehensive metabolic panel [[id number]] Collected: 03/26/23 1126    Lab Status: Final result Specimen: Blood from Arm, Left Updated: 03/26/23 1151     Sodium 140 mmol/L      Potassium 3 9 mmol/L      Chloride 105 mmol/L      CO2 26 mmol/L      ANION GAP 9 mmol/L      BUN 14 mg/dL      Creatinine 0 80 mg/dL      Glucose 103 mg/dL      Calcium 9 4 mg/dL      AST 16 U/L      ALT 30 U/L      Alkaline Phosphatase 43 U/L      Total Protein 7 0 g/dL      Albumin 4 3 g/dL      Total Bilirubin 0 82 mg/dL      eGFR 75 ml/min/1 73sq m     Narrative:      [facility] guidelines for Chronic Kidney Disease (CKD):   •  Stage 1 with normal or high GFR (GFR > 90 mL/min/1 73 square meters)  •  Stage 2 Mild CKD (GFR = 60-89 mL/min/1 73 square meters)  •  Stage 3A Moderate CKD (GFR = 45-59 mL/min/1 73 square meters)  •  Stage 3B Moderate CKD (GFR = 30-44 mL/min/1 73 square meters)  •  Stage 4 Severe CKD (GFR = 15-29 mL/min/1 73 square meters)  •  Stage 5 End Stage CKD (GFR <15 mL/min/1 73 square meters)  Note: GFR calculation is accurate only with a steady state creatinine    Protime-INR [[id number]]  (Normal) Collected: 03/26/23 1126    Lab Status: Final result Specimen: Blood from Arm, Left Updated: 03/26/23 1144     Protime 12 2 seconds      INR 0 88    APTT [[id number]]  (Normal) Collected: 03/26/23 1126    Lab Status: Final result Specimen: Blood from Arm, Left Updated: 03/26/23 1144     PTT 28 seconds     CBC and differential [[id number]] Collected: 03/26/23 1126    Lab Status: Final result Specimen: Blood from Arm, Left Updated: 03/26/23 1134     WBC 7 26 Thousand/uL      RBC 4 56 Million/uL      Hemoglobin 13 8 g/dL      Hematocrit 41 2 %      MCV 90 fL      MCH 30 3 pg      MCHC 33 5 g/dL      RDW 12 4 %      MPV 11 6 fL      Platelets 425 Thousands/uL      nRBC 0 /100 WBCs      Neutrophils Relative 66 %      Immat GRANS % 1 %      Lymphocytes Relative 23 %      Monocytes Relative 8 %      Eosinophils Relative 1 %      Basophils Relative 1 %      Neutrophils Absolute 4 80 Thousands/µL      Immature Grans Absolute 0 04 Thousand/uL      Lymphocytes Absolute 1 65 Thousands/µL      Monocytes Absolute 0 60 Thousand/µL      Eosinophils Absolute 0 09 Thousand/µL      Basophils Absolute 0 08 Thousands/µL     UA w Reflex to Microscopic w Reflex to Culture [[id number]]     Lab Status: No result Specimen: Urine, Clean Catch                  CTA ED chest PE Study   Final Result by Marian Rachel MD (03/26 1321)      No pulmonary embolus  No acute pulmonary disease  No rib lesions  Workstation performed: [id number]         XR chest 1 view portable   Final Result by Marian Rachel MD (03/26 1154)      No acute cardiopulmonary disease                  Workstation performed: [id number]                    Procedures  ECG 12 Lead Documentation Only    Date/Time: 3/26/2023 11:13 AM  Performed by: James Gipson PA-C  Authorized by: James Gipson PA-C     Indications / Diagnosis:  Thoracic pain  ECG reviewed by me, the ED Provider: yes    Patient location:  ED  Previous ECG:     Previous ECG:  Compared to current    Comparison ECG info:  1/26/21    Similarity:  Changes noted    Comparison to cardiac monitor: Yes    Interpretation:     Interpretation: non-specific    Rate:     ECG rate:  87    ECG rate assessment: normal    Rhythm:     Rhythm: sinus rhythm    Ectopy:     Ectopy: PAC    QRS:     QRS axis:  Normal    QRS intervals:  Normal  Conduction:     Conduction: normal    ST segments:     ST segments:  Normal  T waves:     T waves: normal    Comments:      , QRS 72, QT/QTc 362/435; no acute ischemic changes, compared to prior, PACs now present             ED Course  ED Course as of 03/26/23 1544   Sun Mar 26, 2023   1125 Pt was offered and declined anything for pain at this time  She notes pain is okay as long as she is not moving around  1137 WBC: 7 26   1137 Hemoglobin: 13 8   1137 Platelet Count: 095   1146 POCT INR: 0 88   1146 PROTIME: 12 2   1146 PTT: 28   1150 XR chest 1 view portable  Independently viewed and interpreted by me - no acute cardiopulmonary process; pending official read  1201 Glucose, Random: 103   1201 Creatinine: 0 80   1201 BUN: 14   1201 Sodium: 140   1201 Potassium: 3 9   1201 Chloride: 105   1201 CO2: 26   1201 Anion Gap: 9   1201 Calcium: 9 4   1201 AST: 16   1201 ALT: 30   1201 Alkaline Phosphatase: 43   1201 Total Protein: 7 0   1201 Albumin: 4 3   1201 TOTAL BILIRUBIN: 0 82   1201 eGFR: 75   1201 hs TnI 0hr: 4   1201 D-Dimer, Quant(!): 1 26  Unable to age correct, will pursue CTA chest to evaluate for PE    1221 Pt updated on results  Feels about the same  Given elevated d dimer and symptoms, recommended CTA chest which she is agreeable to    1350 CTA ED chest PE Study  IMPRESSION:     No pulmonary embolus      No acute pulmonary disease      No rib lesions  1410 Pt reassessed  I suspect her presenting symptoms are musculoskeletal in nature  She declined lidocaine patch  Okay with toradol IV  She has robaxin at home  Reviewed incidental findings on CTA chest   She does indicate following with cardiology  BP still mildly elevated, pt states this fluctuates and again notes they have been adjusting her meds  No red flags on exam   Discussed continued symptomatic/supportive care    Advised to alternate ice/heat, topical muscle rubs, lidocaine patches, etc   Rx NSAID  Pt already has muscle relaxant - robaxin Rx at home  Can supplement with OTC tylenol  Incidental findings on CT noted and advised outpatient follow up with her PCP as well as cardiologist     Strict return precautions outlined  Advised outpatient follow up with PCP for recheck or return to ER for change in condition as outlined  Pt verbalized understanding and had no further questions  HEART Risk Score    Flowsheet Row Most Recent Value   Heart Score Risk Calculator    History 0 Filed at: 03/26/2023 1141   ECG 0 Filed at: 03/26/2023 1141   Age 2 Filed at: 03/26/2023 1141   Risk Factors 1 Filed at: 03/26/2023 1141   Troponin 0 Filed at: 03/26/2023 1141   HEART Score 3 Filed at: 03/26/2023 1141                            Wells' Criteria for PE    Flowsheet Row Most Recent Value   Wells' Criteria for PE    Clinical signs and symptoms of DVT 0 Filed at: 03/26/2023 1141   PE is primary diagnosis or equally likely 0 Filed at: 03/26/2023 1141   HR >100 0 Filed at: 03/26/2023 1141   Immobilization at least 3 days or Surgery in the previous 4 weeks 0 Filed at: 03/26/2023 1141   Previous, objectively diagnosed PE or DVT 0 Filed at: 03/26/2023 1141   Hemoptysis 0 Filed at: 03/26/2023 1141   Malignancy with treatment within 6 months or palliative 0 Filed at: 03/26/2023 1141   Wells' Criteria Total 0 Filed at: 03/26/2023 1141                Medical Decision Making  69year old female presenting for atraumatic right sided thoracic pain  Presumed to be musculoskeletal in etiology based on exam, however given comorbid conditions/risk factors, HTN will obtain EKG and labs to evaluate for ACS  Will obtain CXR to evaluate for acute cardiopulmonary process such as pneumothorax, pleural effusion, pneumonia, rib fracture  Will perform d dimer to evaluate for need for CTA to rule out PE    Rae Nguyễn' low risk, she is not tachycardia, tachypneic or hypoxic and PE felt less likely although she is having some reports of shortness of breath and pleuritic component of her pain  Will obtain UA to evaluate for infectious process  Please refer to ER course for work up  Acute right-sided thoracic back pain: acute illness or injury     Details: As noted above  Likely musculoskeletal in nature  reviewed symptomatic management  Chronic hypertension: chronic illness or injury     Details: Is currently undergoing med adjustments  Advised to continue to monitor and f/u with PCP as well as cardiologist   Continue home medications  Coronary artery calcification seen on CT scan: chronic illness or injury     Details: We reviewed this as an incidental finding on CT scan  She is not having any chest pain  EKG and troponin not c/w ACS  She does follow up [facility] cardiology and notes family history of CAD  We discussed risk modification  DJD (degenerative joint disease), thoracic: chronic illness or injury  Left thyroid nodule: acute illness or injury     Details: Per radiology, no follow up needed  Advised follow up with PCP  Positive D dimer: acute illness or injury     Details: CTA was pursued which was negative for PE  Amount and/or Complexity of Data Reviewed  External Data Reviewed: labs, radiology, ECG and notes  Labs: ordered  Decision-making details documented in ED Course  Radiology: ordered and independent interpretation performed  Decision-making details documented in ED Course  ECG/medicine tests: ordered and independent interpretation performed  Decision-making details documented in ED Course  Risk  OTC drugs  Prescription drug management  Disposition  Final diagnoses:   Acute right-sided thoracic back pain   Chronic hypertension   Positive D dimer   Coronary artery calcification seen on CT scan - Mild cardiomegaly  Moderate coronary artery calcification indicating atherosclerotic heart disease     Left thyroid nodule - 9 mm left thyroid nodule  DJD (degenerative joint disease), thoracic     Time reflects when diagnosis was documented in both MDM as applicable and the Disposition within this note     Time User Action Codes Description Comment    3/26/2023  2:19 PM Noreen Cyrus Add [M54 6] Acute right-sided thoracic back pain     3/26/2023  2:19 PM Noreen Cyrus Add [I10] Chronic hypertension     3/26/2023  2:20 PM Noreen Cyrus Add [R79 89] Positive D dimer     3/26/2023  2:20 PM Noreen Cyrus Add [I25 10] Coronary artery calcification seen on CT scan     3/26/2023  2:20 PM Noreen Cyrus Modify [I25 10] Coronary artery calcification seen on CT scan Mild cardiomegaly  Moderate coronary artery calcification indicating atherosclerotic heart disease  3/26/2023  2:21 PM Noreen Cyrus Add [E04 1] Left thyroid nodule     3/26/2023  2:21 PM Noreen Cyrus Modify [E04 1] Left thyroid nodule 9 mm left thyroid nodule  3/26/2023  2:21 PM Noreen Cyrus Add [O47 342] DJD (degenerative joint disease), thoracic       ED Disposition     ED Disposition   Discharge    Condition   Stable    Date/Time   Sun Mar 26, 2023  2:19 PM    Comment   Diane Zepeda discharge to home/self care                 Follow-up Information     Follow up With Specialties Details Why Contact Info Additional Information    Edgar García DO Family Medicine Schedule an appointment as soon as possible for a visit   [address],  [address] [phone]       Follow up with your cardiologist         [facility] Department Emergency Medicine  As needed [address] [id number]  [facility] Emergency Department, [address], [address]          Discharge Medication List as of 3/26/2023  2:23 PM      START taking these medications    Details   diclofenac (VOLTAREN) 75 mg EC tablet Take 1 tablet (75 mg total) by mouth 2 (two) times a day, Starting Sun 3/26/2023, Normal         CONTINUE these medications which have NOT CHANGED    Details   butalbital-acetaminophen-caffeine (FIORICET,ESGIC) -40 mg per tablet Take 1 tablet by mouth every 4 (four) hours as needed for headaches, Starting Wed 6/22/2022, Normal      denosumab (PROLIA) 60 mg/mL Inject 1 mL (60 mg total) under the skin once for 1 dose, Starting Tue 8/23/2022, Normal      doxazosin (CARDURA) 1 mg tablet Take 0 5 tablets (0 5 mg total) by mouth daily at bedtime, Starting Tue 2/28/2023, No Print      fluticasone (FLONASE) 50 mcg/act nasal spray 2 sprays into each nostril daily, Starting Mon 7/25/2022, Normal      metoprolol succinate (Toprol XL) 25 mg 24 hr tablet Take 0 5 tablets (12 5 mg total) by mouth daily, Starting Tue 2/28/2023, No Print      pantoprazole (PROTONIX) 40 mg tablet Take 1 tablet by mouth 2 (two) times a day , Historical Med      simvastatin (ZOCOR) 20 mg tablet TAKE 1 TABLET BY MOUTH  DAILY AT BEDTIME, Normal             No discharge procedures on file      PDMP Review       Value Time User    PDMP Reviewed  Yes 6/22/2022  1:59 PM Olga Sibley DO          ED Provider  Electronically Signed by           Giovani Davies PA-C  03/26/23 7226

## 2023-03-27 LAB
ATRIAL RATE: 87 BPM
P AXIS: 52 DEGREES
PR INTERVAL: 134 MS
QRS AXIS: 61 DEGREES
QRSD INTERVAL: 72 MS
QT INTERVAL: 362 MS
QTC INTERVAL: 435 MS
T WAVE AXIS: 57 DEGREES
VENTRICULAR RATE: 87 BPM

## 2023-04-24 DIAGNOSIS — I10 ESSENTIAL HYPERTENSION: ICD-10-CM

## 2023-04-24 DIAGNOSIS — I10 RESISTANT HYPERTENSION: ICD-10-CM

## 2023-04-24 RX ORDER — METOPROLOL SUCCINATE 25 MG/1
12.5 TABLET, EXTENDED RELEASE ORAL 2 TIMES DAILY
Qty: 60 TABLET | Refills: 1 | Status: SHIPPED | OUTPATIENT
Start: 2023-04-24 | End: 2023-04-26

## 2023-04-25 ENCOUNTER — OFFICE VISIT (OUTPATIENT)
Dept: PHYSICAL THERAPY | Facility: HOME HEALTHCARE | Age: 68
End: 2023-04-25

## 2023-04-25 DIAGNOSIS — M25.552 BILATERAL HIP PAIN: Primary | ICD-10-CM

## 2023-04-25 DIAGNOSIS — M25.551 BILATERAL HIP PAIN: Primary | ICD-10-CM

## 2023-04-25 NOTE — PROGRESS NOTES
"Daily Note     Today's date: 2023  Patient name: Sergio Troncoso  : 1955  MRN: [id number]  Referring provider: Briana Birch PA-C  Dx:   Encounter Diagnosis     ICD-10-CM    1  Bilateral hip pain  M25 551     M25 552           Start Time:   Stop Time: 1130  Total time in clinic (min): 45 minutes    Subjective: Pt reports that her hips have been feeling good the past few days and that she has been doing a lot of yard work without pain  Objective: See treatment diary below      Assessment: The pt demonstrated good tolerance to treatment session and was able to progress with strengthening exercises without any symptoms  The pt did require some verbal and tactile cues to avoid trunk rotation compensations for clamshells  The pt would benefit from continued PT  Plan: Continue per plan of care        Precautions: None    Re-eval Date: 2023      Manuals            Warren hip PROM  JA           Warren calf, hs, glute stretch  JA           Hip LAD  JA           Hip flexor stretch             Neuro Re-Ed                                                                 Ther Ex             Nustep  L3 8' SpO2 96%, HR 86           SKTC HEP 5\"x10 warren           Bridges HEP 2x10           Clamshells HEP 2x10 warren           SLR HEP 2x10 warren           S/L hip abd  NV           Prone hip ext             Standing hip flex/ext/abd             Squat             Leg press  70# x30           LF hip abd  45# x30           LF hip add                          Ther Activity                                       Gait Training                                       Modalities                                            "

## 2023-04-26 ENCOUNTER — OFFICE VISIT (OUTPATIENT)
Dept: FAMILY MEDICINE CLINIC | Facility: CLINIC | Age: 68
End: 2023-04-26

## 2023-04-26 VITALS
HEART RATE: 70 BPM | DIASTOLIC BLOOD PRESSURE: 72 MMHG | HEIGHT: 63 IN | BODY MASS INDEX: 31.4 KG/M2 | SYSTOLIC BLOOD PRESSURE: 130 MMHG | WEIGHT: 177.2 LBS | OXYGEN SATURATION: 98 % | TEMPERATURE: 97.6 F

## 2023-04-26 DIAGNOSIS — I10 RESISTANT HYPERTENSION: ICD-10-CM

## 2023-04-26 DIAGNOSIS — I10 ESSENTIAL HYPERTENSION: Primary | ICD-10-CM

## 2023-04-26 DIAGNOSIS — K21.9 GASTROESOPHAGEAL REFLUX DISEASE WITHOUT ESOPHAGITIS: Primary | ICD-10-CM

## 2023-04-26 DIAGNOSIS — E78.2 MIXED HYPERLIPIDEMIA: ICD-10-CM

## 2023-04-26 RX ORDER — METOPROLOL SUCCINATE 25 MG/1
12.5 TABLET, EXTENDED RELEASE ORAL 2 TIMES DAILY
Qty: 90 TABLET | Refills: 3 | Status: SHIPPED | OUTPATIENT
Start: 2023-04-26 | End: 2023-04-28

## 2023-04-26 RX ORDER — PANTOPRAZOLE SODIUM 40 MG/1
40 TABLET, DELAYED RELEASE ORAL 2 TIMES DAILY
Qty: 180 TABLET | Refills: 1 | Status: SHIPPED | OUTPATIENT
Start: 2023-04-26

## 2023-04-26 NOTE — PATIENT INSTRUCTIONS
Heart Healthy Diet   AMBULATORY CARE:   A heart healthy diet  is an eating plan low in unhealthy fats and sodium (salt)  The plan is high in healthy fats and fiber  A heart healthy diet helps improve your cholesterol levels and lowers your risk for heart disease and stroke  A dietitian will teach you how to read and understand food labels  Heart healthy diet guidelines to follow:   • Choose foods that contain healthy fats:      ? Unsaturated fats  include monounsaturated and polyunsaturated fats  Unsaturated fat is found in foods such as soybean, canola, olive, corn, and safflower oils  It is also found in soft tub margarine that is made with liquid vegetable oil  ? Omega-3 fat  is found in certain fish, such as salmon, tuna, and trout, and in walnuts and flaxseed  Eat fish high in omega-3 fats at least 2 times a week  • Limit or do not have unhealthy fats:      ? Cholesterol  is found in animal foods, such as eggs and lobster, and in dairy products made from whole milk  Limit cholesterol to less than 200 mg each day  ? Saturated fat  is found in meats, such as seay and hamburger  It is also found in chicken or turkey skin, whole milk, and butter  Limit saturated fat to less than 7% of your total daily calories  ? Trans fat  is found in packaged foods, such as potato chips and cookies  It is also in hard margarine, some fried foods, and shortening  Do not eat foods that contain trans fats  • Get 20 to 30 grams of fiber each day  Fruits, vegetables, whole-grain foods, and legumes (cooked beans) are good sources of fiber  • Limit sodium as directed  You may be told to limit sodium, such as to 2,000 mg or less each day  Choose low-sodium or no-salt-added foods  Add little or no salt to food you prepare  Use herbs and spices in place of salt         Include the following in your heart healthy plan:  Ask your dietitian or healthcare provider how many servings to have each day from the following food groups:  • Grains:      ? Whole-wheat breads, cereals, and pastas, and brown rice    ? Low-fat, low-sodium crackers and chips    • Vegetables:      ? Broccoli, green beans, green peas, and spinach    ? Collards, kale, and lima beans    ? Carrots, sweet potatoes, tomatoes, and peppers    ? Canned vegetables with no salt added    • Fruits:      ? Bananas, peaches, pears, and pineapple    ? Grapes, raisins, and dates    ? Oranges, tangerines, grapefruit, orange juice, and grapefruit juice    ? Apricots, mangoes, melons, and papaya    ? Raspberries and strawberries    ? Canned fruit with no added sugar    • Low-fat dairy:      ? Nonfat (skim) milk, 1% milk, and low-fat almond, cashew, or soy milks fortified with calcium    ? Low-fat cheese, regular or frozen yogurt, and cottage cheese    • Meats and proteins:      ? Lean cuts of beef and pork (loin, leg, round), skinless chicken and turkey    ? Legumes, soy products, egg whites, or nuts    Limit or do not include the following in your heart healthy plan:   • Foods and liquids that contain unhealthy fats and oils:      ? Whole or 2% milk, cream cheese, sour cream, or cheese    ? High-fat cuts of beef (T-bone steaks, ribs), chicken or turkey with skin, and organ meats such as liver    ? Butter, stick margarine, shortening, and cooking oils such as coconut or palm oil    • Foods and liquids high in sodium:      ? Packaged foods, such as frozen dinners, cookies, macaroni and cheese, and cereals with more than 300 mg of sodium per serving    ? Vegetables with added sodium, such as instant potatoes, vegetables with added sauces, or regular canned vegetables    ? Cured or smoked meats, such as hot dogs, seay, and sausage    ? High-sodium ketchup, barbecue sauce, salad dressing, pickles, olives, soy sauce, or miso    • Foods and liquids high in sugar:      ? Candy, cake, cookies, pies, or doughnuts    ?  Soft drinks (soda), sports drinks, or sweetened tea    ? Canned or dry mixes for cakes, soups, sauces, or gravies    Other healthy heart guidelines:   • Do not smoke  Nicotine and other chemicals in cigarettes and cigars can cause lung and heart damage  Ask your healthcare provider for information if you currently smoke and need help to quit  E-cigarettes or smokeless tobacco still contain nicotine  Talk to your provider before you use these products  • Limit or do not drink alcohol as directed  Alcohol can damage your heart and raise your blood pressure  Your healthcare provider may give you specific daily and weekly limits  The general recommended limit is 1 drink a day for women 21 or older and for men 72 or older  Do not have more than 3 drinks within 24 hours or 7 within a week  The recommended limit is 2 drinks a day for men 24to 59years of age  Do not have more than 4 drinks within 24 hours or 14 within a week  A drink of alcohol is 12 ounces of beer, 5 ounces of wine, or 1½ ounces of liquor  • Maintain a healthy weight  Extra body weight makes your heart work harder  Ask your provider what a healthy weight is for you  He or she can help you create a safe weight loss plan, if needed  • Exercise regularly  Exercise can help you maintain a healthy weight and improve your blood pressure and cholesterol levels  Regular exercise can also decrease your risk for heart problems  Ask your provider about the best exercise plan for you  Do not start an exercise program without asking your provider  Follow up with your doctor or cardiologist as directed:  Write down your questions so you remember to ask them during your visits  © Copyright [employer] 2022 Information is for End User's use only and may not be sold, redistributed or otherwise used for commercial purposes  The above information is an  only  It is not intended as medical advice for individual conditions or treatments   Talk to your doctor, nurse or pharmacist before following any medical regimen to see if it is safe and effective for you

## 2023-04-26 NOTE — PROGRESS NOTES
Name: Gita Mcdaniel      : 1955      MRN: [id number]  Encounter Provider: Herrera David DO  Encounter Date: 2023   Encounter department: [facility] same medications  Continue to watch diet and continued exercise  I will see her back in 4 months, getting lab work before that visit  1  Essential hypertension  -     metoprolol succinate (Toprol XL) 25 mg 24 hr tablet; Take 0 5 tablets (12 5 mg total) by mouth 2 (two) times a day  -     CBC and differential  -     TSH, 3rd generation with Free T4 reflex    2  Mixed hyperlipidemia  -     Comprehensive metabolic panel  -     Lipid Panel with Direct LDL reflex    3  Resistant hypertension  -     metoprolol succinate (Toprol XL) 25 mg 24 hr tablet; Take 0 5 tablets (12 5 mg total) by mouth 2 (two) times a day  -     CBC and differential  -     Comprehensive metabolic panel    4  BMI 31 0-31 9,adult  -     TSH, 3rd generation with Free T4 reflex    BMI Counseling: Body mass index is 31 39 kg/m²  The BMI is above normal  Nutrition recommendations include decreasing portion sizes, encouraging healthy choices of fruits and vegetables, decreasing fast food intake, consuming healthier snacks, limiting drinks that contain sugar, moderation in carbohydrate intake, increasing intake of lean protein, reducing intake of saturated and trans fat and reducing intake of cholesterol  Exercise recommendations include exercising 3-5 times per week  No pharmacotherapy was ordered  Rationale for BMI follow-up plan is due to patient being overweight or obese  Subjective     Patient here today for follow-up  Patient denies any chest pain or shortness of breath  She has been doing well  She is now taking 12 5 mg of metoprolol twice a day and her pressure has been well controlled  Her hips are feeling better, she got them injected by orthopedics and now is walking  She has lost weight      Review of Systems Constitutional: Negative  Respiratory: Negative  Cardiovascular: Negative  Gastrointestinal: Negative  Genitourinary: Negative  Past Medical History:   Diagnosis Date   • Allergic reaction     Resolved 11/24/2017    • Allergic rhinitis     Resolved 11/24/2017    • Generalized anxiety disorder     Resolved 11/24/2017    • High cholesterol    • Hypertension    • Microscopic hematuria     Resolved 11/24/2017    • Postural dizziness with presyncope 03/11/2021   • Renal calculi     Resolved 42/87/8684    • Renal colic     Resolved 91/78/2983    • Renal cyst, acquired     Resolved 11/24/2017    • Restrictive lung disease     Resolved 11/24/2017    • Supraventricular tachycardia ([address] )     Resolved 11/24/2017      Past Surgical History:   Procedure Laterality Date   • CHOLECYSTECTOMY     • CYSTOSCOPY  05/07/2014    Diagnostic   Last assessed 5/7/2014     • TONSILLECTOMY     • TUBAL LIGATION       Family History   Problem Relation Age of Onset   • Cancer Mother    • Lung cancer Mother    • Heart attack Father    • No Known Problems Sister    • No Known Problems Sister    • No Known Problems Sister    • Heart attack Maternal Grandmother    • Diabetes Paternal Grandmother    • Colon cancer Maternal Aunt    • Esophageal cancer Maternal Aunt    • No Known Problems Maternal Aunt    • No Known Problems Paternal Aunt    • Rheum arthritis Family         Rheumatoid arthritis with rheumatoid factor      Social History     Socioeconomic History   • Marital status: /Civil Union     Spouse name: None   • Number of children: None   • Years of education: None   • Highest education level: None   Occupational History   • None   Tobacco Use   • Smoking status: Former   • Smokeless tobacco: Never   Vaping Use   • Vaping Use: Never used   Substance and Sexual Activity   • Alcohol use: Yes     Comment: occasional   • Drug use: Never   • Sexual activity: None   Other Topics Concern   • None   Social History Narrative • None     Social Determinants of Health     Financial Resource Strain: Not on file   Food Insecurity: Not on file   Transportation Needs: Not on file   Physical Activity: Not on file   Stress: Not on file   Social Connections: Not on file   Intimate Partner Violence: Not on file   Housing Stability: Not on file     Current Outpatient Medications on File Prior to Visit   Medication Sig   • butalbital-acetaminophen-caffeine (FIORICET,ESGIC) -40 mg per tablet Take 1 tablet by mouth every 4 (four) hours as needed for headaches   • denosumab (PROLIA) 60 mg/mL Inject 1 mL (60 mg total) under the skin once for 1 dose   • fluticasone (FLONASE) 50 mcg/act nasal spray 2 sprays into each nostril daily   • pantoprazole (PROTONIX) 40 mg tablet Take 1 tablet by mouth 2 (two) times a day    • simvastatin (ZOCOR) 20 mg tablet TAKE 1 TABLET BY MOUTH  DAILY AT BEDTIME   • [DISCONTINUED] metoprolol succinate (Toprol XL) 25 mg 24 hr tablet Take 0 5 tablets (12 5 mg total) by mouth 2 (two) times a day   • [DISCONTINUED] diclofenac (VOLTAREN) 75 mg EC tablet Take 1 tablet (75 mg total) by mouth 2 (two) times a day (Patient not taking: Reported on 4/18/2023)   • [DISCONTINUED] doxazosin (CARDURA) 1 mg tablet Take 0 5 tablets (0 5 mg total) by mouth daily at bedtime (Patient not taking: Reported on 4/18/2023)     Allergies   Allergen Reactions   • Amlodipine Myalgia   • Codeine      Other reaction(s):  Other (See Comments)  headaches   • Fosamax [Alendronate] GI Intolerance   • Angiotensin Receptor Blockers Other (See Comments)     Metallic taste     Immunization History   Administered Date(s) Administered   • COVID-19 MODERNA VACC 0 5 ML IM 03/18/2021, 04/15/2021   • INFLUENZA 10/26/2018, 10/05/2019, 11/01/2020, 10/26/2022   • Influenza Quadrivalent Preservative Free 3 years and older IM 10/28/2015, 10/19/2017   • Influenza Quadrivalent, 6-35 Months IM 11/12/2014   • Influenza Split High Dose Preservative Free IM 10/05/2019   • "Influenza, high dose seasonal 0 7 mL 10/26/2018, 10/10/2020, 10/21/2021, 10/26/2022   • Influenza, seasonal, injectable 12/11/2013   • Pneumococcal Polysaccharide PPV23 12/11/2013       Objective     /72   Pulse 70   Temp 97 6 °F (36 4 °C)   Ht 5' 3\" (1 6 m)   Wt 80 4 kg (177 lb 3 2 oz)   SpO2 98%   BMI 31 39 kg/m²     Physical Exam  Vitals reviewed  Constitutional:       General: She is not in acute distress  Appearance: Normal appearance  She is well-developed  She is not ill-appearing, toxic-appearing or diaphoretic  HENT:      Head: Normocephalic and atraumatic  Eyes:      Conjunctiva/sclera: Conjunctivae normal    Cardiovascular:      Rate and Rhythm: Normal rate and regular rhythm  Heart sounds: Normal heart sounds  No murmur heard  No friction rub  No gallop  Pulmonary:      Effort: Pulmonary effort is normal  No respiratory distress  Breath sounds: Normal breath sounds  No wheezing, rhonchi or rales  Musculoskeletal:      Right lower leg: No edema  Left lower leg: No edema  Neurological:      General: No focal deficit present  Mental Status: She is alert and oriented to person, place, and time  Psychiatric:         Mood and Affect: Mood normal          Behavior: Behavior normal          Thought Content: Thought content normal          Judgment: Judgment normal        Rian Chung DO  BMI Counseling: Body mass index is 31 39 kg/m²  The BMI is above normal  Nutrition recommendations include reducing portion sizes, decreasing overall calorie intake, 3-5 servings of fruits/vegetables daily, reducing fast food intake, consuming healthier snacks, decreasing soda and/or juice intake, moderation in carbohydrate intake, increasing intake of lean protein, reducing intake of saturated fat and trans fat and reducing intake of cholesterol  Exercise recommendations include exercising 3-5 times per week    "

## 2023-04-27 ENCOUNTER — OFFICE VISIT (OUTPATIENT)
Dept: PHYSICAL THERAPY | Facility: HOME HEALTHCARE | Age: 68
End: 2023-04-27

## 2023-04-27 DIAGNOSIS — M25.551 BILATERAL HIP PAIN: Primary | ICD-10-CM

## 2023-04-27 DIAGNOSIS — I10 ESSENTIAL HYPERTENSION: ICD-10-CM

## 2023-04-27 DIAGNOSIS — M81.0 AGE-RELATED OSTEOPOROSIS WITHOUT CURRENT PATHOLOGICAL FRACTURE: ICD-10-CM

## 2023-04-27 DIAGNOSIS — M25.552 BILATERAL HIP PAIN: Primary | ICD-10-CM

## 2023-04-27 DIAGNOSIS — I10 RESISTANT HYPERTENSION: ICD-10-CM

## 2023-04-27 NOTE — PROGRESS NOTES
"Daily Note     Today's date: 2023  Patient name: Julieth Jay  : 1955  MRN: [id number]  Referring provider: Wolfgang Joaquin PA-C  Dx: No diagnosis found  Start Time: 915          Subjective: I feel so good since the injections in B hips and I have no pain  I am able to go up/down stairs and walk longer distances without pain in B hips  Objective: See treatment diary below    Assessment: Pt progressing well with B LE strength, endurance and pain free A/PROM  Pt denied pain today with all TE and MT  Pt remains with B HS and adductor tightness  Patient would benefit from continued PT    Plan: Continue per plan of care        Precautions: None    Re-eval Date: 2023      Manuals           Warren hip PROM  JA 5'          Warren calf, hs, glute stretch  JA 5'          Hip LAD  JA 5          Hip flexor stretch             Neuro Re-Ed                                                                 Ther Ex             Nustep  Sp02  HR  L3 8' SpO2 96%, HR 86 L3  8'  97%  86bpm          SKTC HEP 5\"x10 warren 5\" 10 warren          Bridges HEP 2x10 2x10          Clamshells HEP 2x10 warren 2x10  warren          SLR HEP 2x10 warren 2x10          S/L hip abd  NV NV          Prone hip ext             Standing hip flex/ext/abd   1x10 ea warren          Squat   1x10          Leg press  70# x30 70#  x40          LF hip abd  45# x30 45#  x30          LF hip add                          Ther Activity                                       Gait Training                                       Modalities                                            "

## 2023-04-27 NOTE — TELEPHONE ENCOUNTER
Patient needed a refill on prolia for next injection in May  It comes from [facility]  I contacted them, gave a verbal order for the med but sent it to you as not print  They will check the financial responsibility and contact patient  When the have that complete we will get a call to set up delivery

## 2023-04-28 RX ORDER — METOPROLOL SUCCINATE 25 MG/1
TABLET, EXTENDED RELEASE ORAL
Qty: 30 TABLET | Refills: 0 | Status: SHIPPED | OUTPATIENT
Start: 2023-04-28

## 2023-05-02 ENCOUNTER — OFFICE VISIT (OUTPATIENT)
Dept: PHYSICAL THERAPY | Facility: HOME HEALTHCARE | Age: 68
End: 2023-05-02

## 2023-05-02 DIAGNOSIS — M25.551 BILATERAL HIP PAIN: Primary | ICD-10-CM

## 2023-05-02 DIAGNOSIS — M25.552 BILATERAL HIP PAIN: Primary | ICD-10-CM

## 2023-05-02 NOTE — PROGRESS NOTES
"Daily Note     Today's date: 2023  Patient name: Yony Schuler  : 1955  MRN: [id number]  Referring provider: Aisha Bee PA-C  Dx: No diagnosis found  Start Time:           Subjective: I haven't had pain since the injections  I am able to do all of my housework without pain  I haven't been walking because of all the rain  Objective: See treatment diary below    Assessment: Pt progressing slowly and consistently with strength and pain free ROM at B hips  Pt denies pain with all TE and MT  B HS PROM appears to be WNL    Patient would benefit from continued PT     Plan: Continue per plan of care   Progress to wellness or I HEP     Precautions: None    Re-eval Date: 2023      Manuals  5-2         Warren hip PROM  JA 5' 5'         Warren calf, hs, glute stretch  JA 5' 5'         Hip LAD  JA 5 5'         Hip flexor stretch             Neuro Re-Ed                                                                 Ther Ex    5-2         Nustep  Sp02  HR  L3 8' SpO2 96%, HR 86 L3  8'  97%  86bpm L3  8'  93%  78bpm         SKTC HEP 5\"x10 warren 5\" 10 warren 5\" 10 warren         Bridges HEP 2x10 2x10 2x10         Clamshells HEP 2x10 warren 2x10  warren 2x10 warren         SLR HEP 2x10 warren 2x10 2x10         S/L hip abd  NV NV          Prone hip ext             Standing hip flex/ext/abd   1x10 ea warren 1x10 ea warren         Squat   1x10 1x10         Leg press  70# x30 70#  x40 70#  x40         LF hip abd  45# x30 45#  x30 45#  x30         LF hip add                          Ther Activity                                       Gait Training                                       Modalities                                            "

## 2023-05-04 ENCOUNTER — OFFICE VISIT (OUTPATIENT)
Dept: PHYSICAL THERAPY | Facility: HOME HEALTHCARE | Age: 68
End: 2023-05-04

## 2023-05-04 DIAGNOSIS — M25.552 BILATERAL HIP PAIN: Primary | ICD-10-CM

## 2023-05-04 DIAGNOSIS — M25.551 BILATERAL HIP PAIN: Primary | ICD-10-CM

## 2023-05-04 NOTE — PROGRESS NOTES
"Daily Note     Today's date: 2023  Patient name: Hetcor Pierre  : 1955  MRN: [id number]  Referring provider: Lester Packer PA-C  Dx: No diagnosis found  Start Time:           Subjective: Occasionally I will get a little soreness that wakes me when I lay on my R hip  Otherwise I have no pain  Objective: See treatment diary below    Assessment: Tolerated treatment well  Pt progressing well toward I HEP or wellness  Pt was able to imelda increased wt on leg press today and exhibits improved pain free A/PROM in all planes  Patient would benefit from continued PT    Plan: Continue per plan of care        Precautions: None    Re-eval Date: 2023      Manuals -27 5-2 5-4        Ginette hip PROM  JA 5' 5' 5'        Ginette calf, hs, glute stretch  JA 5' 5' 5'        Hip LAD  JA 5 5' 5'        Hip flexor stretch             Neuro Re-Ed                                                                 Ther Ex   - 5-2 5-4        Nustep  Sp02  HR  L3 8' SpO2 96%, HR 86 L3  8'  97%  86bpm L3  8'  93%  78bpm L3 8'  98%  72bpm        SKTC HEP 5\"x10 ginette 5\" 10 ginette 5\" 10 ginette 5\" x10        Bridges HEP 2x10 2x10 2x10 2x10        Clamshells HEP 2x10 ginette 2x10  ginette 2x10 ginette 2x10  ginette        SLR HEP 2x10 ginette 2x10 2x10 2x10        S/L hip abd  NV NV          Prone hip ext             Standing hip flex/ext/abd   1x10 ea ginette 1x10 ea ginette 1x10 ea ginette        Squat   1x10 1x10 1x10        Leg press  70# x30 70#  x40 70#  x40 70#  1x10  80# x20        LF hip abd  45# x30 45#  x30 45#  x30 45#  x30        LF hip add                          Ther Activity                                       Gait Training                                       Modalities                                            "

## 2023-05-08 ENCOUNTER — TELEPHONE (OUTPATIENT)
Dept: FAMILY MEDICINE CLINIC | Facility: CLINIC | Age: 68
End: 2023-05-08

## 2023-05-08 NOTE — TELEPHONE ENCOUNTER
Continue to hold the metoprolol, and continue to monitor blood pressure  Call us with results in the next couple days

## 2023-05-08 NOTE — TELEPHONE ENCOUNTER
Has been trying to lose weight and watching her sodium intake  She takes a half metoprolol in the morning and night  Friday morning she got up, was weak, took bp, was 93/80  She had the same on Saturday and Sunday morning  The highest reading was 128/90  She did not take the meds for three days because she is afraid how low it is going  What should she do?

## 2023-05-09 ENCOUNTER — OFFICE VISIT (OUTPATIENT)
Dept: PHYSICAL THERAPY | Facility: HOME HEALTHCARE | Age: 68
End: 2023-05-09

## 2023-05-09 DIAGNOSIS — M25.551 BILATERAL HIP PAIN: Primary | ICD-10-CM

## 2023-05-09 DIAGNOSIS — M25.552 BILATERAL HIP PAIN: Primary | ICD-10-CM

## 2023-05-09 NOTE — PROGRESS NOTES
"Daily Note     Today's date: 2023  Patient name: Aliza Chaudhry  : 1955  MRN: [id number]  Referring provider: Consepcion Callow, PA-C  Dx:   Encounter Diagnosis     ICD-10-CM    1  Bilateral hip pain  M25 551     M25 552           Start Time:   Stop Time: 1130  Total time in clinic (min): 45 minutes    Subjective: Pt reports that she is feeling pretty good, but that she feels some weakness in her legs  Objective: See treatment diary below      Assessment: Pt demonstrated good tolerance to treatment session and was able to progress with hip strengthening  The pt was able to perform her exercises with relatively good form and had minimal symptoms throughout the treatment session  Pt would benefit from continued PT  Plan: Continue per plan of care        Precautions: None    Re-eval Date: 2023      Manuals 27 5-2 5-4        Warren hip PROM  JA 5' 5' 5'        Warren calf, hs, glute stretch  JA 5' 5' 5' 5'       Hip LAD  JA 5 5' 5' 5'       Hip flexor stretch             Neuro Re-Ed                                                                 Ther Ex    5-2 5-4        Nustep  Sp02  HR  L3 8' SpO2 96%, HR 86 L3  8'  97%  86bpm L3  8'  93%  78bpm L3 8'  98%  72bpm L3 10'  97%  93bpm       SKTC HEP 5\"x10 warren 5\" 10 warren 5\" 10 warren 5\" x10 5\"x10 warren       Bridges HEP 2x10 2x10 2x10 2x10 2x10       Clamshells HEP 2x10 warren 2x10  warren 2x10 warren 2x10  warren 2x10 warren       SLR HEP 2x10 warren 2x10 2x10 2x10 2x10       S/L hip abd  NV NV          Prone hip ext             Standing hip flex/ext/abd   1x10 ea warren 1x10 ea warren 1x10 ea warren 1x15 ea warren       Squat   1x10 1x10 1x10 1x10       Leg press  70# x30 70#  x40 70#  x40 70#  1x10  80# x20 80# x40       LF hip abd  45# x30 45#  x30 45#  x30 45#  x30 50# x30       LF hip add                          Ther Activity                                       Gait Training                                       Modalities                               "

## 2023-05-11 ENCOUNTER — CLINICAL SUPPORT (OUTPATIENT)
Dept: FAMILY MEDICINE CLINIC | Facility: CLINIC | Age: 68
End: 2023-05-11

## 2023-05-11 ENCOUNTER — OFFICE VISIT (OUTPATIENT)
Dept: PHYSICAL THERAPY | Facility: HOME HEALTHCARE | Age: 68
End: 2023-05-11

## 2023-05-11 DIAGNOSIS — M25.551 BILATERAL HIP PAIN: Primary | ICD-10-CM

## 2023-05-11 DIAGNOSIS — M81.0 AGE-RELATED OSTEOPOROSIS WITHOUT CURRENT PATHOLOGICAL FRACTURE: Primary | ICD-10-CM

## 2023-05-11 DIAGNOSIS — M25.552 BILATERAL HIP PAIN: Primary | ICD-10-CM

## 2023-05-11 NOTE — PROGRESS NOTES
"Daily Note     Today's date: 2023  Patient name: Axel Villanueva  : 1955  MRN: [id number]  Referring provider: Soraya Antunez PA-C  Dx:   Encounter Diagnosis     ICD-10-CM    1  Bilateral hip pain  M25 551     M25 552           Start Time:           Subjective: I had to walk pretty far yesterday and B of my hips got sore  Most of my soreness is at my L hip today  Objective: See treatment diary below    Assessment: Pt imelda session well, but declined advancement with ex program 2* soreness at B hip from increased activity yesterday  Pt reported L hip muscle soreness during wt bearing with R LE standing hip abd  Pt improving with imelda to HS and hip abd stretch with improved painfree PROM noted at B hips    Patient would benefit from continued PT    Plan: Continue per plan of care        Precautions: None    Re-eval Date: 2023      Manuals - 5-2 5-4  5-11      Ginette hip PROM  JA 5' 5' 5'        Ginette calf, hs, glute stretch  JA 5' 5' 5' 5' 5'      Hip LAD  JA 5 5' 5' 5' 5'      Hip flexor stretch             Neuro Re-Ed                                                                 Ther Ex    5-2 5-4  5-11      Nustep  Sp02  HR  L3 8' SpO2 96%, HR 86 L3  8'  97%  86bpm L3  8'  93%  78bpm L3 8'  98%  72bpm L3 10'  97%  93bpm L3 10'  96%  75bpm        SKTC HEP 5\"x10 ginette 5\" 10 ginette 5\" 10 ginette 5\" x10 5\"x10 ginette 5\" x10 ginette      Bridges HEP 2x10 2x10 2x10 2x10 2x10 2x10      Clamshells HEP 2x10 ginette 2x10  ginette 2x10 ginette 2x10  ginette 2x10 ginette 2x10  ginette      SLR HEP 2x10 ginette 2x10 2x10 2x10 2x10 2x10      S/L hip abd  NV NV          Prone hip ext             Standing hip flex/ext/abd   1x10 ea ginette 1x10 ea ginette 1x10 ea ginette 1x15 ea ginette 1x15 ea ginette      Squat   1x10 1x10 1x10 1x10 1x10      Leg press  70# x30 70#  x40 70#  x40 70#  1x10  80# x20 80# x40 80#  x40      LF hip abd  45# x30 45#  x30 45#  x30 45#  x30 50# x30 50#  x30      LF hip add                          Ther Activity                " Gait Training                                       Modalities

## 2023-05-16 ENCOUNTER — OFFICE VISIT (OUTPATIENT)
Dept: PHYSICAL THERAPY | Facility: HOME HEALTHCARE | Age: 68
End: 2023-05-16

## 2023-05-16 DIAGNOSIS — M25.551 BILATERAL HIP PAIN: Primary | ICD-10-CM

## 2023-05-16 DIAGNOSIS — M25.552 BILATERAL HIP PAIN: Primary | ICD-10-CM

## 2023-05-16 NOTE — PROGRESS NOTES
"Daily Note     Today's date: 2023  Patient name: Ernst Shah  : 1955  MRN: [id number]  Referring provider: Francesca Dave PA-C  Dx: No diagnosis found  Start Time:           Subjective: I have some soreness at my L thigh but I think it was because of the way I was laying  Objective: See treatment diary below    Assessment: Pt olerated treatment well and was able to complete all TE without c/o increased pain or vc's needed  Pt with good form t/o session and reports consistency as daily ability allows  Pt reports reduced soreness at L thigh at end of Tx  Patient would benefit from continued PT    Plan: Continue per plan of care  RE due NV        Precautions: None    Re-eval Date: 2023      Manuals  5-2 5-4 - 5-16     Warren hip PROM  JA 5' 5' 5'        Warren calf, hs, glute stretch  JA 5' 5' 5' 5' 5' 5'     Hip LAD  JA 5 5' 5' 5' 5' 5'     Hip flexor stretch             Neuro Re-Ed        -16                                                         Ther Ex   - 5-2 5-4  5- 5-16     Nustep  Sp02  HR  L3 8' SpO2 96%, HR 86 L3  8'  97%  86bpm L3  8'  93%  78bpm L3 8'  98%  72bpm L3 10'  97%  93bpm L3 10'  96%  75bpm   L3  10'  97%  53 bpm       SKTC HEP 5\"x10 warren 5\" 10 warren 5\" 10 warren 5\" x10 5\"x10 warren 5\" x10 warren 5' x10  warren     Bridges HEP 2x10 2x10 2x10 2x10 2x10 2x10 2x10     Clamshells HEP 2x10 warren 2x10  warren 2x10 warren 2x10  warren 2x10 warren 2x10  warren 2x10  warren     SLR HEP 2x10 warren 2x10 2x10 2x10 2x10 2x10 2x10     S/L hip abd  NV NV          Prone hip ext             Standing hip flex/ext/abd   1x10 ea warren 1x10 ea warren 1x10 ea warren 1x15 ea warren 1x15 ea warren 1x15   Ea warren     Squat   1x10 1x10 1x10 1x10 1x10 1x10     Leg press  70# x30 70#  x40 70#  x40 70#  1x10  80# x20 80# x40 80#  x40 80#  x40     LF hip abd  45# x30 45#  x30 45#  x30 45#  x30 50# x30 50#  x30 50#  x30     LF hip add                          Ther Activity                                       Gait " Training                                       Modalities Self

## 2023-05-18 ENCOUNTER — EVALUATION (OUTPATIENT)
Dept: PHYSICAL THERAPY | Facility: HOME HEALTHCARE | Age: 68
End: 2023-05-18

## 2023-05-18 DIAGNOSIS — M25.552 BILATERAL HIP PAIN: Primary | ICD-10-CM

## 2023-05-18 DIAGNOSIS — M25.551 BILATERAL HIP PAIN: Primary | ICD-10-CM

## 2023-05-18 NOTE — PROGRESS NOTES
PT Discharge    Today's date: 2023  Patient name: Alana Dailey  : 1955  MRN: [id number]  Referring provider: Luh Quevedo PA-C  Dx:   Encounter Diagnosis     ICD-10-CM    1  Bilateral hip pain  M25 551     M25 552           Start Time: 9805  Stop Time: 1130  Total time in clinic (min): 45 minutes    Assessment  Assessment details: The pt has made significant improvements in pain, strength, ROM, and overall functional mobility since beginning PT, and she has either met or is progressing towards all of her goals  She has demonstrated independence with her HEP and she is confident in her ability to adhere to her HEP and further progress on her own  Pt will be D/C to her HEP at this time  Understanding of Dx/Px/POC: good   Prognosis: good    Goals  STG: 3-4 weeks  Pt will be independent with HEP in order to continue to progress outside of PT treatment sessions  Met  Pt will improve in quality of life as demonstrated by worst pain levels of 5/10 or less  Met  Pt will improve in functional mobility as demonstrated by bilateral hip flex PROM of 110 deg or greater  Met  LT-8 weeks  Pt will improve in quality of life as demonstrated by worst pain levels of 2/10 or less  Met  Pt will improve in functional mobility as demonstrated by strength levels of 4+/5 or greater  Progressing  Pt will improve in functional mobility as demonstrated by hip ROM WFL  Met  Pt will improve in functional mobility as demonstrated by ability to ambulate for approximately 30 minutes without needing to take any rest breaks due to pain  Met  Pt will improve in functional mobility as demonstrated by ability to perform full ADLs without any limitations due to pain or weakness   Met    Plan  Plan details: D/C to HEP  Treatment plan discussed with: patient        Subjective Evaluation    History of Present Illness  Mechanism of injury: Pt is presenting to [facility] with chronic bilateral hip pain, which has been ongoing for 3-4 years  Pt reports that she was referred to an ortho MD, who found there to be OA in her bilateral hips  Pt reports that she has the most difficulty with prolonged walking due to the hip pain  Pt reports that most of the pain is in her bilateral groin/hip joint, but that she did receive an injection in her lateral hips/GT area  Pt reports that since she received the injection, she was able to walk around a [place] the mall without any hip pain  UPDATE 2023:  Pt reports that her hips are feeling good and that she has no pain  Pt reports that she feels she is ready for D/C from PT at this time  Pain  Current pain ratin  At best pain ratin  At worst pain ratin  Quality: Soreness    Relieving factors: rest  Aggravating factors: walking and stair climbing    Social Support  Stairs in house: yes (2 flights)       Diagnostic Tests  X-ray: abnormal  Treatments  Previous treatment: physical therapy  Current treatment: injection treatment  Patient Goals  Patient goals for therapy: decreased pain, increased motion, increased strength, independence with ADLs/IADLs and return to sport/leisure activities  Patient goal: To be able to walk and not have pain        Objective     Neurological Testing     Sensation     Hip   Left Hip   Intact: light touch    Right Hip   Intact: light touch    Passive Range of Motion   Left Hip   Flexion: WFL  Abduction: WFL  External rotation (90/90): 35 degrees   Internal rotation (90/90): 25 degrees     Right Hip   Flexion: WFL  Abduction: WFL  External rotation (90/90): 30 degrees   Internal rotation (90/90): 30 degrees     Joint Play     Hypomobile: L1, L2, L3, L4, L5 and S1     Pain: L5     Strength/Myotome Testing     Left Hip   Planes of Motion   Flexion: 4+  Extension: 4  Abduction: 4  Adduction: 5    Right Hip   Planes of Motion   Flexion: 4+  Extension: 4  Abduction: 4  Adduction: 5    Left Knee   Flexion: 5  Extension: 5    Right Knee   Flexion: 5  Extension: "5    Left Ankle/Foot   Dorsiflexion: 5  Plantar flexion: 4+    Right Ankle/Foot   Dorsiflexion: 5  Plantar flexion: 4+    Tests     Left Hip   Negative ALBARO and FADIR  Right Hip   Negative ALBARO and FADIR  Additional Tests Details  SLR: L: WFL, R: WFL    Ambulation     Ambulation: Stairs   Ascend stairs: independent  Descend stairs: independent  Curbs: independent    Quality of Movement During Gait     Pelvis    Pelvis (Left): Positive Trendelenburg  Pelvis (Right): Positive Trendelenburg                Precautions: None    Re-eval Date: 05/19/2023        Manuals 4/19 4/25 4-27 5-2 5-4 5/9 5-11 5-16 5/18    Warren hip PROM  JA 5' 5' 5'        Warren calf, hs, glute stretch  JA 5' 5' 5' 5' 5' 5' 8'    Hip LAD  JA 5 5' 5' 5' 5' 5' 2'    Hip flexor stretch             Neuro Re-Ed        5-16                                                         Ther Ex   4-27 5-2 5-4 5-11 5-16     Nustep  Sp02  HR  L3 8' SpO2 96%, HR 86 L3  8'  97%  86bpm L3  8'  93%  78bpm L3 8'  98%  72bpm L3 10'  97%  93bpm L3 10'  96%  75bpm   L3  10'  97%  53 bpm   L3  10'  97%  108 bpm    SKTC HEP 5\"x10 warren 5\" 10 warren 5\" 10 warren 5\" x10 5\"x10 warren 5\" x10 warren 5' x10  warren 5\"x10 warren    Bridges HEP 2x10 2x10 2x10 2x10 2x10 2x10 2x10 2x10    Clamshells HEP 2x10 warren 2x10  warren 2x10 warren 2x10  warren 2x10 warren 2x10  warren 2x10  warren 2x10 warren    SLR HEP 2x10 warren 2x10 2x10 2x10 2x10 2x10 2x10 2x10 warren    S/L hip abd  NV NV          Prone hip ext             Standing hip flex/ext/abd   1x10 ea warren 1x10 ea warren 1x10 ea warren 1x15 ea warren 1x15 ea warren 1x15   Ea warren 1x15 ea warren    Squat   1x10 1x10 1x10 1x10 1x10 1x10 1x10    Leg press  70# x30 70#  x40 70#  x40 70#  1x10  80# x20 80# x40 80#  x40 80#  x40 80# x40    LF hip abd  45# x30 45#  x30 45#  x30 45#  x30 50# x30 50#  x30 50#  x30 50# x30    LF hip add                          Ther Activity                                       Gait Training                                       Modalities                        "

## 2023-05-30 ENCOUNTER — TELEPHONE (OUTPATIENT)
Dept: FAMILY MEDICINE CLINIC | Facility: CLINIC | Age: 68
End: 2023-05-30

## 2023-05-30 DIAGNOSIS — I10 ESSENTIAL HYPERTENSION: Primary | ICD-10-CM

## 2023-05-30 RX ORDER — DOXAZOSIN MESYLATE 1 MG/1
0.5 TABLET ORAL
Qty: 15 TABLET | Refills: 1 | Status: SHIPPED | OUTPATIENT
Start: 2023-05-30 | End: 2023-06-09

## 2023-05-30 NOTE — TELEPHONE ENCOUNTER
She is aware however, she is now concerned that her blood pressure is high  She took it earlier today and it was 160s/80s and now at 4:30pm BP:173/77  She is worried that decreasing the dose will make it go higher

## 2023-05-30 NOTE — TELEPHONE ENCOUNTER
Pt aware and is concerned because she feels the metoprolol is causing her hair to thin and fall out    Asking if there is another bp medication you can prescribe in its place

## 2023-05-30 NOTE — TELEPHONE ENCOUNTER
Pt states she is having issues since starting the Metoprolol  C/O getting very emotional and her hair is thinning

## 2023-05-30 NOTE — TELEPHONE ENCOUNTER
That is what I am trying to do, but need to do it slowly since she has not been tolerating other medications well    I am hoping she tolerates the half a pill a doxazosin well, and we can increase that and get rid of the metoprolol

## 2023-05-30 NOTE — TELEPHONE ENCOUNTER
Have her decrease to half a pill daily  She should come in for blood pressure check at the end of this week or early next  Call us if her symptoms continue or increase

## 2023-05-30 NOTE — TELEPHONE ENCOUNTER
I will have her restart the doxazosin 1 mg  She will take a half a pill at bedtime  Have her come in for blood pressure checks after starting it    Still recommend her taking a half of metoprolol daily

## 2023-06-02 NOTE — TELEPHONE ENCOUNTER
Patient states she woke up this morning, she is very weak, arms are heavy, nauseated, sweating, took BP this morning at 7:30 and it was 80/64  Please advise       Did take doxazosin

## 2023-06-02 NOTE — TELEPHONE ENCOUNTER
Her symptoms are most likely due to the low blood pressure  Encouraged hydration  I will have her stop the metoprolol altogether  She should hold the doxazosin tonight  If she is able to split the pill into quarters, she could take a quarter of the doxazosin at bedtime starting tomorrow if she feels okay and her blood pressure is better  Have her monitor her pressure through the day today, and call us with results later this afternoon  If symptoms continue or increase, call or go to the ER

## 2023-06-09 ENCOUNTER — OFFICE VISIT (OUTPATIENT)
Dept: FAMILY MEDICINE CLINIC | Facility: CLINIC | Age: 68
End: 2023-06-09
Payer: COMMERCIAL

## 2023-06-09 VITALS
BODY MASS INDEX: 32.11 KG/M2 | HEART RATE: 78 BPM | HEIGHT: 63 IN | OXYGEN SATURATION: 95 % | SYSTOLIC BLOOD PRESSURE: 162 MMHG | WEIGHT: 181.2 LBS | DIASTOLIC BLOOD PRESSURE: 98 MMHG | TEMPERATURE: 97.1 F

## 2023-06-09 DIAGNOSIS — I10 ESSENTIAL HYPERTENSION: Primary | ICD-10-CM

## 2023-06-09 PROCEDURE — 99213 OFFICE O/P EST LOW 20 MIN: CPT | Performed by: FAMILY MEDICINE

## 2023-06-09 RX ORDER — LOSARTAN POTASSIUM 25 MG/1
25 TABLET ORAL DAILY
Qty: 30 TABLET | Refills: 3 | Status: SHIPPED | OUTPATIENT
Start: 2023-06-09

## 2023-06-09 NOTE — PROGRESS NOTES
Name: Nicolás Goyal      : 1955      MRN: [id number]  Encounter Provider: Eduardo Christensen DO  Encounter Date: 2023   Encounter department: [address] [place] [address]   For blood pressure, she will discontinue the doxazosin and the metoprolol  I will start her back on losartan, but at 25 mg daily  She tolerated this when we first started it a year ago  She will monitor her pressure and call us with the results  She will follow-up with nephrology in July  She will follow-up here in   Essential hypertension  -     losartan (Cozaar) 25 mg tablet; Take 1 tablet (25 mg total) by mouth daily           Subjective     Here for follow-up on hypertension  Patient had called about a month ago stating that she was feeling more fatigued and losing her hair  She looked up side effects of metoprolol and found out that this could be the cause  We had her cut the metoprolol in half, but her pressure remained elevated  I restarted her on her doxazosin, but at half a milligram daily  That lowered her blood pressure too much, sometimes waking up in the morning with a systolic pressure in the 05L or 90s  Patient has rightly become frustrated with trying to control her pressure  I initially started her on losartan 50 mg a year ago  At that time it was not fully controlling her pressure, and I increased it to 100 mg, that is when she started to have side effects  Review of Systems   Constitutional: Negative  Respiratory: Negative  Cardiovascular: Negative  Gastrointestinal: Negative  Genitourinary: Negative          Past Medical History:   Diagnosis Date   • Allergic reaction     Resolved 2017    • Allergic rhinitis     Resolved 2017    • Generalized anxiety disorder     Resolved 2017    • High cholesterol    • Hypertension    • Microscopic hematuria     Resolved 2017    • Postural dizziness with presyncope 2021   • Renal calculi Resolved 14/54/5033    • Renal colic     Resolved 16/04/7094    • Renal cyst, acquired     Resolved 11/24/2017    • Restrictive lung disease     Resolved 11/24/2017    • Supraventricular tachycardia ([address] )     Resolved 11/24/2017      Past Surgical History:   Procedure Laterality Date   • CHOLECYSTECTOMY     • CYSTOSCOPY  05/07/2014    Diagnostic   Last assessed 5/7/2014     • TONSILLECTOMY     • TUBAL LIGATION       Family History   Problem Relation Age of Onset   • Cancer Mother    • Lung cancer Mother    • Heart attack Father    • No Known Problems Sister    • No Known Problems Sister    • No Known Problems Sister    • Heart attack Maternal Grandmother    • Diabetes Paternal Grandmother    • Colon cancer Maternal Aunt    • Esophageal cancer Maternal Aunt    • No Known Problems Maternal Aunt    • No Known Problems Paternal Aunt    • Rheum arthritis Family         Rheumatoid arthritis with rheumatoid factor      Social History     Socioeconomic History   • Marital status: /Civil Union     Spouse name: None   • Number of children: None   • Years of education: None   • Highest education level: None   Occupational History   • None   Tobacco Use   • Smoking status: Former   • Smokeless tobacco: Never   Vaping Use   • Vaping Use: Never used   Substance and Sexual Activity   • Alcohol use: Yes     Comment: occasional   • Drug use: Never   • Sexual activity: None   Other Topics Concern   • None   Social History Narrative   • None     Social Determinants of Health     Financial Resource Strain: Not on file   Food Insecurity: Not on file   Transportation Needs: Not on file   Physical Activity: Not on file   Stress: Not on file   Social Connections: Not on file   Intimate Partner Violence: Not on file   Housing Stability: Not on file     Current Outpatient Medications on File Prior to Visit   Medication Sig   • butalbital-acetaminophen-caffeine (FIORICET,ESGIC) -40 mg per tablet Take 1 tablet by mouth every 4 "(four) hours as needed for headaches   • fluticasone (FLONASE) 50 mcg/act nasal spray 2 sprays into each nostril daily   • pantoprazole (PROTONIX) 40 mg tablet Take 1 tablet (40 mg total) by mouth 2 (two) times a day   • simvastatin (ZOCOR) 20 mg tablet TAKE 1 TABLET BY MOUTH  DAILY AT BEDTIME   • denosumab (PROLIA) 60 mg/mL Inject 1 mL (60 mg total) under the skin once for 1 dose   • [DISCONTINUED] doxazosin (CARDURA) 1 mg tablet Take 0 5 tablets (0 5 mg total) by mouth daily at bedtime (Patient not taking: Reported on 6/9/2023)     Allergies   Allergen Reactions   • Amlodipine Myalgia   • Codeine      Other reaction(s): Other (See Comments)  headaches   • Fosamax [Alendronate] GI Intolerance   • Angiotensin Receptor Blockers Other (See Comments)     Metallic taste     Immunization History   Administered Date(s) Administered   • COVID-19 MODERNA VACC 0 5 ML IM 03/18/2021, 04/15/2021   • INFLUENZA 10/26/2018, 10/05/2019, 11/01/2020, 10/26/2022   • Influenza Quadrivalent Preservative Free 3 years and older IM 10/28/2015, 10/19/2017   • Influenza Quadrivalent, 6-35 Months IM 11/12/2014   • Influenza Split High Dose Preservative Free IM 10/05/2019   • Influenza, high dose seasonal 0 7 mL 10/26/2018, 10/10/2020, 10/21/2021, 10/26/2022   • Influenza, seasonal, injectable 12/11/2013   • Pneumococcal Polysaccharide PPV23 12/11/2013       Objective     /98   Pulse 78   Temp (!) 97 1 °F (36 2 °C)   Ht 5' 3\" (1 6 m)   Wt 82 2 kg (181 lb 3 2 oz)   SpO2 95%   BMI 32 10 kg/m²     Physical Exam  Vitals reviewed  Constitutional:       General: She is not in acute distress  Appearance: Normal appearance  She is well-developed  She is not ill-appearing, toxic-appearing or diaphoretic  HENT:      Head: Normocephalic and atraumatic  Eyes:      Conjunctiva/sclera: Conjunctivae normal    Cardiovascular:      Rate and Rhythm: Normal rate and regular rhythm  Heart sounds: Normal heart sounds  No murmur heard      " No friction rub  No gallop  Pulmonary:      Effort: Pulmonary effort is normal  No respiratory distress  Breath sounds: Normal breath sounds  No wheezing, rhonchi or rales  Musculoskeletal:      Right lower leg: No edema  Left lower leg: No edema  Neurological:      General: No focal deficit present  Mental Status: She is alert and oriented to person, place, and time  Psychiatric:         Mood and Affect: Mood normal          Behavior: Behavior normal          Thought Content:  Thought content normal          Judgment: Judgment normal        Gladys Fortune DO

## 2023-07-14 ENCOUNTER — HOSPITAL ENCOUNTER (OUTPATIENT)
Dept: MAMMOGRAPHY | Facility: HOSPITAL | Age: 68
Discharge: HOME | End: 2023-07-14
Payer: COMMERCIAL

## 2023-07-14 ENCOUNTER — TELEPHONE (OUTPATIENT)
Dept: FAMILY MEDICINE CLINIC | Facility: CLINIC | Age: 68
End: 2023-07-14

## 2023-07-14 ENCOUNTER — APPOINTMENT (OUTPATIENT)
Dept: LAB | Facility: CLINIC | Age: 68
End: 2023-07-14
Payer: COMMERCIAL

## 2023-07-14 ENCOUNTER — OFFICE VISIT (OUTPATIENT)
Dept: OBGYN CLINIC | Facility: CLINIC | Age: 68
End: 2023-07-14
Payer: COMMERCIAL

## 2023-07-14 VITALS
DIASTOLIC BLOOD PRESSURE: 81 MMHG | WEIGHT: 180 LBS | HEIGHT: 63 IN | BODY MASS INDEX: 31.89 KG/M2 | SYSTOLIC BLOOD PRESSURE: 159 MMHG

## 2023-07-14 DIAGNOSIS — M25.552 BILATERAL HIP PAIN: Primary | ICD-10-CM

## 2023-07-14 DIAGNOSIS — M25.551 BILATERAL HIP PAIN: Primary | ICD-10-CM

## 2023-07-14 DIAGNOSIS — Z12.31 ENCOUNTER FOR SCREENING MAMMOGRAM FOR MALIGNANT NEOPLASM OF BREAST: ICD-10-CM

## 2023-07-14 DIAGNOSIS — R73.09 ELEVATED GLUCOSE: Primary | ICD-10-CM

## 2023-07-14 DIAGNOSIS — R89.9 ABNORMAL LABORATORY TEST RESULT: Primary | ICD-10-CM

## 2023-07-14 LAB
ALBUMIN SERPL BCP-MCNC: 3.8 G/DL (ref 3.5–5)
ALP SERPL-CCNC: 58 U/L (ref 46–116)
ALT SERPL W P-5'-P-CCNC: 44 U/L (ref 12–78)
ANION GAP SERPL CALCULATED.3IONS-SCNC: 6 MMOL/L
AST SERPL W P-5'-P-CCNC: 19 U/L (ref 5–45)
BASOPHILS # BLD AUTO: 0.08 THOUSANDS/ÂΜL (ref 0–0.1)
BASOPHILS NFR BLD AUTO: 1 % (ref 0–1)
BILIRUB SERPL-MCNC: 1.3 MG/DL (ref 0.2–1)
BUN SERPL-MCNC: 18 MG/DL (ref 5–25)
CALCIUM SERPL-MCNC: 8.7 MG/DL (ref 8.3–10.1)
CHLORIDE SERPL-SCNC: 113 MMOL/L (ref 96–108)
CHOLEST SERPL-MCNC: 167 MG/DL
CO2 SERPL-SCNC: 23 MMOL/L (ref 21–32)
CREAT SERPL-MCNC: 0.93 MG/DL (ref 0.6–1.3)
EOSINOPHIL # BLD AUTO: 0.12 THOUSAND/ÂΜL (ref 0–0.61)
EOSINOPHIL NFR BLD AUTO: 2 % (ref 0–6)
ERYTHROCYTE [DISTWIDTH] IN BLOOD BY AUTOMATED COUNT: 12.9 % (ref 11.6–15.1)
GFR SERPL CREATININE-BSD FRML MDRD: 62 ML/MIN/1.73SQ M
GLUCOSE P FAST SERPL-MCNC: 117 MG/DL (ref 65–99)
HCT VFR BLD AUTO: 42.2 % (ref 34.8–46.1)
HDLC SERPL-MCNC: 43 MG/DL
HGB BLD-MCNC: 13.5 G/DL (ref 11.5–15.4)
IMM GRANULOCYTES # BLD AUTO: 0.02 THOUSAND/UL (ref 0–0.2)
IMM GRANULOCYTES NFR BLD AUTO: 0 % (ref 0–2)
LDLC SERPL CALC-MCNC: 95 MG/DL (ref 0–100)
LYMPHOCYTES # BLD AUTO: 1.75 THOUSANDS/ÂΜL (ref 0.6–4.47)
LYMPHOCYTES NFR BLD AUTO: 24 % (ref 14–44)
MCH RBC QN AUTO: 28.5 PG (ref 26.8–34.3)
MCHC RBC AUTO-ENTMCNC: 32 G/DL (ref 31.4–37.4)
MCV RBC AUTO: 89 FL (ref 82–98)
MONOCYTES # BLD AUTO: 0.58 THOUSAND/ÂΜL (ref 0.17–1.22)
MONOCYTES NFR BLD AUTO: 8 % (ref 4–12)
NEUTROPHILS # BLD AUTO: 4.75 THOUSANDS/ÂΜL (ref 1.85–7.62)
NEUTS SEG NFR BLD AUTO: 65 % (ref 43–75)
NRBC BLD AUTO-RTO: 0 /100 WBCS
PLATELET # BLD AUTO: 167 THOUSANDS/UL (ref 149–390)
PMV BLD AUTO: 13.4 FL (ref 8.9–12.7)
POTASSIUM SERPL-SCNC: 3.8 MMOL/L (ref 3.5–5.3)
PROT SERPL-MCNC: 7.1 G/DL (ref 6.4–8.4)
RBC # BLD AUTO: 4.74 MILLION/UL (ref 3.81–5.12)
SODIUM SERPL-SCNC: 142 MMOL/L (ref 135–147)
TRIGL SERPL-MCNC: 144 MG/DL
TSH SERPL DL<=0.05 MIU/L-ACNC: 1.85 UIU/ML (ref 0.45–4.5)
WBC # BLD AUTO: 7.3 THOUSAND/UL (ref 4.31–10.16)

## 2023-07-14 PROCEDURE — 99213 OFFICE O/P EST LOW 20 MIN: CPT | Performed by: STUDENT IN AN ORGANIZED HEALTH CARE EDUCATION/TRAINING PROGRAM

## 2023-07-14 PROCEDURE — 20610 DRAIN/INJ JOINT/BURSA W/O US: CPT | Performed by: STUDENT IN AN ORGANIZED HEALTH CARE EDUCATION/TRAINING PROGRAM

## 2023-07-14 PROCEDURE — 83036 HEMOGLOBIN GLYCOSYLATED A1C: CPT | Performed by: FAMILY MEDICINE

## 2023-07-14 PROCEDURE — 77067 SCR MAMMO BI INCL CAD: CPT

## 2023-07-14 PROCEDURE — 77063 BREAST TOMOSYNTHESIS BI: CPT

## 2023-07-14 RX ORDER — TRIAMCINOLONE ACETONIDE 40 MG/ML
40 INJECTION, SUSPENSION INTRA-ARTICULAR; INTRAMUSCULAR
Status: COMPLETED | OUTPATIENT
Start: 2023-07-14 | End: 2023-07-14

## 2023-07-14 RX ORDER — ROPIVACAINE HYDROCHLORIDE 5 MG/ML
10 INJECTION, SOLUTION EPIDURAL; INFILTRATION; PERINEURAL
Status: COMPLETED | OUTPATIENT
Start: 2023-07-14 | End: 2023-07-14

## 2023-07-14 RX ADMIN — TRIAMCINOLONE ACETONIDE 40 MG: 40 INJECTION, SUSPENSION INTRA-ARTICULAR; INTRAMUSCULAR at 09:45

## 2023-07-14 RX ADMIN — ROPIVACAINE HYDROCHLORIDE 10 ML: 5 INJECTION, SOLUTION EPIDURAL; INFILTRATION; PERINEURAL at 09:45

## 2023-07-14 NOTE — PROGRESS NOTES
Orthopedic Surgery Office Note  Simona Long (3 y.o. female)   : 1955   MRN: [id number]   Encounter Date: 2023  Dr. Erick Mccollum DO, Orthopedic Surgeon  [facility]     Chief Complaint   Patient presents with   • Right Hip - Follow-up   • Left Hip - Follow-up       Assessment / Plan  Discussion:   • Discussed pathophysiology of GT bursitis; exercises given  • Discussed weakness of hip flexors and quadriceps, explained the benefit of PT for strengthening hips  A corticosteroid injection of bilateral GT bursae was performed in the office today. • -Follow up: in 3 month(s)      Surgery:   • No surgery planned at this time    Plan:   · CSI of bilateral trochanteric bursa was performed  · Activity as tolerated  · Begin outpatient PT for hip/groin pain with activity  · Focus on progressing strength  · Focus on progressing ROM  No follow-ups on file. History of Present Illness:  Simona Long is a 69 y.o. female who presents for bilateral lateral hip pain worse with using stairs and walking. She denies any groin pain at the present moment, however she notes that after long walks she can start to have some mild right groin pain. Complted PT, does at home now. Notice pain with walking a distance. R>L    The previous injections gave her relief for about 2 months , and allowed her comfort with walking. Patient inquired about headache placard. Review of Systems  Constitutional: Negative for fatigue, fever or loss of appetite. HENT: Negative. Respiratory: Negative for shortness of breath, dyspnea. Cardiovascular: Negative for chest pain/tightness. Gastrointestinal: Negative for abdominal pain, N/V. Endocrine: Negative for cold/heat intolerance, unexplained weight loss/gain. Genitourinary: Negative for flank pain, dysuria  Musculoskeletal:  Positive for arthralgia   Skin: Negative for rash.     Neurological:  Negative for numbness or tingling  Psychiatric/Behavioral: Negative for agitation. Physical Exam  /81   Ht 5' 3" (1.6 m)   Wt 81.6 kg (180 lb)   BMI 31.89 kg/m²   Cons: Appears well. No apparent distress. Psych: Alert. Oriented x3. Mood and affect normal.  Eyes: PERRLA, EOMI  Resp: Normal effort. No audible wheezing or stridor. CV: Extremities warm and well perfused. No LE edema. Skin: Warm. No visible lesions. Neuro: Normal muscle tone. Orthopedic Exam:   Bilateral hips  Inspection: No abnormal stance, gait,, or deformity  Palpation: Exquisitely tender to palpation over superior anterior aspect of the greater trochanter  ROM: Well-preserved internal/external rotation, passive range of motion full and pain-free  Motor: Weak hip flexion when asked to straight leg raise, otherwise grossly intact  Sensation: Grossly intact   Special tests: negative Stinchfield bilaterally, +obers    Studies Reviewed  Study type: XRAY bilateral hip(s)  Date: 4/14/23  I have personally reviewed all relevant imaging. No radiologist report was available at this time. My impression is as follows:  mild osteoarthritis, calcific tendinitis of greater trochanter bilaterally      Large joint arthrocentesis: bilateral greater trochanteric bursa  Universal Protocol:  Consent: Verbal consent obtained. Risks and benefits: risks, benefits and alternatives were discussed  Consent given by: patient  Time out: Immediately prior to procedure a "time out" was called to verify the correct patient, procedure, equipment, support staff and site/side marked as required.   Patient understanding: patient states understanding of the procedure being performed  Site marked: the operative site was marked  Supporting Documentation  Indications: pain   Procedure Details  Location: hip - bilateral greater trochanteric bursa  Preparation: Patient was prepped and draped in the usual sterile fashion  Needle size: 22 G  Ultrasound guidance: no  Approach: lateral    Medications (Right): 40 mg triamcinolone acetonide 40 mg/mL; 10 mL ropivacaine 0.5 %Medications (Left): 40 mg triamcinolone acetonide 40 mg/mL; 10 mL ropivacaine 0.5 %   Patient tolerance: patient tolerated the procedure well with no immediate complications  Dressing:  Sterile dressing applied            Medical, Surgical, Family, and Social History  The patient's medical history, family history, and social history, were reviewed and updated as appropriate. Past Medical History:   Diagnosis Date   • Allergic reaction     Resolved 11/24/2017    • Allergic rhinitis     Resolved 11/24/2017    • Generalized anxiety disorder     Resolved 11/24/2017    • High cholesterol    • Hypertension    • Microscopic hematuria     Resolved 11/24/2017    • Postural dizziness with presyncope 03/11/2021   • Renal calculi     Resolved 24/55/3368    • Renal colic     Resolved 53/22/5663    • Renal cyst, acquired     Resolved 11/24/2017    • Restrictive lung disease     Resolved 11/24/2017    • Supraventricular tachycardia ([address])     Resolved 11/24/2017        Past Surgical History:   Procedure Laterality Date   • CHOLECYSTECTOMY     • CYSTOSCOPY  05/07/2014    Diagnostic.  Last assessed 5/7/2014     • TONSILLECTOMY     • TUBAL LIGATION         Family History   Problem Relation Age of Onset   • Cancer Mother    • Lung cancer Mother    • Heart attack Father    • No Known Problems Sister    • No Known Problems Sister    • No Known Problems Sister    • Heart attack Maternal Grandmother    • Diabetes Paternal Grandmother    • Colon cancer Maternal Aunt    • Esophageal cancer Maternal Aunt    • No Known Problems Maternal Aunt    • No Known Problems Paternal Aunt    • Rheum arthritis Family         Rheumatoid arthritis with rheumatoid factor        Social History     Occupational History   • Not on file   Tobacco Use   • Smoking status: Former   • Smokeless tobacco: Never   Vaping Use   • Vaping Use: Never used   Substance and Sexual Activity   • Alcohol use: Yes     Comment: occasional   • Drug use: Never   • Sexual activity: Not on file       Allergies   Allergen Reactions   • Amlodipine Myalgia   • Codeine      Other reaction(s):  Other (See Comments)  headaches   • Fosamax [Alendronate] GI Intolerance   • Angiotensin Receptor Blockers Other (See Comments)     Metallic taste         Current Outpatient Medications:   •  butalbital-acetaminophen-caffeine (FIORICET,ESGIC) -40 mg per tablet, Take 1 tablet by mouth every 4 (four) hours as needed for headaches, Disp: 30 tablet, Rfl: 0  •  denosumab (PROLIA) 60 mg/mL, Inject 1 mL (60 mg total) under the skin once for 1 dose, Disp: 1 mL, Rfl: 0  •  fluticasone (FLONASE) 50 mcg/act nasal spray, 2 sprays into each nostril daily, Disp: 16 g, Rfl: 3  •  losartan (Cozaar) 25 mg tablet, Take 1 tablet (25 mg total) by mouth daily, Disp: 30 tablet, Rfl: 3  •  pantoprazole (PROTONIX) 40 mg tablet, Take 1 tablet (40 mg total) by mouth 2 (two) times a day, Disp: 180 tablet, Rfl: 1  •  simvastatin (ZOCOR) 20 mg tablet, TAKE 1 TABLET BY MOUTH  DAILY AT BEDTIME, Disp: 90 tablet, Rfl: 3    30 minutes was spent in the coordination of care, reviewing of imaging and with the patient on the date of service    [place], PA-C     I, [place] PA-C, scribed this note in conjunction with and in the presence of Dr. Ashley Roche, who performed parts of the services as described in this documentation

## 2023-07-14 NOTE — TELEPHONE ENCOUNTER
Please call lab. Have them add a hemoglobin A1c to the blood work she already had done.   I put in the order

## 2023-07-16 LAB
EST. AVERAGE GLUCOSE BLD GHB EST-MCNC: 123 MG/DL
HBA1C MFR BLD: 5.9 %

## 2023-07-17 ENCOUNTER — TELEPHONE (OUTPATIENT)
Dept: FAMILY MEDICINE CLINIC | Facility: CLINIC | Age: 68
End: 2023-07-17

## 2023-07-17 DIAGNOSIS — I10 ESSENTIAL HYPERTENSION: Primary | ICD-10-CM

## 2023-07-17 DIAGNOSIS — R00.0 TACHYCARDIA: ICD-10-CM

## 2023-07-17 NOTE — TELEPHONE ENCOUNTER
Taking Losartan about one month ago. BP is good but pulse is high, over 100 per day and feels like it is racing at times and goes to 130's to 150's. Last yr she took Losartan 50mg and her heart rate was ok, but bp was not as good. Then went up to 100mg and started having issues. Please advise.

## 2023-07-25 ENCOUNTER — OFFICE VISIT (OUTPATIENT)
Dept: FAMILY MEDICINE CLINIC | Facility: CLINIC | Age: 68
End: 2023-07-25
Payer: COMMERCIAL

## 2023-07-25 VITALS
OXYGEN SATURATION: 97 % | HEART RATE: 82 BPM | SYSTOLIC BLOOD PRESSURE: 118 MMHG | TEMPERATURE: 96.4 F | HEIGHT: 63 IN | WEIGHT: 176.6 LBS | DIASTOLIC BLOOD PRESSURE: 78 MMHG | BODY MASS INDEX: 31.29 KG/M2

## 2023-07-25 DIAGNOSIS — I10 ESSENTIAL HYPERTENSION: Primary | ICD-10-CM

## 2023-07-25 DIAGNOSIS — R00.0 TACHYCARDIA: ICD-10-CM

## 2023-07-25 PROCEDURE — 99213 OFFICE O/P EST LOW 20 MIN: CPT | Performed by: FAMILY MEDICINE

## 2023-07-25 NOTE — PROGRESS NOTES
Name: Kassandra Luna      : 1955      MRN: [id number]  Encounter Provider: Vicenta Marino DO  Encounter Date: 2023   Encounter department: [facility]   Patient did show evidence of tachycardia from her blood pressure cuff readings at home. I will check a Holter monitor on her, and she will call cardiology for an appointment. She will also continue to monitor her pressure and call us if any concerns. I will see her in August as scheduled or as needed. 1. Essential hypertension  -     Holter monitor; Future; Expected date: 2023    2. Tachycardia  -     Holter monitor; Future; Expected date: 2023           Subjective     Patient here today stating that she has been getting readings on her blood pressure cuff of elevated heart rate. She does not feel palpitations. She does occasionally feel shortness of breath with exertion. This does not happen all the time. She is tolerating the losartan well. Patient states she will check her blood pressure, then check her 's to see if it is accurate, his pulse seems to be normal, when her pulse is elevated. Review of Systems   Constitutional: Negative. Respiratory: Positive for shortness of breath (  Occasionally with exertion). Cardiovascular: Negative for chest pain, palpitations and leg swelling. Gastrointestinal: Negative. Genitourinary: Negative.         Past Medical History:   Diagnosis Date   • Allergic reaction     Resolved 2017    • Allergic rhinitis     Resolved 2017    • Generalized anxiety disorder     Resolved 2017    • High cholesterol    • Hypertension    • Microscopic hematuria     Resolved 2017    • Postural dizziness with presyncope 2021   • Renal calculi     Resolved     • Renal colic     Resolved     • Renal cyst, acquired     Resolved 2017    • Restrictive lung disease     Resolved 2017    • Supraventricular tachycardia ([address])     Resolved 11/24/2017      Past Surgical History:   Procedure Laterality Date   • CHOLECYSTECTOMY     • CYSTOSCOPY  05/07/2014    Diagnostic.  Last assessed 5/7/2014     • TONSILLECTOMY     • TUBAL LIGATION       Family History   Problem Relation Age of Onset   • Lung cancer Mother    • Heart attack Father    • No Known Problems Sister    • No Known Problems Sister    • No Known Problems Sister    • Heart attack Maternal Grandmother    • Diabetes Paternal Grandmother    • Colon cancer Maternal Aunt    • Esophageal cancer Maternal Aunt    • No Known Problems Maternal Aunt    • No Known Problems Paternal Aunt    • Rheum arthritis Family         Rheumatoid arthritis with rheumatoid factor    • No Known Problems Son    • No Known Problems Son      Social History     Socioeconomic History   • Marital status: /Civil Union     Spouse name: None   • Number of children: None   • Years of education: None   • Highest education level: None   Occupational History   • None   Tobacco Use   • Smoking status: Former   • Smokeless tobacco: Never   Vaping Use   • Vaping Use: Never used   Substance and Sexual Activity   • Alcohol use: Yes     Comment: occasional   • Drug use: Never   • Sexual activity: None   Other Topics Concern   • None   Social History Narrative   • None     Social Determinants of Health     Financial Resource Strain: Not on file   Food Insecurity: Not on file   Transportation Needs: Not on file   Physical Activity: Not on file   Stress: Not on file   Social Connections: Not on file   Intimate Partner Violence: Not on file   Housing Stability: Not on file     Current Outpatient Medications on File Prior to Visit   Medication Sig   • butalbital-acetaminophen-caffeine (FIORICET,ESGIC) -40 mg per tablet Take 1 tablet by mouth every 4 (four) hours as needed for headaches   • fluticasone (FLONASE) 50 mcg/act nasal spray 2 sprays into each nostril daily   • losartan (Cozaar) 25 mg tablet Take 1 tablet (25 mg total) by mouth daily   • pantoprazole (PROTONIX) 40 mg tablet Take 1 tablet (40 mg total) by mouth 2 (two) times a day   • simvastatin (ZOCOR) 20 mg tablet TAKE 1 TABLET BY MOUTH  DAILY AT BEDTIME   • denosumab (PROLIA) 60 mg/mL Inject 1 mL (60 mg total) under the skin once for 1 dose     Allergies   Allergen Reactions   • Amlodipine Myalgia   • Codeine      Other reaction(s): Other (See Comments)  headaches   • Fosamax [Alendronate] GI Intolerance   • Angiotensin Receptor Blockers Other (See Comments)     Metallic taste     Immunization History   Administered Date(s) Administered   • COVID-19 MODERNA VACC 0.5 ML IM 03/18/2021, 04/15/2021   • INFLUENZA 10/26/2018, 10/05/2019, 11/01/2020, 10/26/2022   • Influenza Quadrivalent Preservative Free 3 years and older IM 10/28/2015, 10/19/2017   • Influenza Quadrivalent, 6-35 Months IM 11/12/2014   • Influenza Split High Dose Preservative Free IM 10/05/2019   • Influenza, high dose seasonal 0.7 mL 10/26/2018, 10/10/2020, 10/21/2021, 10/26/2022   • Influenza, seasonal, injectable 12/11/2013   • Pneumococcal Polysaccharide PPV23 12/11/2013       Objective     /78   Pulse 82   Temp (!) 96.4 °F (35.8 °C)   Ht 5' 3" (1.6 m)   Wt 80.1 kg (176 lb 9.6 oz)   SpO2 97%   BMI 31.28 kg/m²     Physical Exam  Vitals reviewed. Constitutional:       General: She is not in acute distress. Appearance: Normal appearance. She is well-developed. She is not ill-appearing, toxic-appearing or diaphoretic. HENT:      Head: Normocephalic and atraumatic. Eyes:      Conjunctiva/sclera: Conjunctivae normal.   Cardiovascular:      Rate and Rhythm: Normal rate and regular rhythm. Heart sounds: Normal heart sounds. No murmur heard. No friction rub. No gallop. Pulmonary:      Effort: Pulmonary effort is normal. No respiratory distress. Breath sounds: Normal breath sounds. No wheezing, rhonchi or rales.    Musculoskeletal:      Right lower leg: No edema. Left lower leg: No edema. Neurological:      General: No focal deficit present. Mental Status: She is alert and oriented to person, place, and time. Psychiatric:         Mood and Affect: Mood normal.         Behavior: Behavior normal.         Thought Content:  Thought content normal.         Judgment: Judgment normal.       Lenny Marsh, DO

## 2023-07-31 ENCOUNTER — TELEPHONE (OUTPATIENT)
Dept: FAMILY MEDICINE CLINIC | Facility: CLINIC | Age: 68
End: 2023-07-31

## 2023-07-31 NOTE — TELEPHONE ENCOUNTER
She is unable to afford the monitor test, she owe $3000 , she knows it is the medication, need to come up with something else

## 2023-08-14 NOTE — PROGRESS NOTES
Cardiology Follow Up    [facility]  1955  [id number]  [address] [place] [address]  [phone]  [phone]    1. Essential hypertension        2. Dyslipidemia        3. Family history of coronary artery disease        4. EL (renal artery stenosis) ([address])        5. Bilateral carotid artery stenosis            Discussion/Summary:  She presents to the office today for evaluation of elevated heart rates over the past 6 weeks at home. EKG shows atrial fibrillation with a rapid ventricular response, heart rate 169 bpm.  This is a new diagnosis for the patient. She is not symptomatic in the sense of palpitations but does have dyspnea on exertion, paroxysmal nocturnal dyspnea, and some mild lower extremity edema. Concern for congestive heart failure in the setting of prolonged rapid atrial fibrillation. I recommended she go to the ER for likely admission to the hospital.  Recommend treatment with AV ranjana blocking agents to see if she converts to sinus rhythm. If not, likely will need LAURENCE/cardioversion. Will need inpatient TTE, labs, CXR, etc.  She will also need anticoagulation for thromboembolic protection given elevated [id number] score of 3 (age, sex, hypertension). We will follow the patient during her hospitalization. Follow-up will be arranged in the outpatient setting once she is discharged. Interval History:   [facility] is a 69 y.o. female with hypertension, right renal artery stenosis, dyslipidemia, mild bilateral carotid artery stenosis, prior tobacco abuse who presents to the office today for evaluation of elevated heart rate readings at home. Over the past few weeks the patient has noticed an elevated heart rate when she checks her blood pressure at home. She reports readings between 120 and 150 bpm.  She does not feel a sensation of palpitations or her heart racing.   Few weeks ago she stopped taking her losartan as she thought this may have been a contributing factor. It has not changed her symptoms. She notes dyspnea on exertion, especially when she descends a hill or climbs a flight of steps. Previously she was able to do these things without any limitations. She has noticed some lower extremity edema as well as paroxysmal nocturnal dyspnea. She denies any chest pain/pressure/discomfort. She denies lightheadedness, dizziness, or syncope.       Medical Problems     Problem List     Reactive airway disease    Esophageal reflux    Hyperlipidemia    Migraine without aura and without status migrainosus, not intractable    Right-sided chest wall pain    Orthostatic hypotension    Essential hypertension    Age-related osteoporosis without current pathological fracture    Bilateral hip pain    EL (renal artery stenosis) (HCC)    Neck pain    Impaired fasting blood sugar    Mild atherosclerosis of both carotid arteries        Past Medical History:   Diagnosis Date   • Allergic reaction     Resolved 11/24/2017    • Allergic rhinitis     Resolved 11/24/2017    • Generalized anxiety disorder     Resolved 11/24/2017    • High cholesterol    • Hypertension    • Microscopic hematuria     Resolved 11/24/2017    • Postural dizziness with presyncope 03/11/2021   • Renal calculi     Resolved 31/02/2455    • Renal colic     Resolved 46/80/4852    • Renal cyst, acquired     Resolved 11/24/2017    • Restrictive lung disease     Resolved 11/24/2017    • Supraventricular tachycardia ([address])     Resolved 11/24/2017      Social History     Socioeconomic History   • Marital status: /Civil Union     Spouse name: Not on file   • Number of children: Not on file   • Years of education: Not on file   • Highest education level: Not on file   Occupational History   • Not on file   Tobacco Use   • Smoking status: Former   • Smokeless tobacco: Never   Vaping Use   • Vaping Use: Never used   Substance and Sexual Activity   • Alcohol use: Yes     Comment: occasional   • Drug use: Never   • Sexual activity: Not on file   Other Topics Concern   • Not on file   Social History Narrative   • Not on file     Social Determinants of Health     Financial Resource Strain: Not on file   Food Insecurity: Not on file   Transportation Needs: Not on file   Physical Activity: Not on file   Stress: Not on file   Social Connections: Not on file   Intimate Partner Violence: Not on file   Housing Stability: Not on file      Family History   Problem Relation Age of Onset   • Lung cancer Mother    • Heart attack Father    • No Known Problems Sister    • No Known Problems Sister    • No Known Problems Sister    • Heart attack Maternal Grandmother    • Diabetes Paternal Grandmother    • Colon cancer Maternal Aunt    • Esophageal cancer Maternal Aunt    • No Known Problems Maternal Aunt    • No Known Problems Paternal Aunt    • Rheum arthritis Family         Rheumatoid arthritis with rheumatoid factor    • No Known Problems Son    • No Known Problems Son      Past Surgical History:   Procedure Laterality Date   • CHOLECYSTECTOMY     • CYSTOSCOPY  05/07/2014    Diagnostic. Last assessed 5/7/2014     • TONSILLECTOMY     • TUBAL LIGATION       No current facility-administered medications for this visit.     Current Outpatient Medications:   •  butalbital-acetaminophen-caffeine (FIORICET,ESGIC) -40 mg per tablet, Take 1 tablet by mouth every 4 (four) hours as needed for headaches, Disp: 30 tablet, Rfl: 0  •  cholecalciferol (VITAMIN D3) 1,000 units tablet, Take 1,000 Units by mouth daily 2,000, Disp: , Rfl:   •  denosumab (PROLIA) 60 mg/mL, Inject 1 mL (60 mg total) under the skin once for 1 dose, Disp: 1 mL, Rfl: 0  •  fluticasone (FLONASE) 50 mcg/act nasal spray, 2 sprays into each nostril daily, Disp: 16 g, Rfl: 3  •  losartan (Cozaar) 25 mg tablet, Take 1 tablet (25 mg total) by mouth daily, Disp: 30 tablet, Rfl: 3  •  pantoprazole (PROTONIX) 40 mg tablet, Take 1 tablet (40 mg total) by mouth 2 (two) times a day, Disp: 180 tablet, Rfl: 1  •  simvastatin (ZOCOR) 20 mg tablet, TAKE 1 TABLET BY MOUTH  DAILY AT BEDTIME, Disp: 90 tablet, Rfl: 3  Allergies   Allergen Reactions   • Amlodipine Myalgia   • Codeine      Other reaction(s): Other (See Comments)  headaches   • Fosamax [Alendronate] GI Intolerance   • Angiotensin Receptor Blockers Other (See Comments)     Metallic taste       Labs:     Chemistry        Component Value Date/Time     05/28/2015 0858    K 3.8 08/15/2023 1453    K 4.1 05/28/2015 0858     08/15/2023 1453     05/28/2015 0858    CO2 24 08/15/2023 1453    CO2 30 05/28/2015 0858    BUN 19 08/15/2023 1453    BUN 17 05/28/2015 0858    CREATININE 0.93 08/15/2023 1453    CREATININE 0.78 05/28/2015 0858        Component Value Date/Time    CALCIUM 9.7 08/15/2023 1453    CALCIUM 9.2 05/28/2015 0858    ALKPHOS 51 08/15/2023 1453    ALKPHOS 65 05/28/2015 0858    AST 45 (H) 08/15/2023 1453    AST 27 05/28/2015 0858     (H) 08/15/2023 1453    ALT 39 05/28/2015 0858    BILITOT 1.32 (H) 05/28/2015 0858            Lab Results   Component Value Date    CHOL 176 05/28/2015    CHOL 173 05/30/2014     Lab Results   Component Value Date    HDL 43 (L) 07/14/2023    HDL 46 (L) 06/23/2022    HDL 54 05/09/2018     Lab Results   Component Value Date    LDLCALC 95 07/14/2023    LDLCALC 118 (H) 06/23/2022    LDLCALC 94 05/09/2018     Lab Results   Component Value Date    TRIG 144 07/14/2023    TRIG 108 06/23/2022    TRIG 144 05/09/2018     No results found for: "CHOLHDL"    Imaging: No results found. ECG: atrial fibrillation with rapid ventricular response    Review of Systems   Cardiovascular: Positive for dyspnea on exertion, leg swelling and paroxysmal nocturnal dyspnea. All other systems reviewed and are negative.       Vitals:    08/15/23 1345   BP: 160/90   Pulse:    SpO2:      Vitals:    08/15/23 1311   Weight: 80.4 kg (177 lb 3.2 oz)     Height: 5' 3" (160 cm)   Body mass index is 31.39 kg/m². Physical Exam:  Physical Exam  Vitals reviewed. Constitutional:       General: She is not in acute distress. Appearance: She is not diaphoretic. HENT:      Head: Normocephalic and atraumatic. Eyes:      Pupils: Pupils are equal, round, and reactive to light. Neck:      Vascular: No carotid bruit. Cardiovascular:      Rate and Rhythm: Tachycardia present. Rhythm irregularly irregular. Pulses:           Radial pulses are 2+ on the right side and 2+ on the left side. Heart sounds: S1 normal and S2 normal. No murmur heard. Pulmonary:      Effort: Pulmonary effort is normal. No respiratory distress. Breath sounds: Normal breath sounds. No wheezing or rales. Abdominal:      General: There is no distension. Palpations: Abdomen is soft. Tenderness: There is no abdominal tenderness. Musculoskeletal:         General: No deformity. Normal range of motion. Cervical back: Normal range of motion. Right lower leg: Edema (+1 edema bilaterally) present. Left lower leg: Edema present. Skin:     General: Skin is warm and dry. Findings: No erythema. Neurological:      General: No focal deficit present. Mental Status: She is alert and oriented to person, place, and time.    Psychiatric:         Mood and Affect: Mood normal.         Behavior: Behavior normal.

## 2023-08-15 ENCOUNTER — OFFICE VISIT (OUTPATIENT)
Dept: CARDIOLOGY CLINIC | Facility: HOSPITAL | Age: 68
End: 2023-08-15
Payer: COMMERCIAL

## 2023-08-15 ENCOUNTER — APPOINTMENT (EMERGENCY)
Dept: RADIOLOGY | Facility: HOSPITAL | Age: 68
DRG: 291 | End: 2023-08-15
Payer: COMMERCIAL

## 2023-08-15 ENCOUNTER — HOSPITAL ENCOUNTER (INPATIENT)
Facility: HOSPITAL | Age: 68
LOS: 6 days | Discharge: HOME | DRG: 291 | End: 2023-08-21
Attending: EMERGENCY MEDICINE | Admitting: FAMILY MEDICINE
Payer: COMMERCIAL

## 2023-08-15 ENCOUNTER — APPOINTMENT (EMERGENCY)
Dept: CT IMAGING | Facility: HOSPITAL | Age: 68
DRG: 291 | End: 2023-08-15
Payer: COMMERCIAL

## 2023-08-15 VITALS
DIASTOLIC BLOOD PRESSURE: 90 MMHG | OXYGEN SATURATION: 96 % | BODY MASS INDEX: 31.4 KG/M2 | HEIGHT: 63 IN | HEART RATE: 145 BPM | WEIGHT: 177.2 LBS | SYSTOLIC BLOOD PRESSURE: 160 MMHG

## 2023-08-15 DIAGNOSIS — I48.91 ATRIAL FIBRILLATION WITH RAPID VENTRICULAR RESPONSE (HCC): Primary | ICD-10-CM

## 2023-08-15 DIAGNOSIS — I50.9 ACUTE CHF (CONGESTIVE HEART FAILURE) (HCC): ICD-10-CM

## 2023-08-15 DIAGNOSIS — E78.5 DYSLIPIDEMIA: ICD-10-CM

## 2023-08-15 DIAGNOSIS — I65.23 BILATERAL CAROTID ARTERY STENOSIS: ICD-10-CM

## 2023-08-15 DIAGNOSIS — Z82.49 FAMILY HISTORY OF CORONARY ARTERY DISEASE: ICD-10-CM

## 2023-08-15 DIAGNOSIS — I10 ESSENTIAL HYPERTENSION: Primary | ICD-10-CM

## 2023-08-15 DIAGNOSIS — I70.1 RAS (RENAL ARTERY STENOSIS) (HCC): ICD-10-CM

## 2023-08-15 DIAGNOSIS — R79.89 ELEVATED LFTS: ICD-10-CM

## 2023-08-15 DIAGNOSIS — I48.91 NEW ONSET A-FIB (HCC): ICD-10-CM

## 2023-08-15 DIAGNOSIS — R79.89 POSITIVE D DIMER: ICD-10-CM

## 2023-08-15 LAB
2HR DELTA HS TROPONIN: 0 NG/L
4HR DELTA HS TROPONIN: 1 NG/L
ALBUMIN SERPL BCP-MCNC: 4.2 G/DL (ref 3.5–5)
ALP SERPL-CCNC: 51 U/L (ref 34–104)
ALT SERPL W P-5'-P-CCNC: 176 U/L (ref 7–52)
ANION GAP SERPL CALCULATED.3IONS-SCNC: 11 MMOL/L
APTT PPP: 27 SECONDS (ref 23–37)
AST SERPL W P-5'-P-CCNC: 45 U/L (ref 13–39)
BACTERIA UR QL AUTO: NORMAL /HPF
BASOPHILS # BLD AUTO: 0.04 THOUSANDS/ÂΜL (ref 0–0.1)
BASOPHILS NFR BLD AUTO: 1 % (ref 0–1)
BILIRUB SERPL-MCNC: 1.11 MG/DL (ref 0.2–1)
BILIRUB UR QL STRIP: NEGATIVE
BNP SERPL-MCNC: 433 PG/ML (ref 0–100)
BUN SERPL-MCNC: 19 MG/DL (ref 5–25)
CALCIUM SERPL-MCNC: 9.7 MG/DL (ref 8.4–10.2)
CARDIAC TROPONIN I PNL SERPL HS: 7 NG/L
CARDIAC TROPONIN I PNL SERPL HS: 7 NG/L
CARDIAC TROPONIN I PNL SERPL HS: 8 NG/L
CHLORIDE SERPL-SCNC: 105 MMOL/L (ref 96–108)
CLARITY UR: CLEAR
CO2 SERPL-SCNC: 24 MMOL/L (ref 21–32)
COLOR UR: YELLOW
CREAT SERPL-MCNC: 0.93 MG/DL (ref 0.6–1.3)
D DIMER PPP FEU-MCNC: 0.93 UG/ML FEU
EOSINOPHIL # BLD AUTO: 0.06 THOUSAND/ÂΜL (ref 0–0.61)
EOSINOPHIL NFR BLD AUTO: 1 % (ref 0–6)
ERYTHROCYTE [DISTWIDTH] IN BLOOD BY AUTOMATED COUNT: 13.3 % (ref 11.6–15.1)
GFR SERPL CREATININE-BSD FRML MDRD: 62 ML/MIN/1.73SQ M
GLUCOSE SERPL-MCNC: 103 MG/DL (ref 65–140)
GLUCOSE UR STRIP-MCNC: NEGATIVE MG/DL
HCT VFR BLD AUTO: 42.6 % (ref 34.8–46.1)
HGB BLD-MCNC: 13.4 G/DL (ref 11.5–15.4)
HGB UR QL STRIP.AUTO: ABNORMAL
IMM GRANULOCYTES # BLD AUTO: 0.02 THOUSAND/UL (ref 0–0.2)
IMM GRANULOCYTES NFR BLD AUTO: 0 % (ref 0–2)
INR PPP: 0.98 (ref 0.84–1.19)
KETONES UR STRIP-MCNC: NEGATIVE MG/DL
LEUKOCYTE ESTERASE UR QL STRIP: NEGATIVE
LYMPHOCYTES # BLD AUTO: 1.76 THOUSANDS/ÂΜL (ref 0.6–4.47)
LYMPHOCYTES NFR BLD AUTO: 24 % (ref 14–44)
MAGNESIUM SERPL-MCNC: 1.9 MG/DL (ref 1.9–2.7)
MCH RBC QN AUTO: 28.5 PG (ref 26.8–34.3)
MCHC RBC AUTO-ENTMCNC: 31.5 G/DL (ref 31.4–37.4)
MCV RBC AUTO: 91 FL (ref 82–98)
MONOCYTES # BLD AUTO: 0.51 THOUSAND/ÂΜL (ref 0.17–1.22)
MONOCYTES NFR BLD AUTO: 7 % (ref 4–12)
NEUTROPHILS # BLD AUTO: 5.07 THOUSANDS/ÂΜL (ref 1.85–7.62)
NEUTS SEG NFR BLD AUTO: 67 % (ref 43–75)
NITRITE UR QL STRIP: NEGATIVE
NON-SQ EPI CELLS URNS QL MICRO: NORMAL /HPF
NRBC BLD AUTO-RTO: 0 /100 WBCS
PH UR STRIP.AUTO: 6 [PH]
PLATELET # BLD AUTO: 142 THOUSANDS/UL (ref 149–390)
PMV BLD AUTO: 12.1 FL (ref 8.9–12.7)
POTASSIUM SERPL-SCNC: 3.8 MMOL/L (ref 3.5–5.3)
PROT SERPL-MCNC: 6.8 G/DL (ref 6.4–8.4)
PROT UR STRIP-MCNC: NEGATIVE MG/DL
PROTHROMBIN TIME: 13.1 SECONDS (ref 11.6–14.5)
RBC # BLD AUTO: 4.7 MILLION/UL (ref 3.81–5.12)
RBC #/AREA URNS AUTO: NORMAL /HPF
SODIUM SERPL-SCNC: 140 MMOL/L (ref 135–147)
SP GR UR STRIP.AUTO: <=1.005 (ref 1–1.03)
TSH SERPL DL<=0.05 MIU/L-ACNC: 0.92 UIU/ML (ref 0.45–4.5)
TSH SERPL DL<=0.05 MIU/L-ACNC: 0.92 UIU/ML (ref 0.45–4.5)
UROBILINOGEN UR QL STRIP.AUTO: 0.2 E.U./DL
WBC # BLD AUTO: 7.46 THOUSAND/UL (ref 4.31–10.16)
WBC #/AREA URNS AUTO: NORMAL /HPF

## 2023-08-15 PROCEDURE — 71045 X-RAY EXAM CHEST 1 VIEW: CPT

## 2023-08-15 PROCEDURE — 93005 ELECTROCARDIOGRAM TRACING: CPT

## 2023-08-15 PROCEDURE — 99214 OFFICE O/P EST MOD 30 MIN: CPT | Performed by: PHYSICIAN ASSISTANT

## 2023-08-15 PROCEDURE — 83880 ASSAY OF NATRIURETIC PEPTIDE: CPT | Performed by: PHYSICIAN ASSISTANT

## 2023-08-15 PROCEDURE — 85610 PROTHROMBIN TIME: CPT | Performed by: PHYSICIAN ASSISTANT

## 2023-08-15 PROCEDURE — 99291 CRITICAL CARE FIRST HOUR: CPT | Performed by: PHYSICIAN ASSISTANT

## 2023-08-15 PROCEDURE — 36415 COLL VENOUS BLD VENIPUNCTURE: CPT | Performed by: PHYSICIAN ASSISTANT

## 2023-08-15 PROCEDURE — 85379 FIBRIN DEGRADATION QUANT: CPT | Performed by: PHYSICIAN ASSISTANT

## 2023-08-15 PROCEDURE — 85025 COMPLETE CBC W/AUTO DIFF WBC: CPT | Performed by: PHYSICIAN ASSISTANT

## 2023-08-15 PROCEDURE — G1004 CDSM NDSC: HCPCS

## 2023-08-15 PROCEDURE — 84443 ASSAY THYROID STIM HORMONE: CPT | Performed by: FAMILY MEDICINE

## 2023-08-15 PROCEDURE — 85730 THROMBOPLASTIN TIME PARTIAL: CPT | Performed by: PHYSICIAN ASSISTANT

## 2023-08-15 PROCEDURE — 99285 EMERGENCY DEPT VISIT HI MDM: CPT

## 2023-08-15 PROCEDURE — 96365 THER/PROPH/DIAG IV INF INIT: CPT

## 2023-08-15 PROCEDURE — 81001 URINALYSIS AUTO W/SCOPE: CPT | Performed by: PHYSICIAN ASSISTANT

## 2023-08-15 PROCEDURE — 96376 TX/PRO/DX INJ SAME DRUG ADON: CPT

## 2023-08-15 PROCEDURE — 80053 COMPREHEN METABOLIC PANEL: CPT | Performed by: PHYSICIAN ASSISTANT

## 2023-08-15 PROCEDURE — 99223 1ST HOSP IP/OBS HIGH 75: CPT | Performed by: FAMILY MEDICINE

## 2023-08-15 PROCEDURE — 84443 ASSAY THYROID STIM HORMONE: CPT | Performed by: PHYSICIAN ASSISTANT

## 2023-08-15 PROCEDURE — 84484 ASSAY OF TROPONIN QUANT: CPT | Performed by: PHYSICIAN ASSISTANT

## 2023-08-15 PROCEDURE — 96367 TX/PROPH/DG ADDL SEQ IV INF: CPT

## 2023-08-15 PROCEDURE — 83735 ASSAY OF MAGNESIUM: CPT | Performed by: PHYSICIAN ASSISTANT

## 2023-08-15 PROCEDURE — 71275 CT ANGIOGRAPHY CHEST: CPT

## 2023-08-15 RX ORDER — MAGNESIUM SULFATE 1 G/100ML
1 INJECTION INTRAVENOUS ONCE
Status: COMPLETED | OUTPATIENT
Start: 2023-08-15 | End: 2023-08-15

## 2023-08-15 RX ORDER — SODIUM CHLORIDE, SODIUM GLUCONATE, SODIUM ACETATE, POTASSIUM CHLORIDE, MAGNESIUM CHLORIDE, SODIUM PHOSPHATE, DIBASIC, AND POTASSIUM PHOSPHATE .53; .5; .37; .037; .03; .012; .00082 G/100ML; G/100ML; G/100ML; G/100ML; G/100ML; G/100ML; G/100ML
1000 INJECTION, SOLUTION INTRAVENOUS ONCE
Status: COMPLETED | OUTPATIENT
Start: 2023-08-15 | End: 2023-08-15

## 2023-08-15 RX ORDER — FUROSEMIDE 10 MG/ML
40 INJECTION INTRAMUSCULAR; INTRAVENOUS DAILY
Status: DISCONTINUED | OUTPATIENT
Start: 2023-08-16 | End: 2023-08-15

## 2023-08-15 RX ORDER — PANTOPRAZOLE SODIUM 40 MG/1
40 TABLET, DELAYED RELEASE ORAL 2 TIMES DAILY
Status: DISCONTINUED | OUTPATIENT
Start: 2023-08-15 | End: 2023-08-21 | Stop reason: HOSPADM

## 2023-08-15 RX ORDER — ONDANSETRON 2 MG/ML
4 INJECTION INTRAMUSCULAR; INTRAVENOUS EVERY 6 HOURS PRN
Status: DISCONTINUED | OUTPATIENT
Start: 2023-08-15 | End: 2023-08-21 | Stop reason: HOSPADM

## 2023-08-15 RX ORDER — MELATONIN
1000 DAILY
Status: DISCONTINUED | OUTPATIENT
Start: 2023-08-16 | End: 2023-08-21 | Stop reason: HOSPADM

## 2023-08-15 RX ORDER — FUROSEMIDE 10 MG/ML
40 INJECTION INTRAMUSCULAR; INTRAVENOUS DAILY
Status: DISCONTINUED | OUTPATIENT
Start: 2023-08-15 | End: 2023-08-18

## 2023-08-15 RX ORDER — POTASSIUM CHLORIDE 14.9 MG/ML
20 INJECTION INTRAVENOUS ONCE
Status: COMPLETED | OUTPATIENT
Start: 2023-08-15 | End: 2023-08-15

## 2023-08-15 RX ORDER — BUTALBITAL, ACETAMINOPHEN AND CAFFEINE 50; 325; 40 MG/1; MG/1; MG/1
1 TABLET ORAL EVERY 4 HOURS PRN
Status: DISCONTINUED | OUTPATIENT
Start: 2023-08-15 | End: 2023-08-21 | Stop reason: HOSPADM

## 2023-08-15 RX ORDER — DILTIAZEM HYDROCHLORIDE 5 MG/ML
15 INJECTION INTRAVENOUS ONCE
Status: COMPLETED | OUTPATIENT
Start: 2023-08-15 | End: 2023-08-15

## 2023-08-15 RX ORDER — PRAVASTATIN SODIUM 40 MG
40 TABLET ORAL
Status: DISCONTINUED | OUTPATIENT
Start: 2023-08-15 | End: 2023-08-21 | Stop reason: HOSPADM

## 2023-08-15 RX ORDER — ACETAMINOPHEN 325 MG/1
650 TABLET ORAL EVERY 6 HOURS PRN
Status: DISCONTINUED | OUTPATIENT
Start: 2023-08-15 | End: 2023-08-21 | Stop reason: HOSPADM

## 2023-08-15 RX ORDER — MELATONIN
1000 DAILY
COMMUNITY

## 2023-08-15 RX ADMIN — POTASSIUM CHLORIDE 20 MEQ: 14.9 INJECTION, SOLUTION INTRAVENOUS at 20:19

## 2023-08-15 RX ADMIN — MAGNESIUM SULFATE HEPTAHYDRATE 1 G: 1 INJECTION, SOLUTION INTRAVENOUS at 20:19

## 2023-08-15 RX ADMIN — PRAVASTATIN SODIUM 40 MG: 40 TABLET ORAL at 19:35

## 2023-08-15 RX ADMIN — IOHEXOL 85 ML: 350 INJECTION, SOLUTION INTRAVENOUS at 16:49

## 2023-08-15 RX ADMIN — APIXABAN 5 MG: 5 TABLET, FILM COATED ORAL at 18:24

## 2023-08-15 RX ADMIN — SODIUM CHLORIDE, SODIUM GLUCONATE, SODIUM ACETATE, POTASSIUM CHLORIDE, MAGNESIUM CHLORIDE, SODIUM PHOSPHATE, DIBASIC, AND POTASSIUM PHOSPHATE 1000 ML: .53; .5; .37; .037; .03; .012; .00082 INJECTION, SOLUTION INTRAVENOUS at 15:13

## 2023-08-15 RX ADMIN — DILTIAZEM HYDROCHLORIDE 15 MG/HR: 5 INJECTION, SOLUTION INTRAVENOUS at 23:38

## 2023-08-15 RX ADMIN — PANTOPRAZOLE SODIUM 40 MG: 40 TABLET, DELAYED RELEASE ORAL at 20:19

## 2023-08-15 RX ADMIN — DILTIAZEM HYDROCHLORIDE 12.5 MG/HR: 5 INJECTION, SOLUTION INTRAVENOUS at 18:18

## 2023-08-15 RX ADMIN — FUROSEMIDE 40 MG: 10 INJECTION, SOLUTION INTRAMUSCULAR; INTRAVENOUS at 18:24

## 2023-08-15 RX ADMIN — DILTIAZEM HYDROCHLORIDE 2.5 MG/HR: 5 INJECTION INTRAVENOUS at 15:51

## 2023-08-15 RX ADMIN — DILTIAZEM HYDROCHLORIDE 15 MG: 5 INJECTION INTRAVENOUS at 14:57

## 2023-08-15 NOTE — ASSESSMENT & PLAN NOTE
This is a 38-year-old female patient with history of hypertension, hyperlipidemia, migraines, who was sent from cardiology office due to evidence of A-fib with RVR and patient's complaints of exertional dyspnea and fluid retention noted on exam  · Admitted to stepdown level of care  · [id number] 4, anticoagulation indicated -initiated on Eliquis -pricing will need to be checked by case management  · Was initiated on Cardizem drip in the ED, continue  · Attempted to initiate p.o. metoprolol but patient declining stating that she cannot tolerate side effects  · Obtain 2D echo  · Check TSH level  · N.p.o. after midnight in case cardioversion with LAURENCE would be indicated  · Request inpatient cardiology evaluation

## 2023-08-15 NOTE — ASSESSMENT & PLAN NOTE
Wt Readings from Last 3 Encounters:   08/15/23 80.4 kg (177 lb 4 oz)   08/15/23 80.4 kg (177 lb 3.2 oz)   07/25/23 80.1 kg (176 lb 9.6 oz)     Patient presents with new onset A-fib, exertional dyspnea, evidence of fluid retention, elevated BNP  Proceed with IV Lasix 40 mg daily  Monitor I's and O's, daily weights  Obtain 2D echo

## 2023-08-15 NOTE — H&P
[address]  H&P  Name: Paulo Pacheco 69 y.o. female I MRN: [id number]  Unit/Bed#: RM02 I Date of Admission: 8/15/2023   Date of Service: 8/15/2023 I Hospital Day: 0      Assessment/Plan   * New onset a-fib with RVR  Assessment & Plan  This is a 68-year-old female patient with history of hypertension, hyperlipidemia, migraines, who was sent from cardiology office due to evidence of A-fib with RVR and patient's complaints of exertional dyspnea and fluid retention noted on exam  · Admitted to stepdown level of care  · [id number] 4, anticoagulation indicated -initiated on Eliquis -pricing will need to be checked by case management  · Was initiated on Cardizem drip in the ED, continue  · Attempted to initiate p.o. metoprolol but patient declining stating that she cannot tolerate side effects  · Obtain 2D echo  · Check TSH level  · N.p.o. after midnight in case cardioversion with LARUENCE would be indicated  · Request inpatient cardiology evaluation    Acute CHF (congestive heart failure) ([address])  Assessment & Plan  Wt Readings from Last 3 Encounters:   08/15/23 80.4 kg (177 lb 4 oz)   08/15/23 80.4 kg (177 lb 3.2 oz)   07/25/23 80.1 kg (176 lb 9.6 oz)     Patient presents with new onset A-fib, exertional dyspnea, evidence of fluid retention, elevated BNP  Proceed with IV Lasix 40 mg daily  Monitor I's and O's, daily weights  Obtain 2D echo      Essential hypertension  Assessment & Plan  Proceed with AV ranjana blocking agents in setting of A-fib with RVR  Hold patient's losartan for now      Migraine without aura and without status migrainosus, not intractable  Assessment & Plan  Continue Fioricet as needed    Esophageal reflux  Assessment & Plan  Continue PPI       VTE Pharmacologic Prophylaxis: VTE Score: 4 Moderate Risk (Score 3-4) - Pharmacological DVT Prophylaxis Ordered: apixaban (Eliquis).   Code Status: Level 1 - Full Code   Discussion with family: Updated  () at bedside. Anticipated Length of Stay: Patient will be admitted on an inpatient basis with an anticipated length of stay of greater than 2 midnights secondary to Stepdown monitoring, IV Cardizem, potential cardioversion. Total Time Spent on Date of Encounter in care of patient: 75 minutes This time was spent on one or more of the following: performing physical exam; counseling and coordination of care; obtaining or reviewing history; documenting in the medical record; reviewing/ordering tests, medications or procedures; communicating with other healthcare professionals and discussing with patient's family/caregivers. Chief Complaint: Exertional dyspnea, ankle edema    History of Present Illness:  Cortney Roy is a 69 y.o. female with a PMH of hypertension, who was sent from cardiology office due to new onset A-fib with RVR, heart rate 169 in the cardiology office. The patient has apparently been complaining of exertional dyspnea and noticed intermittent lower extremity swelling. She has not noticed any palpitations or chest pain per se, but does state that she has not felt completely well "for a while "attributing it to hot weather and "sinuses."  The patient reports compliance with her medications. She denies nausea, vomiting, diarrhea or constipation. She denies infectious symptoms. She does report intolerance to metoprolol and is declining trial of this medication. She has been on Cardizem drip since her presentation to the ED with her heart rates in the 110's to 120s. She does report modest improvement in her symptoms. Review of Systems:  Review of Systems   Constitutional: Negative for fever. HENT: Negative for congestion. Respiratory: Positive for shortness of breath. Cardiovascular: Positive for leg swelling. Gastrointestinal: Negative for abdominal pain, constipation, diarrhea and nausea. Endocrine: Negative for polydipsia and polyuria.    Genitourinary: Negative for dysuria. Musculoskeletal: Negative for gait problem. Skin: Negative for rash. Neurological: Negative for dizziness. Hematological: Negative for adenopathy. Psychiatric/Behavioral: Negative for confusion. Past Medical and Surgical History:   Past Medical History:   Diagnosis Date   • Allergic reaction     Resolved 11/24/2017    • Allergic rhinitis     Resolved 11/24/2017    • Generalized anxiety disorder     Resolved 11/24/2017    • High cholesterol    • Hypertension    • Microscopic hematuria     Resolved 11/24/2017    • Postural dizziness with presyncope 03/11/2021   • Renal calculi     Resolved 38/59/0831    • Renal colic     Resolved 77/38/7225    • Renal cyst, acquired     Resolved 11/24/2017    • Restrictive lung disease     Resolved 11/24/2017    • Supraventricular tachycardia ([address])     Resolved 11/24/2017        Past Surgical History:   Procedure Laterality Date   • CHOLECYSTECTOMY     • CYSTOSCOPY  05/07/2014    Diagnostic. Last assessed 5/7/2014     • TONSILLECTOMY     • TUBAL LIGATION         Meds/Allergies:  Prior to Admission medications    Medication Sig Start Date End Date Taking?  Authorizing Provider   butalbital-acetaminophen-caffeine (FIORICET,ESGIC) -40 mg per tablet Take 1 tablet by mouth every 4 (four) hours as needed for headaches 6/22/22   Heidi Reyes DO   cholecalciferol (VITAMIN D3) 1,000 units tablet Take 1,000 Units by mouth daily 2,000    Historical Provider, MD   denosumab (PROLIA) 60 mg/mL Inject 1 mL (60 mg total) under the skin once for 1 dose 4/27/23 8/15/23  Jericho Shankar DO   fluticasone [facility]) 50 mcg/act nasal spray 2 sprays into each nostril daily 7/25/22   Heidi Reyes DO   losartan (Cozaar) 25 mg tablet Take 1 tablet (25 mg total) by mouth daily 6/9/23   Heidi Reyes DO   pantoprazole (PROTONIX) 40 mg tablet Take 1 tablet (40 mg total) by mouth 2 (two) times a day 4/26/23   Heidi Reyes DO   simvastatin (ZOCOR) 20 mg tablet TAKE 1 TABLET BY MOUTH  DAILY AT BEDTIME 10/14/22   Unknown Latin, DO     I have reviewed home medications with a medical source (PCP, Pharmacy, other). Allergies: Allergies   Allergen Reactions   • Amlodipine Myalgia   • Codeine      Other reaction(s): Other (See Comments)  headaches   • Fosamax [Alendronate] GI Intolerance   • Angiotensin Receptor Blockers Other (See Comments)     Metallic taste       Social History:  Marital Status: /Civil Union   Patient Pre-hospital Living Situation: Home  Patient Pre-hospital Level of Mobility: walks  Patient Pre-hospital Diet Restrictions: None  Substance Use History:   Social History     Substance and Sexual Activity   Alcohol Use Yes    Comment: occasional     Social History     Tobacco Use   Smoking Status Former   Smokeless Tobacco Never     Social History     Substance and Sexual Activity   Drug Use Never       Family History:  Family History   Problem Relation Age of Onset   • Lung cancer Mother    • Heart attack Father    • No Known Problems Sister    • No Known Problems Sister    • No Known Problems Sister    • Heart attack Maternal Grandmother    • Diabetes Paternal Grandmother    • Colon cancer Maternal Aunt    • Esophageal cancer Maternal Aunt    • No Known Problems Maternal Aunt    • No Known Problems Paternal Aunt    • Rheum arthritis Family         Rheumatoid arthritis with rheumatoid factor    • No Known Problems Son    • No Known Problems Son        Physical Exam:     Vitals:   Blood Pressure: 117/65 (08/15/23 1712)  Pulse: (!) 143 (08/15/23 1730)  Temperature: 97.7 °F (36.5 °C) (08/15/23 1451)  Temp Source: Temporal (08/15/23 1451)  Respirations: 22 (08/15/23 1730)  Weight - Scale: 80.4 kg (177 lb 4 oz) (08/15/23 1451)  SpO2: 98 % (08/15/23 1730)    Physical Exam  Constitutional:       General: She is not in acute distress. HENT:      Head: Normocephalic and atraumatic. Nose: No congestion.    Eyes:      Conjunctiva/sclera: Conjunctivae normal. Cardiovascular:      Rate and Rhythm: Tachycardia present. Rhythm irregular. Heart sounds: No murmur heard. Pulmonary:      Effort: No respiratory distress. Breath sounds: No wheezing or rales. Abdominal:      General: There is no distension. Tenderness: There is no abdominal tenderness. There is no guarding. Musculoskeletal:      Right lower leg: Edema (+1 b/l pedal) present. Left lower leg: Edema present. Skin:     General: Skin is warm and dry. Neurological:      Mental Status: She is oriented to person, place, and time. Psychiatric:         Mood and Affect: Mood normal.          Additional Data:     Lab Results:  Results from last 7 days   Lab Units 08/15/23  1453   WBC Thousand/uL 7.46   HEMOGLOBIN g/dL 13.4   HEMATOCRIT % 42.6   PLATELETS Thousands/uL 142*   NEUTROS PCT % 67   LYMPHS PCT % 24   MONOS PCT % 7   EOS PCT % 1     Results from last 7 days   Lab Units 08/15/23  1453   SODIUM mmol/L 140   POTASSIUM mmol/L 3.8   CHLORIDE mmol/L 105   CO2 mmol/L 24   BUN mg/dL 19   CREATININE mg/dL 0.93   ANION GAP mmol/L 11   CALCIUM mg/dL 9.7   ALBUMIN g/dL 4.2   TOTAL BILIRUBIN mg/dL 1.11*   ALK PHOS U/L 51   ALT U/L 176*   AST U/L 45*   GLUCOSE RANDOM mg/dL 103     Results from last 7 days   Lab Units 08/15/23  1453   INR  0.98                   Lines/Drains:  Invasive Devices     Peripheral Intravenous Line  Duration           Peripheral IV 08/15/23 Proximal;Right;Ventral (anterior) Forearm <1 day    Peripheral IV 08/15/23 Right;Ventral (anterior) Forearm <1 day                    Imaging:   CTA ED chest PE Study   Final Result by Otoniel Edouard MD (08/15 3047)      No acute findings in the chest, specifically, no pulmonary arterial embolism or pulmonary infiltrate/consolidation. Workstation performed: [id number]         XR chest 1 view portable   Final Result by Prince Fabiola MD (08/15 8419)      No acute cardiopulmonary disease. Workstation performed: [id number]             EKG and Other Studies Reviewed on Admission:   · EKG: afib RVR    ** Please Note: This note has been constructed using a voice recognition system.  **

## 2023-08-15 NOTE — ED PROVIDER NOTES
History  Chief Complaint   Patient presents with   • Shortness of Breath     Pt sent from cardiology office with increased sob and in a rapid a-fib new onset     69year old female with PMH HTN, HLD, anxiety, h/o kidney stones presents ambulatory from local cardiology office for further evaluation. Pt notes she has been having elevated heart rates. At times feels like heart is beating fast but otherwise denies palpitations per se. She was noted to be in rapid afib at cardiology office and referred to ED for further evaluation. She estimates this has been ongoing for about the past month. Denies fever, chills. Denies cough, congestion or recent illness. Denies chest pain. She reports shortness of breath which is worse with activity, such as walking in here today. Denies N/V/D, abdominal pain. Denies dizziness, lightheadedness. There has been no syncope or falls. She reports occasional pain in right back around her shoulder blade. She has some increased leg swelling. No reported changes in health or medications. No current anticoagulation. No personal h/o DVT or PE. No reported changes in health or medications. No past reported history of afib. History provided by:  Patient and medical records   used: No        Prior to Admission Medications   Prescriptions Last Dose Informant Patient Reported? Taking?    butalbital-acetaminophen-caffeine (FIORICET,ESGIC) -40 mg per tablet   No No   Sig: Take 1 tablet by mouth every 4 (four) hours as needed for headaches   cholecalciferol (VITAMIN D3) 1,000 units tablet   Yes No   Sig: Take 1,000 Units by mouth daily 2,000   denosumab (PROLIA) 60 mg/mL   No No   Sig: Inject 1 mL (60 mg total) under the skin once for 1 dose   fluticasone (FLONASE) 50 mcg/act nasal spray   No No   Si sprays into each nostril daily   losartan (Cozaar) 25 mg tablet   No No   Sig: Take 1 tablet (25 mg total) by mouth daily   pantoprazole (PROTONIX) 40 mg tablet   No No   Sig: Take 1 tablet (40 mg total) by mouth 2 (two) times a day   simvastatin (ZOCOR) 20 mg tablet   No No   Sig: TAKE 1 TABLET BY MOUTH  DAILY AT BEDTIME      Facility-Administered Medications: None       Past Medical History:   Diagnosis Date   • Allergic reaction     Resolved 11/24/2017    • Allergic rhinitis     Resolved 11/24/2017    • Generalized anxiety disorder     Resolved 11/24/2017    • High cholesterol    • Hypertension    • Microscopic hematuria     Resolved 11/24/2017    • Postural dizziness with presyncope 03/11/2021   • Renal calculi     Resolved 77/49/0772    • Renal colic     Resolved 61/12/0056    • Renal cyst, acquired     Resolved 11/24/2017    • Restrictive lung disease     Resolved 11/24/2017    • Supraventricular tachycardia ([address])     Resolved 11/24/2017        Past Surgical History:   Procedure Laterality Date   • CHOLECYSTECTOMY     • CYSTOSCOPY  05/07/2014    Diagnostic. Last assessed 5/7/2014     • TONSILLECTOMY     • TUBAL LIGATION         Family History   Problem Relation Age of Onset   • Lung cancer Mother    • Heart attack Father    • No Known Problems Sister    • No Known Problems Sister    • No Known Problems Sister    • Heart attack Maternal Grandmother    • Diabetes Paternal Grandmother    • Colon cancer Maternal Aunt    • Esophageal cancer Maternal Aunt    • No Known Problems Maternal Aunt    • No Known Problems Paternal Aunt    • Rheum arthritis Family         Rheumatoid arthritis with rheumatoid factor    • No Known Problems Son    • No Known Problems Son      I have reviewed and agree with the history as documented. E-Cigarette/Vaping   • E-Cigarette Use Never User      E-Cigarette/Vaping Substances   • Nicotine No    • THC No    • CBD No    • Flavoring No    • Other No    • Unknown No      Social History     Tobacco Use   • Smoking status: Former   • Smokeless tobacco: Never   Vaping Use   • Vaping Use: Never used   Substance Use Topics   • Alcohol use:  Yes Comment: occasional   • Drug use: Never       Review of Systems   Constitutional: Negative. Negative for chills, fatigue and fever. HENT: Negative. Negative for congestion, rhinorrhea and sore throat. Eyes: Negative. Negative for visual disturbance. Respiratory: Positive for shortness of breath. Negative for cough and wheezing. Cardiovascular: Positive for palpitations and leg swelling. Negative for chest pain. Gastrointestinal: Negative. Negative for abdominal pain, constipation, diarrhea, nausea and vomiting. Genitourinary: Negative. Negative for dysuria, flank pain, frequency and hematuria. Musculoskeletal: Positive for back pain (right back). Negative for myalgias and neck pain. Skin: Negative. Negative for rash. Neurological: Negative. Negative for dizziness, syncope, light-headedness, numbness and headaches. Psychiatric/Behavioral: Negative. Negative for confusion. All other systems reviewed and are negative. Physical Exam  Physical Exam  Vitals and nursing note reviewed. Constitutional:       General: She is awake. She is not in acute distress. Appearance: She is well-developed and overweight. She is not toxic-appearing or diaphoretic. HENT:      Head: Normocephalic and atraumatic. Right Ear: Hearing and external ear normal.      Left Ear: Hearing and external ear normal.      Nose: Nose normal.      Mouth/Throat:      Mouth: Mucous membranes are moist.      Pharynx: Oropharynx is clear. Uvula midline. Eyes:      General: Lids are normal. No scleral icterus. Conjunctiva/sclera: Conjunctivae normal.      Pupils: Pupils are equal, round, and reactive to light. Neck:      Trachea: Trachea and phonation normal. No tracheal deviation. Cardiovascular:      Rate and Rhythm: Tachycardia present. Rhythm irregularly irregular. Pulses: Normal pulses. Radial pulses are 2+ on the right side and 2+ on the left side.         Dorsalis pedis pulses are 2+ on the right side and 2+ on the left side. Posterior tibial pulses are 2+ on the right side and 2+ on the left side. Heart sounds: Normal heart sounds, S1 normal and S2 normal. No murmur heard. Pulmonary:      Effort: Pulmonary effort is normal. No tachypnea or respiratory distress. Breath sounds: Normal breath sounds. No wheezing, rhonchi or rales. Abdominal:      General: Bowel sounds are normal. There is no distension. Palpations: Abdomen is soft. Tenderness: There is no abdominal tenderness. There is no guarding or rebound. Musculoskeletal:         General: No tenderness. Right lower leg: No tenderness. Edema present. Left lower leg: No tenderness. Edema present. Skin:     General: Skin is warm and dry. Capillary Refill: Capillary refill takes less than 2 seconds. Findings: No rash. Neurological:      General: No focal deficit present. Mental Status: She is alert and oriented to person, place, and time. GCS: GCS eye subscore is 4. GCS verbal subscore is 5. GCS motor subscore is 6. Cranial Nerves: No cranial nerve deficit. Sensory: No sensory deficit. Motor: No abnormal muscle tone.    Psychiatric:         Mood and Affect: Mood normal.         Speech: Speech normal.         Behavior: Behavior normal.         Vital Signs  ED Triage Vitals [08/15/23 1451]   Temperature Pulse Respirations Blood Pressure SpO2   97.7 °F (36.5 °C) (!) 170 18 132/79 97 %      Temp Source Heart Rate Source Patient Position - Orthostatic VS BP Location FiO2 (%)   Temporal Monitor Sitting Left arm --      Pain Score       No Pain           Vitals:    08/15/23 1630 08/15/23 1712 08/15/23 1730 08/15/23 1830   BP: 144/67 117/65  166/81   Pulse: (!) 145 (!) 124 (!) 143 (!) 132   Patient Position - Orthostatic VS: Sitting   Sitting         Visual Acuity      ED Medications  Medications   diltiazem (CARDIZEM) 125 mg in sodium chloride 0.9 % 125 mL infusion (15 mg/hr Intravenous Rate/Dose Change 8/15/23 1833)   butalbital-acetaminophen-caffeine (FIORICET,ESGIC) -40 mg per tablet 1 tablet (has no administration in time range)   cholecalciferol (VITAMIN D3) tablet 1,000 Units (has no administration in time range)   pantoprazole (PROTONIX) EC tablet 40 mg (has no administration in time range)   pravastatin (PRAVACHOL) tablet 40 mg (has no administration in time range)   acetaminophen (TYLENOL) tablet 650 mg (has no administration in time range)   ondansetron (ZOFRAN) injection 4 mg (has no administration in time range)   apixaban (ELIQUIS) tablet 5 mg (5 mg Oral Given 8/15/23 1824)   furosemide (LASIX) injection 40 mg (40 mg Intravenous Given 8/15/23 1824)   multi-electrolyte (ISOLYTE-S PH 7.4) bolus 1,000 mL (0 mL Intravenous Stopped 8/15/23 1548)   diltiazem (CARDIZEM) injection 15 mg (15 mg Intravenous Given 8/15/23 1457)   diltiazem (CARDIZEM) 125 mg in sodium chloride 0.9 % 125 mL infusion (0 mg/hr Intravenous Stopped 8/15/23 1817)   iohexol (OMNIPAQUE) 350 MG/ML injection (SINGLE-DOSE) 85 mL (85 mL Intravenous Given 8/15/23 1649)       Diagnostic Studies  Results Reviewed     Procedure Component Value Units Date/Time    HS Troponin I 4hr [[id number]] Collected: 08/15/23 1852    Lab Status: No result Specimen: Blood from Arm, Right     TSH, 3rd generation [[id number]]  (Normal) Collected: 08/15/23 1453    Lab Status: Final result Specimen: Blood from Arm, Right Updated: 08/15/23 1829     TSH 3RD GENERATON 0.915 uIU/mL     HS Troponin I 2hr [[id number]]  (Normal) Collected: 08/15/23 1714    Lab Status: Final result Specimen: Blood from Arm, Right Updated: 08/15/23 1752     hs TnI 2hr 7 ng/L      Delta 2hr hsTnI 0 ng/L     Urine Microscopic [[id number]]  (Normal) Collected: 08/15/23 1551    Lab Status: Final result Specimen: Urine, Clean Catch Updated: 08/15/23 1616     RBC, UA 0-1 /hpf      WBC, UA None Seen /hpf      Epithelial Cells Occasional /hpf      Bacteria, UA None Seen /hpf     UA w Reflex to Microscopic w Reflex to Culture [[id number]]  (Abnormal) Collected: 08/15/23 1551    Lab Status: Final result Specimen: Urine, Clean Catch Updated: 08/15/23 1607     Color, UA Yellow     Clarity, UA Clear     Specific Gravity, UA <=1.005     pH, UA 6.0     Leukocytes, UA Negative     Nitrite, UA Negative     Protein, UA Negative mg/dl      Glucose, UA Negative mg/dl      Ketones, UA Negative mg/dl      Urobilinogen, UA 0.2 E.U./dl      Bilirubin, UA Negative     Occult Blood, UA Trace-Intact    TSH, 3rd generation with Free T4 reflex [[id number]]  (Normal) Collected: 08/15/23 1453    Lab Status: Final result Specimen: Blood from Arm, Right Updated: 08/15/23 1538     TSH 3RD GENERATON 0.919 uIU/mL     HS Troponin 0hr (reflex protocol) [[id number]]  (Normal) Collected: 08/15/23 1453    Lab Status: Final result Specimen: Blood from Arm, Right Updated: 08/15/23 1530     hs TnI 0hr 7 ng/L     B-Type Natriuretic Peptide(BNP) [[id number]]  (Abnormal) Collected: 08/15/23 1453    Lab Status: Final result Specimen: Blood from Arm, Right Updated: 08/15/23 1528      pg/mL     Comprehensive metabolic panel [[id number]]  (Abnormal) Collected: 08/15/23 1453    Lab Status: Final result Specimen: Blood from Arm, Right Updated: 08/15/23 1525     Sodium 140 mmol/L      Potassium 3.8 mmol/L      Chloride 105 mmol/L      CO2 24 mmol/L      ANION GAP 11 mmol/L      BUN 19 mg/dL      Creatinine 0.93 mg/dL      Glucose 103 mg/dL      Calcium 9.7 mg/dL      AST 45 U/L       U/L      Alkaline Phosphatase 51 U/L      Total Protein 6.8 g/dL      Albumin 4.2 g/dL      Total Bilirubin 1.11 mg/dL      eGFR 62 ml/min/1.73sq m     Narrative:      [place] guidelines for Chronic Kidney Disease (CKD):   •  Stage 1 with normal or high GFR (GFR > 90 mL/min/1.73 square meters)  •  Stage 2 Mild CKD (GFR = 60-89 mL/min/1.73 square meters)  •  Stage 3A Moderate CKD (GFR = 45-59 mL/min/1.73 square meters)  •  Stage 3B Moderate CKD (GFR = 30-44 mL/min/1.73 square meters)  •  Stage 4 Severe CKD (GFR = 15-29 mL/min/1.73 square meters)  •  Stage 5 End Stage CKD (GFR <15 mL/min/1.73 square meters)  Note: GFR calculation is accurate only with a steady state creatinine    Magnesium [[id number]]  (Normal) Collected: 08/15/23 1453    Lab Status: Final result Specimen: Blood from Arm, Right Updated: 08/15/23 1525     Magnesium 1.9 mg/dL     D-Dimer [[id number]]  (Abnormal) Collected: 08/15/23 1453    Lab Status: Final result Specimen: Blood from Arm, Right Updated: 08/15/23 1523     D-Dimer, Quant 0.93 ug/ml FEU     Narrative: In the evaluation for possible pulmonary embolism, in the appropriate (Well's Score of 4 or less) patient, the age adjusted d-dimer cutoff for this patient can be calculated as:    Age x 0.01 (in ug/mL) for Age-adjusted D-dimer exclusion threshold for a patient over 50 years.     Protime-INR [[id number]]  (Normal) Collected: 08/15/23 1453    Lab Status: Final result Specimen: Blood from Arm, Right Updated: 08/15/23 1516     Protime 13.1 seconds      INR 0.98    APTT [[id number]]  (Normal) Collected: 08/15/23 1453    Lab Status: Final result Specimen: Blood from Arm, Right Updated: 08/15/23 1516     PTT 27 seconds     CBC and differential [[id number]]  (Abnormal) Collected: 08/15/23 1453    Lab Status: Final result Specimen: Blood from Arm, Right Updated: 08/15/23 1506     WBC 7.46 Thousand/uL      RBC 4.70 Million/uL      Hemoglobin 13.4 g/dL      Hematocrit 42.6 %      MCV 91 fL      MCH 28.5 pg      MCHC 31.5 g/dL      RDW 13.3 %      MPV 12.1 fL      Platelets 726 Thousands/uL      nRBC 0 /100 WBCs      Neutrophils Relative 67 %      Immat GRANS % 0 %      Lymphocytes Relative 24 %      Monocytes Relative 7 %      Eosinophils Relative 1 %      Basophils Relative 1 %      Neutrophils Absolute 5.07 Thousands/µL      Immature Grans Absolute 0.02 Thousand/uL      Lymphocytes Absolute 1.76 Thousands/µL      Monocytes Absolute 0.51 Thousand/µL      Eosinophils Absolute 0.06 Thousand/µL      Basophils Absolute 0.04 Thousands/µL                  CTA ED chest PE Study   Final Result by Guzman Dickerson MD (08/15 1706)      No acute findings in the chest, specifically, no pulmonary arterial embolism or pulmonary infiltrate/consolidation. Workstation performed: [id number]         XR chest 1 view portable   Final Result by Ken Guzman MD (08/15 1653)      No acute cardiopulmonary disease. Workstation performed: [id number]                    Procedures  ECG 12 Lead Documentation Only    Date/Time: 8/15/2023 2:50 PM    Performed by: Lloyd Solis PA-C  Authorized by: Lloyd Solis PA-C    Indications / Diagnosis:  Tachycardia  ECG reviewed by me, the ED Provider: yes    Patient location:  ED  Previous ECG:     Previous ECG:  Compared to current    Comparison ECG info:  3/26/23    Similarity:  Changes noted    Comparison to cardiac monitor: Yes    Interpretation:     Interpretation: abnormal    Rate:     ECG rate:  165    ECG rate assessment: tachycardic    Rhythm:     Rhythm: atrial fibrillation    Ectopy:     Ectopy: none    QRS:     QRS axis:  Normal    QRS intervals:  Normal  Conduction:     Conduction: normal    ST segments:     ST segments:  Non-specific  T waves:     T waves: non-specific    Comments:      [place] *, QRS 70, QT/QTc 276/457; compared to prior afib has replaced sinus rhythm.     CriticalCare Time    Date/Time: 8/15/2023 3:00 PM    Performed by: Lloyd Solis PA-C  Authorized by: Lloyd Solis PA-C    Critical care provider statement:     Critical care time (minutes):  36    Critical care time was exclusive of:  Separately billable procedures and treating other patients    Critical care was necessary to treat or prevent imminent or life-threatening deterioration of the following conditions:  Cardiac failure (afib with rapid ventricular response)    Critical care was time spent personally by me on the following activities:  Obtaining history from patient or surrogate, development of treatment plan with patient or surrogate, discussions with consultants, evaluation of patient's response to treatment, examination of patient, ordering and performing treatments and interventions, ordering and review of laboratory studies, ordering and review of radiographic studies, review of old charts and re-evaluation of patient's condition    I assumed direction of critical care for this patient from another provider in my specialty: no               ED Course  ED Course as of 08/15/23 1855   Tue Aug 15, 2023   1444 HR 160s on the monitor, rapid afib.   1451 Reviewed outpatient OV from cardiology today. 1508 WBC: 7.46   1508 Hemoglobin: 13.4   1508 Platelet Count(!): 808  Decreased from prior, previously normal   1529 POCT INR: 0.98   1530 PROTIME: 13.1   1530 PTT: 27   1530 D-Dimer, Quant(!): 0.93  Unable to correct for age   0 Magnesium: 1.9   1530 BNP(!): 433  No prior for comparison   1530 Glucose, Random: 103   1531 Creatinine: 0.93   1531 BUN: 19   1531 Sodium: 140   1531 Potassium: 3.8   1531 Chloride: 105   1531 CO2: 24   1531 Anion Gap: 11   1531 Calcium: 9.7   1531 AST(!): 45   1531 ALT(!): 176   1531 Alkaline Phosphatase: 51   1531 Total Protein: 6.8   1531 Albumin: 4.2   1531 TOTAL BILIRUBIN(!): 1.11   1531 eGFR: 62   1531 Elevations in AST and ALT are new from prior   1531 hs TnI 0hr: 7  Not diagnostic of ACS however will require a delta to exclude   1532 XR chest 1 view portable  Independently viewed and interpreted by me - no infiltrate, no vascular congestion, no pleural effusion; pending official read. 1539 TSH 3RD GENERATON: 0.919  Within normal limits   1607 Blood, UA(!): Trace-Intact  UA shows trace blood, otherwise negative and not suggestive of infection   1610 Pt reassessed; resting comfortably.   Reviewed results. Agreeable to CT scan. Remains afib on the monitor 120s-130s. Elevation of LFTs unclear significance. She notes having a beer over the weekend but otherwise does not routinely drink. Reports a single dose of tylenol for a headache the other day. There's no abdominal pain. She has had a cholecystectomy in the past.   1617 WBC, UA: None Seen   1617 Bacteria, UA: None Seen   1708 CTA ED chest PE Study  IMPRESSION:     No acute findings in the chest, specifically, no pulmonary arterial embolism or pulmonary infiltrate/consolidation. 1710 Pt updated on results. Agreeable to admission as previously discussed. 1713 TT to on call [facility]IM to discuss admission. 56 Discussed with Dr. Maryann Tello and accepts inpatient to step down. [id number] hsTnI: 0  Not c/w ACS   1829 TSH 3RD GENERATON: 0.915  Within normal limits       Pt admitted in stable condition to step down level of care. HEART Risk Score    Flowsheet Row Most Recent Value   Heart Score Risk Calculator    History 1 Filed at: 08/15/2023 1456   ECG 1 Filed at: 08/15/2023 1456   Age 2 Filed at: 08/15/2023 1456   Risk Factors 1 Filed at: 08/15/2023 1456   Troponin 0 Filed at: 08/15/2023 1456   HEART Score 5 Filed at: 08/15/2023 1456                        SBIRT 22yo+    Flowsheet Row Most Recent Value   Initial Alcohol Screen: US AUDIT-C     1. How often do you have a drink containing alcohol? 0 Filed at: 08/15/2023 1455   Audit-C Score 0 Filed at: 08/15/2023 1455   BITA: How many times in the past year have you. .. Used an illegal drug or used a prescription medication for non-medical reasons?  Never Filed at: 08/15/2023 1455          Wells' Criteria for PE    Flowsheet Row Most Recent Value   Wells' Criteria for PE    Clinical signs and symptoms of DVT 0 Filed at: 08/15/2023 1456   PE is primary diagnosis or equally likely 0 Filed at: 08/15/2023 1456   HR >100 1.5 Filed at: 08/15/2023 1456   Immobilization at least 3 days or Surgery in the previous 4 weeks 0 Filed at: 08/15/2023 1456   Previous, objectively diagnosed PE or DVT 0 Filed at: 08/15/2023 1456   Hemoptysis 0 Filed at: 08/15/2023 1456   Malignancy with treatment within 6 months or palliative 0 Filed at: 08/15/2023 1456   Wells' Criteria Total 1.5 Filed at: 08/15/2023 1456                Medical Decision Making  70 yo female presenting for evaluation of rapid heart rate. Pt is in new onset rapid afib. Based on duration of symptoms, has been ongoing for over the past 4-6 weeks. Was seen by cardiology and referred to ED. Will obtain EKG, CXR, labs, urine. Will provide fluids and initiate cardizem for rate control. Anticipate need for admission. Pt indicates this was plan as discussed by cardiology. Work up obtained as noted above. Atrial fibrillation with rapid ventricular noted on EKG and cardiac monitor. EKG and troponin not c/w ACS. CXR does not reveal pneumonia, pneumothorax, vascular congestion or pleural effusion. No noted leukocytosis, no anemia. New thrombocytopenia from prior. No infectious concerns. No hypo or hyperglycemia. Renal function within normal limits and not suggestive of dehydration. Electrolytes within normal limits. Elevation in LFTs which is new from prior of unclear significance. UA not suggestive of infection. Pt had elevated d dimer (of note pt had this in past with prior negative CTA chest 3/26/23). Given tachycardia, thoracic back pain and complaints of SOB along with elevated d dimer, CTA chest was pursued. CTA chest was negative for PE or other acute findings. SLIM consulted for admission given new onset afib with RVR and pt accepted for step down level of care. Please refer to above ER course for further details/discussion.       Atrial fibrillation with rapid ventricular response ([address]): acute illness or injury     Details: new onset  Elevated LFTs: acute illness or injury  Positive D dimer: acute illness or injury  Amount and/or Complexity of Data Reviewed  External Data Reviewed: labs, radiology, ECG and notes. Labs: ordered. Decision-making details documented in ED Course. Radiology: ordered and independent interpretation performed. Decision-making details documented in ED Course. ECG/medicine tests: ordered and independent interpretation performed. Decision-making details documented in ED Course. Discussion of management or test interpretation with external provider(s): Attending, SLIM    Risk  Prescription drug management. Decision regarding hospitalization. Disposition  Final diagnoses:   Atrial fibrillation with rapid ventricular response ([address])   Positive D dimer   Elevated LFTs     Time reflects when diagnosis was documented in both MDM as applicable and the Disposition within this note     Time User Action Codes Description Comment    8/15/2023  4:38 PM Jay Jay Quant Add [I48.91] Atrial fibrillation with rapid ventricular response ([address])     8/15/2023  4:38 PM Jay Jay Quant Add [R79.89] Positive D dimer     8/15/2023  4:38 PM Wong Mo [R79.89] Elevated LFTs     8/15/2023  6:02 PM Chloe Shah Add [I50.9] Acute CHF (congestive heart failure) ([address])     8/15/2023  6:39 PM Denita Och Add [I48.91] New onset a-fib with RVR       ED Disposition     ED Disposition   Admit    Condition   Stable    Date/Time   Tue Aug 15, 2023  5:14 PM    Comment   Case was discussed with Dr. oTnny Faulkner and the patient's admission status was agreed to be Admission Status: inpatient status to the service of Dr. Tonny Faulkner.            Follow-up Information    None         Current Discharge Medication List      START taking these medications    Details   apixaban (Eliquis) 5 mg Take 1 tablet (5 mg total) by mouth 2 (two) times a day  Qty: 60 tablet, Refills: 0    Associated Diagnoses: New onset a-fib ([address])         CONTINUE these medications which have NOT CHANGED    Details   butalbital-acetaminophen-caffeine ([facility]) -40 mg per tablet Take 1 tablet by mouth every 4 (four) hours as needed for headaches  Qty: 30 tablet, Refills: 0    Associated Diagnoses: Migraine without aura and without status migrainosus, not intractable      cholecalciferol (VITAMIN D3) 1,000 units tablet Take 1,000 Units by mouth daily 2,000      denosumab (PROLIA) 60 mg/mL Inject 1 mL (60 mg total) under the skin once for 1 dose  Qty: 1 mL, Refills: 0    Associated Diagnoses: Age-related osteoporosis without current pathological fracture      fluticasone (FLONASE) 50 mcg/act nasal spray 2 sprays into each nostril daily  Qty: 16 g, Refills: 3    Associated Diagnoses: Acute sinusitis, recurrence not specified, unspecified location      losartan (Cozaar) 25 mg tablet Take 1 tablet (25 mg total) by mouth daily  Qty: 30 tablet, Refills: 3    Associated Diagnoses: Essential hypertension      pantoprazole (PROTONIX) 40 mg tablet Take 1 tablet (40 mg total) by mouth 2 (two) times a day  Qty: 180 tablet, Refills: 1    Associated Diagnoses: Gastroesophageal reflux disease without esophagitis      simvastatin (ZOCOR) 20 mg tablet TAKE 1 TABLET BY MOUTH  DAILY AT BEDTIME  Qty: 90 tablet, Refills: 3    Comments: Requesting 1 year supply  Associated Diagnoses: Mixed hyperlipidemia             No discharge procedures on file.     PDMP Review       Value Time User    PDMP Reviewed  Yes 6/22/2022  1:59 PM Precious Redman DO          ED Provider  Electronically Signed by           Dominick Candelario PA-C  08/15/23 0050

## 2023-08-15 NOTE — ASSESSMENT & PLAN NOTE
Proceed with AV ranjana blocking agents in setting of A-fib with RVR  Hold patient's losartan for now

## 2023-08-16 ENCOUNTER — ANESTHESIA EVENT (OUTPATIENT)
Dept: ANESTHESIOLOGY | Facility: HOSPITAL | Age: 68
End: 2023-08-16

## 2023-08-16 ENCOUNTER — APPOINTMENT (INPATIENT)
Dept: NON INVASIVE DIAGNOSTICS | Facility: HOSPITAL | Age: 68
DRG: 291 | End: 2023-08-16
Payer: COMMERCIAL

## 2023-08-16 ENCOUNTER — ANESTHESIA (OUTPATIENT)
Dept: ANESTHESIOLOGY | Facility: HOSPITAL | Age: 68
End: 2023-08-16

## 2023-08-16 LAB
ALBUMIN SERPL BCP-MCNC: 3.8 G/DL (ref 3.5–5)
ALP SERPL-CCNC: 45 U/L (ref 34–104)
ALT SERPL W P-5'-P-CCNC: 141 U/L (ref 7–52)
ANION GAP SERPL CALCULATED.3IONS-SCNC: 10 MMOL/L
AORTIC ROOT: 2.7 CM
AORTIC VALVE MEAN VELOCITY: 9.4 M/S
APICAL FOUR CHAMBER EJECTION FRACTION: 58 %
AST SERPL W P-5'-P-CCNC: 30 U/L (ref 13–39)
AV LVOT MEAN GRADIENT: 2 MMHG
AV LVOT PEAK GRADIENT: 3 MMHG
AV MEAN GRADIENT: 4 MMHG
AV PEAK GRADIENT: 8 MMHG
AV VELOCITY RATIO: 0.67
BILIRUB SERPL-MCNC: 1.44 MG/DL (ref 0.2–1)
BUN SERPL-MCNC: 15 MG/DL (ref 5–25)
CALCIUM SERPL-MCNC: 8.9 MG/DL (ref 8.4–10.2)
CHLORIDE SERPL-SCNC: 103 MMOL/L (ref 96–108)
CO2 SERPL-SCNC: 29 MMOL/L (ref 21–32)
CREAT SERPL-MCNC: 0.94 MG/DL (ref 0.6–1.3)
DOP CALC AO PEAK VEL: 1.38 M/S
DOP CALC AO VTI: 26.12 CM
DOP CALC LVOT PEAK VEL VTI: 18.11 CM
DOP CALC LVOT PEAK VEL: 0.92 M/S
ERYTHROCYTE [DISTWIDTH] IN BLOOD BY AUTOMATED COUNT: 13.4 % (ref 11.6–15.1)
FRACTIONAL SHORTENING: 29 % (ref 28–44)
GFR SERPL CREATININE-BSD FRML MDRD: 62 ML/MIN/1.73SQ M
GLUCOSE SERPL-MCNC: 104 MG/DL (ref 65–140)
HCT VFR BLD AUTO: 39.8 % (ref 34.8–46.1)
HGB BLD-MCNC: 12.4 G/DL (ref 11.5–15.4)
INTERVENTRICULAR SEPTUM IN DIASTOLE (PARASTERNAL SHORT AXIS VIEW): 1.1 CM
INTERVENTRICULAR SEPTUM: 1.1 CM (ref 0.6–1.1)
LAAS-AP2: 22.1 CM2
LAAS-AP4: 24.2 CM2
LEFT ATRIUM SIZE: 4 CM
LEFT ATRIUM VOLUME (MOD BIPLANE): 69 ML
LEFT INTERNAL DIMENSION IN SYSTOLE: 3 CM (ref 2.1–4)
LEFT VENTRICULAR INTERNAL DIMENSION IN DIASTOLE: 4.2 CM (ref 3.5–6)
LEFT VENTRICULAR POSTERIOR WALL IN END DIASTOLE: 1 CM
LEFT VENTRICULAR STROKE VOLUME: 46 ML
LVSV (TEICH): 46 ML
MAGNESIUM SERPL-MCNC: 2.3 MG/DL (ref 1.9–2.7)
MCH RBC QN AUTO: 28.2 PG (ref 26.8–34.3)
MCHC RBC AUTO-ENTMCNC: 31.2 G/DL (ref 31.4–37.4)
MCV RBC AUTO: 91 FL (ref 82–98)
PLATELET # BLD AUTO: 144 THOUSANDS/UL (ref 149–390)
PMV BLD AUTO: 12.5 FL (ref 8.9–12.7)
POTASSIUM SERPL-SCNC: 3.7 MMOL/L (ref 3.5–5.3)
PROT SERPL-MCNC: 6.1 G/DL (ref 6.4–8.4)
RA PRESSURE ESTIMATED: 8 MMHG
RBC # BLD AUTO: 4.4 MILLION/UL (ref 3.81–5.12)
RIGHT ATRIUM AREA SYSTOLE A4C: 19 CM2
RIGHT VENTRICLE ID DIMENSION: 3 CM
RV PSP: 36 MMHG
SL CV LEFT ATRIUM LENGTH A2C: 6.3 CM
SL CV LV EF: 55
SL CV PED ECHO LEFT VENTRICLE DIASTOLIC VOLUME (MOD BIPLANE) 2D: 80 ML
SL CV PED ECHO LEFT VENTRICLE SYSTOLIC VOLUME (MOD BIPLANE) 2D: 34 ML
SODIUM SERPL-SCNC: 142 MMOL/L (ref 135–147)
TR MAX PG: 28 MMHG
TR PEAK VELOCITY: 2.7 M/S
TRICUSPID ANNULAR PLANE SYSTOLIC EXCURSION: 2 CM
TRICUSPID VALVE PEAK REGURGITATION VELOCITY: 2.66 M/S
WBC # BLD AUTO: 6.69 THOUSAND/UL (ref 4.31–10.16)

## 2023-08-16 PROCEDURE — 83735 ASSAY OF MAGNESIUM: CPT | Performed by: FAMILY MEDICINE

## 2023-08-16 PROCEDURE — 93306 TTE W/DOPPLER COMPLETE: CPT | Performed by: INTERNAL MEDICINE

## 2023-08-16 PROCEDURE — 5A2204Z RESTORATION OF CARDIAC RHYTHM, SINGLE: ICD-10-PCS | Performed by: FAMILY MEDICINE

## 2023-08-16 PROCEDURE — B24BZZ4 ULTRASONOGRAPHY OF HEART WITH AORTA, TRANSESOPHAGEAL: ICD-10-PCS | Performed by: FAMILY MEDICINE

## 2023-08-16 PROCEDURE — 99223 1ST HOSP IP/OBS HIGH 75: CPT | Performed by: INTERNAL MEDICINE

## 2023-08-16 PROCEDURE — 99233 SBSQ HOSP IP/OBS HIGH 50: CPT | Performed by: FAMILY MEDICINE

## 2023-08-16 PROCEDURE — 85027 COMPLETE CBC AUTOMATED: CPT | Performed by: FAMILY MEDICINE

## 2023-08-16 PROCEDURE — 80053 COMPREHEN METABOLIC PANEL: CPT | Performed by: FAMILY MEDICINE

## 2023-08-16 PROCEDURE — 93306 TTE W/DOPPLER COMPLETE: CPT

## 2023-08-16 RX ORDER — METOPROLOL TARTRATE 5 MG/5ML
5 INJECTION INTRAVENOUS EVERY 6 HOURS PRN
Status: DISCONTINUED | OUTPATIENT
Start: 2023-08-16 | End: 2023-08-21 | Stop reason: HOSPADM

## 2023-08-16 RX ADMIN — ACETAMINOPHEN 650 MG: 325 TABLET, FILM COATED ORAL at 12:48

## 2023-08-16 RX ADMIN — FUROSEMIDE 40 MG: 10 INJECTION, SOLUTION INTRAMUSCULAR; INTRAVENOUS at 09:53

## 2023-08-16 RX ADMIN — APIXABAN 5 MG: 5 TABLET, FILM COATED ORAL at 17:12

## 2023-08-16 RX ADMIN — Medication 1000 UNITS: at 09:53

## 2023-08-16 RX ADMIN — ACETAMINOPHEN 650 MG: 325 TABLET, FILM COATED ORAL at 00:02

## 2023-08-16 RX ADMIN — PRAVASTATIN SODIUM 40 MG: 40 TABLET ORAL at 17:12

## 2023-08-16 RX ADMIN — DILTIAZEM HYDROCHLORIDE 30 MG: 30 TABLET ORAL at 20:32

## 2023-08-16 RX ADMIN — PANTOPRAZOLE SODIUM 40 MG: 40 TABLET, DELAYED RELEASE ORAL at 20:32

## 2023-08-16 RX ADMIN — DILTIAZEM HYDROCHLORIDE 30 MG: 30 TABLET ORAL at 17:12

## 2023-08-16 RX ADMIN — APIXABAN 5 MG: 5 TABLET, FILM COATED ORAL at 09:53

## 2023-08-16 RX ADMIN — DILTIAZEM HYDROCHLORIDE 30 MG: 30 TABLET ORAL at 11:24

## 2023-08-16 RX ADMIN — PANTOPRAZOLE SODIUM 40 MG: 40 TABLET, DELAYED RELEASE ORAL at 09:53

## 2023-08-16 NOTE — ASSESSMENT & PLAN NOTE
This is a 56-year-old female patient with history of hypertension, hyperlipidemia, migraines, who was sent from cardiology office due to evidence of A-fib with RVR and patient's complaints of exertional dyspnea and fluid retention noted on exam  · Admitted to stepdown level of care  · [id number], anticoagulation indicated -initiated on Eliquis -pricing checked by case management   · was initiated on Cardizem drip in the ED, heart rate now controlled, patient transitioned to p.o. Cardizem by cardiology  · Attempted to initiate p.o. metoprolol but patient declining stating that she cannot tolerate side effects  · 2D echo reveals low normal EF of 50%  · N.p.o. after midnight with plans for LAURENCE guided cardioversion in a.m.    · Appreciate cardiology input

## 2023-08-16 NOTE — PLAN OF CARE
Problem: PAIN - ADULT  Goal: Verbalizes/displays adequate comfort level or baseline comfort level  Description: Interventions:  - Encourage patient to monitor pain and request assistance  - Assess pain using appropriate pain scale  - Administer analgesics based on type and severity of pain and evaluate response  - Implement non-pharmacological measures as appropriate and evaluate response  - Consider cultural and social influences on pain and pain management  - Notify physician/advanced practitioner if interventions unsuccessful or patient reports new pain  Outcome: Progressing     Problem: INFECTION - ADULT  Goal: Absence or prevention of progression during hospitalization  Description: INTERVENTIONS:  - Assess and monitor for signs and symptoms of infection  - Monitor lab/diagnostic results  - Monitor all insertion sites, i.e. indwelling lines, tubes, and drains  - Monitor endotracheal if appropriate and nasal secretions for changes in amount and color  - [place] appropriate cooling/warming therapies per order  - Administer medications as ordered  - Instruct and encourage patient and family to use good hand hygiene technique  - Identify and instruct in appropriate isolation precautions for identified infection/condition  Outcome: Progressing  Goal: Absence of fever/infection during neutropenic period  Description: INTERVENTIONS:  - Monitor WBC    Outcome: Progressing     Problem: SAFETY ADULT  Goal: Patient will remain free of falls  Description: INTERVENTIONS:  - Educate patient/family on patient safety including physical limitations  - Instruct patient to call for assistance with activity   - Consult OT/PT to assist with strengthening/mobility   - Keep Call bell within reach  - Keep bed low and locked with side rails adjusted as appropriate  - Keep care items and personal belongings within reach  - Initiate and maintain comfort rounds  - Make Fall Risk Sign visible to staff  - Offer Toileting every 2 Hours, in advance of need  - Initiate/Maintain bed alarm  - Obtain necessary fall risk management equipment:   - Apply yellow socks and bracelet for high fall risk patients  - Consider moving patient to room near nurses station  Outcome: Progressing  Goal: Maintain or return to baseline ADL function  Description: INTERVENTIONS:  -  Assess patient's ability to carry out ADLs; assess patient's baseline for ADL function and identify physical deficits which impact ability to perform ADLs (bathing, care of mouth/teeth, toileting, grooming, dressing, etc.)  - Assess/evaluate cause of self-care deficits   - Assess range of motion  - Assess patient's mobility; develop plan if impaired  - Assess patient's need for assistive devices and provide as appropriate  - Encourage maximum independence but intervene and supervise when necessary  - Involve family in performance of ADLs  - Assess for home care needs following discharge   - Consider OT consult to assist with ADL evaluation and planning for discharge  - Provide patient education as appropriate  Outcome: Progressing  Goal: Maintains/Returns to pre admission functional level  Description: INTERVENTIONS:  - Perform BMAT or MOVE assessment daily.   - Set and communicate daily mobility goal to care team and patient/family/caregiver. - Collaborate with rehabilitation services on mobility goals if consulted  - Perform Range of Motion 3 times a day. - Reposition patient every 2 hours.   - Dangle patient 3 times a day  - Stand patient 3 times a day  - Ambulate patient 3 times a day  - Out of bed to chair 3 times a day   - Out of bed for meals 3 times a day  - Out of bed for toileting  - Record patient progress and toleration of activity level   Outcome: Progressing     Problem: DISCHARGE PLANNING  Goal: Discharge to home or other facility with appropriate resources  Description: INTERVENTIONS:  - Identify barriers to discharge w/patient and caregiver  - Arrange for needed discharge resources and transportation as appropriate  - Identify discharge learning needs (meds, wound care, etc.)  - Arrange for interpretive services to assist at discharge as needed  - Refer to Case Management Department for coordinating discharge planning if the patient needs post-hospital services based on physician/advanced practitioner order or complex needs related to functional status, cognitive ability, or social support system  Outcome: Progressing     Problem: Knowledge Deficit  Goal: Patient/family/caregiver demonstrates understanding of disease process, treatment plan, medications, and discharge instructions  Description: Complete learning assessment and assess knowledge base. Interventions:  - Provide teaching at level of understanding  - Provide teaching via preferred learning methods  Outcome: Progressing     Problem: Nutrition/Hydration-ADULT  Goal: Nutrient/Hydration intake appropriate for improving, restoring or maintaining nutritional needs  Description: Monitor and assess patient's nutrition/hydration status for malnutrition. Collaborate with interdisciplinary team and initiate plan and interventions as ordered. Monitor patient's weight and dietary intake as ordered or per policy. Utilize nutrition screening tool and intervene as necessary. Determine patient's food preferences and provide high-protein, high-caloric foods as appropriate.      INTERVENTIONS:  - Monitor oral intake, urinary output, labs, and treatment plans  - Assess nutrition and hydration status and recommend course of action  - Evaluate amount of meals eaten  - Assist patient with eating if necessary   - Allow adequate time for meals  - Recommend/ encourage appropriate diets, oral nutritional supplements, and vitamin/mineral supplements  - Order, calculate, and assess calorie counts as needed  - Recommend, monitor, and adjust tube feedings and TPN/PPN based on assessed needs  - Assess need for intravenous fluids  - Provide specific nutrition/hydration education as appropriate  - Include patient/family/caregiver in decisions related to nutrition  Outcome: Progressing     Problem: CARDIOVASCULAR - ADULT  Goal: Maintains optimal cardiac output and hemodynamic stability  Description: INTERVENTIONS:  - Monitor I/O, vital signs and rhythm  - Monitor for S/S and trends of decreased cardiac output  - Administer and titrate ordered vasoactive medications to optimize hemodynamic stability  - Assess quality of pulses, skin color and temperature  - Assess for signs of decreased coronary artery perfusion  - Instruct patient to report change in severity of symptoms  Outcome: Progressing  Goal: Absence of cardiac dysrhythmias or at baseline rhythm  Description: INTERVENTIONS:  - Continuous cardiac monitoring, vital signs, obtain 12 lead EKG if ordered  - Administer antiarrhythmic and heart rate control medications as ordered  - Monitor electrolytes and administer replacement therapy as ordered  Outcome: Progressing     Problem: RESPIRATORY - ADULT  Goal: Achieves optimal ventilation and oxygenation  Description: INTERVENTIONS:  - Assess for changes in respiratory status  - Assess for changes in mentation and behavior  - Position to facilitate oxygenation and minimize respiratory effort  - Oxygen administered by appropriate delivery if ordered  - Initiate smoking cessation education as indicated  - Encourage broncho-pulmonary hygiene including cough, deep breathe, Incentive Spirometry  - Assess the need for suctioning and aspirate as needed  - Assess and instruct to report SOB or any respiratory difficulty  - Respiratory Therapy support as indicated  Outcome: Progressing

## 2023-08-16 NOTE — PROGRESS NOTES
[address]  Progress Note  Name: Latasha Elizabeth  MRN: [id number]  Unit/Bed#:  I Date of Admission: 8/15/2023   Date of Service: 8/16/2023 I Hospital Day: 1    Assessment/Plan   * New onset a-fib with RVR  Assessment & Plan  This is a 68-year-old female patient with history of hypertension, hyperlipidemia, migraines, who was sent from cardiology office due to evidence of A-fib with RVR and patient's complaints of exertional dyspnea and fluid retention noted on exam  · Admitted to stepdown level of care  · [id number] 4, anticoagulation indicated -initiated on Eliquis -pricing checked by case management   · was initiated on Cardizem drip in the ED, heart rate now controlled, patient transitioned to p.o. Cardizem by cardiology  · Attempted to initiate p.o. metoprolol but patient declining stating that she cannot tolerate side effects  · 2D echo reveals low normal EF of 50%  · N.p.o. after midnight with plans for LAURENCE guided cardioversion in a.m. · Appreciate cardiology input      Acute CHF (congestive heart failure) ([address])  Assessment & Plan  Wt Readings from Last 3 Encounters:   08/16/23 78.5 kg (173 lb)   08/15/23 80.4 kg (177 lb 3.2 oz)   07/25/23 80.1 kg (176 lb 9.6 oz)     Patient presents with new onset A-fib, exertional dyspnea, evidence of fluid retention, elevated BNP 2D echo revealed low normal EF at 50%  Proceed with IV Lasix 40 mg daily  Monitor I's and O's, daily weights        Essential hypertension  Assessment & Plan  Proceed with Cardizem in setting of A-fib with RVR  Hold patient's losartan for now      Migraine without aura and without status migrainosus, not intractable  Assessment & Plan  Continue Fioricet as needed    Esophageal reflux  Assessment & Plan  Continue PPI           VTE Pharmacologic Prophylaxis: VTE Score: 4 Moderate Risk (Score 3-4) - Pharmacological DVT Prophylaxis Ordered: apixaban (Eliquis).     Patient Centered Rounds: I performed bedside rounds with nursing staff today. Discussions with Specialists or Other Care Team Provider: Cardiology     Education and Discussions with Family / Patient: patient    Total Time Spent on Date of Encounter in care of patient: 54 minutes This time was spent on one or more of the following: performing physical exam; counseling and coordination of care; obtaining or reviewing history; documenting in the medical record; reviewing/ordering tests, medications or procedures; communicating with other healthcare professionals and discussing with patient's family/caregivers. Current Length of Stay: 1 day(s)  Current Patient Status: Inpatient   Certification Statement: The patient will continue to require additional inpatient hospital stay due to close monitoring, plan for LAURENCE guided cardioversion in am  Discharge Plan: Anticipate discharge in 24-48 hrs to home. Code Status: Level 1 - Full Code    Subjective:   Patient reports feeling improved. She is still in A-fib with her heart rate around 90s. No overnight events. Objective:     Vitals:   Temp (24hrs), Av.8 °F (36.6 °C), Min:97 °F (36.1 °C), Max:98.9 °F (37.2 °C)    Temp:  [97 °F (36.1 °C)-98.9 °F (37.2 °C)] 97 °F (36.1 °C)  HR:  [] 90  Resp:  [14-34] 22  BP: ()/(51-96) 113/78  SpO2:  [90 %-98 %] 96 %  Body mass index is 30.65 kg/m². Input and Output Summary (last 24 hours): Intake/Output Summary (Last 24 hours) at 2023 1302  Last data filed at 2023 0701  Gross per 24 hour   Intake 1385.92 ml   Output 800 ml   Net 585.92 ml       Physical Exam:   Physical Exam  Constitutional:       General: She is not in acute distress. HENT:      Head: Normocephalic and atraumatic. Nose: No congestion. Eyes:      Conjunctiva/sclera: Conjunctivae normal.   Cardiovascular:      Rate and Rhythm: Normal rate. Rhythm irregular. Pulmonary:      Effort: No respiratory distress. Abdominal:      General: There is no distension. Tenderness: There is no abdominal tenderness. There is no guarding. Musculoskeletal:      Right lower leg: Edema (trace) present. Left lower leg: Edema present. Skin:     General: Skin is warm and dry. Neurological:      Mental Status: She is oriented to person, place, and time. Psychiatric:         Mood and Affect: Mood normal.          Additional Data:     Labs:  Results from last 7 days   Lab Units 08/16/23  0500 08/15/23  1453   WBC Thousand/uL 6.69 7.46   HEMOGLOBIN g/dL 12.4 13.4   HEMATOCRIT % 39.8 42.6   PLATELETS Thousands/uL 144* 142*   NEUTROS PCT %  --  67   LYMPHS PCT %  --  24   MONOS PCT %  --  7   EOS PCT %  --  1     Results from last 7 days   Lab Units 08/16/23  0500   SODIUM mmol/L 142   POTASSIUM mmol/L 3.7   CHLORIDE mmol/L 103   CO2 mmol/L 29   BUN mg/dL 15   CREATININE mg/dL 0.94   ANION GAP mmol/L 10   CALCIUM mg/dL 8.9   ALBUMIN g/dL 3.8   TOTAL BILIRUBIN mg/dL 1.44*   ALK PHOS U/L 45   ALT U/L 141*   AST U/L 30   GLUCOSE RANDOM mg/dL 104     Results from last 7 days   Lab Units 08/15/23  1453   INR  0.98                   Lines/Drains:  Invasive Devices     Peripheral Intravenous Line  Duration           Peripheral IV 08/15/23 Proximal;Right;Ventral (anterior) Forearm <1 day    Peripheral IV 08/15/23 Right;Ventral (anterior) Forearm <1 day                  Telemetry:  Telemetry Orders (From admission, onward)             24 Hour Telemetry Monitoring  Continuous x 24 Hours (Telem)        Expiring   Question:  Reason for 24 Hour Telemetry  Answer:  Arrhythmias requiring acute medical intervention / PPM or ICD malfunction                 Telemetry Reviewed: Atrial fibrillation.  HR averaging 90  Indication for Continued Telemetry Use: Arrthymias requiring medical therapy             Imaging: Reviewed radiology reports from this admission including: ECHO    Recent Cultures (last 7 days):         Last 24 Hours Medication List:   Current Facility-Administered Medications   Medication Dose Route Frequency Provider Last Rate   • acetaminophen  650 mg Oral Q6H PRN Chloe Shah MD     • apixaban  5 mg Oral BID Roxi Ventura MD     • butalbital-acetaminophen-caffeine  1 tablet Oral Q4H PRN Chloe Cronin MD     • cholecalciferol  1,000 Units Oral Daily Chloe Shah MD     • diltiazem  30 mg Oral Q6H [address] Tia Charito Corbin PA-C     • furosemide  40 mg Intravenous Daily Roxi Ventura MD     • ondansetron  4 mg Intravenous Q6H PRN Roxi Ventura MD     • pantoprazole  40 mg Oral BID Roxi Ventura MD     • pravastatin  40 mg Oral Daily With Pennie Ceron MD          Today, Patient Was Seen By: Roxi Ventura MD    **Please Note: This note may have been constructed using a voice recognition system. **

## 2023-08-16 NOTE — PLAN OF CARE
Problem: SAFETY ADULT  Goal: Patient will remain free of falls  Description: INTERVENTIONS:  - Educate patient/family on patient safety including physical limitations  - Instruct patient to call for assistance with activity   - Consult OT/PT to assist with strengthening/mobility   - Keep Call bell within reach  - Keep bed low and locked with side rails adjusted as appropriate  - Keep care items and personal belongings within reach  - Initiate and maintain comfort rounds  - Make Fall Risk Sign visible to staff  - Offer Toileting every 2 Hours, in advance of need  - Initiate/Maintain bed alarm  - Obtain necessary fall risk management equipment:   - Apply yellow socks and bracelet for high fall risk patients  - Consider moving patient to room near nurses station  Outcome: Progressing     Problem: DISCHARGE PLANNING  Goal: Discharge to home or other facility with appropriate resources  Description: INTERVENTIONS:  - Identify barriers to discharge w/patient and caregiver  - Arrange for needed discharge resources and transportation as appropriate  - Identify discharge learning needs (meds, wound care, etc.)  - Arrange for interpretive services to assist at discharge as needed  - Refer to Case Management Department for coordinating discharge planning if the patient needs post-hospital services based on physician/advanced practitioner order or complex needs related to functional status, cognitive ability, or social support system  Outcome: Progressing     Problem: Knowledge Deficit  Goal: Patient/family/caregiver demonstrates understanding of disease process, treatment plan, medications, and discharge instructions  Description: Complete learning assessment and assess knowledge base.   Interventions:  - Provide teaching at level of understanding  - Provide teaching via preferred learning methods  Outcome: Progressing     Problem: CARDIOVASCULAR - ADULT  Goal: Maintains optimal cardiac output and hemodynamic stability  Description: INTERVENTIONS:  - Monitor I/O, vital signs and rhythm  - Monitor for S/S and trends of decreased cardiac output  - Administer and titrate ordered vasoactive medications to optimize hemodynamic stability  - Assess quality of pulses, skin color and temperature  - Assess for signs of decreased coronary artery perfusion  - Instruct patient to report change in severity of symptoms  Outcome: Progressing

## 2023-08-16 NOTE — CASE MANAGEMENT
Case Management Assessment & Discharge Planning Note    Patient name Magnolia Walden  Location / MRN [id number]  : 1955 Date 2023       Current Admission Date: 8/15/2023  Current Admission Diagnosis:New onset a-fib with RVR   Patient Active Problem List    Diagnosis Date Noted   • New onset a-fib with RVR 08/15/2023   • Acute CHF (congestive heart failure) ([address]) 08/15/2023   • Mild atherosclerosis of both carotid arteries 2023   • Neck pain 2023   • Impaired fasting blood sugar 2023   • EL (renal artery stenosis) ([address]) 10/18/2022   • Bilateral hip pain 2022   • Age-related osteoporosis without current pathological fracture 2022   • Essential hypertension 2022   • Orthostatic hypotension 2021   • Right-sided chest wall pain 2019   • Migraine without aura and without status migrainosus, not intractable 2018   • Reactive airway disease 2018   • Esophageal reflux 2014   • Hyperlipidemia 2013      LOS (days): 1  Geometric Mean LOS (GMLOS) (days):   Days to GMLOS:     OBJECTIVE:    Risk of Unplanned Readmission Score: 10.35         Current admission status: Inpatient       Preferred Pharmacy:   [address] [place] [address]  Phone: [phone] Fax: [phone]    [facility] ([address] [facility]  [address]  [address] [place] [id number]  Phone: [phone] Fax: [phone]    [facility] ([facility]) - [place] [facility] [address]  Phone: [phone] Fax: [phone]    Primary Care Provider: Viviana Justin DO    Primary Insurance: [employer]  Secondary Insurance:     ASSESSMENT:  [address] [place] [address], [address] [facility] Representative - Spouse Primary Phone: [phone] (Home)               Advance Directives  Does patient have a [address]?: No  Was patient offered paperwork?: Yes (declines)  Does patient currently have a Health Care decision maker?: Yes, please see Health Care Proxy section  Does patient have Advance Directives?: No  Was patient offered paperwork?: Yes (declines)  Primary Contact: Rabia Goodwin         Readmission Root Cause  30 Day Readmission: No    Patient Information  Admitted from[de-identified] Home  Mental Status: Alert  During Assessment patient was accompanied by: Not accompanied during assessment  Assessment information provided by[de-identified] Patient  Primary Caregiver: Self  Support Systems: Spouse/significant other  [place] of Residence: [facility] do you live in?: [address] [facility] entry access options.  Select all that apply.: Stairs  Type of Current Residence: 2 story home  Upon entering residence, is there a bedroom on the main floor (no further steps)?: No  A bedroom is located on the following floor levels of residence (select all that apply):: 2nd Floor  Upon entering residence, is there a bathroom on the main floor (no further steps)?: Yes (1st and 2nd floor)  Number of steps to 2nd floor from main floor: One Flight  In the last 12 months, was there a time when you were not able to pay the mortgage or rent on time?: No  In the last 12 months, how many places have you lived?: 1  In the last 12 months, was there a time when you did not have a steady place to sleep or slept in a shelter (including now)?: No  Homeless/housing insecurity resource given?: N/A  Living Arrangements: Lives w/ Spouse/significant other  Is patient a ?: No    Activities of Daily Living Prior to Admission  Functional Status: Independent  Completes ADLs independently?: Yes  Ambulates independently?: Yes  Does patient use assisted devices?: No  Does patient currently own DME?: No  Does patient have a history of Outpatient Therapy (PT/OT)?: Yes (hx OP PT [place])  Does the patient have a history of Short-Term Rehab?: No  Does patient have a history of HHC?: No  Does patient currently have [facility]?: No         Patient Information Continued  Income Source: Pension/group home  Does patient have prescription coverage?: Yes  Within the past 12 months, you worried that your food would run out before you got the money to buy more.: Never true  Within the past 12 months, the food you bought just didn't last and you didn't have money to get more.: Never true  Food insecurity resource given?: N/A  Does patient receive dialysis treatments?: No  Does patient have a history of substance abuse?: No  Does patient have a history of Mental Health Diagnosis?: No         Means of Transportation  Means of Transport to Appts[de-identified] Drives Self  In the past 12 months, has lack of transportation kept you from medical appointments or from getting medications?: No  In the past 12 months, has lack of transportation kept you from meetings, work, or from getting things needed for daily living?: No  Was application for public transport provided?: N/A        DISCHARGE DETAILS:    Discharge planning discussed with[de-identified] patient        CM contacted family/caregiver?: No- see comments (declines)  Were Treatment Team discharge recommendations reviewed with patient/caregiver?: Yes  Did patient/caregiver verbalize understanding of patient care needs?: Yes  Were patient/caregiver advised of the risks associated with not following Treatment Team discharge recommendations?: Yes         [address]         Is the patient interested in [facility] at discharge?: No    DME Referral Provided  Referral made for DME?: No         Would you like to participate in our [address] service program?  : No - Declined       Discharge Destination Plan[de-identified] Home  Transport at Discharge : Family      Plans at this time are home on dc with OP follow up.  Pt to go home on Eliquis and CM will provide pt with 30 day free card. Will continue to follow and assist in dc planning.

## 2023-08-16 NOTE — CONSULTS
Consultation - Cardiology   [facility] 69 y.o. female MRN: [id number]  Unit/Bed#:  Encounter: [id number]    Consults      Physician Requesting Consult: Vanita Parham MD  Reason for Consult / Principal Problem: atrial fibrillation    Assessment/Plan:  1. New onset atrial fibrillation with rapid ventricular response   - patient presented to the cardiology office yesterday with concerns regarding elevated heart rate readings x 6 weeks at home, no distinct sensation of palpitations but had dyspnea on exertion   - found to be in atrial fibrillation with RVR, heart rate 169 bpm. Patient has no prior history of atrial fibrillation   - she was initiated on a cardizem gtt which was stopped this AM due to improvement in heart rate. Patient still remains in atrial fibrillation   - preliminary review of echocardiogram shows EF 50%. - will proceed with LAURENCE/cardioversion tomorrow, 8/17.    - discontinue diltiazem gtt completely, transition to oral cardizem at 30 mg every 6 hours. Plan to change to long acting upon discharge   - patient reports she was unable to tolerate metoprolol in the past due to side effects (nausea, headache, lightheadedness)   - [id number] score = 5 (age, sex, HTN, CHF, vascular disease). Anticoagulation was initiated with Eliquis 5 mg BID which is appropriate    2. Acute congestive heart failure secondary to #1   - patient developed mild volume overload in the setting of #1   - no pulmonary edema noted on chest imaging, but BNP elevated at 433    - was given 40 mg IV lasix yesterday with improvement in volume status. Give one additional dose today and reassess in AM.   - check echocardiogram - as above, preliminary read shows EF low normal at approximately 50%. Formal report to follow   - initiate low sodium diet   - check daily standing weights, strict I/O   - follow up on AM BMP    3.  Hypertension with renal artery stenosis   - BP currently controlled with mild hypotension   - will be stopping cardizem gtt as above, starting PO cardizem    4. Dyslipidemia    - on simvastatin 20 mg daily at baseline     History of Present Illness   HPI: Iain Bryan is a 69y.o. year old female with hypertension, right renal artery stenosis, dyslipidemia, mild bilateral carotid artery stenosis, prior tobacco abuse who follows with [facility]. The patient was directed to the emergency department yesterday after she presented to the office for elevation of elevated heart rate readings. An EKG was obtained which showed atrial fibrillation with a rapid ventricular response, heart rate 169 bpm.  This was a new diagnosis for the patient. She had noticed elevated heart rate readings at home for the past 6 weeks. She noted dyspnea on exertion as well as paroxysmal nocturnal dyspnea. She started to develop mild lower extremity edema as well. She denied any significant lightheadedness or dizziness. She denies chest pain/pressure/discomfort. Based on this, I directed the patient to the emergency department. She was initiated on diltiazem drip overnight with improvement in her heart rate although she remains in atrial fibrillation on telemetry. She was given 40 mg of IV Lasix due to mild volume overload with good urinary output per patient. Cardiology was consulted for further evaluation and management. Review of Systems   Cardiovascular: Positive for dyspnea on exertion, leg swelling and paroxysmal nocturnal dyspnea. All other systems reviewed and are negative.     Historical Information   Past Medical History:   Diagnosis Date   • Allergic reaction     Resolved 11/24/2017    • Allergic rhinitis     Resolved 11/24/2017    • Generalized anxiety disorder     Resolved 11/24/2017    • High cholesterol    • Hypertension    • Microscopic hematuria     Resolved 11/24/2017    • Postural dizziness with presyncope 03/11/2021   • Renal calculi     Resolved 36/32/1287    • Renal colic     Resolved 76/90/3821 • Renal cyst, acquired     Resolved 11/24/2017    • Restrictive lung disease     Resolved 11/24/2017    • Supraventricular tachycardia ([address])     Resolved 11/24/2017      Past Surgical History:   Procedure Laterality Date   • CHOLECYSTECTOMY     • CYSTOSCOPY  05/07/2014    Diagnostic. Last assessed 5/7/2014     • TONSILLECTOMY     • TUBAL LIGATION       Social History     Substance and Sexual Activity   Alcohol Use Yes    Comment: occasional     Social History     Substance and Sexual Activity   Drug Use Never     Social History     Tobacco Use   Smoking Status Former   Smokeless Tobacco Never     Family History: non-contributory    Meds/Allergies   all current active meds have been reviewed       Allergies   Allergen Reactions   • Amlodipine Myalgia   • Codeine      Other reaction(s): Other (See Comments)  headaches   • Fosamax [Alendronate] GI Intolerance   • Angiotensin Receptor Blockers Other (See Comments)     Metallic taste       Objective   Vitals: Blood pressure 113/78, pulse 90, temperature (!) 97 °F (36.1 °C), temperature source Temporal, resp. rate 22, height 5' 3" (1.6 m), weight 78.5 kg (173 lb), SpO2 96 %. , Body mass index is 30.65 kg/m².,   Orthostatic Blood Pressures    Flowsheet Row Most Recent Value   Blood Pressure 113/78 filed at 08/16/2023 0802   Patient Position - Orthostatic VS Lying filed at 08/16/2023 2165        Systolic (40YSG), EAE:942 , Min:93 , VYU:699     Diastolic (23FUU), NZQ:34, Min:51, Max:96      Intake/Output Summary (Last 24 hours) at 8/16/2023 1013  Last data filed at 8/16/2023 0701  Gross per 24 hour   Intake 1385.92 ml   Output 800 ml   Net 585.92 ml       Weight (last 2 days)     Date/Time Weight    08/16/23 0802 78.5 (173)    08/16/23 0600 78.6 (173.28)    08/16/23 0331 80.9 (178.35)    08/15/23 1451 80.4 (177.25)          Invasive Devices     Peripheral Intravenous Line  Duration           Peripheral IV 08/15/23 Proximal;Right;Ventral (anterior) Forearm <1 day Peripheral IV 08/15/23 Right;Ventral (anterior) Forearm <1 day                  Physical Exam  Vitals reviewed. Constitutional:       General: She is not in acute distress. Appearance: She is obese. She is not diaphoretic. Interventions: Nasal cannula in place. HENT:      Head: Normocephalic and atraumatic. Eyes:      Pupils: Pupils are equal, round, and reactive to light. Neck:      Vascular: No carotid bruit. Cardiovascular:      Rate and Rhythm: Normal rate. Rhythm irregularly irregular. Pulses:           Radial pulses are 2+ on the right side and 2+ on the left side. Heart sounds: S1 normal and S2 normal. No murmur heard. Pulmonary:      Effort: Pulmonary effort is normal. No respiratory distress. Breath sounds: Normal breath sounds. No wheezing or rales. Abdominal:      General: There is no distension. Palpations: Abdomen is soft. Tenderness: There is no abdominal tenderness. Musculoskeletal:         General: No deformity. Normal range of motion. Cervical back: Normal range of motion. Right lower leg: Edema (trace ankle edema bilaterally) present. Left lower leg: Edema present. Skin:     General: Skin is warm and dry. Findings: No erythema. Neurological:      General: No focal deficit present. Mental Status: She is alert and oriented to person, place, and time.    Psychiatric:         Mood and Affect: Mood normal.         Behavior: Behavior normal.             Laboratory Results:        CBC with diff:   Results from last 7 days   Lab Units 08/16/23  0500 08/15/23  1453   WBC Thousand/uL 6.69 7.46   HEMOGLOBIN g/dL 12.4 13.4   HEMATOCRIT % 39.8 42.6   MCV fL 91 91   PLATELETS Thousands/uL 144* 142*   RBC Million/uL 4.40 4.70   MCH pg 28.2 28.5   MCHC g/dL 31.2* 31.5   RDW % 13.4 13.3   MPV fL 12.5 12.1   NRBC AUTO /100 WBCs  --  0         CMP:  Results from last 7 days   Lab Units 08/16/23  0500 08/15/23  1453   POTASSIUM mmol/L 3.7 3.8 CHLORIDE mmol/L 103 105   CO2 mmol/L 29 24   BUN mg/dL 15 19   CREATININE mg/dL 0.94 0.93   CALCIUM mg/dL 8.9 9.7   AST U/L 30 45*   ALT U/L 141* 176*   ALK PHOS U/L 45 51   EGFR ml/min/1.73sq m 62 62         BMP:  Results from last 7 days   Lab Units 23  0500 08/15/23  1453   POTASSIUM mmol/L 3.7 3.8   CHLORIDE mmol/L 103 105   CO2 mmol/L 29 24   BUN mg/dL 15 19   CREATININE mg/dL 0.94 0.93   CALCIUM mg/dL 8.9 9.7       BNP:    Recent Labs     08/15/23  1453   *       Magnesium:   Results from last 7 days   Lab Units 23  0500 08/15/23  1453   MAGNESIUM mg/dL 2.3 1.9       Coags:   Results from last 7 days   Lab Units 08/15/23  1453   PTT seconds 27   INR  0.98       TSH:       Hemoglobin A1C       Lipid Profile:         Cardiac testing:   Results for orders placed during the hospital encounter of 21    Echo complete with contrast if indicated    Narrative  [address] [place] [address] [address]  [address] [place] [address], [address] [place] [address]  [phone]    Transthoracic Echocardiogram  2D, M-mode, Doppler, and Color Doppler    Study date:  18-Mar-2021    Patient: Fortino Reyes  MR number: [id number]  Account number: [de-identified]  : 1955  Age: 77 years  Gender: Female  Status: Outpatient  Location: Echo lab  Height: 63 in  Weight: 168.7 lb  BP: 160/ 74 mmHg    Indications: ORTHOSTATIC HYPOTENSION, NEAR SYNCOPE    Diagnoses: I95.1 - Orthostatic hypotension    Sonographer:  Rudolph Boo RDCS  Primary Physician:  Americo Roberts DO  Referring Physician:  Evan Pedersen MD  Group:  [facility]  Interpreting Physician:  Salomon Centeno MD    SUMMARY    PROCEDURE INFORMATION:  This was a technically difficult study. LEFT VENTRICLE:  Size was normal.  Systolic function was normal. Ejection fraction was estimated to be 60 %. There were no regional wall motion abnormalities.   Wall thickness was at the upper limits of normal.    MITRAL VALVE:  There was mild regurgitation. AORTIC VALVE:  Transaortic velocity was increased due to increased transvalvular flow. The AV appears to open adequately on 2D imaging although imaging is suboptimal.  Cannot exclude mild AS. Valve peak gradient was 19 mmHg. The valve was not well visualized. TRICUSPID VALVE:  There was mild regurgitation. HISTORY: PRIOR HISTORY: REACTIVE AIRWAY DISEASE, OBSTRUCTIVE SLEEP APNEA, HYPERLIPIDEMIA, POSTURAL DIZZINESS, FAMILY HISTORY OF SUDDEN CARDIAC DEATH    PROCEDURE: The procedure was performed in the echo lab. This was a routine study. The transthoracic approach was used. The study included complete 2D imaging, M-mode, complete spectral Doppler, and color Doppler. Images were obtained from  the parasternal, apical, subcostal, and suprasternal notch acoustic windows. This was a technically difficult study. LEFT VENTRICLE: Size was normal. Systolic function was normal. Ejection fraction was estimated to be 60 %. There were no regional wall motion abnormalities. Wall thickness was at the upper limits of normal.    RIGHT VENTRICLE: The size was normal. Systolic function was normal. Wall thickness was normal.    LEFT ATRIUM: Size was normal.    RIGHT ATRIUM: Size was normal.    MITRAL VALVE: Valve structure was normal. There was normal leaflet separation. DOPPLER: The transmitral velocity was within the normal range. There was no evidence for stenosis. There was mild regurgitation. AORTIC VALVE: The valve was not well visualized. DOPPLER: Transaortic velocity was increased due to increased transvalvular flow. The AV appears to open adequately on 2D imaging although imaging is suboptimal.  Cannot exclude mild AS. There was no significant regurgitation. TRICUSPID VALVE: The valve structure was normal. There was normal leaflet separation. DOPPLER: The transtricuspid velocity was within the normal range. There was no evidence for stenosis. There was mild regurgitation.     PULMONIC VALVE: Not well visualized. PERICARDIUM: There was no pericardial effusion. The pericardium was normal in appearance. AORTA: The root exhibited normal size. MEASUREMENT TABLES    DOPPLER MEASUREMENTS  Aortic valve   (Reference normals)  Peak gradient   19 mmHg   (--)    SYSTEM MEASUREMENT TABLES    2D  %FS: 43.85 %  Ao Diam: 2.81 cm  EDV(Teich): 60.69 ml  EF(Teich): 75.82 %  ESV(Teich): 14.67 ml  IVSd: 1.13 cm  LA Area: 19.63 cm2  LA Diam: 3.66 cm  LVEDV MOD A4C: 82.12 ml  LVEF MOD A4C: 43.32 %  LVESV MOD A4C: 46.55 ml  LVIDd: 3.77 cm  LVIDs: 2.12 cm  LVLd A4C: 7.78 cm  LVLs A4C: 6.77 cm  LVOT Diam: 1.88 cm  LVPWd: 1.13 cm  RA Area: 13.43 cm2  RVIDd: 3.21 cm  RWT: 0.6  SV MOD A4C: 35.57 ml  SV(Teich): 46.01 ml    CW  AV Env. Ti: 300.67 ms  AV VTI: 44.26 cm  AV Vmax: 2.2 m/s  AV Vmean: 1.47 m/s  AV maxP.35 mmHg  AV meanP.89 mmHg  PV Vmax: 1.21 m/s  PV maxP.81 mmHg    MM  TAPSE: 2.57 cm    PW  BLOSSOM (VTI): 1.25 cm2  BLOSSOM Vmax: 1.3 cm2  BLOSSOM Vmax, Pt: 1.23 cm2  AVAI (VTI): 0 cm2/m2  AVAI Vmax: 0 cm2/m2  AVAI Vmax, Pt: 0 cm2/m2  E' Lat: 0.11 m/s  E' Sept: 0.08 m/s  E/E' Lat: 8.91  E/E' Sept: 12.57  LVOT Env. Ti: 291.15 ms  LVOT VTI: 19.84 cm  LVOT Vmax: 0.97 m/s  LVOT Vmean: 0.68 m/s  LVOT maxPG: 3.8 mmHg  LVOT meanP.15 mmHg  LVSI Dopp: 30.71 ml/m2  LVSV Dopp: 55.28 ml  MV A Chinedu: 0.97 m/s  MV Dec Whiteside: 5.12 m/s2  MV DecT: 196.12 ms  MV E Chinedu: 1 m/s  MV E/A Ratio: 1.04  RVOT Vmax: 1.07 m/s  RVOT maxP.54 mmHg    [facility] Accredited Echocardiography Laboratory    Prepared and electronically signed by    Bart Fairbanks MD  Signed 18-Mar-2021 10:23:23    No results found for this or any previous visit. No results found for this or any previous visit.     Results for orders placed in visit on 11/16/15    NM myocardial perfusion spect (stress and/or rest)    Narrative  EXAM ROOM = [facility] STRESS  EXAM START = [id number]  EXAM STOP = [id number]  FILMS USED = 4 IMAGES    This stress test was performed by a cardiologist and the  cardiologist will render the interpretation. [address] [place] [address] Coordinator  [place] Radiologist- QUIQUE Sidhu MD  Electronically [id number]rec, RAD MD  Released Date Time- 11/24/15 0958  ------------------------------------------------------------------------------  READ BY = 8450^TAMMYRI^J^ANDERS  RELEASED BY = 8450^TAMMYRI^MICAH        Imaging: I have personally reviewed pertinent reports. CTA ED chest PE Study    Result Date: 8/15/2023  Narrative: CTA - CHEST WITH IV CONTRAST - PULMONARY ANGIOGRAM INDICATION:   dyspnea, tachycardia, + d dimer. COMPARISON: CTA chest PE study 3/26/2023 TECHNIQUE: CTA examination of the chest was performed using angiographic technique according to a protocol specifically tailored to evaluate for pulmonary embolism. Multiplanar 2D reformatted images were created from the source data. In addition, coronal 3D MIP postprocessing was performed on the acquisition scanner. Radiation dose length product (DLP) for this visit:  429.36 mGy-cm . This examination, like all CT scans performed in the [facility], was performed utilizing techniques to minimize radiation dose exposure, including the use of iterative  reconstruction and automated exposure control. IV Contrast:  85 mL of iohexol (OMNIPAQUE) FINDINGS: PULMONARY ARTERIAL TREE:  No pulmonary embolus is seen. LUNGS:  Lungs are clear. There is no tracheal or endobronchial lesion. PLEURA:  Unremarkable. HEART/GREAT VESSELS: Cardiomegaly. Coronary artery calcifications. No thoracic aortic aneurysm. MEDIASTINUM AND ERLIN:  Unremarkable. CHEST WALL AND LOWER NECK:   11 mm left thyroid hypodensity does not meet size criteria necessitating follow-up. VISUALIZED STRUCTURES IN THE UPPER ABDOMEN: No new findings. OSSEOUS STRUCTURES:  No acute fracture or destructive osseous lesion.      Impression: No acute findings in the chest, specifically, no pulmonary arterial embolism or pulmonary infiltrate/consolidation. Workstation performed: [id number]     XR chest 1 view portable    Result Date: 8/15/2023  Narrative: CHEST INDICATION:   dyspnea. COMPARISON: CXR and chest CT 3/26/2023. EXAM PERFORMED/VIEWS:  XR CHEST PORTABLE. FINDINGS: Cardiomediastinal silhouette normal. Lungs clear. No effusion or pneumothorax. Upper abdomen normal. Bones normal for age. Impression: No acute cardiopulmonary disease.  Workstation performed: [id number]       EKG reviewed personally: atrial fibrillation with RVR  Telemetry reviewed personally: atrial fibrillation, heart rate currently 90's    Assessment:  Principal Problem:    New onset a-fib with RVR  Active Problems:    Esophageal reflux    Migraine without aura and without status migrainosus, not intractable    Essential hypertension    Acute CHF (congestive heart failure) ([facility])      Code Status: Level 1 - Full Code

## 2023-08-16 NOTE — ASSESSMENT & PLAN NOTE
Wt Readings from Last 3 Encounters:   08/16/23 78.5 kg (173 lb)   08/15/23 80.4 kg (177 lb 3.2 oz)   07/25/23 80.1 kg (176 lb 9.6 oz)     Patient presents with new onset A-fib, exertional dyspnea, evidence of fluid retention, elevated BNP 2D echo revealed low normal EF at 50%  Proceed with IV Lasix 40 mg daily  Monitor I's and O's, daily weights

## 2023-08-16 NOTE — ED NOTES
Family at bedside. Dr. Marcos Oakes at the bedside speaking w/ pt and family member.      Tejas Foster RN  08/15/23 2018

## 2023-08-16 NOTE — ED NOTES
Note delayed d/t pt care. Pt 02 desat to 89% w/ good pleth. Pt denied sob, RR even and unlabored. Pt placed on 2L nc. 02 sats at 97% w/ good pleth.      Elle Davila RN  08/16/23 9824

## 2023-08-17 ENCOUNTER — ANESTHESIA (OUTPATIENT)
Dept: NON INVASIVE DIAGNOSTICS | Facility: HOSPITAL | Age: 68
End: 2023-08-17

## 2023-08-17 ENCOUNTER — ANESTHESIA (INPATIENT)
Dept: NON INVASIVE DIAGNOSTICS | Facility: HOSPITAL | Age: 68
DRG: 291 | End: 2023-08-17
Payer: COMMERCIAL

## 2023-08-17 ENCOUNTER — ANESTHESIA EVENT (OUTPATIENT)
Dept: NON INVASIVE DIAGNOSTICS | Facility: HOSPITAL | Age: 68
End: 2023-08-17

## 2023-08-17 PROBLEM — I50.31 ACUTE DIASTOLIC CONGESTIVE HEART FAILURE (HCC): Status: ACTIVE | Noted: 2023-08-15

## 2023-08-17 LAB
ANION GAP SERPL CALCULATED.3IONS-SCNC: 9 MMOL/L
ATRIAL RATE: 163 BPM
ATRIAL RATE: 41 BPM
BASOPHILS # BLD AUTO: 0.04 THOUSANDS/ÂΜL (ref 0–0.1)
BASOPHILS NFR BLD AUTO: 1 % (ref 0–1)
BUN SERPL-MCNC: 20 MG/DL (ref 5–25)
CALCIUM SERPL-MCNC: 8.9 MG/DL (ref 8.4–10.2)
CHLORIDE SERPL-SCNC: 103 MMOL/L (ref 96–108)
CO2 SERPL-SCNC: 29 MMOL/L (ref 21–32)
CREAT SERPL-MCNC: 0.91 MG/DL (ref 0.6–1.3)
EOSINOPHIL # BLD AUTO: 0.07 THOUSAND/ÂΜL (ref 0–0.61)
EOSINOPHIL NFR BLD AUTO: 1 % (ref 0–6)
ERYTHROCYTE [DISTWIDTH] IN BLOOD BY AUTOMATED COUNT: 13.3 % (ref 11.6–15.1)
GFR SERPL CREATININE-BSD FRML MDRD: 64 ML/MIN/1.73SQ M
GLUCOSE SERPL-MCNC: 107 MG/DL (ref 65–140)
HCT VFR BLD AUTO: 42.2 % (ref 34.8–46.1)
HGB BLD-MCNC: 13.5 G/DL (ref 11.5–15.4)
IMM GRANULOCYTES # BLD AUTO: 0.01 THOUSAND/UL (ref 0–0.2)
IMM GRANULOCYTES NFR BLD AUTO: 0 % (ref 0–2)
LYMPHOCYTES # BLD AUTO: 1.95 THOUSANDS/ÂΜL (ref 0.6–4.47)
LYMPHOCYTES NFR BLD AUTO: 28 % (ref 14–44)
MAGNESIUM SERPL-MCNC: 2.3 MG/DL (ref 1.9–2.7)
MCH RBC QN AUTO: 29 PG (ref 26.8–34.3)
MCHC RBC AUTO-ENTMCNC: 32 G/DL (ref 31.4–37.4)
MCV RBC AUTO: 91 FL (ref 82–98)
MONOCYTES # BLD AUTO: 0.57 THOUSAND/ÂΜL (ref 0.17–1.22)
MONOCYTES NFR BLD AUTO: 8 % (ref 4–12)
NEUTROPHILS # BLD AUTO: 4.45 THOUSANDS/ÂΜL (ref 1.85–7.62)
NEUTS SEG NFR BLD AUTO: 62 % (ref 43–75)
NRBC BLD AUTO-RTO: 0 /100 WBCS
PLATELET # BLD AUTO: 162 THOUSANDS/UL (ref 149–390)
PMV BLD AUTO: 11.9 FL (ref 8.9–12.7)
POTASSIUM SERPL-SCNC: 4.2 MMOL/L (ref 3.5–5.3)
QRS AXIS: 66 DEGREES
QRS AXIS: 70 DEGREES
QRSD INTERVAL: 68 MS
QRSD INTERVAL: 70 MS
QT INTERVAL: 276 MS
QT INTERVAL: 292 MS
QTC INTERVAL: 457 MS
QTC INTERVAL: 473 MS
RBC # BLD AUTO: 4.66 MILLION/UL (ref 3.81–5.12)
SL CV LV EF: 50
SODIUM SERPL-SCNC: 141 MMOL/L (ref 135–147)
T WAVE AXIS: 79 DEGREES
T WAVE AXIS: 80 DEGREES
VENTRICULAR RATE: 158 BPM
VENTRICULAR RATE: 165 BPM
WBC # BLD AUTO: 7.09 THOUSAND/UL (ref 4.31–10.16)

## 2023-08-17 PROCEDURE — 93320 DOPPLER ECHO COMPLETE: CPT | Performed by: INTERNAL MEDICINE

## 2023-08-17 PROCEDURE — 99232 SBSQ HOSP IP/OBS MODERATE 35: CPT | Performed by: INTERNAL MEDICINE

## 2023-08-17 PROCEDURE — 93325 DOPPLER ECHO COLOR FLOW MAPG: CPT | Performed by: INTERNAL MEDICINE

## 2023-08-17 PROCEDURE — 92960 CARDIOVERSION ELECTRIC EXT: CPT | Performed by: INTERNAL MEDICINE

## 2023-08-17 PROCEDURE — 93312 ECHO TRANSESOPHAGEAL: CPT | Performed by: INTERNAL MEDICINE

## 2023-08-17 PROCEDURE — 99233 SBSQ HOSP IP/OBS HIGH 50: CPT | Performed by: FAMILY MEDICINE

## 2023-08-17 PROCEDURE — 85025 COMPLETE CBC W/AUTO DIFF WBC: CPT | Performed by: FAMILY MEDICINE

## 2023-08-17 PROCEDURE — 80048 BASIC METABOLIC PNL TOTAL CA: CPT | Performed by: FAMILY MEDICINE

## 2023-08-17 PROCEDURE — 83735 ASSAY OF MAGNESIUM: CPT | Performed by: FAMILY MEDICINE

## 2023-08-17 PROCEDURE — 92960 CARDIOVERSION ELECTRIC EXT: CPT

## 2023-08-17 PROCEDURE — 93010 ELECTROCARDIOGRAM REPORT: CPT | Performed by: INTERNAL MEDICINE

## 2023-08-17 RX ORDER — SODIUM CHLORIDE, SODIUM LACTATE, POTASSIUM CHLORIDE, CALCIUM CHLORIDE 600; 310; 30; 20 MG/100ML; MG/100ML; MG/100ML; MG/100ML
INJECTION, SOLUTION INTRAVENOUS CONTINUOUS PRN
Status: DISCONTINUED | OUTPATIENT
Start: 2023-08-17 | End: 2023-08-17

## 2023-08-17 RX ORDER — PROPOFOL 10 MG/ML
INJECTION, EMULSION INTRAVENOUS AS NEEDED
Status: DISCONTINUED | OUTPATIENT
Start: 2023-08-17 | End: 2023-08-17

## 2023-08-17 RX ORDER — DILTIAZEM HYDROCHLORIDE 60 MG/1
60 TABLET, FILM COATED ORAL EVERY 6 HOURS SCHEDULED
Status: DISCONTINUED | OUTPATIENT
Start: 2023-08-17 | End: 2023-08-18

## 2023-08-17 RX ADMIN — AMIODARONE HYDROCHLORIDE 150 MG: 50 INJECTION, SOLUTION INTRAVENOUS at 10:14

## 2023-08-17 RX ADMIN — PROPOFOL 50 MG: 10 INJECTION, EMULSION INTRAVENOUS at 09:05

## 2023-08-17 RX ADMIN — PANTOPRAZOLE SODIUM 40 MG: 40 TABLET, DELAYED RELEASE ORAL at 20:26

## 2023-08-17 RX ADMIN — DILTIAZEM HYDROCHLORIDE 60 MG: 60 TABLET, FILM COATED ORAL at 23:50

## 2023-08-17 RX ADMIN — DILTIAZEM HYDROCHLORIDE 60 MG: 60 TABLET, FILM COATED ORAL at 17:19

## 2023-08-17 RX ADMIN — Medication 1000 UNITS: at 08:19

## 2023-08-17 RX ADMIN — DILTIAZEM HYDROCHLORIDE 30 MG: 30 TABLET ORAL at 13:11

## 2023-08-17 RX ADMIN — PROPOFOL 50 MG: 10 INJECTION, EMULSION INTRAVENOUS at 09:02

## 2023-08-17 RX ADMIN — PRAVASTATIN SODIUM 40 MG: 40 TABLET ORAL at 17:19

## 2023-08-17 RX ADMIN — SODIUM CHLORIDE, SODIUM LACTATE, POTASSIUM CHLORIDE, AND CALCIUM CHLORIDE: .6; .31; .03; .02 INJECTION, SOLUTION INTRAVENOUS at 08:55

## 2023-08-17 RX ADMIN — ACETAMINOPHEN 650 MG: 325 TABLET, FILM COATED ORAL at 00:41

## 2023-08-17 RX ADMIN — PANTOPRAZOLE SODIUM 40 MG: 40 TABLET, DELAYED RELEASE ORAL at 08:19

## 2023-08-17 RX ADMIN — APIXABAN 5 MG: 5 TABLET, FILM COATED ORAL at 17:19

## 2023-08-17 RX ADMIN — DILTIAZEM HYDROCHLORIDE 30 MG: 30 TABLET ORAL at 06:00

## 2023-08-17 RX ADMIN — AMIODARONE HYDROCHLORIDE 0.5 MG/MIN: 50 INJECTION, SOLUTION INTRAVENOUS at 10:16

## 2023-08-17 RX ADMIN — PROPOFOL 100 MG: 10 INJECTION, EMULSION INTRAVENOUS at 09:00

## 2023-08-17 RX ADMIN — PROPOFOL 50 MG: 10 INJECTION, EMULSION INTRAVENOUS at 09:10

## 2023-08-17 RX ADMIN — APIXABAN 5 MG: 5 TABLET, FILM COATED ORAL at 08:19

## 2023-08-17 NOTE — ANESTHESIA PREPROCEDURE EVALUATION
Procedure:  CARDIOVERSION ([place] ONLY)    Relevant Problems   CARDIO   (+) Acute CHF (congestive heart failure) (HCC)   (+) Essential hypertension   (+) Hyperlipidemia   (+) Migraine without aura and without status migrainosus, not intractable   (+) New onset a-fib with RVR   (+) Right-sided chest wall pain      GI/HEPATIC   (+) Esophageal reflux      NEURO/PSYCH   (+) Migraine without aura and without status migrainosus, not intractable             Anesthesia Plan  ASA Score- 3     Anesthesia Type- IV sedation with anesthesia with ASA Monitors. Additional Monitors:   Airway Plan:           Plan Factors-    Chart reviewed. Induction- intravenous. Postoperative Plan-     Informed Consent- Anesthetic plan and risks discussed with patient. I personally reviewed this patient with the CRNA. Discussed and agreed on the Anesthesia Plan with the CRNA. Cornel [place]

## 2023-08-17 NOTE — UTILIZATION REVIEW
Continued Stay Review    Date: 8/17/23                          Current Patient Class: IP  Current Level of Care: Med Surg    HPI:67 y.o. female initially admitted on 8/15     Assessment/Plan: LAURENCE guided cardioversion this AM, not converted to regular rhythm. Started on Amiodarone drip. Continue Eliquis 5 mg BID for anticoagulation. Telemetry. Conitnue IV Lasix 40 mg daily for today, transition to PO starting form tomorrow. Continue low salt diet. Discontinue Cardizem drip and transition to po. Continue PPI. Continue holding losartan.      Vital Signs:     Date/Time Temp Pulse Resp BP MAP (mmHg) SpO2 Calculated FIO2 (%) - Nasal Cannula Nasal Cannula O2 Flow Rate (L/min) O2 Device   08/17/23 0945 -- 129 Abnormal  18 132/64 92 95 % -- -- None (Room air)   08/17/23 0940 -- 127 Abnormal  27 Abnormal  127/74 95 95 % -- -- None (Room air)   08/17/23 0930 -- 122 Abnormal  20 129/62 86 93 % -- -- None (Room air)   08/17/23 0921 99.1 °F (37.3 °C) 104 20 97/68 77 100 % -- -- None (Room air)   08/17/23 0700 97.3 °F (36.3 °C) Abnormal  102 20 122/67 87 95 % -- -- None (Room air)   08/17/23 0500 -- 96 12 110/53 77 92 % -- -- --   08/17/23 0400 -- 94 16 89/53 Abnormal  65 92 % -- -- --   08/17/23 0300 96.8 °F (36 °C) Abnormal  108 Abnormal  16 108/65 82 92 % -- -- None (Room air)   08/17/23 0005 -- -- -- 94/55 -- -- -- -- --   08/17/23 0000 -- 107 Abnormal  12 94/55 70 92 % -- -- --   08/16/23 2334 98 °F (36.7 °C) 111 Abnormal  15 106/72 84 91 % -- -- None (Room air)   08/16/23 2100 -- 116 Abnormal  12 113/76 91 91 % -- -- --   08/16/23 2030 -- 121 Abnormal  12 101/71 82 91 % -- -- --   08/16/23 2000 -- 124 Abnormal  18 90/63 72 92 % -- -- None (Room air)   08/16/23 1900 -- 142 Abnormal  28 Abnormal  122/70 81 93 % -- -- --   08/16/23 1800 -- 127 Abnormal  20 108/77 88 92 % -- -- --   08/16/23 1700 -- 131 Abnormal  23 Abnormal  121/64 84 93 % -- -- None (Room air)     Pertinent Labs/Diagnostic Results:     8/17 LAURENCE:  Interpretation Summary       •  Left Ventricle: Left ventricular cavity size is normal. Wall thickness is normal. The left ventricular ejection fraction is 50%. Systolic function is low normal. Wall motion is normal.  •  Left Atrium: The atrium is moderately dilated. •  Right Atrium: The atrium is mildly dilated. •  Atrial Septum: No patent foramen ovale detected. •  Left Atrial Appendage: There is normal function. There is no thrombus. •  Mitral Valve: There is mild to moderate regurgitation. •  Tricuspid Valve: There is mild regurgitation. •  Aorta: There is a non-protruding atheroma.         Results from last 7 days   Lab Units 08/17/23  0603 08/16/23  0500 08/15/23  1453   WBC Thousand/uL 7.09 6.69 7.46   HEMOGLOBIN g/dL 13.5 12.4 13.4   HEMATOCRIT % 42.2 39.8 42.6   PLATELETS Thousands/uL 162 144* 142*   NEUTROS ABS Thousands/µL 4.45  --  5.07         Results from last 7 days   Lab Units 08/17/23  0603 08/16/23  0500 08/15/23  1453   SODIUM mmol/L 141 142 140   POTASSIUM mmol/L 4.2 3.7 3.8   CHLORIDE mmol/L 103 103 105   CO2 mmol/L 29 29 24   ANION GAP mmol/L 9 10 11   BUN mg/dL 20 15 19   CREATININE mg/dL 0.91 0.94 0.93   EGFR ml/min/1.73sq m 64 62 62   CALCIUM mg/dL 8.9 8.9 9.7   MAGNESIUM mg/dL 2.3 2.3 1.9     Results from last 7 days   Lab Units 08/16/23  0500 08/15/23  1453   AST U/L 30 45*   ALT U/L 141* 176*   ALK PHOS U/L 45 51   TOTAL PROTEIN g/dL 6.1* 6.8   ALBUMIN g/dL 3.8 4.2   TOTAL BILIRUBIN mg/dL 1.44* 1.11*         Results from last 7 days   Lab Units 08/17/23  0603 08/16/23  0500 08/15/23  1453   GLUCOSE RANDOM mg/dL 107 104 103         Results from last 7 days   Lab Units 08/15/23  1852 08/15/23  1714 08/15/23  1453   HS TNI 0HR ng/L  --   --  7   HS TNI 2HR ng/L  --  7  --    HSTNI D2 ng/L  --  0  --    HS TNI 4HR ng/L 8  --   --    HSTNI D4 ng/L 1  --   --      Results from last 7 days   Lab Units 08/15/23  1453   D-DIMER QUANTITATIVE ug/ml FEU 0.93*     Results from last 7 days Lab Units 08/15/23  1453   PROTIME seconds 13.1   INR  0.98   PTT seconds 27     Results from last 7 days   Lab Units 08/15/23  1453   TSH 3RD GENERATON uIU/mL 0.915  0.919           Results from last 7 days   Lab Units 08/15/23  1453   BNP pg/mL 433*           Results from last 7 days   Lab Units 08/15/23  1551   CLARITY UA  Clear   COLOR UA  Yellow   SPEC GRAV UA  <=1.005   PH UA  6.0   GLUCOSE UA mg/dl Negative   KETONES UA mg/dl Negative   BLOOD UA  Trace-Intact*   PROTEIN UA mg/dl Negative   NITRITE UA  Negative   BILIRUBIN UA  Negative   UROBILINOGEN UA E.U./dl 0.2   LEUKOCYTES UA  Negative   WBC UA /hpf None Seen   RBC UA /hpf 0-1   BACTERIA UA /hpf None Seen   EPITHELIAL CELLS WET PREP /hpf Occasional         Medications:   Scheduled Medications:  amiodarone 150 mg in dextrose 5 % 100 mL IV bolus, 150 mg, Intravenous, Once  apixaban, 5 mg, Oral, BID  cholecalciferol, 1,000 Units, Oral, Daily  diltiazem, 30 mg, Oral, Q6H SHANNAN  furosemide, 40 mg, Intravenous, Daily  pantoprazole, 40 mg, Oral, BID  pravastatin, 40 mg, Oral, Daily With Dinner      Continuous IV Infusions:  amiodarone (CORDARONE) 900 mg in dextrose 5 % 500 mL infusion, 0.5 mg/min, Intravenous, Continuous      PRN Meds:  acetaminophen, 650 mg, Oral, Q6H PRN  butalbital-acetaminophen-caffeine, 1 tablet, Oral, Q4H PRN  metoprolol, 5 mg, Intravenous, Q6H PRN  ondansetron, 4 mg, Intravenous, Q6H PRN        Discharge Plan: [facility]    Network Utilization Review Department  ATTENTION: Please call with any questions or concerns to [phone] and carefully listen to the prompts so that you are directed to the right person. All voicemails are confidential.  Kathy Souza all requests for admission clinical reviews, approved or denied determinations and any other requests to dedicated fax number below belonging to the campus where the patient is receiving treatment.  List of dedicated fax numbers for the Facilities:  FACILITY NAME UR FAX NUMBER   ADMISSION DENIALS (Administrative/Medical Necessity) [phone] [address] (Maternity/NICU/Pediatrics) [address] [facility] [phone] [address] [facility] [address] [address] [facility] [address] [phone]

## 2023-08-17 NOTE — INCIDENTAL FINDINGS
The following findings require follow up:  Radiographic finding   Finding:     CTA - CHEST WITH IV CONTRAST     11 mm left thyroid hypodensity does not meet size criteria necessitating follow-up.       Follow up required: No

## 2023-08-17 NOTE — ASSESSMENT & PLAN NOTE
This is a 68-year-old female patient with history of hypertension, hyperlipidemia, migraines, who was sent from cardiology office due to evidence of A-fib with RVR and patient's complaints of exertional dyspnea and fluid retention noted on exam    · [id number] 4, anticoagulation indicated -initiated on Eliquis -pricing checked by case management   · Initially on Cardizem drip which was stopped with improvement in heart rate and transitioned to p.o.  Cardizem by cardiology on 08/16/2023   · Attempted to initiate p.o. metoprolol yesterday but patient declining stating that she cannot tolerate side effects  · 2D echo reveals low normal EF of 50%   · I appreciate cardiology input    · LAURENCE guided cardioversion this AM, not converted to regular rhythm  · Started on Amiodarone drip  · Continue telemetry monitoring  · Plan to change Cardizam to long acting form after cardioversion on discharge  · Continue Eliquis 5 mg BID for anticoagulation

## 2023-08-17 NOTE — PROGRESS NOTES
[address]  Progress Note  Name: Abraham Carver  MRN: [id number]  Unit/Bed#:  I Date of Admission: 8/15/2023   Date of Service: 8/17/2023 I Hospital Day: 2    Assessment/Plan   * New onset a-fib with RVR  Assessment & Plan  This is a 77-year-old female patient with history of hypertension, hyperlipidemia, migraines, who was sent from cardiology office due to evidence of A-fib with RVR and patient's complaints of exertional dyspnea and fluid retention noted on exam    · [id number], anticoagulation indicated -initiated on Eliquis -pricing checked by case management   · Initially on Cardizem drip which was stopped with improvement in heart rate and transitioned to p.o.  Cardizem by cardiology on 08/16/2023   · Attempted to initiate p.o. metoprolol yesterday but patient declining stating that she cannot tolerate side effects  · 2D echo reveals low normal EF of 50%   · I appreciate cardiology input    · LAURENCE guided cardioversion this AM, not converted to regular rhythm  · Started on Amiodarone drip  · Continue telemetry monitoring  · Plan to change Cardizam to long acting form after cardioversion on discharge  · Continue Eliquis 5 mg BID for anticoagulation        Acute CHF (congestive heart failure) (HCC)  Assessment & Plan  Wt Readings from Last 3 Encounters:   08/17/23 77.9 kg (171 lb 11.8 oz)   08/15/23 80.4 kg (177 lb 3.2 oz)   07/25/23 80.1 kg (176 lb 9.6 oz)     Patient presents with new onset A-fib, exertional dyspnea, evidence of fluid retention, elevated BNP 2D echo revealed low normal EF at 50%  Appreciate cardiology input  Conitnue IV Lasix 40 mg daily for today, transition to PO starting form tomorrow  Monitor I's and O's,   daily weights  Low salt diet        Essential hypertension  Assessment & Plan  Home Losartan 25 mg on hold  I appreciate cardiology inputs  Discontinue Cardizem drip, transition to PO Cardizem   Follow blood pressure trend      Migraine without aura and without status migrainosus, not intractable  Assessment & Plan  Continue Fioricet as needed  Follow up with PCP as outpatient regarding prophylactic medication     Esophageal reflux  Assessment & Plan  Continue PPI         VTE Pharmacologic Prophylaxis:   Pharmacologic: Apixaban (Eliquis)  Mechanical VTE Prophylaxis in Place: Yes    Patient Centered Rounds: I have performed bedside rounds with nursing staff today. Discussions with Specialists or Other Care Team Provider: yes    Education and Discussions with Family / Patient: patient and her spouse    Time Spent for Care: 55 minutes. More than 50% of total time spent on counseling and coordination of care as described above. Current Length of Stay: 2 day(s)    Current Patient Status: Inpatient   Certification Statement: The patient will continue to require additional inpatient hospital stay due to ongoing management    Discharge Plan: when stable    Code Status: Level 1 - Full Code      Subjective:   Seen and examined the patient at bedside today. Came back from cardioversion. Not in distress. Expressed disappointment with failed cardioversion. Denies chest pain, palpitations, shortness of breath. Objective:     Vitals:   Temp (24hrs), Av.8 °F (36.6 °C), Min:96.8 °F (36 °C), Max:99.1 °F (37.3 °C)    Temp:  [96.8 °F (36 °C)-99.1 °F (37.3 °C)] 97.4 °F (36.3 °C)  HR:  [] 129  Resp:  [12-42] 18  BP: ()/(53-94) 132/64  SpO2:  [90 %-100 %] 95 %  Body mass index is 30.42 kg/m². Input and Output Summary (last 24 hours): Intake/Output Summary (Last 24 hours) at 2023 1214  Last data filed at 2023 0917  Gross per 24 hour   Intake 780 ml   Output 800 ml   Net -20 ml       Physical Exam:     Physical Exam  Vitals and nursing note reviewed. Constitutional:       General: She is not in acute distress. Appearance: She is well-developed. HENT:      Head: Normocephalic and atraumatic.       Right Ear: External ear normal. Left Ear: External ear normal.      Nose: Nose normal.      Mouth/Throat:      Mouth: Mucous membranes are moist.      Pharynx: Oropharynx is clear. Eyes:      General: No scleral icterus. Extraocular Movements: Extraocular movements intact. Conjunctiva/sclera: Conjunctivae normal.   Cardiovascular:      Rate and Rhythm: Normal rate. Rhythm irregular. Pulses: Normal pulses. Heart sounds: Normal heart sounds. Pulmonary:      Effort: Pulmonary effort is normal. No respiratory distress. Breath sounds: Normal breath sounds. No wheezing or rales. Abdominal:      General: Bowel sounds are normal.      Palpations: Abdomen is soft. Tenderness: There is no abdominal tenderness. There is no guarding. Musculoskeletal:         General: No swelling. Cervical back: Neck supple. Skin:     General: Skin is warm and dry. Capillary Refill: Capillary refill takes less than 2 seconds. Neurological:      Mental Status: She is alert and oriented to person, place, and time. Mental status is at baseline. Psychiatric:         Mood and Affect: Mood normal.         Additional Data:     Labs:    Results from last 7 days   Lab Units 08/17/23  0603   WBC Thousand/uL 7.09   HEMOGLOBIN g/dL 13.5   HEMATOCRIT % 42.2   PLATELETS Thousands/uL 162   NEUTROS PCT % 62   LYMPHS PCT % 28   MONOS PCT % 8   EOS PCT % 1     Results from last 7 days   Lab Units 08/17/23  0603 08/16/23  0500   SODIUM mmol/L 141 142   POTASSIUM mmol/L 4.2 3.7   CHLORIDE mmol/L 103 103   CO2 mmol/L 29 29   BUN mg/dL 20 15   CREATININE mg/dL 0.91 0.94   ANION GAP mmol/L 9 10   CALCIUM mg/dL 8.9 8.9   ALBUMIN g/dL  --  3.8   TOTAL BILIRUBIN mg/dL  --  1.44*   ALK PHOS U/L  --  45   ALT U/L  --  141*   AST U/L  --  30   GLUCOSE RANDOM mg/dL 107 104     Results from last 7 days   Lab Units 08/15/23  1453   INR  0.98                     * I Have Reviewed All Lab Data Listed Above. * Additional Pertinent Lab Tests Reviewed:  All [facility] Admission Reviewed    Imaging:    Imaging Reports Reviewed Today Include:   CTA ED chest PE Study   Final Result      No acute findings in the chest, specifically, no pulmonary arterial embolism or pulmonary infiltrate/consolidation. Workstation performed: [id number]         XR chest 1 view portable   Final Result      No acute cardiopulmonary disease. Workstation performed: [id number]           Imaging Personally Reviewed by Myself Includes:  na    Recent Cultures (last 7 days):           Last 24 Hours Medication List:   Current Facility-Administered Medications   Medication Dose Route Frequency Provider Last Rate   • acetaminophen  650 mg Oral Q6H PRN Flory Boswlel MD     • amiodarone (CORDARONE) 900 mg in dextrose 5 % 500 mL infusion  0.5 mg/min Intravenous Continuous Christopher Cutitta, DO 0.5 mg/min (08/17/23 1016)   • apixaban  5 mg Oral BID Flory Boswell MD     • butalbital-acetaminophen-caffeine  1 tablet Oral Q4H PRN Flory Boswell MD     • cholecalciferol  1,000 Units Oral Daily Flory Boswell MD     • diltiazem  30 mg Oral Q6H [address] Anthony Steinberg MD     • furosemide  40 mg Intravenous Daily Flory Boswell MD     • metoprolol  5 mg Intravenous Q6H PRN Anthony Steinberg MD     • ondansetron  4 mg Intravenous Q6H PRN Flory Boswell MD     • pantoprazole  40 mg Oral BID Flory Boswell MD     • pravastatin  40 mg Oral Daily With Fide Austin MD          Today, Patient Was Seen By: Romulo Umanzor MD    ** Please Note: Dictation voice to text software may have been used in the creation of this document.  **

## 2023-08-17 NOTE — ASSESSMENT & PLAN NOTE
Wt Readings from Last 3 Encounters:   08/17/23 77.9 kg (171 lb 11.8 oz)   08/15/23 80.4 kg (177 lb 3.2 oz)   07/25/23 80.1 kg (176 lb 9.6 oz)     Patient presents with new onset A-fib, exertional dyspnea, evidence of fluid retention, elevated BNP 2D echo revealed low normal EF at 50%  Appreciate cardiology input  Conitnue IV Lasix 40 mg daily for today, transition to PO starting form tomorrow  Monitor I's and O's,   daily weights  Low salt diet

## 2023-08-17 NOTE — NURSING NOTE
IV site in R proximal FA noted to be red and tender. No fluids running to that location at this time, last noted infusion was yesterday. IV removed, ice applied.

## 2023-08-17 NOTE — INCIDENTAL FINDINGS
The following findings require follow up:  Radiographic finding   Finding:     CTA - CHEST WITH IV CONTRAST  HEART/GREAT VESSELS: Cardiomegaly. Coronary artery calcifications.  No thoracic aortic aneurysm   Follow up required: Yes   Follow up should be done within 1 month(s)    Please notify the following clinician to assist with the follow up: PCP   Dr. Kennedy Porras, DO

## 2023-08-17 NOTE — PROGRESS NOTES
Cardiology Progress Note - Magnolia Walden 69 y.o. female MRN: [id number]    Unit/Bed#:  Encounter: [id number]    Assessment and plan  #1 new onset atrial fibrillation with RVR  #2 preserved LV systolic function  #3 acute diastolic congestive heart failure  #4 hypertension  #5 hyperlipidemia    Recommendations: Patient remains in atrial fibrillation with periods of RVR plan LAURENCE guided cardioversion this morning. Will transition Cardizem to long-acting after cardioversion when I see what her baseline sinus rate will be. Blood pressure has been well controlled. We will give 1 more dose of IV Lasix today transition to low-dose oral tomorrow. If she stable this afternoon could possibly go home. Subjective:    No significant events overnight. Denies any chest pain, shortness of breath, palpitations. Remains in A-fib. Had a mild headache this morning. ROS    Objective:   Vitals: Blood pressure 122/67, pulse 102, temperature (!) 97.3 °F (36.3 °C), temperature source Temporal, resp. rate 20, height 5' 3" (1.6 m), weight 77.9 kg (171 lb 11.8 oz), SpO2 95 %. , Body mass index is 30.42 kg/m².,   Orthostatic Blood Pressures    Flowsheet Row Most Recent Value   Blood Pressure 122/67 filed at 08/17/2023 0700   Patient Position - Orthostatic VS Lying filed at 08/17/2023 4385         Systolic (62SKD), CMB:915 , Min:89 , SNL:883     Diastolic (14XMT), ZUA:88, Min:53, Max:94      Intake/Output Summary (Last 24 hours) at 8/17/2023 0730  Last data filed at 8/17/2023 0033  Gross per 24 hour   Intake 486.25 ml   Output 800 ml   Net -313.75 ml     Weight (last 2 days)     Date/Time Weight    08/17/23 0600 77.9 (171.74)    08/16/23 0802 78.5 (173)    08/16/23 0600 78.6 (173.28)    08/16/23 0331 80.9 (178.35)    08/15/23 1451 80.4 (177.25)            Telemetry Review: No significant arrhythmias seen on telemetry review. EKG personally reviewed by Trent Cure, DO.      Physical Exam  Vitals and nursing note reviewed. Constitutional:       General: She is not in acute distress. Appearance: She is well-developed. HENT:      Head: Normocephalic and atraumatic. Eyes:      Conjunctiva/sclera: Conjunctivae normal.      Pupils: Pupils are equal, round, and reactive to light. Cardiovascular:      Rate and Rhythm: Normal rate. Rhythm irregular. Heart sounds: Normal heart sounds. No murmur heard. No friction rub. Pulmonary:      Effort: Pulmonary effort is normal. No respiratory distress. Breath sounds: Normal breath sounds. No wheezing or rales. Abdominal:      General: Bowel sounds are normal. There is no distension. Palpations: Abdomen is soft. Tenderness: There is no abdominal tenderness. There is no rebound. Musculoskeletal:         General: No tenderness or deformity. Normal range of motion. Cervical back: Neck supple. Skin:     General: Skin is warm and dry. Findings: No erythema. Neurological:      Mental Status: She is alert and oriented to person, place, and time. Cranial Nerves: No cranial nerve deficit.            Laboratory Results:        CBC with diff:   Results from last 7 days   Lab Units 08/17/23  0603 08/16/23  0500 08/15/23  1453   WBC Thousand/uL 7.09 6.69 7.46   HEMOGLOBIN g/dL 13.5 12.4 13.4   HEMATOCRIT % 42.2 39.8 42.6   MCV fL 91 91 91   PLATELETS Thousands/uL 162 144* 142*   RBC Million/uL 4.66 4.40 4.70   MCH pg 29.0 28.2 28.5   MCHC g/dL 32.0 31.2* 31.5   RDW % 13.3 13.4 13.3   MPV fL 11.9 12.5 12.1   NRBC AUTO /100 WBCs 0  --  0         CMP:  Results from last 7 days   Lab Units 08/17/23  0603 08/16/23  0500 08/15/23  1453   POTASSIUM mmol/L 4.2 3.7 3.8   CHLORIDE mmol/L 103 103 105   CO2 mmol/L 29 29 24   BUN mg/dL 20 15 19   CREATININE mg/dL 0.91 0.94 0.93   CALCIUM mg/dL 8.9 8.9 9.7   AST U/L  --  30 45*   ALT U/L  --  141* 176*   ALK PHOS U/L  --  45 51   EGFR ml/min/1.73sq m 64 62 62         BMP:  Results from last 7 days   Lab Units 23  0603 23  0500 08/15/23  1453   POTASSIUM mmol/L 4.2 3.7 3.8   CHLORIDE mmol/L 103 103 105   CO2 mmol/L 29 29 24   BUN mg/dL 20 15 19   CREATININE mg/dL 0.91 0.94 0.93   CALCIUM mg/dL 8.9 8.9 9.7       BNP:   Recent Labs     08/15/23  1453   *       Magnesium:   Results from last 7 days   Lab Units 23  0603 23  0500 08/15/23  1453   MAGNESIUM mg/dL 2.3 2.3 1.9       Coags:   Results from last 7 days   Lab Units 08/15/23  1453   PTT seconds 27   INR  0.98       TSH:        Hemoglobin A1C       Lipid Profile:       Cardiac testing:   Results for orders placed during the hospital encounter of 21    Echo complete with contrast if indicated    Narrative  [address]  [address] [place] [address], [address] [place] [address]  [phone]    Transthoracic Echocardiogram  2D, M-mode, Doppler, and Color Doppler    Study date:  18-Mar-2021    Patient: Rubi Olvera  MR number: [id number]  Account number: [de-identified]  : 1955  Age: 77 years  Gender: Female  Status: Outpatient  Location: Echo lab  Height: 63 in  Weight: 168.7 lb  BP: 160/ 74 mmHg    Indications: ORTHOSTATIC HYPOTENSION, NEAR SYNCOPE    Diagnoses: I95.1 - Orthostatic hypotension    Sonographer:  Mary Timmons RDCS  Primary Physician:  Dean Batista DO  Referring Physician:  Priti Mehta MD  Group:  [facility]  Interpreting Physician:  Lee Ann Huerta MD    SUMMARY    PROCEDURE INFORMATION:  This was a technically difficult study. LEFT VENTRICLE:  Size was normal.  Systolic function was normal. Ejection fraction was estimated to be 60 %. There were no regional wall motion abnormalities. Wall thickness was at the upper limits of normal.    MITRAL VALVE:  There was mild regurgitation. AORTIC VALVE:  Transaortic velocity was increased due to increased transvalvular flow.  The AV appears to open adequately on 2D imaging although imaging is suboptimal.  Cannot exclude mild AS. Valve peak gradient was 19 mmHg. The valve was not well visualized. TRICUSPID VALVE:  There was mild regurgitation. HISTORY: PRIOR HISTORY: REACTIVE AIRWAY DISEASE, OBSTRUCTIVE SLEEP APNEA, HYPERLIPIDEMIA, POSTURAL DIZZINESS, FAMILY HISTORY OF SUDDEN CARDIAC DEATH    PROCEDURE: The procedure was performed in the echo lab. This was a routine study. The transthoracic approach was used. The study included complete 2D imaging, M-mode, complete spectral Doppler, and color Doppler. Images were obtained from  the parasternal, apical, subcostal, and suprasternal notch acoustic windows. This was a technically difficult study. LEFT VENTRICLE: Size was normal. Systolic function was normal. Ejection fraction was estimated to be 60 %. There were no regional wall motion abnormalities. Wall thickness was at the upper limits of normal.    RIGHT VENTRICLE: The size was normal. Systolic function was normal. Wall thickness was normal.    LEFT ATRIUM: Size was normal.    RIGHT ATRIUM: Size was normal.    MITRAL VALVE: Valve structure was normal. There was normal leaflet separation. DOPPLER: The transmitral velocity was within the normal range. There was no evidence for stenosis. There was mild regurgitation. AORTIC VALVE: The valve was not well visualized. DOPPLER: Transaortic velocity was increased due to increased transvalvular flow. The AV appears to open adequately on 2D imaging although imaging is suboptimal.  Cannot exclude mild AS. There was no significant regurgitation. TRICUSPID VALVE: The valve structure was normal. There was normal leaflet separation. DOPPLER: The transtricuspid velocity was within the normal range. There was no evidence for stenosis. There was mild regurgitation. PULMONIC VALVE: Not well visualized. PERICARDIUM: There was no pericardial effusion. The pericardium was normal in appearance. AORTA: The root exhibited normal size.     MEASUREMENT TABLES    DOPPLER MEASUREMENTS  Aortic valve   (Reference normals)  Peak gradient   19 mmHg   (--)    SYSTEM MEASUREMENT TABLES    2D  %FS: 43.85 %  Ao Diam: 2.81 cm  EDV(Teich): 60.69 ml  EF(Teich): 75.82 %  ESV(Teich): 14.67 ml  IVSd: 1.13 cm  LA Area: 19.63 cm2  LA Diam: 3.66 cm  LVEDV MOD A4C: 82.12 ml  LVEF MOD A4C: 43.32 %  LVESV MOD A4C: 46.55 ml  LVIDd: 3.77 cm  LVIDs: 2.12 cm  LVLd A4C: 7.78 cm  LVLs A4C: 6.77 cm  LVOT Diam: 1.88 cm  LVPWd: 1.13 cm  RA Area: 13.43 cm2  RVIDd: 3.21 cm  RWT: 0.6  SV MOD A4C: 35.57 ml  SV(Teich): 46.01 ml    CW  AV Env. Ti: 300.67 ms  AV VTI: 44.26 cm  AV Vmax: 2.2 m/s  AV Vmean: 1.47 m/s  AV maxP.35 mmHg  AV meanP.89 mmHg  PV Vmax: 1.21 m/s  PV maxP.81 mmHg    MM  TAPSE: 2.57 cm    PW  BLOSSOM (VTI): 1.25 cm2  BLOSSOM Vmax: 1.3 cm2  BLOSSOM Vmax, Pt: 1.23 cm2  AVAI (VTI): 0 cm2/m2  AVAI Vmax: 0 cm2/m2  AVAI Vmax, Pt: 0 cm2/m2  E' Lat: 0.11 m/s  E' Sept: 0.08 m/s  E/E' Lat: 8.91  E/E' Sept: 12.57  LVOT Env. Ti: 291.15 ms  LVOT VTI: 19.84 cm  LVOT Vmax: 0.97 m/s  LVOT Vmean: 0.68 m/s  LVOT maxPG: 3.8 mmHg  LVOT meanP.15 mmHg  LVSI Dopp: 30.71 ml/m2  LVSV Dopp: 55.28 ml  MV A [place]: 0.97 m/s  MV Dec [place]: 5.12 m/s2  MV DecT: 196.12 ms  MV E [place]: 1 m/s  MV E/A Ratio: 1.04  RVOT Vmax: 1.07 m/s  RVOT maxP.54 mmHg    [facility] Accredited Echocardiography Laboratory    Prepared and electronically signed by    Sherri Barrett MD  Signed 18-Mar-2021 10:23:23    No results found for this or any previous visit. No results found for this or any previous visit. Results for orders placed in visit on 11/16/15    NM myocardial perfusion spect (stress and/or rest)    Narrative  EXAM ROOM = [facility] STRESS  EXAM START = [id number]  EXAM STOP = [id number]  FILMS USED = 4 IMAGES    This stress test was performed by a cardiologist and the  cardiologist will render the interpretation.     - Scottie Mayes, [address] [place] [address] Coordinator  [place] Radiologist- QUIQUE Diaz MD  Electronically QUIQUE Montez MD  Released Date Time- 11/24/15 0958  ------------------------------------------------------------------------------  READ BY = [id number]  RELEASED BY = [id number]      Meds/Allergies   all current active meds have been reviewed  Medications Prior to Admission   Medication   • butalbital-acetaminophen-caffeine (FIORICET,ESGIC) -40 mg per tablet   • cholecalciferol (VITAMIN D3) 1,000 units tablet   • losartan (Cozaar) 25 mg tablet   • pantoprazole (PROTONIX) 40 mg tablet   • simvastatin (ZOCOR) 20 mg tablet   • denosumab (PROLIA) 60 mg/mL   • fluticasone (FLONASE) 50 mcg/act nasal spray          Assessment:  Principal Problem:    New onset a-fib with RVR  Active Problems:    Esophageal reflux    Migraine without aura and without status migrainosus, not intractable    Essential hypertension    Acute CHF (congestive heart failure) ([address] )            Counseling / Coordination of Care  Total floor / unit time spent today 25 minutes. Greater than 50% of total time was spent with the patient and / or family counseling and / or coordination of care. A description of the counseling / coordination of care: Annalee Ho

## 2023-08-17 NOTE — PLAN OF CARE
Problem: PAIN - ADULT  Goal: Verbalizes/displays adequate comfort level or baseline comfort level  Description: Interventions:  - Encourage patient to monitor pain and request assistance  - Assess pain using appropriate pain scale  - Administer analgesics based on type and severity of pain and evaluate response  - Implement non-pharmacological measures as appropriate and evaluate response  - Consider cultural and social influences on pain and pain management  - Notify physician/advanced practitioner if interventions unsuccessful or patient reports new pain  Outcome: Progressing     Problem: INFECTION - ADULT  Goal: Absence or prevention of progression during hospitalization  Description: INTERVENTIONS:  - Assess and monitor for signs and symptoms of infection  - Monitor lab/diagnostic results  - Monitor all insertion sites, i.e. indwelling lines, tubes, and drains  - Monitor endotracheal if appropriate and nasal secretions for changes in amount and color  - [place] appropriate cooling/warming therapies per order  - Administer medications as ordered  - Instruct and encourage patient and family to use good hand hygiene technique  - Identify and instruct in appropriate isolation precautions for identified infection/condition  Outcome: Progressing     Problem: SAFETY ADULT  Goal: Patient will remain free of falls  Description: INTERVENTIONS:  - Educate patient/family on patient safety including physical limitations  - Instruct patient to call for assistance with activity   - Consult OT/PT to assist with strengthening/mobility   - Keep Call bell within reach  - Keep bed low and locked with side rails adjusted as appropriate  - Keep care items and personal belongings within reach  - Initiate and maintain comfort rounds  - Make Fall Risk Sign visible to staff  - Offer Toileting every 2 Hours, in advance of need  - Initiate/Maintain bed alarm  - Obtain necessary fall risk management equipment:   - Apply yellow socks and bracelet for high fall risk patients  - Consider moving patient to room near nurses station  Outcome: Progressing     Problem: Knowledge Deficit  Goal: Patient/family/caregiver demonstrates understanding of disease process, treatment plan, medications, and discharge instructions  Description: Complete learning assessment and assess knowledge base.   Interventions:  - Provide teaching at level of understanding  - Provide teaching via preferred learning methods  Outcome: Progressing

## 2023-08-17 NOTE — PLAN OF CARE
Problem: PAIN - ADULT  Goal: Verbalizes/displays adequate comfort level or baseline comfort level  Description: Interventions:  - Encourage patient to monitor pain and request assistance  - Assess pain using appropriate pain scale  - Administer analgesics based on type and severity of pain and evaluate response  - Implement non-pharmacological measures as appropriate and evaluate response  - Consider cultural and social influences on pain and pain management  - Notify physician/advanced practitioner if interventions unsuccessful or patient reports new pain  Outcome: Progressing     Problem: INFECTION - ADULT  Goal: Absence or prevention of progression during hospitalization  Description: INTERVENTIONS:  - Assess and monitor for signs and symptoms of infection  - Monitor lab/diagnostic results  - Monitor all insertion sites, i.e. indwelling lines, tubes, and drains  - Monitor endotracheal if appropriate and nasal secretions for changes in amount and color  - [place] appropriate cooling/warming therapies per order  - Administer medications as ordered  - Instruct and encourage patient and family to use good hand hygiene technique  - Identify and instruct in appropriate isolation precautions for identified infection/condition  Outcome: Progressing  Goal: Absence of fever/infection during neutropenic period  Description: INTERVENTIONS:  - Monitor WBC    Outcome: Progressing     Problem: SAFETY ADULT  Goal: Patient will remain free of falls  Description: INTERVENTIONS:  - Educate patient/family on patient safety including physical limitations  - Instruct patient to call for assistance with activity   - Consult OT/PT to assist with strengthening/mobility   - Keep Call bell within reach  - Keep bed low and locked with side rails adjusted as appropriate  - Keep care items and personal belongings within reach  - Initiate and maintain comfort rounds  - Make Fall Risk Sign visible to staff  - Offer Toileting every 2 Hours, in advance of need  - Initiate/Maintain bed alarm  - Obtain necessary fall risk management equipment:   - Apply yellow socks and bracelet for high fall risk patients  - Consider moving patient to room near nurses station  Outcome: Progressing  Goal: Maintain or return to baseline ADL function  Description: INTERVENTIONS:  -  Assess patient's ability to carry out ADLs; assess patient's baseline for ADL function and identify physical deficits which impact ability to perform ADLs (bathing, care of mouth/teeth, toileting, grooming, dressing, etc.)  - Assess/evaluate cause of self-care deficits   - Assess range of motion  - Assess patient's mobility; develop plan if impaired  - Assess patient's need for assistive devices and provide as appropriate  - Encourage maximum independence but intervene and supervise when necessary  - Involve family in performance of ADLs  - Assess for home care needs following discharge   - Consider OT consult to assist with ADL evaluation and planning for discharge  - Provide patient education as appropriate  Outcome: Progressing  Goal: Maintains/Returns to pre admission functional level  Description: INTERVENTIONS:  - Perform BMAT or MOVE assessment daily.   - Set and communicate daily mobility goal to care team and patient/family/caregiver. - Collaborate with rehabilitation services on mobility goals if consulted  - Perform Range of Motion 3 times a day. - Reposition patient every 2 hours.   - Dangle patient 3 times a day  - Stand patient 3 times a day  - Ambulate patient 3 times a day  - Out of bed to chair 3 times a day   - Out of bed for meals 3 times a day  - Out of bed for toileting  - Record patient progress and toleration of activity level   Outcome: Progressing     Problem: DISCHARGE PLANNING  Goal: Discharge to home or other facility with appropriate resources  Description: INTERVENTIONS:  - Identify barriers to discharge w/patient and caregiver  - Arrange for needed discharge resources and transportation as appropriate  - Identify discharge learning needs (meds, wound care, etc.)  - Arrange for interpretive services to assist at discharge as needed  - Refer to Case Management Department for coordinating discharge planning if the patient needs post-hospital services based on physician/advanced practitioner order or complex needs related to functional status, cognitive ability, or social support system  Outcome: Progressing     Problem: Knowledge Deficit  Goal: Patient/family/caregiver demonstrates understanding of disease process, treatment plan, medications, and discharge instructions  Description: Complete learning assessment and assess knowledge base. Interventions:  - Provide teaching at level of understanding  - Provide teaching via preferred learning methods  Outcome: Progressing     Problem: Nutrition/Hydration-ADULT  Goal: Nutrient/Hydration intake appropriate for improving, restoring or maintaining nutritional needs  Description: Monitor and assess patient's nutrition/hydration status for malnutrition. Collaborate with interdisciplinary team and initiate plan and interventions as ordered. Monitor patient's weight and dietary intake as ordered or per policy. Utilize nutrition screening tool and intervene as necessary. Determine patient's food preferences and provide high-protein, high-caloric foods as appropriate.      INTERVENTIONS:  - Monitor oral intake, urinary output, labs, and treatment plans  - Assess nutrition and hydration status and recommend course of action  - Evaluate amount of meals eaten  - Assist patient with eating if necessary   - Allow adequate time for meals  - Recommend/ encourage appropriate diets, oral nutritional supplements, and vitamin/mineral supplements  - Order, calculate, and assess calorie counts as needed  - Recommend, monitor, and adjust tube feedings and TPN/PPN based on assessed needs  - Assess need for intravenous fluids  - Provide specific nutrition/hydration education as appropriate  - Include patient/family/caregiver in decisions related to nutrition  Outcome: Progressing     Problem: CARDIOVASCULAR - ADULT  Goal: Maintains optimal cardiac output and hemodynamic stability  Description: INTERVENTIONS:  - Monitor I/O, vital signs and rhythm  - Monitor for S/S and trends of decreased cardiac output  - Administer and titrate ordered vasoactive medications to optimize hemodynamic stability  - Assess quality of pulses, skin color and temperature  - Assess for signs of decreased coronary artery perfusion  - Instruct patient to report change in severity of symptoms  Outcome: Progressing  Goal: Absence of cardiac dysrhythmias or at baseline rhythm  Description: INTERVENTIONS:  - Continuous cardiac monitoring, vital signs, obtain 12 lead EKG if ordered  - Administer antiarrhythmic and heart rate control medications as ordered  - Monitor electrolytes and administer replacement therapy as ordered  Outcome: Progressing     Problem: RESPIRATORY - ADULT  Goal: Achieves optimal ventilation and oxygenation  Description: INTERVENTIONS:  - Assess for changes in respiratory status  - Assess for changes in mentation and behavior  - Position to facilitate oxygenation and minimize respiratory effort  - Oxygen administered by appropriate delivery if ordered  - Initiate smoking cessation education as indicated  - Encourage broncho-pulmonary hygiene including cough, deep breathe, Incentive Spirometry  - Assess the need for suctioning and aspirate as needed  - Assess and instruct to report SOB or any respiratory difficulty  - Respiratory Therapy support as indicated  Outcome: Progressing

## 2023-08-17 NOTE — PLAN OF CARE
Problem: PAIN - ADULT  Goal: Verbalizes/displays adequate comfort level or baseline comfort level  Description: Interventions:  - Encourage patient to monitor pain and request assistance  - Assess pain using appropriate pain scale  - Administer analgesics based on type and severity of pain and evaluate response  - Implement non-pharmacological measures as appropriate and evaluate response  - Consider cultural and social influences on pain and pain management  - Notify physician/advanced practitioner if interventions unsuccessful or patient reports new pain  Outcome: Progressing     Problem: INFECTION - ADULT  Goal: Absence or prevention of progression during hospitalization  Description: INTERVENTIONS:  - Assess and monitor for signs and symptoms of infection  - Monitor lab/diagnostic results  - Monitor all insertion sites, i.e. indwelling lines, tubes, and drains  - Monitor endotracheal if appropriate and nasal secretions for changes in amount and color  - [place] appropriate cooling/warming therapies per order  - Administer medications as ordered  - Instruct and encourage patient and family to use good hand hygiene technique  - Identify and instruct in appropriate isolation precautions for identified infection/condition  Outcome: Progressing  Goal: Absence of fever/infection during neutropenic period  Description: INTERVENTIONS:  - Monitor WBC    Outcome: Progressing     Problem: SAFETY ADULT  Goal: Patient will remain free of falls  Description: INTERVENTIONS:  - Educate patient/family on patient safety including physical limitations  - Instruct patient to call for assistance with activity   - Consult OT/PT to assist with strengthening/mobility   - Keep Call bell within reach  - Keep bed low and locked with side rails adjusted as appropriate  - Keep care items and personal belongings within reach  - Initiate and maintain comfort rounds  - Make Fall Risk Sign visible to staff  - Offer Toileting every 2 Hours, in advance of need  - Initiate/Maintain bed alarm  - Obtain necessary fall risk management equipment:   - Apply yellow socks and bracelet for high fall risk patients  - Consider moving patient to room near nurses station  Outcome: Progressing  Goal: Maintain or return to baseline ADL function  Description: INTERVENTIONS:  -  Assess patient's ability to carry out ADLs; assess patient's baseline for ADL function and identify physical deficits which impact ability to perform ADLs (bathing, care of mouth/teeth, toileting, grooming, dressing, etc.)  - Assess/evaluate cause of self-care deficits   - Assess range of motion  - Assess patient's mobility; develop plan if impaired  - Assess patient's need for assistive devices and provide as appropriate  - Encourage maximum independence but intervene and supervise when necessary  - Involve family in performance of ADLs  - Assess for home care needs following discharge   - Consider OT consult to assist with ADL evaluation and planning for discharge  - Provide patient education as appropriate  Outcome: Progressing  Goal: Maintains/Returns to pre admission functional level  Description: INTERVENTIONS:  - Perform BMAT or MOVE assessment daily.   - Set and communicate daily mobility goal to care team and patient/family/caregiver. - Collaborate with rehabilitation services on mobility goals if consulted  - Perform Range of Motion 3 times a day. - Reposition patient every 2 hours.   - Dangle patient 3 times a day  - Stand patient 3 times a day  - Ambulate patient 3 times a day  - Out of bed to chair 3 times a day   - Out of bed for meals 3 times a day  - Out of bed for toileting  - Record patient progress and toleration of activity level   Outcome: Progressing     Problem: DISCHARGE PLANNING  Goal: Discharge to home or other facility with appropriate resources  Description: INTERVENTIONS:  - Identify barriers to discharge w/patient and caregiver  - Arrange for needed discharge resources and transportation as appropriate  - Identify discharge learning needs (meds, wound care, etc.)  - Arrange for interpretive services to assist at discharge as needed  - Refer to Case Management Department for coordinating discharge planning if the patient needs post-hospital services based on physician/advanced practitioner order or complex needs related to functional status, cognitive ability, or social support system  Outcome: Progressing     Problem: Knowledge Deficit  Goal: Patient/family/caregiver demonstrates understanding of disease process, treatment plan, medications, and discharge instructions  Description: Complete learning assessment and assess knowledge base. Interventions:  - Provide teaching at level of understanding  - Provide teaching via preferred learning methods  Outcome: Progressing     Problem: Nutrition/Hydration-ADULT  Goal: Nutrient/Hydration intake appropriate for improving, restoring or maintaining nutritional needs  Description: Monitor and assess patient's nutrition/hydration status for malnutrition. Collaborate with interdisciplinary team and initiate plan and interventions as ordered. Monitor patient's weight and dietary intake as ordered or per policy. Utilize nutrition screening tool and intervene as necessary. Determine patient's food preferences and provide high-protein, high-caloric foods as appropriate.      INTERVENTIONS:  - Monitor oral intake, urinary output, labs, and treatment plans  - Assess nutrition and hydration status and recommend course of action  - Evaluate amount of meals eaten  - Assist patient with eating if necessary   - Allow adequate time for meals  - Recommend/ encourage appropriate diets, oral nutritional supplements, and vitamin/mineral supplements  - Order, calculate, and assess calorie counts as needed  - Recommend, monitor, and adjust tube feedings and TPN/PPN based on assessed needs  - Assess need for intravenous fluids  - Provide specific nutrition/hydration education as appropriate  - Include patient/family/caregiver in decisions related to nutrition  Outcome: Progressing     Problem: CARDIOVASCULAR - ADULT  Goal: Maintains optimal cardiac output and hemodynamic stability  Description: INTERVENTIONS:  - Monitor I/O, vital signs and rhythm  - Monitor for S/S and trends of decreased cardiac output  - Administer and titrate ordered vasoactive medications to optimize hemodynamic stability  - Assess quality of pulses, skin color and temperature  - Assess for signs of decreased coronary artery perfusion  - Instruct patient to report change in severity of symptoms  Outcome: Progressing  Goal: Absence of cardiac dysrhythmias or at baseline rhythm  Description: INTERVENTIONS:  - Continuous cardiac monitoring, vital signs, obtain 12 lead EKG if ordered  - Administer antiarrhythmic and heart rate control medications as ordered  - Monitor electrolytes and administer replacement therapy as ordered  Outcome: Progressing     Problem: RESPIRATORY - ADULT  Goal: Achieves optimal ventilation and oxygenation  Description: INTERVENTIONS:  - Assess for changes in respiratory status  - Assess for changes in mentation and behavior  - Position to facilitate oxygenation and minimize respiratory effort  - Oxygen administered by appropriate delivery if ordered  - Initiate smoking cessation education as indicated  - Encourage broncho-pulmonary hygiene including cough, deep breathe, Incentive Spirometry  - Assess the need for suctioning and aspirate as needed  - Assess and instruct to report SOB or any respiratory difficulty  - Respiratory Therapy support as indicated  Outcome: Progressing

## 2023-08-17 NOTE — ASSESSMENT & PLAN NOTE
Home Losartan 25 mg on hold  I appreciate cardiology inputs  Discontinue Cardizem drip, transition to PO Cardizem   Follow blood pressure trend

## 2023-08-17 NOTE — ANESTHESIA POSTPROCEDURE EVALUATION
Post-Op Assessment Note    No notable events documented.     BP   103/76   Temp  97.5   Pulse  130   Resp   18   SpO2   96

## 2023-08-18 LAB
ALBUMIN SERPL BCP-MCNC: 3.9 G/DL (ref 3.5–5)
ALP SERPL-CCNC: 49 U/L (ref 34–104)
ALT SERPL W P-5'-P-CCNC: 89 U/L (ref 7–52)
ANION GAP SERPL CALCULATED.3IONS-SCNC: 9 MMOL/L
AST SERPL W P-5'-P-CCNC: 17 U/L (ref 13–39)
BILIRUB SERPL-MCNC: 1.38 MG/DL (ref 0.2–1)
BUN SERPL-MCNC: 16 MG/DL (ref 5–25)
CALCIUM SERPL-MCNC: 8.7 MG/DL (ref 8.4–10.2)
CHLORIDE SERPL-SCNC: 105 MMOL/L (ref 96–108)
CO2 SERPL-SCNC: 27 MMOL/L (ref 21–32)
CREAT SERPL-MCNC: 0.94 MG/DL (ref 0.6–1.3)
ERYTHROCYTE [DISTWIDTH] IN BLOOD BY AUTOMATED COUNT: 13.3 % (ref 11.6–15.1)
GFR SERPL CREATININE-BSD FRML MDRD: 62 ML/MIN/1.73SQ M
GLUCOSE SERPL-MCNC: 112 MG/DL (ref 65–140)
HCT VFR BLD AUTO: 41.8 % (ref 34.8–46.1)
HGB BLD-MCNC: 13 G/DL (ref 11.5–15.4)
MAGNESIUM SERPL-MCNC: 2.2 MG/DL (ref 1.9–2.7)
MCH RBC QN AUTO: 28.2 PG (ref 26.8–34.3)
MCHC RBC AUTO-ENTMCNC: 31.1 G/DL (ref 31.4–37.4)
MCV RBC AUTO: 91 FL (ref 82–98)
PLATELET # BLD AUTO: 177 THOUSANDS/UL (ref 149–390)
PMV BLD AUTO: 12.1 FL (ref 8.9–12.7)
POTASSIUM SERPL-SCNC: 4.2 MMOL/L (ref 3.5–5.3)
PROT SERPL-MCNC: 6.4 G/DL (ref 6.4–8.4)
RBC # BLD AUTO: 4.61 MILLION/UL (ref 3.81–5.12)
SODIUM SERPL-SCNC: 141 MMOL/L (ref 135–147)
WBC # BLD AUTO: 7.41 THOUSAND/UL (ref 4.31–10.16)

## 2023-08-18 PROCEDURE — 99232 SBSQ HOSP IP/OBS MODERATE 35: CPT | Performed by: FAMILY MEDICINE

## 2023-08-18 PROCEDURE — 83735 ASSAY OF MAGNESIUM: CPT

## 2023-08-18 PROCEDURE — 85027 COMPLETE CBC AUTOMATED: CPT

## 2023-08-18 PROCEDURE — 99232 SBSQ HOSP IP/OBS MODERATE 35: CPT | Performed by: INTERNAL MEDICINE

## 2023-08-18 PROCEDURE — 80053 COMPREHEN METABOLIC PANEL: CPT

## 2023-08-18 RX ORDER — FUROSEMIDE 20 MG/1
20 TABLET ORAL DAILY
Status: DISCONTINUED | OUTPATIENT
Start: 2023-08-18 | End: 2023-08-18

## 2023-08-18 RX ORDER — FUROSEMIDE 20 MG/1
20 TABLET ORAL DAILY
Status: DISCONTINUED | OUTPATIENT
Start: 2023-08-19 | End: 2023-08-21 | Stop reason: HOSPADM

## 2023-08-18 RX ORDER — AMIODARONE HYDROCHLORIDE 200 MG/1
200 TABLET ORAL 2 TIMES DAILY WITH MEALS
Status: DISCONTINUED | OUTPATIENT
Start: 2023-08-18 | End: 2023-08-21 | Stop reason: HOSPADM

## 2023-08-18 RX ORDER — DILTIAZEM HYDROCHLORIDE 60 MG/1
60 TABLET, FILM COATED ORAL 4 TIMES DAILY
Status: DISCONTINUED | OUTPATIENT
Start: 2023-08-18 | End: 2023-08-19

## 2023-08-18 RX ADMIN — Medication 1000 UNITS: at 09:11

## 2023-08-18 RX ADMIN — DILTIAZEM HYDROCHLORIDE 60 MG: 60 TABLET, FILM COATED ORAL at 11:38

## 2023-08-18 RX ADMIN — APIXABAN 5 MG: 5 TABLET, FILM COATED ORAL at 09:11

## 2023-08-18 RX ADMIN — PANTOPRAZOLE SODIUM 40 MG: 40 TABLET, DELAYED RELEASE ORAL at 21:16

## 2023-08-18 RX ADMIN — PANTOPRAZOLE SODIUM 40 MG: 40 TABLET, DELAYED RELEASE ORAL at 09:11

## 2023-08-18 RX ADMIN — DILTIAZEM HYDROCHLORIDE 60 MG: 60 TABLET, FILM COATED ORAL at 21:16

## 2023-08-18 RX ADMIN — AMIODARONE HYDROCHLORIDE 200 MG: 200 TABLET ORAL at 10:19

## 2023-08-18 RX ADMIN — APIXABAN 5 MG: 5 TABLET, FILM COATED ORAL at 17:04

## 2023-08-18 RX ADMIN — AMIODARONE HYDROCHLORIDE 200 MG: 200 TABLET ORAL at 16:18

## 2023-08-18 RX ADMIN — PRAVASTATIN SODIUM 40 MG: 40 TABLET ORAL at 16:18

## 2023-08-18 RX ADMIN — FUROSEMIDE 40 MG: 10 INJECTION, SOLUTION INTRAMUSCULAR; INTRAVENOUS at 09:11

## 2023-08-18 RX ADMIN — AMIODARONE HYDROCHLORIDE 0.5 MG/MIN: 50 INJECTION, SOLUTION INTRAVENOUS at 17:06

## 2023-08-18 RX ADMIN — DILTIAZEM HYDROCHLORIDE 60 MG: 60 TABLET, FILM COATED ORAL at 05:38

## 2023-08-18 RX ADMIN — DILTIAZEM HYDROCHLORIDE 60 MG: 60 TABLET, FILM COATED ORAL at 17:05

## 2023-08-18 NOTE — ASSESSMENT & PLAN NOTE
Wt Readings from Last 3 Encounters:   08/18/23 77.9 kg (171 lb 11.8 oz)   08/15/23 80.4 kg (177 lb 3.2 oz)   07/25/23 80.1 kg (176 lb 9.6 oz)     Patient presents with new onset A-fib, exertional dyspnea, evidence of fluid retention, elevated BNP 2D echo revealed low normal EF at 50%  Appreciate cardiology input  Received IV Lasix 40 mg daily for 3 days,   IV lasix transitioned to PO lasix 20 mg  Sr. Creatinine stable  Monitor I's and O's,   daily weights  Low salt diet  Follow BMP

## 2023-08-18 NOTE — ASSESSMENT & PLAN NOTE
This is a 61-year-old female patient with history of hypertension, hyperlipidemia, migraines, who was sent from cardiology office due to evidence of A-fib with RVR and patient's complaints of exertional dyspnea and fluid retention noted on exam    · [id number], anticoagulation indicated -initiated on Eliquis -pricing checked by case management   · Initially on Cardizem drip which was stopped with improvement in heart rate and transitioned to p.o. Cardizem by cardiology on 08/16/2023   · Attempted to initiate p.o. metoprolol yesterday but patient declining stating that she cannot tolerate side effects  · 2D echo reveals low normal EF of 50%     · Cardiology following - appreciate cardiology input  · LAURENCE guided cardioversion on 08/17/2023, not converted to regular rhythm after two attempts  · Suspect sleep apnea, Previous sleep study in 2018 - home sleep study with evidence of hypoxia. · Recommend sleep study at sleep center to evaluate for sleep apnea  · Started on Amiodarone drip 0.5 mg/min on 08/17/2023  · Starting oral Amiodarone 200 BID today  · Increased Cardizem to 60 mg every 6 hours starting form 08/17/2023  · Plan to change Cardizam to long acting form after cardioversion on discharge  · Plan to reattempt Cardioversion on Monday if not converted over weekend  · Continue Eliquis 5 mg BID for anticoagulation   · As per cardiology she can be discharged over the weekend if rate control achieved with eventual plan for outpatient cardioversion.   · Out patient follow up with cardiology in one week after discharge   · Continue telemetry monitoring

## 2023-08-18 NOTE — ASSESSMENT & PLAN NOTE
Home Losartan 25 mg on hold  I appreciate cardiology inputs  Discontinue Cardizem drip, transition to PO Cardizem  Couldn't tolerate metoprolol   Follow blood pressure trend

## 2023-08-18 NOTE — PROGRESS NOTES
[address]  Progress Note  Name: Odell Castro  MRN: [id number]  Unit/Bed#:  I Date of Admission: 8/15/2023   Date of Service: 8/18/2023 I Hospital Day: 3    Assessment/Plan   * New onset a-fib with RVR  Assessment & Plan  This is a 68-year-old female patient with history of hypertension, hyperlipidemia, migraines, who was sent from cardiology office due to evidence of A-fib with RVR and patient's complaints of exertional dyspnea and fluid retention noted on exam    · [id number], anticoagulation indicated -initiated on Eliquis -pricing checked by case management   · Initially on Cardizem drip which was stopped with improvement in heart rate and transitioned to p.o. Cardizem by cardiology on 08/16/2023   · Attempted to initiate p.o. metoprolol yesterday but patient declining stating that she cannot tolerate side effects  · 2D echo reveals low normal EF of 50%     · Cardiology following - appreciate cardiology input  · LAURENCE guided cardioversion on 08/17/2023, not converted to regular rhythm after two attempts  · Cardiology suspect sleep apnea, Previous sleep study couple of years ago- home sleep study with evidence of hypoxia, no sleep apnea. · Recommend sleep study at sleep center to evaluate for sleep apnea  · Started on Amiodarone drip 0.5 mg/min on 08/17/2023  · Starting oral Amiodarone 200 BID today  · Increased Cardizem to 60 mg every 6 hours starting form 08/17/2023  · Plan to change Cardizam to long acting form after cardioversion on discharge  · Plan to reattempt Cardioversion on Monday if not converted over weekend  · Continue Eliquis 5 mg BID for anticoagulation   · As per cardiology she can be discharged over the weekend if rate control achieved with eventual plan for outpatient cardioversion.   · Out patient follow up with cardiology in one week after discharge   · Continue telemetry monitoring      Acute diastolic congestive heart failure ([address])  Assessment & Plan  Wt Readings from Last 3 Encounters:   08/18/23 77.9 kg (171 lb 11.8 oz)   08/15/23 80.4 kg (177 lb 3.2 oz)   07/25/23 80.1 kg (176 lb 9.6 oz)     Patient presents with new onset A-fib, exertional dyspnea, evidence of fluid retention, elevated BNP 2D echo revealed low normal EF at 50%  Appreciate cardiology input  Received IV Lasix 40 mg daily for 3 days,   IV lasix transitioned to PO lasix 20 mg  Sr. Creatinine stable  Monitor I's and O's,   daily weights  Low salt diet  Follow BMP        Essential hypertension  Assessment & Plan  Home Losartan 25 mg on hold  I appreciate cardiology inputs  Discontinue Cardizem drip, transition to PO Cardizem  Couldn't tolerate metoprolol   Follow blood pressure trend      Migraine without aura and without status migrainosus, not intractable  Assessment & Plan  Continue Fioricet as needed  Follow up with PCP as outpatient regarding prophylactic medication     Esophageal reflux  Assessment & Plan  Continue PPI         VTE Pharmacologic Prophylaxis:   Pharmacologic: Apixaban (Eliquis)  Mechanical VTE Prophylaxis in Place: Yes    Patient Centered Rounds: I have performed bedside rounds with nursing staff today. Discussions with Specialists or Other Care Team Provider: Discussed with Cardiology. Discussed with rest of the team including case management during rounds. Education and Discussions with Family / Patient: Patient and her brother at bedside. Time Spent for Care: 55 minutes. More than 50% of total time spent on counseling and coordination of care as described above. Current Length of Stay: 3 day(s)    Current Patient Status: Inpatient   Certification Statement: The patient will continue to require additional inpatient hospital stay due to ongoing evaluation and management    Discharge Plan: in 48-72 hrs    Code Status: Level 1 - Full Code      Subjective:   Seen and examined the patient at bedside today. Not in distress. No overnight events.  Denies chest pain, SOB and palpitations. Objective:     Vitals:   Temp (24hrs), Av.5 °F (36.4 °C), Min:97.1 °F (36.2 °C), Max:98.2 °F (36.8 °C)    Temp:  [97.1 °F (36.2 °C)-98.2 °F (36.8 °C)] 97.2 °F (36.2 °C)  HR:  [] 99  Resp:  [12-33] 16  BP: ()/(53-87) 144/78  SpO2:  [91 %-95 %] 95 %  Body mass index is 30.42 kg/m². Input and Output Summary (last 24 hours): Intake/Output Summary (Last 24 hours) at 2023 0948  Last data filed at 2023 0913  Gross per 24 hour   Intake 1237.53 ml   Output 1500 ml   Net -262.47 ml       Physical Exam:     Physical Exam  Vitals and nursing note reviewed. Constitutional:       General: She is not in acute distress. Appearance: She is well-developed. HENT:      Head: Normocephalic and atraumatic. Right Ear: External ear normal.      Left Ear: External ear normal.      Nose: Nose normal.      Mouth/Throat:      Mouth: Mucous membranes are moist.      Pharynx: Oropharynx is clear. Eyes:      General: No scleral icterus. Extraocular Movements: Extraocular movements intact. Conjunctiva/sclera: Conjunctivae normal.   Cardiovascular:      Rate and Rhythm: Tachycardia present. Rhythm irregular. Pulses: Normal pulses. Heart sounds: Normal heart sounds. Pulmonary:      Effort: Pulmonary effort is normal. No respiratory distress. Breath sounds: Normal breath sounds. No wheezing or rales. Abdominal:      General: Bowel sounds are normal.      Palpations: Abdomen is soft. Tenderness: There is no abdominal tenderness. There is no guarding. Musculoskeletal:         General: No swelling. Cervical back: Neck supple. Right lower leg: No edema. Left lower leg: No edema. Skin:     General: Skin is warm and dry. Capillary Refill: Capillary refill takes less than 2 seconds. Neurological:      General: No focal deficit present. Mental Status: She is alert and oriented to person, place, and time.  Mental status is at baseline. Psychiatric:         Mood and Affect: Mood normal.         Behavior: Behavior normal.         Additional Data:     Labs:    Results from last 7 days   Lab Units 08/18/23  0545 08/17/23  0603   WBC Thousand/uL 7.41 7.09   HEMOGLOBIN g/dL 13.0 13.5   HEMATOCRIT % 41.8 42.2   PLATELETS Thousands/uL 177 162   NEUTROS PCT %  --  62   LYMPHS PCT %  --  28   MONOS PCT %  --  8   EOS PCT %  --  1     Results from last 7 days   Lab Units 08/18/23  0545   SODIUM mmol/L 141   POTASSIUM mmol/L 4.2   CHLORIDE mmol/L 105   CO2 mmol/L 27   BUN mg/dL 16   CREATININE mg/dL 0.94   ANION GAP mmol/L 9   CALCIUM mg/dL 8.7   ALBUMIN g/dL 3.9   TOTAL BILIRUBIN mg/dL 1.38*   ALK PHOS U/L 49   ALT U/L 89*   AST U/L 17   GLUCOSE RANDOM mg/dL 112     Results from last 7 days   Lab Units 08/15/23  1453   INR  0.98                   * I Have Reviewed All Lab Data Listed Above. * Additional Pertinent Lab Tests Reviewed: [address] [place] [address] Admission Reviewed    Imaging:    Imaging Reports Reviewed Today Include:   CTA ED chest PE Study   Final Result      No acute findings in the chest, specifically, no pulmonary arterial embolism or pulmonary infiltrate/consolidation. Workstation performed: [id number]         XR chest 1 view portable   Final Result      No acute cardiopulmonary disease.                Workstation performed: [id number]             Imaging Personally Reviewed by Myself Includes:  As above    Recent Cultures (last 7 days):           Last 24 Hours Medication List:   Current Facility-Administered Medications   Medication Dose Route Frequency Provider Last Rate   • acetaminophen  650 mg Oral Q6H PRN Chloe Shah MD     • amiodarone (CORDARONE) 900 mg in dextrose 5 % 500 mL infusion  0.5 mg/min Intravenous Continuous Shun Elkins DO 0.5 mg/min (08/17/23 1016)   • amiodarone  200 mg Oral BID With Meals Neli Sparks PA-C     • apixaban  5 mg Oral BID Chloe Anatoly Zambrano MD     • butalbital-acetaminophen-caffeine  1 tablet Oral Q4H PRN Chloe Zambrano MD     • cholecalciferol  1,000 Units Oral Daily Chloe Shah MD     • diltiazem  60 mg Oral 4x Daily Neli Sparks PA-C     • [START ON 8/19/2023] furosemide  20 mg Oral Daily Tia Mayra Sparks PA-C     • metoprolol  5 mg Intravenous Q6H PRN Anthony Steinberg MD     • ondansetron  4 mg Intravenous Q6H PRN Santos Yo MD     • pantoprazole  40 mg Oral BID Santos Yo MD     • pravastatin  40 mg Oral Daily With Alejandro Diggs MD          Today, Patient Was Seen By: Manjula Shabazz MD    ** Please Note: Dictation voice to text software may have been used in the creation of this document.  **

## 2023-08-18 NOTE — PLAN OF CARE
Problem: PAIN - ADULT  Goal: Verbalizes/displays adequate comfort level or baseline comfort level  Description: Interventions:  - Encourage patient to monitor pain and request assistance  - Assess pain using appropriate pain scale  - Administer analgesics based on type and severity of pain and evaluate response  - Implement non-pharmacological measures as appropriate and evaluate response  - Consider cultural and social influences on pain and pain management  - Notify physician/advanced practitioner if interventions unsuccessful or patient reports new pain  Outcome: Progressing     Problem: INFECTION - ADULT  Goal: Absence or prevention of progression during hospitalization  Description: INTERVENTIONS:  - Assess and monitor for signs and symptoms of infection  - Monitor lab/diagnostic results  - Monitor all insertion sites, i.e. indwelling lines, tubes, and drains  - Monitor endotracheal if appropriate and nasal secretions for changes in amount and color  - Procious appropriate cooling/warming therapies per order  - Administer medications as ordered  - Instruct and encourage patient and family to use good hand hygiene technique  - Identify and instruct in appropriate isolation precautions for identified infection/condition  Outcome: Progressing  Goal: Absence of fever/infection during neutropenic period  Description: INTERVENTIONS:  - Monitor WBC    Outcome: Progressing     Problem: SAFETY ADULT  Goal: Patient will remain free of falls  Description: INTERVENTIONS:  - Educate patient/family on patient safety including physical limitations  - Instruct patient to call for assistance with activity   - Consult OT/PT to assist with strengthening/mobility   - Keep Call bell within reach  - Keep bed low and locked with side rails adjusted as appropriate  - Keep care items and personal belongings within reach  - Initiate and maintain comfort rounds  - Make Fall Risk Sign visible to staff  - Offer Toileting every 2 Hours, in advance of need  - Initiate/Maintain bed alarm  - Obtain necessary fall risk management equipment:   - Apply yellow socks and bracelet for high fall risk patients  - Consider moving patient to room near nurses station  Outcome: Progressing  Goal: Maintain or return to baseline ADL function  Description: INTERVENTIONS:  -  Assess patient's ability to carry out ADLs; assess patient's baseline for ADL function and identify physical deficits which impact ability to perform ADLs (bathing, care of mouth/teeth, toileting, grooming, dressing, etc.)  - Assess/evaluate cause of self-care deficits   - Assess range of motion  - Assess patient's mobility; develop plan if impaired  - Assess patient's need for assistive devices and provide as appropriate  - Encourage maximum independence but intervene and supervise when necessary  - Involve family in performance of ADLs  - Assess for home care needs following discharge   - Consider OT consult to assist with ADL evaluation and planning for discharge  - Provide patient education as appropriate  Outcome: Progressing  Goal: Maintains/Returns to pre admission functional level  Description: INTERVENTIONS:  - Perform BMAT or MOVE assessment daily.   - Set and communicate daily mobility goal to care team and patient/family/caregiver. - Collaborate with rehabilitation services on mobility goals if consulted  - Perform Range of Motion 3 times a day. - Reposition patient every 2 hours.   - Dangle patient 3 times a day  - Stand patient 3 times a day  - Ambulate patient 3 times a day  - Out of bed to chair 3 times a day   - Out of bed for meals 3 times a day  - Out of bed for toileting  - Record patient progress and toleration of activity level   Outcome: Progressing     Problem: DISCHARGE PLANNING  Goal: Discharge to home or other facility with appropriate resources  Description: INTERVENTIONS:  - Identify barriers to discharge w/patient and caregiver  - Arrange for needed discharge resources and transportation as appropriate  - Identify discharge learning needs (meds, wound care, etc.)  - Arrange for interpretive services to assist at discharge as needed  - Refer to Case Management Department for coordinating discharge planning if the patient needs post-hospital services based on physician/advanced practitioner order or complex needs related to functional status, cognitive ability, or social support system  Outcome: Progressing     Problem: Knowledge Deficit  Goal: Patient/family/caregiver demonstrates understanding of disease process, treatment plan, medications, and discharge instructions  Description: Complete learning assessment and assess knowledge base. Interventions:  - Provide teaching at level of understanding  - Provide teaching via preferred learning methods  Outcome: Progressing     Problem: Nutrition/Hydration-ADULT  Goal: Nutrient/Hydration intake appropriate for improving, restoring or maintaining nutritional needs  Description: Monitor and assess patient's nutrition/hydration status for malnutrition. Collaborate with interdisciplinary team and initiate plan and interventions as ordered. Monitor patient's weight and dietary intake as ordered or per policy. Utilize nutrition screening tool and intervene as necessary. Determine patient's food preferences and provide high-protein, high-caloric foods as appropriate.      INTERVENTIONS:  - Monitor oral intake, urinary output, labs, and treatment plans  - Assess nutrition and hydration status and recommend course of action  - Evaluate amount of meals eaten  - Assist patient with eating if necessary   - Allow adequate time for meals  - Recommend/ encourage appropriate diets, oral nutritional supplements, and vitamin/mineral supplements  - Order, calculate, and assess calorie counts as needed  - Recommend, monitor, and adjust tube feedings and TPN/PPN based on assessed needs  - Assess need for intravenous fluids  - Provide specific nutrition/hydration education as appropriate  - Include patient/family/caregiver in decisions related to nutrition  Outcome: Progressing     Problem: CARDIOVASCULAR - ADULT  Goal: Maintains optimal cardiac output and hemodynamic stability  Description: INTERVENTIONS:  - Monitor I/O, vital signs and rhythm  - Monitor for S/S and trends of decreased cardiac output  - Administer and titrate ordered vasoactive medications to optimize hemodynamic stability  - Assess quality of pulses, skin color and temperature  - Assess for signs of decreased coronary artery perfusion  - Instruct patient to report change in severity of symptoms  Outcome: Progressing  Goal: Absence of cardiac dysrhythmias or at baseline rhythm  Description: INTERVENTIONS:  - Continuous cardiac monitoring, vital signs, obtain 12 lead EKG if ordered  - Administer antiarrhythmic and heart rate control medications as ordered  - Monitor electrolytes and administer replacement therapy as ordered  Outcome: Progressing     Problem: RESPIRATORY - ADULT  Goal: Achieves optimal ventilation and oxygenation  Description: INTERVENTIONS:  - Assess for changes in respiratory status  - Assess for changes in mentation and behavior  - Position to facilitate oxygenation and minimize respiratory effort  - Oxygen administered by appropriate delivery if ordered  - Initiate smoking cessation education as indicated  - Encourage broncho-pulmonary hygiene including cough, deep breathe, Incentive Spirometry  - Assess the need for suctioning and aspirate as needed  - Assess and instruct to report SOB or any respiratory difficulty  - Respiratory Therapy support as indicated  Outcome: Progressing

## 2023-08-18 NOTE — UTILIZATION REVIEW
Continued Stay Review    Date: 08/18                       Current Patient Class: IP Current Level of Care: Level 1 stepdown    HPI:67 y.o. female initially admitted on 08/15    Assessment/Plan: Pt had a LAURENCE guided cardioversion yesterday she did convert to sinus rhythm but only lasted a few minutes and she reverted back to atrial fibrillation. She was initiated on amiodarone gtt yesterday and po cardizem was increased to 60 mg q6h. Cont amio gtt through tomorrow. Initiate po amio 200 mg bid today. Keep IP until rate controlled/converts to sinus rhythm. If not she will need a cardioversion on Monday. Will transition to oral lasix 20 mg daily from tomorrow. Daily BMP.      Vital Signs: /78   Pulse (!) 133   Temp (!) 97.4 °F (36.3 °C) (Temporal)   Resp (!) 39   Ht 5' 3" (1.6 m)   Wt 77.9 kg (171 lb 11.8 oz)   SpO2 95%   BMI 30.42 kg/m²       Pertinent Labs/Diagnostic Results:       Results from last 7 days   Lab Units 08/18/23  0545 08/17/23  0603 08/16/23  0500 08/15/23  1453   WBC Thousand/uL 7.41 7.09 6.69 7.46   HEMOGLOBIN g/dL 13.0 13.5 12.4 13.4   HEMATOCRIT % 41.8 42.2 39.8 42.6   PLATELETS Thousands/uL 177 162 144* 142*   NEUTROS ABS Thousands/µL  --  4.45  --  5.07         Results from last 7 days   Lab Units 08/18/23  0545 08/17/23  0603 08/16/23  0500 08/15/23  1453   SODIUM mmol/L 141 141 142 140   POTASSIUM mmol/L 4.2 4.2 3.7 3.8   CHLORIDE mmol/L 105 103 103 105   CO2 mmol/L 27 29 29 24   ANION GAP mmol/L 9 9 10 11   BUN mg/dL 16 20 15 19   CREATININE mg/dL 0.94 0.91 0.94 0.93   EGFR ml/min/1.73sq m 62 64 62 62   CALCIUM mg/dL 8.7 8.9 8.9 9.7   MAGNESIUM mg/dL 2.2 2.3 2.3 1.9     Results from last 7 days   Lab Units 08/18/23  0545 08/16/23  0500 08/15/23  1453   AST U/L 17 30 45*   ALT U/L 89* 141* 176*   ALK PHOS U/L 49 45 51   TOTAL PROTEIN g/dL 6.4 6.1* 6.8   ALBUMIN g/dL 3.9 3.8 4.2   TOTAL BILIRUBIN mg/dL 1.38* 1.44* 1.11*         Results from last 7 days   Lab Units 08/18/23  0545 08/17/23  0603 08/16/23  0500 08/15/23  1453   GLUCOSE RANDOM mg/dL 112 107 104 103           Results from last 7 days   Lab Units 08/15/23  1852 08/15/23  1714 08/15/23  1453   HS TNI 0HR ng/L  --   --  7   HS TNI 2HR ng/L  --  7  --    HSTNI D2 ng/L  --  0  --    HS TNI 4HR ng/L 8  --   --    HSTNI D4 ng/L 1  --   --      Results from last 7 days   Lab Units 08/15/23  1453   D-DIMER QUANTITATIVE ug/ml FEU 0.93*     Results from last 7 days   Lab Units 08/15/23  1453   PROTIME seconds 13.1   INR  0.98   PTT seconds 27     Results from last 7 days   Lab Units 08/15/23  1453   TSH 3RD GENERATON uIU/mL 0.915  0.919       Results from last 7 days   Lab Units 08/15/23  1453   BNP pg/mL 433*           Results from last 7 days   Lab Units 08/15/23  1551   CLARITY UA  Clear   COLOR UA  Yellow   SPEC GRAV UA  <=1.005   PH UA  6.0   GLUCOSE UA mg/dl Negative   KETONES UA mg/dl Negative   BLOOD UA  Trace-Intact*   PROTEIN UA mg/dl Negative   NITRITE UA  Negative   BILIRUBIN UA  Negative   UROBILINOGEN UA E.U./dl 0.2   LEUKOCYTES UA  Negative   WBC UA /hpf None Seen   RBC UA /hpf 0-1   BACTERIA UA /hpf None Seen   EPITHELIAL CELLS WET PREP /hpf Occasional         Medications:   Scheduled Medications:  amiodarone, 200 mg, Oral, BID With Meals  apixaban, 5 mg, Oral, BID  cholecalciferol, 1,000 Units, Oral, Daily  diltiazem, 60 mg, Oral, 4x Daily  [START ON 8/19/2023] furosemide, 20 mg, Oral, Daily  pantoprazole, 40 mg, Oral, BID  pravastatin, 40 mg, Oral, Daily With Dinner  furosemide (LASIX) injection 40 mg  Dose: 40 mg  Freq: Daily Route: IV  Start: 08/15/23 1815 End: 08/18/23 0915    Continuous IV Infusions:  amiodarone (CORDARONE) 900 mg in dextrose 5 % 500 mL infusion, 0.5 mg/min, Intravenous, Continuous      PRN Meds:  acetaminophen, 650 mg, Oral, Q6H PRN  butalbital-acetaminophen-caffeine, 1 tablet, Oral, Q4H PRN  metoprolol, 5 mg, Intravenous, Q6H PRN  ondansetron, 4 mg, Intravenous, Q6H PRN        Discharge Plan: [facility]    Network Utilization Review Department  ATTENTION: Please call with any questions or concerns to [phone] and carefully listen to the prompts so that you are directed to the right person. All voicemails are confidential.  Junior Hooper all requests for admission clinical reviews, approved or denied determinations and any other requests to dedicated fax number below belonging to the campus where the patient is receiving treatment.  List of dedicated fax numbers for the Facilities:  [place] DENIALS (Administrative/Medical Necessity) [phone] [facility] (Maternity/NICU/Pediatrics) [phone]   [address] [place] [address] [phone]   [facility] [address] [facility] [phone]   [address] [place] [address] [address] [phone]   [address] [facility] [address] [address] [address] [phone]

## 2023-08-18 NOTE — CASE MANAGEMENT
Case Management Discharge Planning Note    Patient name Irina Gandhiing  Location / MRN [id number]  : 1955 Date 2023       Current Admission Date: 8/15/2023  Current Admission Diagnosis:New onset a-fib with RVR   Patient Active Problem List    Diagnosis Date Noted   • New onset a-fib with RVR 08/15/2023   • Acute diastolic congestive heart failure ([address]) 08/15/2023   • Mild atherosclerosis of both carotid arteries 2023   • Neck pain 2023   • Impaired fasting blood sugar 2023   • EL (renal artery stenosis) ([address]) 10/18/2022   • Bilateral hip pain 2022   • Age-related osteoporosis without current pathological fracture 2022   • Essential hypertension 2022   • Orthostatic hypotension 2021   • Right-sided chest wall pain 2019   • Migraine without aura and without status migrainosus, not intractable 2018   • Reactive airway disease 2018   • Esophageal reflux 2014   • Hyperlipidemia 2013      LOS (days): 3  Geometric Mean LOS (GMLOS) (days):   Days to GMLOS:     OBJECTIVE:  Risk of Unplanned Readmission Score: 10.35         Current admission status: Inpatient   Preferred Pharmacy:   [address]   [place] [address]  Phone: [phone] Fax: [phone]    [facility] ([address] [place] [address]  Phone: [phone] Fax: [phone]    [facility] ([facility]) - [place] [address]  Phone: [phone] Fax: [phone]    Primary Care Provider: Lizzette Sorensen DO    Primary Insurance: [place] [employer]  Secondary Insurance: MEDICARE    DISCHARGE DETAILS:    Discharge planning discussed with[de-identified] patient        CM contacted family/caregiver?: Yes  Were Treatment Team discharge recommendations reviewed with patient/caregiver?: Yes  Did patient/caregiver verbalize understanding of patient care needs?: Yes  Were patient/caregiver advised of the risks associated with not following Treatment Team discharge recommendations?: Yes    Contacts  Contact Method: In Person  Reason/Outcome: Discharge Planning      Other Referral/Resources/Interventions Provided:  Interventions: Other (Specify)  Referral Comments: patient has eliquis card    Would you like to [id number]?  : No - Declined    Treatment Team Recommendation: Home  Discharge Destination Plan[de-identified] Home       IMM Given (Date):: 08/18/23  IMM Given to[de-identified] Patient   patient tentative discharge over weekend. Pt has eliquis card and medication is 40$ after. Pt agreeable with same. No other discharge needs noted.

## 2023-08-18 NOTE — PROGRESS NOTES
Cardiology Progress Note - Iain Bryan 69 y.o. female MRN: [id number]    Unit/Bed#:  Encounter: [id number]    Assessment/Plan:  1. New onset atrial fibrillation with rapid ventricular response              - patient presented to the cardiology office yesterday with concerns regarding elevated heart rate readings x 6 weeks at home, no distinct sensation of palpitations but had dyspnea on exertion              - found to be in atrial fibrillation with RVR, heart rate 169 bpm. Patient has no prior history of atrial fibrillation              - preliminary review of echocardiogram shows EF 50%. - she was initiated on a cardizem gtt initially, attempted LAURENCE/cardioversion yesterday unfortunately without success - patient immediately reverted back to atrial fibrillation   - initiated on an amiodarone gtt yesterday and PO cardizem was increased to 60 mg every 6 hours, but only received 2 doses of this   - continue amiodarone gtt through tomorrow morning. initiate oral amiodarone 200 mg BID today (overlapping IV and PO today). Will need to be kept inpatient until she is rate controlled or converts to sinus rhythm. - if she does not meet either of these goals, can re-attempt cardioversion on Monday, 8/21 once she has amiodarone load. - can titrate diltiazem if needed as BP allows. - patient reports she was unable to tolerate metoprolol in the past due to side effects (nausea, headache, lightheadedness)              - [id number] score = 5 (age, sex, HTN, CHF, vascular disease). Anticoagulation was initiated with Eliquis 5 mg BID which will be continued    - had prior home sleep study which showed sleep related hypoxia but no evidence of KOSTA. Recommend formal diagnostic sleep study at discharge      2.  Acute congestive heart failure secondary to #1              - patient developed mild volume overload in the setting of #1              - no pulmonary edema noted on chest imaging, but BNP elevated at 433                 - has been given a few doses of IV lasix with improvement in volume status. Will transition to oral lasix 20 mg daily from tomorrow              - TTE/LAURENCE showed low normal EF at 50%, mild-moderate MR.              - continue daily standing weights, strict I/O as able   - follow daily BMP    3. Hypertension with renal artery stenosis              - BP currently is controlled     4. Dyslipidemia               - on simvastatin 20 mg daily at baseline     Subjective:   Patient seen and examined. She feels ok and offers no complaints. Understandably frustrated with the failed cardioversion yesterday. Review of Systems   All other systems reviewed and are negative. Objective:   Vitals: Blood pressure 144/78, pulse 99, temperature (!) 97.2 °F (36.2 °C), temperature source Temporal, resp. rate 16, height 5' 3" (1.6 m), weight 77.9 kg (171 lb 11.8 oz), SpO2 95 %. , Body mass index is 30.42 kg/m².,   Orthostatic Blood Pressures    Flowsheet Row Most Recent Value   Blood Pressure 144/78 filed at 08/18/2023 0700   Patient Position - Orthostatic VS Lying filed at 08/18/2023 3124         Systolic (57BND), QDR:498 , Min:97 , TML:646     Diastolic (85LRE), XDU:00, Min:53, Max:87      Intake/Output Summary (Last 24 hours) at 8/18/2023 0928  Last data filed at 8/18/2023 0913  Gross per 24 hour   Intake 1237.53 ml   Output 1500 ml   Net -262.47 ml     Weight (last 2 days)     Date/Time Weight    08/18/23 0546 77.9 (171.74)    08/17/23 0600 77.9 (171.74)    08/16/23 0802 78.5 (173)    08/16/23 0600 78.6 (173.28)    08/16/23 0331 80.9 (178.35)            Telemetry Review: atrial fibrillation, rates  on average  EKG personally reviewed by Nalini Garcia PA-C. Physical Exam  Vitals reviewed. Constitutional:       General: She is not in acute distress. Appearance: She is not diaphoretic. HENT:      Head: Normocephalic and atraumatic.    Eyes:      Pupils: Pupils are equal, round, and reactive to light. Neck:      Vascular: No carotid bruit. Cardiovascular:      Rate and Rhythm: Tachycardia present. Rhythm irregularly irregular. Pulses:           Radial pulses are 2+ on the right side and 2+ on the left side. Heart sounds: S1 normal and S2 normal. No murmur heard. Pulmonary:      Effort: Pulmonary effort is normal. No respiratory distress. Breath sounds: Normal breath sounds. No wheezing or rales. Abdominal:      General: There is no distension. Palpations: Abdomen is soft. Tenderness: There is no abdominal tenderness. Musculoskeletal:         General: No deformity. Normal range of motion. Cervical back: Normal range of motion. Right lower leg: No edema. Left lower leg: No edema. Skin:     General: Skin is warm and dry. Findings: No erythema. Neurological:      General: No focal deficit present. Mental Status: She is alert and oriented to person, place, and time.    Psychiatric:         Mood and Affect: Mood normal.         Behavior: Behavior normal.           Laboratory Results:        CBC with diff:   Results from last 7 days   Lab Units 08/18/23  0545 08/17/23  0603 08/16/23  0500 08/15/23  1453   WBC Thousand/uL 7.41 7.09 6.69 7.46   HEMOGLOBIN g/dL 13.0 13.5 12.4 13.4   HEMATOCRIT % 41.8 42.2 39.8 42.6   MCV fL 91 91 91 91   PLATELETS Thousands/uL 177 162 144* 142*   RBC Million/uL 4.61 4.66 4.40 4.70   MCH pg 28.2 29.0 28.2 28.5   MCHC g/dL 31.1* 32.0 31.2* 31.5   RDW % 13.3 13.3 13.4 13.3   MPV fL 12.1 11.9 12.5 12.1   NRBC AUTO /100 WBCs  --  0  --  0         CMP:  Results from last 7 days   Lab Units 08/18/23  0545 08/17/23  0603 08/16/23  0500 08/15/23  1453   POTASSIUM mmol/L 4.2 4.2 3.7 3.8   CHLORIDE mmol/L 105 103 103 105   CO2 mmol/L 27 29 29 24   BUN mg/dL 16 20 15 19   CREATININE mg/dL 0.94 0.91 0.94 0.93   CALCIUM mg/dL 8.7 8.9 8.9 9.7   AST U/L 17  --  30 45*   ALT U/L 89*  --  141* 176*   ALK PHOS U/L 49 --  45 51   EGFR ml/min/1.73sq m 62 64 62 62         BMP:  Results from last 7 days   Lab Units 23  0545 23  0603 23  0500 08/15/23  1453   POTASSIUM mmol/L 4.2 4.2 3.7 3.8   CHLORIDE mmol/L 105 103 103 105   CO2 mmol/L 27 29 29 24   BUN mg/dL 16 20 15 19   CREATININE mg/dL 0.94 0.91 0.94 0.93   CALCIUM mg/dL 8.7 8.9 8.9 9.7       BNP:   Recent Labs     08/15/23  1453   *       Magnesium:   Results from last 7 days   Lab Units 23  0545 23  0603 23  0500 08/15/23  1453   MAGNESIUM mg/dL 2.2 2.3 2.3 1.9       Coags:   Results from last 7 days   Lab Units 08/15/23  1453   PTT seconds 27   INR  0.98       TSH:        Hemoglobin A1C       Lipid Profile:       Cardiac testing:   Results for orders placed during the hospital encounter of 21    Echo complete with contrast if indicated    Narrative  [facility] [address]  [address] [place] [address], [address] [place] [address]  [phone]    Transthoracic Echocardiogram  2D, M-mode, Doppler, and Color Doppler    Study date:  18-Mar-2021    Patient: Mc Peterson  MR number: [id number]  Account number: [de-identified]  : 1955  Age: 77 years  Gender: Female  Status: Outpatient  Location: Echo lab  Height: 63 in  Weight: 168.7 lb  BP: 160/ 74 mmHg    Indications: ORTHOSTATIC HYPOTENSION, NEAR SYNCOPE    Diagnoses: I95.1 - Orthostatic hypotension    Sonographer:  Buddy Watkins RDCS  Primary Physician:  Boone Darnell DO  Referring Physician:  Yessica Mae MD  Group:  [facility]  Interpreting Physician:  Shanta Panchal MD    SUMMARY    PROCEDURE INFORMATION:  This was a technically difficult study. LEFT VENTRICLE:  Size was normal.  Systolic function was normal. Ejection fraction was estimated to be 60 %. There were no regional wall motion abnormalities. Wall thickness was at the upper limits of normal.    MITRAL VALVE:  There was mild regurgitation.     AORTIC VALVE:  Transaortic velocity was increased due to increased transvalvular flow. The AV appears to open adequately on 2D imaging although imaging is suboptimal.  Cannot exclude mild AS. Valve peak gradient was 19 mmHg. The valve was not well visualized. TRICUSPID VALVE:  There was mild regurgitation. HISTORY: PRIOR HISTORY: REACTIVE AIRWAY DISEASE, OBSTRUCTIVE SLEEP APNEA, HYPERLIPIDEMIA, POSTURAL DIZZINESS, FAMILY HISTORY OF SUDDEN CARDIAC DEATH    PROCEDURE: The procedure was performed in the echo lab. This was a routine study. The transthoracic approach was used. The study included complete 2D imaging, M-mode, complete spectral Doppler, and color Doppler. Images were obtained from  the parasternal, apical, subcostal, and suprasternal notch acoustic windows. This was a technically difficult study. LEFT VENTRICLE: Size was normal. Systolic function was normal. Ejection fraction was estimated to be 60 %. There were no regional wall motion abnormalities. Wall thickness was at the upper limits of normal.    RIGHT VENTRICLE: The size was normal. Systolic function was normal. Wall thickness was normal.    LEFT ATRIUM: Size was normal.    RIGHT ATRIUM: Size was normal.    MITRAL VALVE: Valve structure was normal. There was normal leaflet separation. DOPPLER: The transmitral velocity was within the normal range. There was no evidence for stenosis. There was mild regurgitation. AORTIC VALVE: The valve was not well visualized. DOPPLER: Transaortic velocity was increased due to increased transvalvular flow. The AV appears to open adequately on 2D imaging although imaging is suboptimal.  Cannot exclude mild AS. There was no significant regurgitation. TRICUSPID VALVE: The valve structure was normal. There was normal leaflet separation. DOPPLER: The transtricuspid velocity was within the normal range. There was no evidence for stenosis. There was mild regurgitation. PULMONIC VALVE: Not well visualized.     PERICARDIUM: There was no pericardial effusion. The pericardium was normal in appearance. AORTA: The root exhibited normal size. MEASUREMENT TABLES    DOPPLER MEASUREMENTS  Aortic valve   (Reference normals)  Peak gradient   19 mmHg   (--)    SYSTEM MEASUREMENT TABLES    2D  %FS: 43.85 %  Ao Diam: 2.81 cm  EDV(Teich): 60.69 ml  EF(Teich): 75.82 %  ESV(Teich): 14.67 ml  IVSd: 1.13 cm  LA Area: 19.63 cm2  LA Diam: 3.66 cm  LVEDV MOD A4C: 82.12 ml  LVEF MOD A4C: 43.32 %  LVESV MOD A4C: 46.55 ml  LVIDd: 3.77 cm  LVIDs: 2.12 cm  LVLd A4C: 7.78 cm  LVLs A4C: 6.77 cm  LVOT Diam: 1.88 cm  LVPWd: 1.13 cm  RA Area: 13.43 cm2  RVIDd: 3.21 cm  RWT: 0.6  SV MOD A4C: 35.57 ml  SV(Teich): 46.01 ml    CW  AV Env. Ti: 300.67 ms  AV VTI: 44.26 cm  AV Vmax: 2.2 m/s  AV Vmean: 1.47 m/s  AV maxP.35 mmHg  AV meanP.89 mmHg  PV Vmax: 1.21 m/s  PV maxP.81 mmHg    MM  TAPSE: 2.57 cm    PW  BLOSSOM (VTI): 1.25 cm2  BLOSSOM Vmax: 1.3 cm2  BLOSSOM Vmax, Pt: 1.23 cm2  AVAI (VTI): 0 cm2/m2  AVAI Vmax: 0 cm2/m2  AVAI Vmax, Pt: 0 cm2/m2  E' Lat: 0.11 m/s  E' Sept: 0.08 m/s  E/E' Lat: 8.91  E/E' Sept: 12.57  LVOT Env. Ti: 291.15 ms  LVOT VTI: 19.84 cm  LVOT Vmax: 0.97 m/s  LVOT Vmean: 0.68 m/s  LVOT maxPG: 3.8 mmHg  LVOT meanP.15 mmHg  LVSI Dopp: 30.71 ml/m2  LVSV Dopp: 55.28 ml  MV A [place]: 0.97 m/s  MV Dec [place]: 5.12 m/s2  MV DecT: 196.12 ms  MV E [place]: 1 m/s  MV E/A Ratio: 1.04  RVOT Vmax: 1.07 m/s  RVOT maxP.54 mmHg    [facility] Accredited Echocardiography Laboratory    Prepared and electronically signed by    Niel Gilford, MD  Signed 18-Mar-2021 10:23:23    No results found for this or any previous visit. No results found for this or any previous visit.     Results for orders placed in visit on 11/16/15    NM myocardial perfusion spect (stress and/or rest)    Narrative  EXAM ROOM = [facility] STRESS  EXAM START = [id number]  EXAM STOP = [id number]  FILMS USED = 4 IMAGES    This stress test was performed by a cardiologist and the  cardiologist will render the interpretation.     [place] Coordinator  Reading Radiologist- QUIQUE Gómez MD  Electronically QUIQUE Wong MD  Released Date Time- 11/24/15 0958  ------------------------------------------------------------------------------  READ BY = [id number]^DANA  RELEASED BY = 8482^DANA      Meds/Allergies   all current active meds have been reviewed  Medications Prior to Admission   Medication   • butalbital-acetaminophen-caffeine (FIORICET,ESGIC) -40 mg per tablet   • cholecalciferol (VITAMIN D3) 1,000 units tablet   • losartan (Cozaar) 25 mg tablet   • pantoprazole (PROTONIX) 40 mg tablet   • simvastatin (ZOCOR) 20 mg tablet   • denosumab (PROLIA) 60 mg/mL   • fluticasone (FLONASE) 50 mcg/act nasal spray       amiodarone (CORDARONE) 900 mg in dextrose 5 % 500 mL infusion, 0.5 mg/min, Last Rate: 0.5 mg/min (08/17/23 1016)      Assessment:  Principal Problem:    New onset a-fib with RVR  Active Problems:    Esophageal reflux    Migraine without aura and without status migrainosus, not intractable    Essential hypertension    Acute diastolic congestive heart failure ([address])

## 2023-08-19 LAB
ANION GAP SERPL CALCULATED.3IONS-SCNC: 9 MMOL/L
BUN SERPL-MCNC: 23 MG/DL (ref 5–25)
CALCIUM SERPL-MCNC: 9.1 MG/DL (ref 8.4–10.2)
CHLORIDE SERPL-SCNC: 103 MMOL/L (ref 96–108)
CO2 SERPL-SCNC: 28 MMOL/L (ref 21–32)
CREAT SERPL-MCNC: 1.09 MG/DL (ref 0.6–1.3)
ERYTHROCYTE [DISTWIDTH] IN BLOOD BY AUTOMATED COUNT: 13.2 % (ref 11.6–15.1)
GFR SERPL CREATININE-BSD FRML MDRD: 51 ML/MIN/1.73SQ M
GLUCOSE SERPL-MCNC: 111 MG/DL (ref 65–140)
HCT VFR BLD AUTO: 44.1 % (ref 34.8–46.1)
HGB BLD-MCNC: 14 G/DL (ref 11.5–15.4)
MAGNESIUM SERPL-MCNC: 2.1 MG/DL (ref 1.9–2.7)
MCH RBC QN AUTO: 29.2 PG (ref 26.8–34.3)
MCHC RBC AUTO-ENTMCNC: 31.7 G/DL (ref 31.4–37.4)
MCV RBC AUTO: 92 FL (ref 82–98)
PLATELET # BLD AUTO: 185 THOUSANDS/UL (ref 149–390)
PMV BLD AUTO: 11.8 FL (ref 8.9–12.7)
POTASSIUM SERPL-SCNC: 4.3 MMOL/L (ref 3.5–5.3)
RBC # BLD AUTO: 4.8 MILLION/UL (ref 3.81–5.12)
SODIUM SERPL-SCNC: 140 MMOL/L (ref 135–147)
WBC # BLD AUTO: 8.08 THOUSAND/UL (ref 4.31–10.16)

## 2023-08-19 PROCEDURE — 80048 BASIC METABOLIC PNL TOTAL CA: CPT

## 2023-08-19 PROCEDURE — 99232 SBSQ HOSP IP/OBS MODERATE 35: CPT | Performed by: FAMILY MEDICINE

## 2023-08-19 PROCEDURE — 83735 ASSAY OF MAGNESIUM: CPT

## 2023-08-19 PROCEDURE — 85027 COMPLETE CBC AUTOMATED: CPT

## 2023-08-19 RX ORDER — DILTIAZEM HCL 90 MG
90 TABLET ORAL 4 TIMES DAILY
Status: DISCONTINUED | OUTPATIENT
Start: 2023-08-19 | End: 2023-08-21 | Stop reason: HOSPADM

## 2023-08-19 RX ADMIN — AMIODARONE HYDROCHLORIDE 0.5 MG/MIN: 50 INJECTION, SOLUTION INTRAVENOUS at 12:45

## 2023-08-19 RX ADMIN — ACETAMINOPHEN 650 MG: 325 TABLET, FILM COATED ORAL at 17:22

## 2023-08-19 RX ADMIN — AMIODARONE HYDROCHLORIDE 200 MG: 200 TABLET ORAL at 07:13

## 2023-08-19 RX ADMIN — APIXABAN 5 MG: 5 TABLET, FILM COATED ORAL at 17:18

## 2023-08-19 RX ADMIN — PANTOPRAZOLE SODIUM 40 MG: 40 TABLET, DELAYED RELEASE ORAL at 08:18

## 2023-08-19 RX ADMIN — PRAVASTATIN SODIUM 40 MG: 40 TABLET ORAL at 15:38

## 2023-08-19 RX ADMIN — AMIODARONE HYDROCHLORIDE 200 MG: 200 TABLET ORAL at 15:38

## 2023-08-19 RX ADMIN — DILTIAZEM HYDROCHLORIDE 60 MG: 60 TABLET, FILM COATED ORAL at 08:18

## 2023-08-19 RX ADMIN — PANTOPRAZOLE SODIUM 40 MG: 40 TABLET, DELAYED RELEASE ORAL at 22:00

## 2023-08-19 RX ADMIN — DILTIAZEM HYDROCHLORIDE 90 MG: 90 TABLET, FILM COATED ORAL at 22:02

## 2023-08-19 RX ADMIN — Medication 1000 UNITS: at 08:18

## 2023-08-19 RX ADMIN — DILTIAZEM HYDROCHLORIDE 90 MG: 90 TABLET, FILM COATED ORAL at 17:18

## 2023-08-19 RX ADMIN — DILTIAZEM HYDROCHLORIDE 60 MG: 60 TABLET, FILM COATED ORAL at 12:17

## 2023-08-19 RX ADMIN — APIXABAN 5 MG: 5 TABLET, FILM COATED ORAL at 08:18

## 2023-08-19 RX ADMIN — FUROSEMIDE 20 MG: 20 TABLET ORAL at 08:18

## 2023-08-19 NOTE — PLAN OF CARE
Problem: PAIN - ADULT  Goal: Verbalizes/displays adequate comfort level or baseline comfort level  Description: Interventions:  - Encourage patient to monitor pain and request assistance  - Assess pain using appropriate pain scale  - Administer analgesics based on type and severity of pain and evaluate response  - Implement non-pharmacological measures as appropriate and evaluate response  - Consider cultural and social influences on pain and pain management  - Notify physician/advanced practitioner if interventions unsuccessful or patient reports new pain  Outcome: Progressing     Problem: INFECTION - ADULT  Goal: Absence or prevention of progression during hospitalization  Description: INTERVENTIONS:  - Assess and monitor for signs and symptoms of infection  - Monitor lab/diagnostic results  - Monitor all insertion sites, i.e. indwelling lines, tubes, and drains  - Monitor endotracheal if appropriate and nasal secretions for changes in amount and color  - Christine appropriate cooling/warming therapies per order  - Administer medications as ordered  - Instruct and encourage patient and family to use good hand hygiene technique  - Identify and instruct in appropriate isolation precautions for identified infection/condition  Outcome: Progressing  Goal: Absence of fever/infection during neutropenic period  Description: INTERVENTIONS:  - Monitor WBC    Outcome: Progressing     Problem: SAFETY ADULT  Goal: Patient will remain free of falls  Description: INTERVENTIONS:  - Educate patient/family on patient safety including physical limitations  - Instruct patient to call for assistance with activity   - Consult OT/PT to assist with strengthening/mobility   - Keep Call bell within reach  - Keep bed low and locked with side rails adjusted as appropriate  - Keep care items and personal belongings within reach  - Initiate and maintain comfort rounds  - Make Fall Risk Sign visible to staff  - Offer Toileting every 2 Hours, in advance of need  - Initiate/Maintain bed alarm  - Obtain necessary fall risk management equipment:   - Apply yellow socks and bracelet for high fall risk patients  - Consider moving patient to room near nurses station  Outcome: Progressing  Goal: Maintain or return to baseline ADL function  Description: INTERVENTIONS:  -  Assess patient's ability to carry out ADLs; assess patient's baseline for ADL function and identify physical deficits which impact ability to perform ADLs (bathing, care of mouth/teeth, toileting, grooming, dressing, etc.)  - Assess/evaluate cause of self-care deficits   - Assess range of motion  - Assess patient's mobility; develop plan if impaired  - Assess patient's need for assistive devices and provide as appropriate  - Encourage maximum independence but intervene and supervise when necessary  - Involve family in performance of ADLs  - Assess for home care needs following discharge   - Consider OT consult to assist with ADL evaluation and planning for discharge  - Provide patient education as appropriate  Outcome: Progressing  Goal: Maintains/Returns to pre admission functional level  Description: INTERVENTIONS:  - Perform BMAT or MOVE assessment daily.   - Set and communicate daily mobility goal to care team and patient/family/caregiver. - Collaborate with rehabilitation services on mobility goals if consulted  - Perform Range of Motion 3 times a day. - Reposition patient every 2 hours.   - Dangle patient 3 times a day  - Stand patient 3 times a day  - Ambulate patient 3 times a day  - Out of bed to chair 3 times a day   - Out of bed for meals 3 times a day  - Out of bed for toileting  - Record patient progress and toleration of activity level   Outcome: Progressing     Problem: DISCHARGE PLANNING  Goal: Discharge to home or other facility with appropriate resources  Description: INTERVENTIONS:  - Identify barriers to discharge w/patient and caregiver  - Arrange for needed discharge resources and transportation as appropriate  - Identify discharge learning needs (meds, wound care, etc.)  - Arrange for interpretive services to assist at discharge as needed  - Refer to Case Management Department for coordinating discharge planning if the patient needs post-hospital services based on physician/advanced practitioner order or complex needs related to functional status, cognitive ability, or social support system  Outcome: Progressing     Problem: Knowledge Deficit  Goal: Patient/family/caregiver demonstrates understanding of disease process, treatment plan, medications, and discharge instructions  Description: Complete learning assessment and assess knowledge base. Interventions:  - Provide teaching at level of understanding  - Provide teaching via preferred learning methods  Outcome: Progressing     Problem: Nutrition/Hydration-ADULT  Goal: Nutrient/Hydration intake appropriate for improving, restoring or maintaining nutritional needs  Description: Monitor and assess patient's nutrition/hydration status for malnutrition. Collaborate with interdisciplinary team and initiate plan and interventions as ordered. Monitor patient's weight and dietary intake as ordered or per policy. Utilize nutrition screening tool and intervene as necessary. Determine patient's food preferences and provide high-protein, high-caloric foods as appropriate.      INTERVENTIONS:  - Monitor oral intake, urinary output, labs, and treatment plans  - Assess nutrition and hydration status and recommend course of action  - Evaluate amount of meals eaten  - Assist patient with eating if necessary   - Allow adequate time for meals  - Recommend/ encourage appropriate diets, oral nutritional supplements, and vitamin/mineral supplements  - Order, calculate, and assess calorie counts as needed  - Recommend, monitor, and adjust tube feedings and TPN/PPN based on assessed needs  - Assess need for intravenous fluids  - Provide specific nutrition/hydration education as appropriate  - Include patient/family/caregiver in decisions related to nutrition  Outcome: Progressing     Problem: CARDIOVASCULAR - ADULT  Goal: Maintains optimal cardiac output and hemodynamic stability  Description: INTERVENTIONS:  - Monitor I/O, vital signs and rhythm  - Monitor for S/S and trends of decreased cardiac output  - Administer and titrate ordered vasoactive medications to optimize hemodynamic stability  - Assess quality of pulses, skin color and temperature  - Assess for signs of decreased coronary artery perfusion  - Instruct patient to report change in severity of symptoms  Outcome: Progressing  Goal: Absence of cardiac dysrhythmias or at baseline rhythm  Description: INTERVENTIONS:  - Continuous cardiac monitoring, vital signs, obtain 12 lead EKG if ordered  - Administer antiarrhythmic and heart rate control medications as ordered  - Monitor electrolytes and administer replacement therapy as ordered  Outcome: Progressing     Problem: RESPIRATORY - ADULT  Goal: Achieves optimal ventilation and oxygenation  Description: INTERVENTIONS:  - Assess for changes in respiratory status  - Assess for changes in mentation and behavior  - Position to facilitate oxygenation and minimize respiratory effort  - Oxygen administered by appropriate delivery if ordered  - Initiate smoking cessation education as indicated  - Encourage broncho-pulmonary hygiene including cough, deep breathe, Incentive Spirometry  - Assess the need for suctioning and aspirate as needed  - Assess and instruct to report SOB or any respiratory difficulty  - Respiratory Therapy support as indicated  Outcome: Progressing

## 2023-08-19 NOTE — PROGRESS NOTES
[address]  Progress Note  Name: Ngoc Wei  MRN: [id number]  Unit/Bed#:  I Date of Admission: 8/15/2023   Date of Service: 8/19/2023 I Hospital Day: 4    Assessment/Plan   * New onset a-fib with RVR  Assessment & Plan  This is a 42-year-old female patient with history of hypertension, hyperlipidemia, migraines, who was sent from cardiology office due to evidence of A-fib with RVR and patient's complaints of exertional dyspnea and fluid retention noted on exam    · [id number], anticoagulation indicated -initiated on Eliquis -pricing checked by case management   · Initially on Cardizem drip which was stopped with improvement in heart rate and transitioned to p.o. Cardizem by cardiology on 08/16/2023   · Attempted to initiate p.o. metoprolol yesterday but patient declining stating that she cannot tolerate side effects  · 2D echo reveals low normal EF of 50%     · Cardiology following - appreciate cardiology input  · LAURENCE guided cardioversion on 08/17/2023, not converted to regular rhythm after two attempts  · Cardiology suspect sleep apnea, Previous sleep study couple of years ago- home sleep study with evidence of hypoxia, no sleep apnea. · Recommend sleep study at sleep center to evaluate for sleep apnea  · Discontinued Amiodarone drip as per cardiology 08/19/2023  · Continue oral Amiodarone 200 BID   · Increased Cardizem from 60 mg to 90 mg every 6 hours starting form 08/19/2023  · Plan to change Cardizam to long acting form after cardioversion on discharge  · Plan to reattempt Cardioversion on Monday if not converted over weekend  · Continue Eliquis 5 mg BID for anticoagulation   · As per cardiology she can be discharged over the weekend if rate control achieved with eventual plan for outpatient cardioversion.   · Out patient follow up with cardiology in one week after discharge   · Continue telemetry monitoring      Acute diastolic congestive heart failure [facility])  Assessment & Plan  Wt Readings from Last 3 Encounters:   08/19/2023 78.1 kg (172 lb 29 oz)   08/18/23 77.9 kg (171 lb 11.8 oz)   08/15/23 80.4 kg (177 lb 3.2 oz)     Recent Labs     08/17/23  0603 08/18/23  0545 08/19/23  0632   CREATININE 0.91 0.94 1.09   EGFR 64 62 51     Estimated Creatinine Clearance: 48.2 mL/min (by C-G formula based on SCr of 1.09 mg/dL). Patient presents with new onset A-fib, exertional dyspnea, evidence of fluid retention, elevated BNP 2D echo revealed low normal EF at 50%  Appreciate cardiology input  Received IV Lasix 40 mg daily for 3 days,   IV lasix transitioned to PO lasix 20 mg on 08/18/2023  Continue PO Lasxis 20 mg daily  Sr. Creatinine stable  Monitor I's and O's,   daily weights  Low salt diet  Follow BMP      Essential hypertension  Assessment & Plan  Home Losartan 25 mg on hold  I appreciate cardiology inputs  Discontinue Cardizem drip, transition to PO Cardizem  Couldn't tolerate metoprolol   Follow blood pressure trend      Migraine without aura and without status migrainosus, not intractable  Assessment & Plan  Continue Fioricet as needed  Follow up with PCP as outpatient regarding prophylactic medication     Esophageal reflux  Assessment & Plan  Continue PPI         VTE Pharmacologic Prophylaxis:   Pharmacologic: Apixaban (Eliquis)  Mechanical VTE Prophylaxis in Place: Yes    Patient Centered Rounds: I have performed bedside rounds with nursing staff today. Discussions with Specialists or Other Care Team Provider: Discussed with Cardiology. Discussed with rest of the team including case management during rounds. Education and Discussions with Family / Patient: Patient and her brother at bedside. Time Spent for Care: 55 minutes. More than 50% of total time spent on counseling and coordination of care as described above.     Current Length of Stay: 4 day(s)    Current Patient Status: Inpatient   Certification Statement: The patient will continue to require additional inpatient hospital stay due to ongoing evaluation and management    Discharge Plan: in 48-72 hrs    Code Status: Level 1 - Full Code      Subjective:   Seen and examined the patient at bedside today. Not in distress. No overnight events. Denies chest pain, SOB and palpitations. Objective:     Vitals:   Temp (24hrs), Av.4 °F (36.3 °C), Min:96.7 °F (35.9 °C), Max:97.9 °F (36.6 °C)    Temp:  [96.7 °F (35.9 °C)-97.9 °F (36.6 °C)] 97.2 °F (36.2 °C)  HR:  [] 126  Resp:  [15-33] 20  BP: (112-162)/(60-86) 148/69  SpO2:  [93 %-94 %] 94 %  Body mass index is 30.5 kg/m². Input and Output Summary (last 24 hours): Intake/Output Summary (Last 24 hours) at 2023 1413  Last data filed at 2023 0900  Gross per 24 hour   Intake 540 ml   Output --   Net 540 ml       Physical Exam:     Physical Exam  Vitals and nursing note reviewed. Constitutional:       General: She is not in acute distress. Appearance: She is well-developed. HENT:      Head: Normocephalic and atraumatic. Right Ear: External ear normal.      Left Ear: External ear normal.      Nose: Nose normal.      Mouth/Throat:      Mouth: Mucous membranes are moist.      Pharynx: Oropharynx is clear. Eyes:      General: No scleral icterus. Extraocular Movements: Extraocular movements intact. Conjunctiva/sclera: Conjunctivae normal.   Cardiovascular:      Rate and Rhythm: Tachycardia present. Rhythm irregular. Pulses: Normal pulses. Heart sounds: Normal heart sounds. Pulmonary:      Effort: Pulmonary effort is normal. No respiratory distress. Breath sounds: Normal breath sounds. No wheezing or rales. Abdominal:      General: Bowel sounds are normal.      Palpations: Abdomen is soft. Tenderness: There is no abdominal tenderness. There is no guarding. Musculoskeletal:         General: No swelling. Cervical back: Neck supple. Right lower leg: No edema. Left lower leg: No edema. Skin:     General: Skin is warm and dry. Capillary Refill: Capillary refill takes less than 2 seconds. Neurological:      Mental Status: She is alert and oriented to person, place, and time. Mental status is at baseline. Motor: No weakness. Psychiatric:         Mood and Affect: Mood normal.         Behavior: Behavior normal.         Additional Data:     Labs:    Results from last 7 days   Lab Units 08/19/23  0632 08/18/23  0545 08/17/23  0603   WBC Thousand/uL 8.08   < > 7.09   HEMOGLOBIN g/dL 14.0   < > 13.5   HEMATOCRIT % 44.1   < > 42.2   PLATELETS Thousands/uL 185   < > 162   NEUTROS PCT %  --   --  62   LYMPHS PCT %  --   --  28   MONOS PCT %  --   --  8   EOS PCT %  --   --  1    < > = values in this interval not displayed. Results from last 7 days   Lab Units 08/19/23  0632 08/18/23  0545   SODIUM mmol/L 140 141   POTASSIUM mmol/L 4.3 4.2   CHLORIDE mmol/L 103 105   CO2 mmol/L 28 27   BUN mg/dL 23 16   CREATININE mg/dL 1.09 0.94   ANION GAP mmol/L 9 9   CALCIUM mg/dL 9.1 8.7   ALBUMIN g/dL  --  3.9   TOTAL BILIRUBIN mg/dL  --  1.38*   ALK PHOS U/L  --  49   ALT U/L  --  89*   AST U/L  --  17   GLUCOSE RANDOM mg/dL 111 112     Results from last 7 days   Lab Units 08/15/23  1453   INR  0.98                   * I Have Reviewed All Lab Data Listed Above. * Additional Pertinent Lab Tests Reviewed: [address] [place] [address] Admission Reviewed    Imaging:    Imaging Reports Reviewed Today Include:   CTA ED chest PE Study   Final Result      No acute findings in the chest, specifically, no pulmonary arterial embolism or pulmonary infiltrate/consolidation. Workstation performed: [id number]         XR chest 1 view portable   Final Result      No acute cardiopulmonary disease.                Workstation performed: [id number]             Imaging Personally Reviewed by Myself Includes:  As above    Recent Cultures (last 7 days):           Last 24 Hours Medication List:   Current Facility-Administered Medications   Medication Dose Route Frequency Provider Last Rate   • acetaminophen  650 mg Oral Q6H PRN Chloe Shah MD     • amiodarone (CORDARONE) 900 mg in dextrose 5 % 500 mL infusion  0.5 mg/min Intravenous Continuous Christopher Cutitta, DO 0.5 mg/min (08/19/23 1245)   • amiodarone  200 mg Oral BID With Meals Neli Cha PA-C     • apixaban  5 mg Oral BID Chloe Shah MD     • butalbital-acetaminophen-caffeine  1 tablet Oral Q4H PRN Chloe Baxter MD     • cholecalciferol  1,000 Units Oral Daily Chloe Shah MD     • diltiazem  60 mg Oral 4x Daily Neli Cha PA-C     • furosemide  20 mg Oral Daily Neli Cha PA-C     • metoprolol  5 mg Intravenous Q6H PRN Anthony Steinberg MD     • ondansetron  4 mg Intravenous Q6H PRN Hailee Dwyer MD     • pantoprazole  40 mg Oral BID Hailee Dwyer MD     • pravastatin  40 mg Oral Daily With Donald Collado MD          Today, Patient Was Seen By: Cheryl Ortiz MD    ** Please Note: Dictation voice to text software may have been used in the creation of this document.  **

## 2023-08-19 NOTE — PROGRESS NOTES
Patient IV site infiltrated, IV removed. Patient refused another IV insertion. Patient now has no IV access, Dr Mick Holcomb made aware, will make primary RN also aware.

## 2023-08-19 NOTE — PLAN OF CARE
Problem: PAIN - ADULT  Goal: Verbalizes/displays adequate comfort level or baseline comfort level  Description: Interventions:  - Encourage patient to monitor pain and request assistance  - Assess pain using appropriate pain scale  - Administer analgesics based on type and severity of pain and evaluate response  - Implement non-pharmacological measures as appropriate and evaluate response  - Consider cultural and social influences on pain and pain management  - Notify physician/advanced practitioner if interventions unsuccessful or patient reports new pain  Outcome: Progressing     Problem: INFECTION - ADULT  Goal: Absence or prevention of progression during hospitalization  Description: INTERVENTIONS:  - Assess and monitor for signs and symptoms of infection  - Monitor lab/diagnostic results  - Monitor all insertion sites, i.e. indwelling lines, tubes, and drains  - Monitor endotracheal if appropriate and nasal secretions for changes in amount and color  - [place] appropriate cooling/warming therapies per order  - Administer medications as ordered  - Instruct and encourage patient and family to use good hand hygiene technique  - Identify and instruct in appropriate isolation precautions for identified infection/condition  Outcome: Progressing  Goal: Absence of fever/infection during neutropenic period  Description: INTERVENTIONS:  - Monitor WBC    Outcome: Progressing     Problem: SAFETY ADULT  Goal: Patient will remain free of falls  Description: INTERVENTIONS:  - Educate patient/family on patient safety including physical limitations  - Instruct patient to call for assistance with activity   - Consult OT/PT to assist with strengthening/mobility   - Keep Call bell within reach  - Keep bed low and locked with side rails adjusted as appropriate  - Keep care items and personal belongings within reach  - Initiate and maintain comfort rounds  - Make Fall Risk Sign visible to staff  - Offer Toileting every 2 Hours, in advance of need  - Initiate/Maintain bed alarm  - Obtain necessary fall risk management equipment:   - Apply yellow socks and bracelet for high fall risk patients  - Consider moving patient to room near nurses station  Outcome: Progressing  Goal: Maintain or return to baseline ADL function  Description: INTERVENTIONS:  -  Assess patient's ability to carry out ADLs; assess patient's baseline for ADL function and identify physical deficits which impact ability to perform ADLs (bathing, care of mouth/teeth, toileting, grooming, dressing, etc.)  - Assess/evaluate cause of self-care deficits   - Assess range of motion  - Assess patient's mobility; develop plan if impaired  - Assess patient's need for assistive devices and provide as appropriate  - Encourage maximum independence but intervene and supervise when necessary  - Involve family in performance of ADLs  - Assess for home care needs following discharge   - Consider OT consult to assist with ADL evaluation and planning for discharge  - Provide patient education as appropriate  Outcome: Progressing  Goal: Maintains/Returns to pre admission functional level  Description: INTERVENTIONS:  - Perform BMAT or MOVE assessment daily.   - Set and communicate daily mobility goal to care team and patient/family/caregiver. - Collaborate with rehabilitation services on mobility goals if consulted  - Perform Range of Motion 3 times a day. - Reposition patient every 2 hours.   - Dangle patient 3 times a day  - Stand patient 3 times a day  - Ambulate patient 3 times a day  - Out of bed to chair 3 times a day   - Out of bed for meals 3 times a day  - Out of bed for toileting  - Record patient progress and toleration of activity level   Outcome: Progressing     Problem: DISCHARGE PLANNING  Goal: Discharge to home or other facility with appropriate resources  Description: INTERVENTIONS:  - Identify barriers to discharge w/patient and caregiver  - Arrange for needed discharge resources and transportation as appropriate  - Identify discharge learning needs (meds, wound care, etc.)  - Arrange for interpretive services to assist at discharge as needed  - Refer to Case Management Department for coordinating discharge planning if the patient needs post-hospital services based on physician/advanced practitioner order or complex needs related to functional status, cognitive ability, or social support system  Outcome: Progressing     Problem: Knowledge Deficit  Goal: Patient/family/caregiver demonstrates understanding of disease process, treatment plan, medications, and discharge instructions  Description: Complete learning assessment and assess knowledge base. Interventions:  - Provide teaching at level of understanding  - Provide teaching via preferred learning methods  Outcome: Progressing     Problem: Nutrition/Hydration-ADULT  Goal: Nutrient/Hydration intake appropriate for improving, restoring or maintaining nutritional needs  Description: Monitor and assess patient's nutrition/hydration status for malnutrition. Collaborate with interdisciplinary team and initiate plan and interventions as ordered. Monitor patient's weight and dietary intake as ordered or per policy. Utilize nutrition screening tool and intervene as necessary. Determine patient's food preferences and provide high-protein, high-caloric foods as appropriate.      INTERVENTIONS:  - Monitor oral intake, urinary output, labs, and treatment plans  - Assess nutrition and hydration status and recommend course of action  - Evaluate amount of meals eaten  - Assist patient with eating if necessary   - Allow adequate time for meals  - Recommend/ encourage appropriate diets, oral nutritional supplements, and vitamin/mineral supplements  - Order, calculate, and assess calorie counts as needed  - Recommend, monitor, and adjust tube feedings and TPN/PPN based on assessed needs  - Assess need for intravenous fluids  - Provide specific nutrition/hydration education as appropriate  - Include patient/family/caregiver in decisions related to nutrition  Outcome: Progressing     Problem: CARDIOVASCULAR - ADULT  Goal: Maintains optimal cardiac output and hemodynamic stability  Description: INTERVENTIONS:  - Monitor I/O, vital signs and rhythm  - Monitor for S/S and trends of decreased cardiac output  - Administer and titrate ordered vasoactive medications to optimize hemodynamic stability  - Assess quality of pulses, skin color and temperature  - Assess for signs of decreased coronary artery perfusion  - Instruct patient to report change in severity of symptoms  Outcome: Progressing  Goal: Absence of cardiac dysrhythmias or at baseline rhythm  Description: INTERVENTIONS:  - Continuous cardiac monitoring, vital signs, obtain 12 lead EKG if ordered  - Administer antiarrhythmic and heart rate control medications as ordered  - Monitor electrolytes and administer replacement therapy as ordered  Outcome: Progressing     Problem: RESPIRATORY - ADULT  Goal: Achieves optimal ventilation and oxygenation  Description: INTERVENTIONS:  - Assess for changes in respiratory status  - Assess for changes in mentation and behavior  - Position to facilitate oxygenation and minimize respiratory effort  - Oxygen administered by appropriate delivery if ordered  - Initiate smoking cessation education as indicated  - Encourage broncho-pulmonary hygiene including cough, deep breathe, Incentive Spirometry  - Assess the need for suctioning and aspirate as needed  - Assess and instruct to report SOB or any respiratory difficulty  - Respiratory Therapy support as indicated  Outcome: Progressing

## 2023-08-19 NOTE — ASSESSMENT & PLAN NOTE
Wt Readings from Last 3 Encounters:   08/19/23 78.1 kg (172 lb 2.9 oz)   08/15/23 80.4 kg (177 lb 3.2 oz)   07/25/23 80.1 kg (176 lb 9.6 oz)     Patient presents with new onset A-fib, exertional dyspnea, evidence of fluid retention, elevated BNP 2D echo revealed low normal EF at 50%  Appreciate cardiology input  Received IV Lasix 40 mg daily for 3 days, transitioned to PO Lasix 20 mg daily  Monitor I's and O's,   daily weights  Low salt diet  Follow BMP

## 2023-08-20 LAB
ANION GAP SERPL CALCULATED.3IONS-SCNC: 7 MMOL/L
BASOPHILS # BLD AUTO: 0.06 THOUSANDS/ÂΜL (ref 0–0.1)
BASOPHILS NFR BLD AUTO: 1 % (ref 0–1)
BUN SERPL-MCNC: 19 MG/DL (ref 5–25)
CALCIUM SERPL-MCNC: 9.4 MG/DL (ref 8.4–10.2)
CHLORIDE SERPL-SCNC: 103 MMOL/L (ref 96–108)
CO2 SERPL-SCNC: 30 MMOL/L (ref 21–32)
CREAT SERPL-MCNC: 0.99 MG/DL (ref 0.6–1.3)
EOSINOPHIL # BLD AUTO: 0.06 THOUSAND/ÂΜL (ref 0–0.61)
EOSINOPHIL NFR BLD AUTO: 1 % (ref 0–6)
ERYTHROCYTE [DISTWIDTH] IN BLOOD BY AUTOMATED COUNT: 13.1 % (ref 11.6–15.1)
GFR SERPL CREATININE-BSD FRML MDRD: 58 ML/MIN/1.73SQ M
GLUCOSE SERPL-MCNC: 106 MG/DL (ref 65–140)
HCT VFR BLD AUTO: 43 % (ref 34.8–46.1)
HGB BLD-MCNC: 13.7 G/DL (ref 11.5–15.4)
IMM GRANULOCYTES # BLD AUTO: 0.03 THOUSAND/UL (ref 0–0.2)
IMM GRANULOCYTES NFR BLD AUTO: 0 % (ref 0–2)
LYMPHOCYTES # BLD AUTO: 1.7 THOUSANDS/ÂΜL (ref 0.6–4.47)
LYMPHOCYTES NFR BLD AUTO: 20 % (ref 14–44)
MAGNESIUM SERPL-MCNC: 2 MG/DL (ref 1.9–2.7)
MCH RBC QN AUTO: 29.1 PG (ref 26.8–34.3)
MCHC RBC AUTO-ENTMCNC: 31.9 G/DL (ref 31.4–37.4)
MCV RBC AUTO: 91 FL (ref 82–98)
MONOCYTES # BLD AUTO: 0.69 THOUSAND/ÂΜL (ref 0.17–1.22)
MONOCYTES NFR BLD AUTO: 8 % (ref 4–12)
NEUTROPHILS # BLD AUTO: 6.2 THOUSANDS/ÂΜL (ref 1.85–7.62)
NEUTS SEG NFR BLD AUTO: 70 % (ref 43–75)
NRBC BLD AUTO-RTO: 0 /100 WBCS
PLATELET # BLD AUTO: 192 THOUSANDS/UL (ref 149–390)
PMV BLD AUTO: 12.1 FL (ref 8.9–12.7)
POTASSIUM SERPL-SCNC: 4.2 MMOL/L (ref 3.5–5.3)
RBC # BLD AUTO: 4.71 MILLION/UL (ref 3.81–5.12)
SODIUM SERPL-SCNC: 140 MMOL/L (ref 135–147)
WBC # BLD AUTO: 8.74 THOUSAND/UL (ref 4.31–10.16)

## 2023-08-20 PROCEDURE — 85025 COMPLETE CBC W/AUTO DIFF WBC: CPT | Performed by: FAMILY MEDICINE

## 2023-08-20 PROCEDURE — 99232 SBSQ HOSP IP/OBS MODERATE 35: CPT | Performed by: FAMILY MEDICINE

## 2023-08-20 PROCEDURE — 83735 ASSAY OF MAGNESIUM: CPT

## 2023-08-20 PROCEDURE — 80048 BASIC METABOLIC PNL TOTAL CA: CPT | Performed by: FAMILY MEDICINE

## 2023-08-20 RX ADMIN — Medication 1000 UNITS: at 08:35

## 2023-08-20 RX ADMIN — APIXABAN 5 MG: 5 TABLET, FILM COATED ORAL at 17:00

## 2023-08-20 RX ADMIN — DILTIAZEM HYDROCHLORIDE 90 MG: 90 TABLET, FILM COATED ORAL at 17:00

## 2023-08-20 RX ADMIN — PANTOPRAZOLE SODIUM 40 MG: 40 TABLET, DELAYED RELEASE ORAL at 08:35

## 2023-08-20 RX ADMIN — APIXABAN 5 MG: 5 TABLET, FILM COATED ORAL at 08:35

## 2023-08-20 RX ADMIN — AMIODARONE HYDROCHLORIDE 200 MG: 200 TABLET ORAL at 07:26

## 2023-08-20 RX ADMIN — PANTOPRAZOLE SODIUM 40 MG: 40 TABLET, DELAYED RELEASE ORAL at 21:54

## 2023-08-20 RX ADMIN — DILTIAZEM HYDROCHLORIDE 90 MG: 90 TABLET, FILM COATED ORAL at 21:54

## 2023-08-20 RX ADMIN — PRAVASTATIN SODIUM 40 MG: 40 TABLET ORAL at 15:48

## 2023-08-20 RX ADMIN — FUROSEMIDE 20 MG: 20 TABLET ORAL at 08:35

## 2023-08-20 RX ADMIN — AMIODARONE HYDROCHLORIDE 200 MG: 200 TABLET ORAL at 15:48

## 2023-08-20 RX ADMIN — DILTIAZEM HYDROCHLORIDE 90 MG: 90 TABLET, FILM COATED ORAL at 11:47

## 2023-08-20 RX ADMIN — DILTIAZEM HYDROCHLORIDE 90 MG: 90 TABLET, FILM COATED ORAL at 08:35

## 2023-08-20 NOTE — PLAN OF CARE
Problem: PAIN - ADULT  Goal: Verbalizes/displays adequate comfort level or baseline comfort level  Description: Interventions:  - Encourage patient to monitor pain and request assistance  - Assess pain using appropriate pain scale  - Administer analgesics based on type and severity of pain and evaluate response  - Implement non-pharmacological measures as appropriate and evaluate response  - Consider cultural and social influences on pain and pain management  - Notify physician/advanced practitioner if interventions unsuccessful or patient reports new pain  Outcome: Progressing     Problem: INFECTION - ADULT  Goal: Absence or prevention of progression during hospitalization  Description: INTERVENTIONS:  - Assess and monitor for signs and symptoms of infection  - Monitor lab/diagnostic results  - Monitor all insertion sites, i.e. indwelling lines, tubes, and drains  - Monitor endotracheal if appropriate and nasal secretions for changes in amount and color  - [place] appropriate cooling/warming therapies per order  - Administer medications as ordered  - Instruct and encourage patient and family to use good hand hygiene technique  - Identify and instruct in appropriate isolation precautions for identified infection/condition  Outcome: Progressing  Goal: Absence of fever/infection during neutropenic period  Description: INTERVENTIONS:  - Monitor WBC    Outcome: Progressing     Problem: SAFETY ADULT  Goal: Patient will remain free of falls  Description: INTERVENTIONS:  - Educate patient/family on patient safety including physical limitations  - Instruct patient to call for assistance with activity   - Consult OT/PT to assist with strengthening/mobility   - Keep Call bell within reach  - Keep bed low and locked with side rails adjusted as appropriate  - Keep care items and personal belongings within reach  - Initiate and maintain comfort rounds  - Make Fall Risk Sign visible to staff  - Offer Toileting every 2 Hours, in advance of need  - Initiate/Maintain bed alarm  - Obtain necessary fall risk management equipment:   - Apply yellow socks and bracelet for high fall risk patients  - Consider moving patient to room near nurses station  Outcome: Progressing  Goal: Maintain or return to baseline ADL function  Description: INTERVENTIONS:  -  Assess patient's ability to carry out ADLs; assess patient's baseline for ADL function and identify physical deficits which impact ability to perform ADLs (bathing, care of mouth/teeth, toileting, grooming, dressing, etc.)  - Assess/evaluate cause of self-care deficits   - Assess range of motion  - Assess patient's mobility; develop plan if impaired  - Assess patient's need for assistive devices and provide as appropriate  - Encourage maximum independence but intervene and supervise when necessary  - Involve family in performance of ADLs  - Assess for home care needs following discharge   - Consider OT consult to assist with ADL evaluation and planning for discharge  - Provide patient education as appropriate  Outcome: Progressing  Goal: Maintains/Returns to pre admission functional level  Description: INTERVENTIONS:  - Perform BMAT or MOVE assessment daily.   - Set and communicate daily mobility goal to care team and patient/family/caregiver. - Collaborate with rehabilitation services on mobility goals if consulted  - Perform Range of Motion 3 times a day. - Reposition patient every 2 hours.   - Dangle patient 3 times a day  - Stand patient 3 times a day  - Ambulate patient 3 times a day  - Out of bed to chair 3 times a day   - Out of bed for meals 3 times a day  - Out of bed for toileting  - Record patient progress and toleration of activity level   Outcome: Progressing     Problem: DISCHARGE PLANNING  Goal: Discharge to home or other facility with appropriate resources  Description: INTERVENTIONS:  - Identify barriers to discharge w/patient and caregiver  - Arrange for needed discharge resources and transportation as appropriate  - Identify discharge learning needs (meds, wound care, etc.)  - Arrange for interpretive services to assist at discharge as needed  - Refer to Case Management Department for coordinating discharge planning if the patient needs post-hospital services based on physician/advanced practitioner order or complex needs related to functional status, cognitive ability, or social support system  Outcome: Progressing     Problem: Knowledge Deficit  Goal: Patient/family/caregiver demonstrates understanding of disease process, treatment plan, medications, and discharge instructions  Description: Complete learning assessment and assess knowledge base. Interventions:  - Provide teaching at level of understanding  - Provide teaching via preferred learning methods  Outcome: Progressing     Problem: Nutrition/Hydration-ADULT  Goal: Nutrient/Hydration intake appropriate for improving, restoring or maintaining nutritional needs  Description: Monitor and assess patient's nutrition/hydration status for malnutrition. Collaborate with interdisciplinary team and initiate plan and interventions as ordered. Monitor patient's weight and dietary intake as ordered or per policy. Utilize nutrition screening tool and intervene as necessary. Determine patient's food preferences and provide high-protein, high-caloric foods as appropriate.      INTERVENTIONS:  - Monitor oral intake, urinary output, labs, and treatment plans  - Assess nutrition and hydration status and recommend course of action  - Evaluate amount of meals eaten  - Assist patient with eating if necessary   - Allow adequate time for meals  - Recommend/ encourage appropriate diets, oral nutritional supplements, and vitamin/mineral supplements  - Order, calculate, and assess calorie counts as needed  - Recommend, monitor, and adjust tube feedings and TPN/PPN based on assessed needs  - Assess need for intravenous fluids  - Provide specific nutrition/hydration education as appropriate  - Include patient/family/caregiver in decisions related to nutrition  Outcome: Progressing     Problem: CARDIOVASCULAR - ADULT  Goal: Maintains optimal cardiac output and hemodynamic stability  Description: INTERVENTIONS:  - Monitor I/O, vital signs and rhythm  - Monitor for S/S and trends of decreased cardiac output  - Administer and titrate ordered vasoactive medications to optimize hemodynamic stability  - Assess quality of pulses, skin color and temperature  - Assess for signs of decreased coronary artery perfusion  - Instruct patient to report change in severity of symptoms  Outcome: Progressing  Goal: Absence of cardiac dysrhythmias or at baseline rhythm  Description: INTERVENTIONS:  - Continuous cardiac monitoring, vital signs, obtain 12 lead EKG if ordered  - Administer antiarrhythmic and heart rate control medications as ordered  - Monitor electrolytes and administer replacement therapy as ordered  Outcome: Progressing     Problem: RESPIRATORY - ADULT  Goal: Achieves optimal ventilation and oxygenation  Description: INTERVENTIONS:  - Assess for changes in respiratory status  - Assess for changes in mentation and behavior  - Position to facilitate oxygenation and minimize respiratory effort  - Oxygen administered by appropriate delivery if ordered  - Initiate smoking cessation education as indicated  - Encourage broncho-pulmonary hygiene including cough, deep breathe, Incentive Spirometry  - Assess the need for suctioning and aspirate as needed  - Assess and instruct to report SOB or any respiratory difficulty  - Respiratory Therapy support as indicated  Outcome: Progressing Medications

## 2023-08-20 NOTE — ASSESSMENT & PLAN NOTE
This is a 78-year-old female patient with history of hypertension, hyperlipidemia, migraines, who was sent from cardiology office due to evidence of A-fib with RVR and patient's complaints of exertional dyspnea and fluid retention noted on exam    · [id number], anticoagulation indicated -initiated on Eliquis 5 mg BID -pricing checked by case management   · Initially on Cardizem drip which was stopped with improvement in heart rate and transitioned to p.o. Cardizem by cardiology on 08/16/2023   · Patient is intolerant of metoprolol  · 2D echo reveals low normal EF of 50%   · Cardiology following - appreciate cardiology input  · S/p unsuccessful LAURENCE guided cardioversion on 08/17/2023  · Untreated sleep apnea. Previous sleep study in 2018 - home sleep study with evidence of hypoxia. Recommend sleep study at sleep center to evaluate for sleep apnea  · Completed course of amiodarone drip.   Bridged to oral Amiodarone 200 BID   · Cardizem increased to 90 mg every 6 hours  · With heart rates remaining suboptimal, patient still in A-fib, likely plan to reattempt Cardioversion on Monday - final determination per cardiology  · NPO a/m

## 2023-08-20 NOTE — PROGRESS NOTES
[address]  Progress Note  Name: Jeff Ng  MRN: [id number]  Unit/Bed#:  I Date of Admission: 8/15/2023   Date of Service: 8/20/2023 I Hospital Day: 5    Assessment/Plan   * New onset a-fib with RVR  Assessment & Plan  This is a 77-year-old female patient with history of hypertension, hyperlipidemia, migraines, who was sent from cardiology office due to evidence of A-fib with RVR and patient's complaints of exertional dyspnea and fluid retention noted on exam    · [id number] 4, anticoagulation indicated -initiated on Eliquis 5 mg BID -pricing checked by case management   · Initially on Cardizem drip which was stopped with improvement in heart rate and transitioned to p.o. Cardizem by cardiology on 08/16/2023   · Patient is intolerant of metoprolol  · 2D echo reveals low normal EF of 50%   · Cardiology following - appreciate cardiology input  · S/p unsuccessful LAURENCE guided cardioversion on 08/17/2023  · Untreated sleep apnea. Previous sleep study in 2018 - home sleep study with evidence of hypoxia. Recommend sleep study at sleep center to evaluate for sleep apnea  · Completed course of amiodarone drip.   Bridged to oral Amiodarone 200 BID   · Cardizem increased to 90 mg every 6 hours  · With heart rates remaining suboptimal, patient still in A-fib, likely plan to reattempt Cardioversion on Monday - final determination per cardiology  · NPO a/m          Acute diastolic congestive heart failure ([address])  Assessment & Plan  Wt Readings from Last 3 Encounters:   08/19/23 78.1 kg (172 lb 2.9 oz)   08/15/23 80.4 kg (177 lb 3.2 oz)   07/25/23 80.1 kg (176 lb 9.6 oz)     Patient presents with new onset A-fib, exertional dyspnea, evidence of fluid retention, elevated BNP 2D echo revealed low normal EF at 50%  Appreciate cardiology input  Received IV Lasix 40 mg daily for 3 days, transitioned to PO Lasix 20 mg daily  Monitor I's and O's,   daily weights  Low salt diet  Follow BMP        Essential hypertension  Assessment & Plan  Home Losartan 25 mg -on hold to allow BP room for A-fib management      Migraine without aura and without status migrainosus, not intractable  Assessment & Plan  Continue Fioricet as needed      Esophageal reflux  Assessment & Plan  Continue PPI           VTE Pharmacologic Prophylaxis: VTE Score: 4 Moderate Risk (Score 3-4) - Pharmacological DVT Prophylaxis Ordered: apixaban (Eliquis). Patient Centered Rounds: I performed bedside rounds with nursing staff today. Discussions with Specialists or Other Care Team Provider: LISANDRA    Education and Discussions with Family / Patient: Updated  (brother) at bedside. Total Time Spent on Date of Encounter in care of patient: 55 minutes This time was spent on one or more of the following: performing physical exam; counseling and coordination of care; obtaining or reviewing history; documenting in the medical record; reviewing/ordering tests, medications or procedures; communicating with other healthcare professionals and discussing with patient's family/caregivers. Current Length of Stay: 5 day(s)  Current Patient Status: Inpatient   Certification Statement: The patient will continue to require additional inpatient hospital stay due to close monitoring, potential cardioversion  Discharge Plan: Anticipate discharge in 24-48 hrs to home. Code Status: Level 1 - Full Code    Subjective:   Patient reports feeling well, asymptomatic. No acute complaints overnight events reported    Objective:     Vitals:   Temp (24hrs), Av.3 °F (36.3 °C), Min:97.2 °F (36.2 °C), Max:97.4 °F (36.3 °C)    Temp:  [97.2 °F (36.2 °C)-97.4 °F (36.3 °C)] 97.4 °F (36.3 °C)  HR:  [] 100  Resp:  [12-27] 18  BP: ()/(54-96) 115/68  SpO2:  [91 %-97 %] 97 %  Body mass index is 30.5 kg/m². Input and Output Summary (last 24 hours):      Intake/Output Summary (Last 24 hours) at 2023 1056  Last data filed at 2023 0901  Gross per 24 hour   Intake 1486.33 ml   Output 800 ml   Net 686.33 ml       Physical Exam:   Physical Exam  Constitutional:       General: She is not in acute distress. HENT:      Head: Normocephalic and atraumatic. Nose: No congestion. Eyes:      Conjunctiva/sclera: Conjunctivae normal.   Cardiovascular:      Rate and Rhythm: Tachycardia present. Rhythm irregular. Pulmonary:      Effort: No respiratory distress. Breath sounds: No wheezing or rales. Abdominal:      General: There is no distension. Tenderness: There is no abdominal tenderness. There is no guarding. Musculoskeletal:      Right lower leg: No edema. Left lower leg: No edema. Skin:     General: Skin is warm and dry. Neurological:      Mental Status: She is oriented to person, place, and time. Psychiatric:         Mood and Affect: Mood normal.          Additional Data:     Labs:  Results from last 7 days   Lab Units 23  0521   WBC Thousand/uL 8.74   HEMOGLOBIN g/dL 13.7   HEMATOCRIT % 43.0   PLATELETS Thousands/uL 192   NEUTROS PCT % 70   LYMPHS PCT % 20   MONOS PCT % 8   EOS PCT % 1     Results from last 7 days   Lab Units 23  0521 23  0632 23  0545   SODIUM mmol/L 140   < > 141   POTASSIUM mmol/L 4.2   < > 4.2   CHLORIDE mmol/L 103   < > 105   CO2 mmol/L 30   < > 27   BUN mg/dL 19   < > 16   CREATININE mg/dL 0.99   < > 0.94   ANION GAP mmol/L 7   < > 9   CALCIUM mg/dL 9.4   < > 8.7   ALBUMIN g/dL  --   --  3.9   TOTAL BILIRUBIN mg/dL  --   --  1.38*   ALK PHOS U/L  --   --  49   ALT U/L  --   --  89*   AST U/L  --   --  17   GLUCOSE RANDOM mg/dL 106   < > 112    < > = values in this interval not displayed.      Results from last 7 days   Lab Units 08/15/23  1453   INR  0.98                   Lines/Drains:  Invasive Devices     None                   Telemetry:  Telemetry Orders (From admission, onward)             24 Hour Telemetry Monitoring  Continuous x 24 Hours (Telem)         Question:  Reason for 24 Hour Telemetry  Answer:  Arrhythmias requiring acute medical intervention / PPM or ICD malfunction                 Telemetry Reviewed: Atrial fibrillation. HR averaging 110  Indication for Continued Telemetry Use: Arrthymias requiring medical therapy             Imaging: No pertinent imaging reviewed. Recent Cultures (last 7 days):         Last 24 Hours Medication List:   Current Facility-Administered Medications   Medication Dose Route Frequency Provider Last Rate   • acetaminophen  650 mg Oral Q6H PRN Chloe Shah MD     • amiodarone  200 mg Oral BID With Meals Neli Hernandez PA-C     • apixaban  5 mg Oral BID Nati Davis MD     • butalbital-acetaminophen-caffeine  1 tablet Oral Q4H PRN Nati Davis MD     • cholecalciferol  1,000 Units Oral Daily Nati Davis MD     • diltiazem  90 mg Oral 4x Daily Taryn Zee MD     • furosemide  20 mg Oral Daily Neli Hernandez PA-C     • metoprolol  5 mg Intravenous Q6H PRN Anthony Steinberg MD     • ondansetron  4 mg Intravenous Q6H PRN Nati Davis MD     • pantoprazole  40 mg Oral BID Nati Davis MD     • pravastatin  40 mg Oral Daily With Antonio Jang MD          Today, Patient Was Seen By: Nati Davis MD    **Please Note: This note may have been constructed using a voice recognition system. **

## 2023-08-21 ENCOUNTER — ANESTHESIA EVENT (INPATIENT)
Dept: PERIOP | Facility: HOSPITAL | Age: 68
DRG: 291 | End: 2023-08-21
Payer: COMMERCIAL

## 2023-08-21 ENCOUNTER — TRANSITIONAL CARE MANAGEMENT (OUTPATIENT)
Dept: FAMILY MEDICINE CLINIC | Facility: CLINIC | Age: 68
End: 2023-08-21

## 2023-08-21 ENCOUNTER — APPOINTMENT (OUTPATIENT)
Dept: NON INVASIVE DIAGNOSTICS | Facility: HOSPITAL | Age: 68
DRG: 291 | End: 2023-08-21
Payer: COMMERCIAL

## 2023-08-21 ENCOUNTER — ANESTHESIA (INPATIENT)
Dept: PERIOP | Facility: HOSPITAL | Age: 68
DRG: 291 | End: 2023-08-21
Payer: COMMERCIAL

## 2023-08-21 VITALS
TEMPERATURE: 98 F | RESPIRATION RATE: 23 BRPM | HEIGHT: 63 IN | SYSTOLIC BLOOD PRESSURE: 145 MMHG | OXYGEN SATURATION: 95 % | BODY MASS INDEX: 30.51 KG/M2 | DIASTOLIC BLOOD PRESSURE: 63 MMHG | HEART RATE: 69 BPM | WEIGHT: 172.18 LBS

## 2023-08-21 LAB
ANION GAP SERPL CALCULATED.3IONS-SCNC: 7 MMOL/L
ATRIAL RATE: 102 BPM
ATRIAL RATE: 75 BPM
BASOPHILS # BLD AUTO: 0.06 THOUSANDS/ÂΜL (ref 0–0.1)
BASOPHILS NFR BLD AUTO: 1 % (ref 0–1)
BUN SERPL-MCNC: 22 MG/DL (ref 5–25)
CALCIUM SERPL-MCNC: 9.2 MG/DL (ref 8.4–10.2)
CHLORIDE SERPL-SCNC: 103 MMOL/L (ref 96–108)
CO2 SERPL-SCNC: 29 MMOL/L (ref 21–32)
CREAT SERPL-MCNC: 1.09 MG/DL (ref 0.6–1.3)
EOSINOPHIL # BLD AUTO: 0.07 THOUSAND/ÂΜL (ref 0–0.61)
EOSINOPHIL NFR BLD AUTO: 1 % (ref 0–6)
ERYTHROCYTE [DISTWIDTH] IN BLOOD BY AUTOMATED COUNT: 13.1 % (ref 11.6–15.1)
GFR SERPL CREATININE-BSD FRML MDRD: 51 ML/MIN/1.73SQ M
GLUCOSE SERPL-MCNC: 102 MG/DL (ref 65–140)
HCT VFR BLD AUTO: 43 % (ref 34.8–46.1)
HGB BLD-MCNC: 13.7 G/DL (ref 11.5–15.4)
IMM GRANULOCYTES # BLD AUTO: 0.02 THOUSAND/UL (ref 0–0.2)
IMM GRANULOCYTES NFR BLD AUTO: 0 % (ref 0–2)
LYMPHOCYTES # BLD AUTO: 1.66 THOUSANDS/ÂΜL (ref 0.6–4.47)
LYMPHOCYTES NFR BLD AUTO: 21 % (ref 14–44)
MCH RBC QN AUTO: 28.8 PG (ref 26.8–34.3)
MCHC RBC AUTO-ENTMCNC: 31.9 G/DL (ref 31.4–37.4)
MCV RBC AUTO: 90 FL (ref 82–98)
MONOCYTES # BLD AUTO: 0.55 THOUSAND/ÂΜL (ref 0.17–1.22)
MONOCYTES NFR BLD AUTO: 7 % (ref 4–12)
NEUTROPHILS # BLD AUTO: 5.52 THOUSANDS/ÂΜL (ref 1.85–7.62)
NEUTS SEG NFR BLD AUTO: 70 % (ref 43–75)
NRBC BLD AUTO-RTO: 0 /100 WBCS
P AXIS: 36 DEGREES
PLATELET # BLD AUTO: 185 THOUSANDS/UL (ref 149–390)
PMV BLD AUTO: 12 FL (ref 8.9–12.7)
POTASSIUM SERPL-SCNC: 4.1 MMOL/L (ref 3.5–5.3)
PR INTERVAL: 182 MS
QRS AXIS: 51 DEGREES
QRS AXIS: 67 DEGREES
QRSD INTERVAL: 74 MS
QRSD INTERVAL: 78 MS
QT INTERVAL: 354 MS
QT INTERVAL: 414 MS
QTC INTERVAL: 462 MS
QTC INTERVAL: 476 MS
RBC # BLD AUTO: 4.76 MILLION/UL (ref 3.81–5.12)
SODIUM SERPL-SCNC: 139 MMOL/L (ref 135–147)
T WAVE AXIS: 44 DEGREES
T WAVE AXIS: 64 DEGREES
VENTRICULAR RATE: 109 BPM
VENTRICULAR RATE: 75 BPM
WBC # BLD AUTO: 7.88 THOUSAND/UL (ref 4.31–10.16)

## 2023-08-21 PROCEDURE — 99232 SBSQ HOSP IP/OBS MODERATE 35: CPT | Performed by: FAMILY MEDICINE

## 2023-08-21 PROCEDURE — 93010 ELECTROCARDIOGRAM REPORT: CPT | Performed by: INTERNAL MEDICINE

## 2023-08-21 PROCEDURE — 92960 CARDIOVERSION ELECTRIC EXT: CPT | Performed by: INTERNAL MEDICINE

## 2023-08-21 PROCEDURE — 92960 CARDIOVERSION ELECTRIC EXT: CPT

## 2023-08-21 PROCEDURE — 85025 COMPLETE CBC W/AUTO DIFF WBC: CPT | Performed by: FAMILY MEDICINE

## 2023-08-21 PROCEDURE — 93005 ELECTROCARDIOGRAM TRACING: CPT

## 2023-08-21 PROCEDURE — 80048 BASIC METABOLIC PNL TOTAL CA: CPT | Performed by: FAMILY MEDICINE

## 2023-08-21 PROCEDURE — 99232 SBSQ HOSP IP/OBS MODERATE 35: CPT | Performed by: INTERNAL MEDICINE

## 2023-08-21 PROCEDURE — 99239 HOSP IP/OBS DSCHRG MGMT >30: CPT | Performed by: FAMILY MEDICINE

## 2023-08-21 RX ORDER — DILTIAZEM HYDROCHLORIDE 120 MG/1
360 CAPSULE, COATED, EXTENDED RELEASE ORAL DAILY
Qty: 90 CAPSULE | Refills: 0 | Status: SHIPPED | OUTPATIENT
Start: 2023-08-21 | End: 2023-08-21

## 2023-08-21 RX ORDER — FUROSEMIDE 20 MG/1
20 TABLET ORAL DAILY
Qty: 14 TABLET | Refills: 0 | Status: SHIPPED | OUTPATIENT
Start: 2023-08-21 | End: 2023-08-25 | Stop reason: SDUPTHER

## 2023-08-21 RX ORDER — SODIUM CHLORIDE, SODIUM LACTATE, POTASSIUM CHLORIDE, CALCIUM CHLORIDE 600; 310; 30; 20 MG/100ML; MG/100ML; MG/100ML; MG/100ML
INJECTION, SOLUTION INTRAVENOUS CONTINUOUS PRN
Status: DISCONTINUED | OUTPATIENT
Start: 2023-08-21 | End: 2023-08-21

## 2023-08-21 RX ORDER — PROPOFOL 10 MG/ML
INJECTION, EMULSION INTRAVENOUS AS NEEDED
Status: DISCONTINUED | OUTPATIENT
Start: 2023-08-21 | End: 2023-08-21

## 2023-08-21 RX ORDER — DILTIAZEM HYDROCHLORIDE 360 MG/1
360 CAPSULE, EXTENDED RELEASE ORAL DAILY
Qty: 30 CAPSULE | Refills: 0 | Status: SHIPPED | OUTPATIENT
Start: 2023-08-21 | End: 2023-08-25 | Stop reason: SDUPTHER

## 2023-08-21 RX ORDER — AMIODARONE HYDROCHLORIDE 200 MG/1
200 TABLET ORAL 2 TIMES DAILY
Qty: 28 TABLET | Refills: 0 | Status: SHIPPED | OUTPATIENT
Start: 2023-08-21 | End: 2023-09-04

## 2023-08-21 RX ORDER — AMIODARONE HYDROCHLORIDE 200 MG/1
200 TABLET ORAL DAILY
Qty: 30 TABLET | Refills: 0 | Status: SHIPPED | OUTPATIENT
Start: 2023-09-05 | End: 2023-08-25 | Stop reason: SDUPTHER

## 2023-08-21 RX ADMIN — AMIODARONE HYDROCHLORIDE 200 MG: 200 TABLET ORAL at 09:02

## 2023-08-21 RX ADMIN — Medication 1000 UNITS: at 09:01

## 2023-08-21 RX ADMIN — PANTOPRAZOLE SODIUM 40 MG: 40 TABLET, DELAYED RELEASE ORAL at 09:01

## 2023-08-21 RX ADMIN — APIXABAN 5 MG: 5 TABLET, FILM COATED ORAL at 09:01

## 2023-08-21 RX ADMIN — DILTIAZEM HYDROCHLORIDE 90 MG: 90 TABLET, FILM COATED ORAL at 13:21

## 2023-08-21 RX ADMIN — DILTIAZEM HYDROCHLORIDE 90 MG: 90 TABLET, FILM COATED ORAL at 09:01

## 2023-08-21 RX ADMIN — SODIUM CHLORIDE, SODIUM LACTATE, POTASSIUM CHLORIDE, AND CALCIUM CHLORIDE: .6; .31; .03; .02 INJECTION, SOLUTION INTRAVENOUS at 10:37

## 2023-08-21 RX ADMIN — PROPOFOL 100 MG: 10 INJECTION, EMULSION INTRAVENOUS at 10:47

## 2023-08-21 NOTE — PLAN OF CARE
Problem: PAIN - ADULT  Goal: Verbalizes/displays adequate comfort level or baseline comfort level  Description: Interventions:  - Encourage patient to monitor pain and request assistance  - Assess pain using appropriate pain scale  - Administer analgesics based on type and severity of pain and evaluate response  - Implement non-pharmacological measures as appropriate and evaluate response  - Consider cultural and social influences on pain and pain management  - Notify physician/advanced practitioner if interventions unsuccessful or patient reports new pain  8/21/2023 1321 by Lois Ricketts RN  Outcome: Completed  8/21/2023 0905 by Lois Ricketts RN  Outcome: Progressing     Problem: INFECTION - ADULT  Goal: Absence or prevention of progression during hospitalization  Description: INTERVENTIONS:  - Assess and monitor for signs and symptoms of infection  - Monitor lab/diagnostic results  - Monitor all insertion sites, i.e. indwelling lines, tubes, and drains  - Monitor endotracheal if appropriate and nasal secretions for changes in amount and color  - [place] appropriate cooling/warming therapies per order  - Administer medications as ordered  - Instruct and encourage patient and family to use good hand hygiene technique  - Identify and instruct in appropriate isolation precautions for identified infection/condition  8/21/2023 1321 by Lois Ricketts RN  Outcome: Completed  8/21/2023 0905 by Lois Ricketts RN  Outcome: Progressing  Goal: Absence of fever/infection during neutropenic period  Description: INTERVENTIONS:  - Monitor WBC    8/21/2023 1321 by Lois Ricketts RN  Outcome: Completed  8/21/2023 0905 by Lois Ricketts RN  Outcome: Progressing     Problem: SAFETY ADULT  Goal: Patient will remain free of falls  Description: INTERVENTIONS:  - Educate patient/family on patient safety including physical limitations  - Instruct patient to call for assistance with activity   - Consult OT/PT to assist with strengthening/mobility   - Keep Call bell within reach  - Keep bed low and locked with side rails adjusted as appropriate  - Keep care items and personal belongings within reach  - Initiate and maintain comfort rounds  - Make Fall Risk Sign visible to staff  - Offer Toileting every 2 Hours, in advance of need  - Initiate/Maintain bed alarm  - Obtain necessary fall risk management equipment:   - Apply yellow socks and bracelet for high fall risk patients  - Consider moving patient to room near nurses station  8/21/2023 1321 by Alejandro Simons RN  Outcome: Completed  8/21/2023 0905 by Alejandro Simons RN  Outcome: Progressing  Goal: Maintain or return to baseline ADL function  Description: INTERVENTIONS:  -  Assess patient's ability to carry out ADLs; assess patient's baseline for ADL function and identify physical deficits which impact ability to perform ADLs (bathing, care of mouth/teeth, toileting, grooming, dressing, etc.)  - Assess/evaluate cause of self-care deficits   - Assess range of motion  - Assess patient's mobility; develop plan if impaired  - Assess patient's need for assistive devices and provide as appropriate  - Encourage maximum independence but intervene and supervise when necessary  - Involve family in performance of ADLs  - Assess for home care needs following discharge   - Consider OT consult to assist with ADL evaluation and planning for discharge  - Provide patient education as appropriate  8/21/2023 1321 by Alejandro Simons RN  Outcome: Completed  8/21/2023 0905 by Alejandro Simons RN  Outcome: Progressing  Goal: Maintains/Returns to pre admission functional level  Description: INTERVENTIONS:  - Perform BMAT or MOVE assessment daily.   - Set and communicate daily mobility goal to care team and patient/family/caregiver. - Collaborate with rehabilitation services on mobility goals if consulted  - Perform Range of Motion 3 times a day. - Reposition patient every 2 hours.   - Dangle patient 3 times a day  - Stand patient 3 times a day  - Ambulate patient 3 times a day  - Out of bed to chair 3 times a day   - Out of bed for meals 3 times a day  - Out of bed for toileting  - Record patient progress and toleration of activity level   8/21/2023 1321 by Josh Romo RN  Outcome: Completed  8/21/2023 0905 by Josh Romo RN  Outcome: Progressing     Problem: DISCHARGE PLANNING  Goal: Discharge to home or other facility with appropriate resources  Description: INTERVENTIONS:  - Identify barriers to discharge w/patient and caregiver  - Arrange for needed discharge resources and transportation as appropriate  - Identify discharge learning needs (meds, wound care, etc.)  - Arrange for interpretive services to assist at discharge as needed  - Refer to Case Management Department for coordinating discharge planning if the patient needs post-hospital services based on physician/advanced practitioner order or complex needs related to functional status, cognitive ability, or social support system  8/21/2023 1321 by Josh Romo RN  Outcome: Completed  8/21/2023 0905 by Josh Romo RN  Outcome: Progressing     Problem: Knowledge Deficit  Goal: Patient/family/caregiver demonstrates understanding of disease process, treatment plan, medications, and discharge instructions  Description: Complete learning assessment and assess knowledge base. Interventions:  - Provide teaching at level of understanding  - Provide teaching via preferred learning methods  8/21/2023 1321 by Josh Romo RN  Outcome: Completed  8/21/2023 0905 by Josh Romo RN  Outcome: Progressing     Problem: Nutrition/Hydration-ADULT  Goal: Nutrient/Hydration intake appropriate for improving, restoring or maintaining nutritional needs  Description: Monitor and assess patient's nutrition/hydration status for malnutrition. Collaborate with interdisciplinary team and initiate plan and interventions as ordered. Monitor patient's weight and dietary intake as ordered or per policy. Utilize nutrition screening tool and intervene as necessary. Determine patient's food preferences and provide high-protein, high-caloric foods as appropriate.      INTERVENTIONS:  - Monitor oral intake, urinary output, labs, and treatment plans  - Assess nutrition and hydration status and recommend course of action  - Evaluate amount of meals eaten  - Assist patient with eating if necessary   - Allow adequate time for meals  - Recommend/ encourage appropriate diets, oral nutritional supplements, and vitamin/mineral supplements  - Order, calculate, and assess calorie counts as needed  - Recommend, monitor, and adjust tube feedings and TPN/PPN based on assessed needs  - Assess need for intravenous fluids  - Provide specific nutrition/hydration education as appropriate  - Include patient/family/caregiver in decisions related to nutrition  8/21/2023 1321 by Wilda Crowe RN  Outcome: Completed  8/21/2023 0905 by Wilda Crowe RN  Outcome: Progressing     Problem: CARDIOVASCULAR - ADULT  Goal: Maintains optimal cardiac output and hemodynamic stability  Description: INTERVENTIONS:  - Monitor I/O, vital signs and rhythm  - Monitor for S/S and trends of decreased cardiac output  - Administer and titrate ordered vasoactive medications to optimize hemodynamic stability  - Assess quality of pulses, skin color and temperature  - Assess for signs of decreased coronary artery perfusion  - Instruct patient to report change in severity of symptoms  8/21/2023 1321 by Wilda Crowe RN  Outcome: Completed  8/21/2023 0905 by Wilda Crowe RN  Outcome: Progressing  Goal: Absence of cardiac dysrhythmias or at baseline rhythm  Description: INTERVENTIONS:  - Continuous cardiac monitoring, vital signs, obtain 12 lead EKG if ordered  - Administer antiarrhythmic and heart rate control medications as ordered  - Monitor electrolytes and administer replacement therapy as ordered  8/21/2023 1321 by Varsha Orta RN  Outcome: Completed  8/21/2023 0905 by Varsha Orta RN  Outcome: Progressing     Problem: RESPIRATORY - ADULT  Goal: Achieves optimal ventilation and oxygenation  Description: INTERVENTIONS:  - Assess for changes in respiratory status  - Assess for changes in mentation and behavior  - Position to facilitate oxygenation and minimize respiratory effort  - Oxygen administered by appropriate delivery if ordered  - Initiate smoking cessation education as indicated  - Encourage broncho-pulmonary hygiene including cough, deep breathe, Incentive Spirometry  - Assess the need for suctioning and aspirate as needed  - Assess and instruct to report SOB or any respiratory difficulty  - Respiratory Therapy support as indicated  8/21/2023 1321 by Varsha Orta RN  Outcome: Completed  8/21/2023 0905 by Varsha Orta RN  Outcome: Progressing

## 2023-08-21 NOTE — ASSESSMENT & PLAN NOTE
Home Losartan 25 mg -on hold to allow BP room for A-fib management during hospital stay  Can resume home medicaiton

## 2023-08-21 NOTE — ANESTHESIA POSTPROCEDURE EVALUATION
Post-Op Assessment Note    CV Status:  Stable  Pain Score: 0    Pain management: satisfactory to patient     Mental Status:  Alert and awake   Hydration Status:  Euvolemic   PONV Controlled:  Controlled   Airway Patency:  Patent      Post Op Vitals Reviewed: Yes      Staff: CRNA         No notable events documented.     BP   102/51   Temp   98.5   Pulse  76   Resp   18   SpO2   97

## 2023-08-21 NOTE — ASSESSMENT & PLAN NOTE
This is a 61-year-old female patient with history of hypertension, hyperlipidemia, migraines, who was sent from cardiology office due to evidence of A-fib with RVR and patient's complaints of exertional dyspnea and fluid retention noted on exam    · [id number], anticoagulation indicated -initiated on Eliquis 5 mg BID -pricing checked by case management   · Initially on Cardizem drip which was stopped with improvement in heart rate and transitioned to p.o. Cardizem by cardiology on 08/16/2023  · Patient is intolerant of metoprolol  · 2D echo reveals low normal EF of 50%   · Cardiology following - appreciate cardiology input  · S/p unsuccessful LAURENCE guided cardioversion on 08/17/2023  · Untreated sleep apnea. Previous sleep study in 2018 - home sleep study with evidence of hypoxia. Recommend sleep study at sleep center to evaluate for sleep apnea  · Completed course of amiodarone drip.   Bridged to oral Amiodarone 200 BID   · Cardizem 90 mg every 6 hours  · Scheduled for repeat Cardioversion this AM  · Continue anticoagulation with Eliquis 5 mg BID  · Rest of the plan per cardiology   · Recommend follow up with cardiology as outpatient

## 2023-08-21 NOTE — ASSESSMENT & PLAN NOTE
Wt Readings from Last 3 Encounters:   08/21/23 78.1 kg (172 lb 2.9 oz)   08/15/23 80.4 kg (177 lb 3.2 oz)   07/25/23 80.1 kg (176 lb 9.6 oz)     Patient presents with new onset A-fib, exertional dyspnea, evidence of fluid retention, elevated BNP 2D echo revealed low normal EF at 50%  Appreciate cardiology input  Received IV Lasix 40 mg daily for 3 days, transitioned to PO Lasix 20 mg daily  Renal function reamained stable

## 2023-08-21 NOTE — PLAN OF CARE
Problem: PAIN - ADULT  Goal: Verbalizes/displays adequate comfort level or baseline comfort level  Description: Interventions:  - Encourage patient to monitor pain and request assistance  - Assess pain using appropriate pain scale  - Administer analgesics based on type and severity of pain and evaluate response  - Implement non-pharmacological measures as appropriate and evaluate response  - Consider cultural and social influences on pain and pain management  - Notify physician/advanced practitioner if interventions unsuccessful or patient reports new pain  Outcome: Progressing     Problem: INFECTION - ADULT  Goal: Absence or prevention of progression during hospitalization  Description: INTERVENTIONS:  - Assess and monitor for signs and symptoms of infection  - Monitor lab/diagnostic results  - Monitor all insertion sites, i.e. indwelling lines, tubes, and drains  - Monitor endotracheal if appropriate and nasal secretions for changes in amount and color  - [place] appropriate cooling/warming therapies per order  - Administer medications as ordered  - Instruct and encourage patient and family to use good hand hygiene technique  - Identify and instruct in appropriate isolation precautions for identified infection/condition  Outcome: Progressing  Goal: Absence of fever/infection during neutropenic period  Description: INTERVENTIONS:  - Monitor WBC    Outcome: Progressing     Problem: SAFETY ADULT  Goal: Patient will remain free of falls  Description: INTERVENTIONS:  - Educate patient/family on patient safety including physical limitations  - Instruct patient to call for assistance with activity   - Consult OT/PT to assist with strengthening/mobility   - Keep Call bell within reach  - Keep bed low and locked with side rails adjusted as appropriate  - Keep care items and personal belongings within reach  - Initiate and maintain comfort rounds  - Make Fall Risk Sign visible to staff  - Offer Toileting every 2 Hours, in advance of need  - Initiate/Maintain bed alarm  - Obtain necessary fall risk management equipment:   - Apply yellow socks and bracelet for high fall risk patients  - Consider moving patient to room near nurses station  Outcome: Progressing  Goal: Maintain or return to baseline ADL function  Description: INTERVENTIONS:  -  Assess patient's ability to carry out ADLs; assess patient's baseline for ADL function and identify physical deficits which impact ability to perform ADLs (bathing, care of mouth/teeth, toileting, grooming, dressing, etc.)  - Assess/evaluate cause of self-care deficits   - Assess range of motion  - Assess patient's mobility; develop plan if impaired  - Assess patient's need for assistive devices and provide as appropriate  - Encourage maximum independence but intervene and supervise when necessary  - Involve family in performance of ADLs  - Assess for home care needs following discharge   - Consider OT consult to assist with ADL evaluation and planning for discharge  - Provide patient education as appropriate  Outcome: Progressing  Goal: Maintains/Returns to pre admission functional level  Description: INTERVENTIONS:  - Perform BMAT or MOVE assessment daily.   - Set and communicate daily mobility goal to care team and patient/family/caregiver. - Collaborate with rehabilitation services on mobility goals if consulted  - Perform Range of Motion 3 times a day. - Reposition patient every 2 hours.   - Dangle patient 3 times a day  - Stand patient 3 times a day  - Ambulate patient 3 times a day  - Out of bed to chair 3 times a day   - Out of bed for meals 3 times a day  - Out of bed for toileting  - Record patient progress and toleration of activity level   Outcome: Progressing     Problem: DISCHARGE PLANNING  Goal: Discharge to home or other facility with appropriate resources  Description: INTERVENTIONS:  - Identify barriers to discharge w/patient and caregiver  - Arrange for needed discharge resources and transportation as appropriate  - Identify discharge learning needs (meds, wound care, etc.)  - Arrange for interpretive services to assist at discharge as needed  - Refer to Case Management Department for coordinating discharge planning if the patient needs post-hospital services based on physician/advanced practitioner order or complex needs related to functional status, cognitive ability, or social support system  Outcome: Progressing     Problem: Knowledge Deficit  Goal: Patient/family/caregiver demonstrates understanding of disease process, treatment plan, medications, and discharge instructions  Description: Complete learning assessment and assess knowledge base. Interventions:  - Provide teaching at level of understanding  - Provide teaching via preferred learning methods  Outcome: Progressing     Problem: Nutrition/Hydration-ADULT  Goal: Nutrient/Hydration intake appropriate for improving, restoring or maintaining nutritional needs  Description: Monitor and assess patient's nutrition/hydration status for malnutrition. Collaborate with interdisciplinary team and initiate plan and interventions as ordered. Monitor patient's weight and dietary intake as ordered or per policy. Utilize nutrition screening tool and intervene as necessary. Determine patient's food preferences and provide high-protein, high-caloric foods as appropriate.      INTERVENTIONS:  - Monitor oral intake, urinary output, labs, and treatment plans  - Assess nutrition and hydration status and recommend course of action  - Evaluate amount of meals eaten  - Assist patient with eating if necessary   - Allow adequate time for meals  - Recommend/ encourage appropriate diets, oral nutritional supplements, and vitamin/mineral supplements  - Order, calculate, and assess calorie counts as needed  - Recommend, monitor, and adjust tube feedings and TPN/PPN based on assessed needs  - Assess need for intravenous fluids  - Provide specific nutrition/hydration education as appropriate  - Include patient/family/caregiver in decisions related to nutrition  Outcome: Progressing     Problem: CARDIOVASCULAR - ADULT  Goal: Maintains optimal cardiac output and hemodynamic stability  Description: INTERVENTIONS:  - Monitor I/O, vital signs and rhythm  - Monitor for S/S and trends of decreased cardiac output  - Administer and titrate ordered vasoactive medications to optimize hemodynamic stability  - Assess quality of pulses, skin color and temperature  - Assess for signs of decreased coronary artery perfusion  - Instruct patient to report change in severity of symptoms  Outcome: Progressing  Goal: Absence of cardiac dysrhythmias or at baseline rhythm  Description: INTERVENTIONS:  - Continuous cardiac monitoring, vital signs, obtain 12 lead EKG if ordered  - Administer antiarrhythmic and heart rate control medications as ordered  - Monitor electrolytes and administer replacement therapy as ordered  Outcome: Progressing     Problem: RESPIRATORY - ADULT  Goal: Achieves optimal ventilation and oxygenation  Description: INTERVENTIONS:  - Assess for changes in respiratory status  - Assess for changes in mentation and behavior  - Position to facilitate oxygenation and minimize respiratory effort  - Oxygen administered by appropriate delivery if ordered  - Initiate smoking cessation education as indicated  - Encourage broncho-pulmonary hygiene including cough, deep breathe, Incentive Spirometry  - Assess the need for suctioning and aspirate as needed  - Assess and instruct to report SOB or any respiratory difficulty  - Respiratory Therapy support as indicated  Outcome: Progressing

## 2023-08-21 NOTE — DISCHARGE SUMMARY
[address]  Discharge- Eddie Hernandez 1955, 69 y.o. female MRN: [id number]  Unit/Bed#:  Encounter: [id number]  Primary Care Provider: Jonathan Combs DO   Date and time admitted to hospital: 8/15/2023  2:33 PM    * New onset a-fib with RVR  Assessment & Plan  This is a 68-year-old female patient with history of hypertension, hyperlipidemia, migraines, who was sent from cardiology office due to evidence of A-fib with RVR and patient's complaints of exertional dyspnea and fluid retention noted on exam    · [id number] 4, anticoagulation indicated -initiated on Eliquis 5 mg BID -pricing checked by case management   · Patient is intolerant of metoprolol  · 2D echo reveals low normal EF of 50%   · S/p unsuccessful LAURENCE guided cardioversion on 08/17/2023  · Untreated sleep apnea. Previous sleep study in 2018 - home sleep study with evidence of hypoxia. Recommend sleep study at sleep center to evaluate for sleep apnea  · Completed course of amiodarone drip. Bridged to oral Amiodarone 200 BID   · Initially on Cardizem drip which was stopped with improvement in heart rate and transitioned to p.o. Cardizem by cardiology on 08/16/2023  · Cardizem gradually increased for optimal control, On cardizem 90 mg every 6 hours prior to discharge. · Converted to sinus rhythm on repeat Cardioversion this AM 08/21/2023,  · Appreciate cardiology inputs - as per cardiology               - recommend discharge on amiodarone 200 mg BID, she will complete 2 weeks at this dose then decrease to 200 mg daily. Amiodarone will be short term. - should switch cardizem to long acting 360 mg daily for discharge              - continue anticoagulation with Eliquis 5 mg BID              - long term will need follow up with EP for consideration of an ablation or other antiarrhythmic therapy.              - had prior home sleep study which showed sleep related hypoxia but no evidence of KOSTA.  Recommend formal diagnostic sleep study at discharge   · Recommend follow up with cardiology as outpatient          Acute diastolic congestive heart failure ([address])  Assessment & Plan  Wt Readings from Last 3 Encounters:   08/21/23 78.1 kg (172 lb 2.9 oz)   08/15/23 80.4 kg (177 lb 3.2 oz)   07/25/23 80.1 kg (176 lb 9.6 oz)     Patient presents with new onset A-fib, exertional dyspnea, evidence of fluid retention, elevated BNP 2D echo revealed low normal EF at 50%  Appreciate cardiology input  Received IV Lasix 40 mg daily for 3 days, transitioned to PO Lasix 20 mg daily  Renal function reamained stable          Essential hypertension  Assessment & Plan  Home Losartan 25 mg -on hold to allow BP room for A-fib management during hospital stay  Can resume home medicaiton    Migraine without aura and without status migrainosus, not intractable  Assessment & Plan  Continue Fioricet as needed  Follow up with PCP as outpatient      KOSTA (obstructive sleep apnea)  Assessment & Plan  Prior home sleep study few years ago. Hypoxia noted, no apnea during the study noted. Recommend formal diagnostic sleep study. Esophageal reflux  Assessment & Plan  Continue PPI      Discharging Physician / Practitioner: Estee López MD  PCP: Mumtaz Dodd DO  Admission Date:   Admission Orders (From admission, onward)     Ordered        08/15/23 [address]  Once                      Discharge Date: 08/21/23    Medical Problems     Resolved Problems  Date Reviewed: 8/20/2023   None         Consultations During Hospital Stay:  · Cardiology    Procedures Performed:   · LAURENCE cardioversion x 2    Significant Findings / Test Results:   · None    Incidental Findings:      CTA - CHEST WITH IV CONTRAST  · HEART/GREAT VESSELS: Cardiomegaly. Coronary artery calcifications.  No thoracic aortic aneurysm  · 11 mm left thyroid hypodensity does not meet size criteria necessitating follow-up    Test Results Pending at Discharge (will require follow up):   · none     Outpatient Tests Requested:  · none    Complications:  none    Reason for Admission: Atrial fibrillation with rapid ventricular rate    HPI from H&P: Jose Guadalupe Gutierrez is a 69 y.o. female with a PMH of hypertension, who was sent from cardiology office due to new onset A-fib with RVR, heart rate 169 in the cardiology office. The patient has apparently been complaining of exertional dyspnea and noticed intermittent lower extremity swelling. She has not noticed any palpitations or chest pain per se, but does state that she has not felt completely well "for a while "attributing it to hot weather and "sinuses."  The patient reports compliance with her medications. She denies nausea, vomiting, diarrhea or constipation. She denies infectious symptoms. She does report intolerance to metoprolol and is declining trial of this medication. She has been on Cardizem drip since her presentation to the ED with her heart rates in the 110's to 120s. She does report modest improvement in her symptoms. Hospital Course:     Jose Guadalupe Gutierrez is a 69 y.o. female patient who originally presented to the hospital on 8/15/2023 due to new onset atrial fibrillation with rapid ventricular rate. Initially placed on Cardizem drip, telemetry monitoring. Consulted cardiology. Discontinued Cardizem drip as per cardiology. Transition to oral Cardizem 30 mg every 6 hours. Unsuccessful attempt for LAURENCE guided cardioversion on 8/17/2023. As per cardiology patient converted to sinus rhythm but immediately went back to A-fib. Noticed episodes of apnea during cardioversion. Started on IV amiodarone drip, bridged to p.o. amiodarone 200 mg twice daily. Cardizem gradually increased to 90 mg every 6 hours for optimal heart rate control. Patient continued to remain in A-fib. Reattempt for LAURENCE guided cardioversion today [8/21/2023] was successful. Patient remained in sinus rhythm post cardioversion.   Case discussed with cardiology. Cleared for discharge. Patient remained stable clinically and hemodynamically at the time of discharge. Discharge medications as per cardiology:  · Amiodarone 200 mg twice daily for 14 days; followed by 200 mg daily. · Cardizem  mg daily  · Eliquis 5 mg twice daily    Recommend follow-up with cardiology within 1 week, Scheduled on 08/25/2023 with Jorge Franklin PA-C    Please see above list of diagnoses and related plan for additional information. Condition at Discharge: stable     Discharge Day Visit / Exam:     Subjective: Seen and examined the patient at bedside today. Not in distress. Denies chest pain, shortness of breath, palpitations. Vitals: Blood Pressure: 111/62 (08/21/23 1136)  Pulse: 68 (08/21/23 1136)  Temperature: 98 °F (36.7 °C) (08/21/23 1125)  Temp Source: Temporal (08/21/23 0710)  Respirations: 21 (08/21/23 1136)  Height: 5' 3" (160 cm) (08/16/23 0802)  Weight - Scale: 78.1 kg (172 lb 2.9 oz) (08/21/23 0600)  SpO2: 95 % (08/21/23 1136)  Exam:   Physical Exam  Vitals and nursing note reviewed. Constitutional:       General: She is not in acute distress. Appearance: She is well-developed. HENT:      Head: Normocephalic and atraumatic. Right Ear: External ear normal.      Left Ear: External ear normal.      Nose: Nose normal.      Mouth/Throat:      Mouth: Mucous membranes are moist.      Pharynx: Oropharynx is clear. Eyes:      General: No scleral icterus. Extraocular Movements: Extraocular movements intact. Conjunctiva/sclera: Conjunctivae normal.   Cardiovascular:      Rate and Rhythm: Normal rate and regular rhythm. Pulses: Normal pulses. Heart sounds: Normal heart sounds. No murmur heard. Pulmonary:      Effort: Pulmonary effort is normal. No respiratory distress. Breath sounds: Normal breath sounds. No wheezing or rales. Abdominal:      General: Bowel sounds are normal.      Palpations: Abdomen is soft.       Tenderness: There is no abdominal tenderness. There is no guarding. Musculoskeletal:         General: No swelling. Cervical back: Neck supple. Right lower leg: No edema. Left lower leg: No edema. Skin:     General: Skin is warm and dry. Capillary Refill: Capillary refill takes less than 2 seconds. Neurological:      Mental Status: She is alert and oriented to person, place, and time. Mental status is at baseline. Psychiatric:         Mood and Affect: Mood normal.         Discussion with Family: yes    Discharge instructions/Information to patient and family:   See after visit summary for information provided to patient and family. Provisions for Follow-Up Care:  See after visit summary for information related to follow-up care and any pertinent home health orders. Disposition:     Home    For Discharges to [facility]:   · Not Applicable to this Patient - Not Applicable to this Patient    Planned Readmission: none     Discharge Statement:  I spent 60 minutes discharging the patient. This time was spent on the day of discharge. I had direct contact with the patient on the day of discharge. Greater than 50% of the total time was spent examining patient, answering all patient questions, arranging and discussing plan of care with patient as well as directly providing post-discharge instructions. Additional time then spent on discharge activities. Discharge Medications:  See after visit summary for reconciled discharge medications provided to patient and family.       ** Please Note: This note has been constructed using a voice recognition system **

## 2023-08-21 NOTE — ASSESSMENT & PLAN NOTE
Prior home sleep study few years ago. Hypoxia noted, no apnea during the study noted. Recommend formal diagnostic sleep study.

## 2023-08-21 NOTE — ANESTHESIA PREPROCEDURE EVALUATION
Procedure:  CARDIOVERSION ([place] ONLY)    Relevant Problems   CARDIO   (+) Acute diastolic congestive heart failure (HCC)   (+) Essential hypertension   (+) Hyperlipidemia   (+) Migraine without aura and without status migrainosus, not intractable   (+) New onset a-fib with RVR   (+) Right-sided chest wall pain      GI/HEPATIC   (+) Esophageal reflux      NEURO/PSYCH   (+) Migraine without aura and without status migrainosus, not intractable        Physical Exam    Airway    Mallampati score: II  TM Distance: >3 FB  Neck ROM: full     Dental       Cardiovascular      Pulmonary      Other Findings        Anesthesia Plan  ASA Score- 3     Anesthesia Type- IV sedation with anesthesia with ASA Monitors. Additional Monitors:   Airway Plan:           Plan Factors-Exercise tolerance (METS): >4 METS. Chart reviewed. Patient is not a current smoker. Patient did not smoke on day of surgery. Obstructive sleep apnea risk education given perioperatively. Induction- intravenous. Postoperative Plan-     Informed Consent- Anesthetic plan and risks discussed with patient. I personally reviewed this patient with the CRNA. Discussed and agreed on the Anesthesia Plan with the CRNA. .        NPO appropriate. Discussed benefits/risks of monitored anesthetic care and discussed providing a dynamic level of mild to deep sedation. Risks include awareness, airway obstruction, aspiration which may necessitate conversion to general anesthesia. All questions answered. Patient understands and wishes to proceed. Anesthesia plan and consent discussed with [facility] who expressed understanding and agreement. Risks/benefits and alternatives discussed with patient including possible PONV, sore throat, damage to teeth/lips/gums and possibility of rare anesthetic and surgical emergencies.

## 2023-08-21 NOTE — NURSING NOTE
Dr Jesse Frances notified of patients left arm being red/hard and painful. Warm soaks have been applied for over 24 hrs.   Verbal instructions via tiger text are to maintain warm soaks over night & if no improvement will obtain an ultrasound of left arm in am.

## 2023-08-21 NOTE — PROGRESS NOTES
[address]  Progress Note  Name: Tere Carter  MRN: [id number]  Unit/Bed#:  I Date of Admission: 8/15/2023   Date of Service: 8/21/2023 I Hospital Day: 6    Assessment/Plan   * New onset a-fib with RVR  Assessment & Plan  This is a 38-year-old female patient with history of hypertension, hyperlipidemia, migraines, who was sent from cardiology office due to evidence of A-fib with RVR and patient's complaints of exertional dyspnea and fluid retention noted on exam    · [id number] 4, anticoagulation indicated -initiated on Eliquis 5 mg BID -pricing checked by case management   · Initially on Cardizem drip which was stopped with improvement in heart rate and transitioned to p.o. Cardizem by cardiology on 08/16/2023  · Patient is intolerant of metoprolol  · 2D echo reveals low normal EF of 50%   · Cardiology following - appreciate cardiology input  · S/p unsuccessful LAURENCE guided cardioversion on 08/17/2023  · Untreated sleep apnea. Previous sleep study in 2018 - home sleep study with evidence of hypoxia. Recommend sleep study at sleep center to evaluate for sleep apnea  · Completed course of amiodarone drip.   Bridged to oral Amiodarone 200 BID   · Cardizem 90 mg every 6 hours  · Scheduled for repeat Cardioversion this AM  · Continue anticoagulation with Eliquis 5 mg BID  · Rest of the plan per cardiology   · Recommend follow up with cardiology as outpatient          Acute diastolic congestive heart failure ([address])  Assessment & Plan  Wt Readings from Last 3 Encounters:   08/21/23 78.1 kg (172 lb 2.9 oz)   08/15/23 80.4 kg (177 lb 3.2 oz)   07/25/23 80.1 kg (176 lb 9.6 oz)     Patient presents with new onset A-fib, exertional dyspnea, evidence of fluid retention, elevated BNP 2D echo revealed low normal EF at 50%  Appreciate cardiology input  Received IV Lasix 40 mg daily for 3 days, transitioned to PO Lasix 20 mg daily  Renal function stable  Monitor I's and O's,   daily weights  Low salt diet  Follow BMP        Essential hypertension  Assessment & Plan  Home Losartan 25 mg -on hold to allow BP room for A-fib management  Follow blood pressure trend      Migraine without aura and without status migrainosus, not intractable  Assessment & Plan  Continue Fioricet as needed      Esophageal reflux  Assessment & Plan  Continue PPI           VTE Pharmacologic Prophylaxis:   Pharmacologic: Apixaban (Eliquis)  Mechanical VTE Prophylaxis in Place: Yes    Patient Centered Rounds: I have performed bedside rounds with nursing staff today. Discussions with Specialists or Other Care Team Provider: yes    Education and Discussions with Family / Patient: patient    Time Spent for Care: 30 minutes. More than 50% of total time spent on counseling and coordination of care as described above. Current Length of Stay: 6 day(s)    Current Patient Status: Inpatient   Certification Statement: The patient will continue to require additional inpatient hospital stay due to ongoing management of A.fib    Discharge Plan: when stable    Code Status: Level 1 - Full Code      Subjective:   Seen and examined at bedside today. Not in distress. No overnight events. Objective:     Vitals:   Temp (24hrs), Av.7 °F (36.5 °C), Min:97.6 °F (36.4 °C), Max:97.8 °F (36.6 °C)    Temp:  [97.6 °F (36.4 °C)-97.8 °F (36.6 °C)] 97.6 °F (36.4 °C)  HR:  [] 78  Resp:  [13-36] 14  BP: (110-135)/(62-73) 118/64  SpO2:  [91 %-95 %] 92 %  Body mass index is 30.5 kg/m². Input and Output Summary (last 24 hours): Intake/Output Summary (Last 24 hours) at 2023 1026  Last data filed at 2023  Gross per 24 hour   Intake 420 ml   Output 250 ml   Net 170 ml       Physical Exam:     Physical Exam  Vitals and nursing note reviewed. Constitutional:       General: She is not in acute distress. Appearance: She is well-developed. HENT:      Head: Normocephalic and atraumatic.       Right Ear: External ear normal. Left Ear: External ear normal.      Nose: Nose normal.      Mouth/Throat:      Mouth: Mucous membranes are moist.      Pharynx: Oropharynx is clear. Eyes:      General: No scleral icterus. Extraocular Movements: Extraocular movements intact. Conjunctiva/sclera: Conjunctivae normal.   Cardiovascular:      Rate and Rhythm: Tachycardia present. Rhythm irregular. Pulses: Normal pulses. Heart sounds: Normal heart sounds. Pulmonary:      Effort: Pulmonary effort is normal. No respiratory distress. Breath sounds: Normal breath sounds. Abdominal:      General: Bowel sounds are normal.      Palpations: Abdomen is soft. Tenderness: There is no abdominal tenderness. There is no guarding. Musculoskeletal:         General: No swelling. Cervical back: Neck supple. Right lower leg: No edema. Left lower leg: No edema. Skin:     General: Skin is warm and dry. Capillary Refill: Capillary refill takes less than 2 seconds. Neurological:      Mental Status: She is alert and oriented to person, place, and time. Mental status is at baseline.    Psychiatric:         Mood and Affect: Mood normal.         Additional Data:     Labs:    Results from last 7 days   Lab Units 08/21/23  0637   WBC Thousand/uL 7.88   HEMOGLOBIN g/dL 13.7   HEMATOCRIT % 43.0   PLATELETS Thousands/uL 185   NEUTROS PCT % 70   LYMPHS PCT % 21   MONOS PCT % 7   EOS PCT % 1     Results from last 7 days   Lab Units 08/21/23  0637 08/19/23  0632 08/18/23  0545   SODIUM mmol/L 139   < > 141   POTASSIUM mmol/L 4.1   < > 4.2   CHLORIDE mmol/L 103   < > 105   CO2 mmol/L 29   < > 27   BUN mg/dL 22   < > 16   CREATININE mg/dL 1.09   < > 0.94   ANION GAP mmol/L 7   < > 9   CALCIUM mg/dL 9.2   < > 8.7   ALBUMIN g/dL  --   --  3.9   TOTAL BILIRUBIN mg/dL  --   --  1.38*   ALK PHOS U/L  --   --  49   ALT U/L  --   --  89*   AST U/L  --   --  17   GLUCOSE RANDOM mg/dL 102   < > 112    < > = values in this interval not displayed. Results from last 7 days   Lab Units 08/15/23  1453   INR  0.98                       * I Have Reviewed All Lab Data Listed Above. * Additional Pertinent Lab Tests Reviewed: [address] Admission Reviewed    Imaging:    Imaging Reports Reviewed Today Include:   CTA ED chest PE Study   Final Result      No acute findings in the chest, specifically, no pulmonary arterial embolism or pulmonary infiltrate/consolidation. Workstation performed: [id number]         XR chest 1 view portable   Final Result      No acute cardiopulmonary disease. Workstation performed: [id number]             Imaging Personally Reviewed by Myself Includes:  As above    Recent Cultures (last 7 days):           Last 24 Hours Medication List:   Current Facility-Administered Medications   Medication Dose Route Frequency Provider Last Rate   • acetaminophen  650 mg Oral Q6H PRN Chloe Shah MD     • amiodarone  200 mg Oral BID With Meals Neli Orta PA-C     • apixaban  5 mg Oral BID Addie Garcia MD     • butalbital-acetaminophen-caffeine  1 tablet Oral Q4H PRN Addie Garcia MD     • cholecalciferol  1,000 Units Oral Daily Addie Garcia MD     • diltiazem  90 mg Oral 4x Daily Veto Holliday MD     • furosemide  20 mg Oral Daily Neli Orta PA-C     • metoprolol  5 mg Intravenous Q6H PRN Anthony Steinberg MD     • ondansetron  4 mg Intravenous Q6H PRN Addie Garcia MD     • pantoprazole  40 mg Oral BID Addie Garcia MD     • pravastatin  40 mg Oral Daily With Nuria Patel MD          Today, Patient Was Seen By: Veto Holliday MD    ** Please Note: Dictation voice to text software may have been used in the creation of this document.  **

## 2023-08-21 NOTE — PROGRESS NOTES
Cardiology Progress Note - Eileen Lee 69 y.o. female MRN: [id number]    Unit/Bed#:  Encounter: [id number]    Assessment/Plan:  1. New onset atrial fibrillation with rapid ventricular response              - patient presented to the cardiology office yesterday with concerns regarding elevated heart rate readings x 6 weeks at home, no distinct sensation of palpitations but had dyspnea on exertion              - found to be in atrial fibrillation with RVR, heart rate 169 bpm. Patient has no prior history of atrial fibrillation              - echocardiogram showed EF low normal at 50%              - she was initiated on a cardizem gtt initially, LAURENCE/cardioversion was attempted on 8/17 but unfortunately patient immediately reverted to sinus rhythm. - received IV amiodarone x 48 hours, was placed on oral amiodarone 200 mg BID, PO diltiazem was increased to 90 mg every 6 hours               - patient reports she was unable to tolerate metoprolol in the past due to side effects (nausea, headache, lightheadedness)   - for repeat cardioversion today. Hopefully with amiodarone load, she will remain in sinus rhythm. - recommend discharge on amiodarone 200 mg BID, she will complete 2 weeks at this dose then decrease to 200 mg daily. Amiodarone will be short term. - should switch cardizem to long acting 360 mg daily for discharge   - continue anticoagulation with Eliquis 5 mg BID   - long term will need follow up with EP for consideration of an ablation or other antiarrhythmic therapy. - had prior home sleep study which showed sleep related hypoxia but no evidence of KOSTA. Recommend formal diagnostic sleep study at discharge      2.  Acute congestive heart failure secondary to #1              - patient developed mild volume overload in the setting of #1              - no pulmonary edema noted on chest imaging, but BNP elevated at 433                 - was given a few days of 40 mg IV lasix, and transitioned to PO lasix 20 mg daily on . Continue at discharge.              - TTE/LAURENCE showed low normal EF at 50%, mild-moderate MR.             - continue daily standing weights, strict I/O as able. Weight has been stable at 171-172 lbs.              - follow daily BMP     3. Hypertension with renal artery stenosis              - BP is controlled on present regimen     4. Dyslipidemia               - on simvastatin 20 mg daily at baseline     Subjective:   Patient seen and examined. She remains in atrial fibrillation with a poorly controlled ventricular response. Denies any palpitations, shortness of breath, chest pain, edema. Does have redness/tenderness at prior IV site. Review of Systems   All other systems reviewed and are negative. Objective:   Vitals: Blood pressure 118/64, pulse 78, temperature 97.6 °F (36.4 °C), temperature source Temporal, resp. rate 14, height 5' 3" (1.6 m), weight 78.1 kg (172 lb 2.9 oz), SpO2 92 %. , Body mass index is 30.5 kg/m².,   Orthostatic Blood Pressures    Flowsheet Row Most Recent Value   Blood Pressure 118/64 filed at 2023 0901   Patient Position - Orthostatic VS Lying filed at 2023 4832         Systolic (49YYW), QZS:044 , Min:110 , DJY:600     Diastolic (90APU), FB, Min:62, Max:73      Intake/Output Summary (Last 24 hours) at 2023 1050  Last data filed at 2023 1050  Gross per 24 hour   Intake 520 ml   Output 250 ml   Net 270 ml     Weight (last 2 days)     Date/Time Weight    23 0600 78.1 (172.18)    23 0534 78.1 (172.18)    23 0600 78.1 (172.18)            Telemetry Review: atrial fibrillation with RVR  EKG personally reviewed by Kashmir Hayes PA-C. Physical Exam  Vitals reviewed. Constitutional:       General: She is not in acute distress. Appearance: She is not diaphoretic. HENT:      Head: Normocephalic and atraumatic. Eyes:      Pupils: Pupils are equal, round, and reactive to light. Neck:      Vascular: No carotid bruit. Cardiovascular:      Rate and Rhythm: Tachycardia present. Rhythm irregularly irregular. Pulses:           Radial pulses are 2+ on the right side and 2+ on the left side. Heart sounds: S1 normal and S2 normal. No murmur heard. Pulmonary:      Effort: Pulmonary effort is normal. No respiratory distress. Breath sounds: Normal breath sounds. No wheezing or rales. Abdominal:      General: There is no distension. Palpations: Abdomen is soft. Tenderness: There is no abdominal tenderness. Musculoskeletal:         General: No deformity. Normal range of motion. Cervical back: Normal range of motion. Right lower leg: No edema. Left lower leg: No edema. Skin:     General: Skin is warm and dry. Findings: No erythema. Neurological:      General: No focal deficit present. Mental Status: She is alert and oriented to person, place, and time.    Psychiatric:         Mood and Affect: Mood normal.         Behavior: Behavior normal.           Laboratory Results:        CBC with diff:   Results from last 7 days   Lab Units 08/21/23  0637 08/20/23  0521 08/19/23  0632 08/18/23  0545 08/17/23  0603 08/16/23  0500 08/15/23  1453   WBC Thousand/uL 7.88 8.74 8.08 7.41 7.09 6.69 7.46   HEMOGLOBIN g/dL 13.7 13.7 14.0 13.0 13.5 12.4 13.4   HEMATOCRIT % 43.0 43.0 44.1 41.8 42.2 39.8 42.6   MCV fL 90 91 92 91 91 91 91   PLATELETS Thousands/uL 185 192 185 177 162 144* 142*   RBC Million/uL 4.76 4.71 4.80 4.61 4.66 4.40 4.70   MCH pg 28.8 29.1 29.2 28.2 29.0 28.2 28.5   MCHC g/dL 31.9 31.9 31.7 31.1* 32.0 31.2* 31.5   RDW % 13.1 13.1 13.2 13.3 13.3 13.4 13.3   MPV fL 12.0 12.1 11.8 12.1 11.9 12.5 12.1   NRBC AUTO /100 WBCs 0 0  --   --  0  --  0         CMP:  Results from last 7 days   Lab Units 08/21/23  0637 08/20/23  0521 08/19/23  0632 08/18/23  0545 08/17/23  0603 08/16/23  0500 08/15/23  1453   POTASSIUM mmol/L 4.1 4.2 4.3 4.2 4.2 3.7 3.8 CHLORIDE mmol/L 103 103 103 105 103 103 105   CO2 mmol/L 29 30 28 27 29 29 24   BUN mg/dL 22 19 23 16 20 15 19   CREATININE mg/dL 1.09 0.99 1.09 0.94 0.91 0.94 0.93   CALCIUM mg/dL 9.2 9.4 9.1 8.7 8.9 8.9 9.7   AST U/L  --   --   --  17  --  30 45*   ALT U/L  --   --   --  89*  --  141* 176*   ALK PHOS U/L  --   --   --  49  --  45 51   EGFR ml/min/1.73sq m 51 58 51 62 64 62 62         BMP:  Results from last 7 days   Lab Units 23  0637 23  0521 23  0632 23  0545 23  0603 23  0500 08/15/23  1453   POTASSIUM mmol/L 4.1 4.2 4.3 4.2 4.2 3.7 3.8   CHLORIDE mmol/L 103 103 103 105 103 103 105   CO2 mmol/L 29 30 28 27 29 29 24   BUN mg/dL 22 19 23 16 20 15 19   CREATININE mg/dL 1.09 0.99 1.09 0.94 0.91 0.94 0.93   CALCIUM mg/dL 9.2 9.4 9.1 8.7 8.9 8.9 9.7       BNP: No results for input(s): "BNP" in the last 72 hours.     Magnesium:   Results from last 7 days   Lab Units 23  0521 23  5574 23  0545 23  0603 23  0500 08/15/23  1453   MAGNESIUM mg/dL 2.0 2.1 2.2 2.3 2.3 1.9       Coags:   Results from last 7 days   Lab Units 08/15/23  1453   PTT seconds 27   INR  0.98       TSH:        Hemoglobin A1C       Lipid Profile:       Cardiac testing:   Results for orders placed during the hospital encounter of 21    Echo complete with contrast if indicated    Narrative  [address]  [address] [place] [address]  [phone]    Transthoracic Echocardiogram  2D, M-mode, Doppler, and Color Doppler    Study date:  18-Mar-2021    Patient: Azra Hull  MR number: [id number]  Account number: [de-identified]  : 1955  Age: 77 years  Gender: Female  Status: Outpatient  Location: Echo lab  Height: 63 in  Weight: 168.7 lb  BP: 160/ 74 mmHg    Indications: ORTHOSTATIC HYPOTENSION, NEAR SYNCOPE    Diagnoses: I95.1 - Orthostatic hypotension    Sonographer:  Eddie Pulido RDCS  Primary Physician:  Kali Ortega, DO  Referring Physician:  Amanda Newby MD  Group:  [facility]  Interpreting Physician:  Martina Barnett MD    SUMMARY    PROCEDURE INFORMATION:  This was a technically difficult study. LEFT VENTRICLE:  Size was normal.  Systolic function was normal. Ejection fraction was estimated to be 60 %. There were no regional wall motion abnormalities. Wall thickness was at the upper limits of normal.    MITRAL VALVE:  There was mild regurgitation. AORTIC VALVE:  Transaortic velocity was increased due to increased transvalvular flow. The AV appears to open adequately on 2D imaging although imaging is suboptimal.  Cannot exclude mild AS. Valve peak gradient was 19 mmHg. The valve was not well visualized. TRICUSPID VALVE:  There was mild regurgitation. HISTORY: PRIOR HISTORY: REACTIVE AIRWAY DISEASE, OBSTRUCTIVE SLEEP APNEA, HYPERLIPIDEMIA, POSTURAL DIZZINESS, FAMILY HISTORY OF SUDDEN CARDIAC DEATH    PROCEDURE: The procedure was performed in the echo lab. This was a routine study. The transthoracic approach was used. The study included complete 2D imaging, M-mode, complete spectral Doppler, and color Doppler. Images were obtained from  the parasternal, apical, subcostal, and suprasternal notch acoustic windows. This was a technically difficult study. LEFT VENTRICLE: Size was normal. Systolic function was normal. Ejection fraction was estimated to be 60 %. There were no regional wall motion abnormalities. Wall thickness was at the upper limits of normal.    RIGHT VENTRICLE: The size was normal. Systolic function was normal. Wall thickness was normal.    LEFT ATRIUM: Size was normal.    RIGHT ATRIUM: Size was normal.    MITRAL VALVE: Valve structure was normal. There was normal leaflet separation. DOPPLER: The transmitral velocity was within the normal range. There was no evidence for stenosis. There was mild regurgitation. AORTIC VALVE: The valve was not well visualized.  DOPPLER: Transaortic velocity was increased due to increased transvalvular flow. The AV appears to open adequately on 2D imaging although imaging is suboptimal.  Cannot exclude mild AS. There was no significant regurgitation. TRICUSPID VALVE: The valve structure was normal. There was normal leaflet separation. DOPPLER: The transtricuspid velocity was within the normal range. There was no evidence for stenosis. There was mild regurgitation. PULMONIC VALVE: Not well visualized. PERICARDIUM: There was no pericardial effusion. The pericardium was normal in appearance. AORTA: The root exhibited normal size. MEASUREMENT TABLES    DOPPLER MEASUREMENTS  Aortic valve   (Reference normals)  Peak gradient   19 mmHg   (--)    SYSTEM MEASUREMENT TABLES    2D  %FS: 43.85 %  Ao Diam: 2.81 cm  EDV(Teich): 60.69 ml  EF(Teich): 75.82 %  ESV(Teich): 14.67 ml  IVSd: 1.13 cm  LA Area: 19.63 cm2  LA Diam: 3.66 cm  LVEDV MOD A4C: 82.12 ml  LVEF MOD A4C: 43.32 %  LVESV MOD A4C: 46.55 ml  LVIDd: 3.77 cm  LVIDs: 2.12 cm  LVLd A4C: 7.78 cm  LVLs A4C: 6.77 cm  LVOT Diam: 1.88 cm  LVPWd: 1.13 cm  RA Area: 13.43 cm2  RVIDd: 3.21 cm  RWT: 0.6  SV MOD A4C: 35.57 ml  SV(Teich): 46.01 ml    CW  AV Env. Ti: 300.67 ms  AV VTI: 44.26 cm  AV Vmax: 2.2 m/s  AV Vmean: 1.47 m/s  AV maxP.35 mmHg  AV meanP.89 mmHg  PV Vmax: 1.21 m/s  PV maxP.81 mmHg    MM  TAPSE: 2.57 cm    PW  BLOSSOM (VTI): 1.25 cm2  BLOSSOM Vmax: 1.3 cm2  BLOSSOM Vmax, Pt: 1.23 cm2  AVAI (VTI): 0 cm2/m2  AVAI Vmax: 0 cm2/m2  AVAI Vmax, Pt: 0 cm2/m2  E' Lat: 0.11 m/s  E' Sept: 0.08 m/s  E/E' Lat: 8.91  E/E' Sept: 12.57  LVOT Env. Ti: 291.15 ms  LVOT VTI: 19.84 cm  LVOT Vmax: 0.97 m/s  LVOT Vmean: 0.68 m/s  LVOT maxPG: 3.8 mmHg  LVOT meanP.15 mmHg  LVSI Dopp: 30.71 ml/m2  LVSV Dopp: 55.28 ml  MV A [place]: 0.97 m/s  MV Dec [place]: 5.12 m/s2  MV DecT: 196.12 ms  MV E [place]: 1 m/s  MV E/A Ratio: 1.04  RVOT Vmax: 1.07 m/s  RVOT maxP.54 mmHg    [facility] Accredited Echocardiography Laboratory    Prepared and electronically signed by    Shanta Panchal MD  Signed 18-Mar-2021 10:23:23    No results found for this or any previous visit. No results found for this or any previous visit. Results for orders placed in visit on 11/16/15    NM myocardial perfusion spect (stress and/or rest)    Narrative  EXAM ROOM = [facility] STRESS  EXAM START = [id number]  EXAM STOP = [id number]  FILMS USED = 4 IMAGES    This stress test was performed by a cardiologist and the  cardiologist will render the interpretation.     - Dipak Hdez Coordinator  Reading Radiologist- QUIQUE Rodriguez MD  Electronically Signed- QUIQUE Rodriguez MD  Released Date Time- 11/24/15 0958  ------------------------------------------------------------------------------  READ BY = 8450^DANA  RELEASED BY = 8450^DANA      Meds/Allergies   all current active meds have been reviewed  Medications Prior to Admission   Medication   • butalbital-acetaminophen-caffeine (FIORICET,ESGIC) -40 mg per tablet   • cholecalciferol (VITAMIN D3) 1,000 units tablet   • losartan (Cozaar) 25 mg tablet   • pantoprazole (PROTONIX) 40 mg tablet   • simvastatin (ZOCOR) 20 mg tablet   • denosumab (PROLIA) 60 mg/mL   • fluticasone (FLONASE) 50 mcg/act nasal spray          Assessment:  Principal Problem:    New onset a-fib with RVR  Active Problems:    Esophageal reflux    Migraine without aura and without status migrainosus, not intractable    Essential hypertension    Acute diastolic congestive heart failure ([address])

## 2023-08-21 NOTE — ASSESSMENT & PLAN NOTE
This is a 38-year-old female patient with history of hypertension, hyperlipidemia, migraines, who was sent from cardiology office due to evidence of A-fib with RVR and patient's complaints of exertional dyspnea and fluid retention noted on exam    · [id number] 4, anticoagulation indicated -initiated on Eliquis 5 mg BID -pricing checked by case management   · Patient is intolerant of metoprolol  · 2D echo reveals low normal EF of 50%   · S/p unsuccessful LAURENCE guided cardioversion on 08/17/2023  · Untreated sleep apnea. Previous sleep study in 2018 - home sleep study with evidence of hypoxia. Recommend sleep study at sleep center to evaluate for sleep apnea  · Completed course of amiodarone drip. Bridged to oral Amiodarone 200 BID   · Initially on Cardizem drip which was stopped with improvement in heart rate and transitioned to p.o. Cardizem by cardiology on 08/16/2023  · Cardizem gradually increased for optimal control, On cardizem 90 mg every 6 hours prior to discharge. · Converted to sinus rhythm on repeat Cardioversion this AM 08/21/2023,  · Appreciate cardiology inputs - as per cardiology               - recommend discharge on amiodarone 200 mg BID, she will complete 2 weeks at this dose then decrease to 200 mg daily. Amiodarone will be short term. - should switch cardizem to long acting 360 mg daily for discharge              - continue anticoagulation with Eliquis 5 mg BID              - long term will need follow up with EP for consideration of an ablation or other antiarrhythmic therapy.              - had prior home sleep study which showed sleep related hypoxia but no evidence of KOSTA.  Recommend formal diagnostic sleep study at discharge   · Recommend follow up with cardiology as outpatient

## 2023-08-21 NOTE — CASE MANAGEMENT
Case Management Discharge Planning Note    Patient name Damian Orta  Location / MRN [id number]  : 1955 Date 2023       Current Admission Date: 8/15/2023  Current Admission Diagnosis:New onset a-fib with RVR   Patient Active Problem List    Diagnosis Date Noted   • New onset a-fib with RVR 08/15/2023   • Acute diastolic congestive heart failure ([address]) 08/15/2023   • Mild atherosclerosis of both carotid arteries 2023   • Neck pain 2023   • Impaired fasting blood sugar 2023   • EL (renal artery stenosis) ([address]) 10/18/2022   • Bilateral hip pain 2022   • Age-related osteoporosis without current pathological fracture 2022   • Essential hypertension 2022   • Orthostatic hypotension 2021   • Right-sided chest wall pain 2019   • KOSTA (obstructive sleep apnea) 2018   • Migraine without aura and without status migrainosus, not intractable 2018   • Reactive airway disease 2018   • Esophageal reflux 2014   • Hyperlipidemia 2013      LOS (days): 6  Geometric Mean LOS (GMLOS) (days):   Days to GMLOS:     OBJECTIVE:  Risk of Unplanned Readmission Score: 10.73         Current admission status: Inpatient   Preferred Pharmacy:   [address] [place] [address]  Phone: [phone] Fax: [phone]    [facility] ([address] [place] [address]  [address]  [address] [place] [address]  Phone: [phone] Fax: [phone]    [facility] ([facility]) - [address]  Phone: [phone] Fax: [phone]    Primary Care Provider: Felisa Norris DO    Primary Insurance: [place] HEALTHCARE  Secondary Insurance: MEDICARE    DISCHARGE DETAILS:    Discharge planning discussed with[de-identified] Patient CM contacted family/caregiver?: No- see comments (patient declined)  Were Treatment Team discharge recommendations reviewed with patient/caregiver?: Yes  Did patient/caregiver verbalize understanding of patient care needs?: Yes  Were patient/caregiver advised of the risks associated with not following Treatment Team discharge recommendations?: Yes    Contacts  Patient Contacts: Elise Espino  spouse  Relationship to Patient[de-identified] Family  Reason/Outcome: Discharge Planning            Treatment Team Recommendation: Home  Discharge Destination Plan[de-identified] Home  Transport at Discharge : Family               Patient discussed in huddle stable for discharge today home. CM called [facility] pharm patient eliquis is free and family pick it up already. Patient provided coupon for 10.00 /month for one year at bedside. Patient aware to give to [facility] [address] or her mail order. Patient verbalized understanding. Patient brother to transport her home today.     Follow up providers on AVS

## 2023-08-21 NOTE — ASSESSMENT & PLAN NOTE
Wt Readings from Last 3 Encounters:   08/21/23 78.1 kg (172 lb 2.9 oz)   08/15/23 80.4 kg (177 lb 3.2 oz)   07/25/23 80.1 kg (176 lb 9.6 oz)     Patient presents with new onset A-fib, exertional dyspnea, evidence of fluid retention, elevated BNP 2D echo revealed low normal EF at 50%  Appreciate cardiology input  Received IV Lasix 40 mg daily for 3 days, transitioned to PO Lasix 20 mg daily  Renal function stable  Monitor I's and O's,   daily weights  Low salt diet  Follow BMP

## 2023-08-23 ENCOUNTER — TRANSITIONAL CARE MANAGEMENT (OUTPATIENT)
Dept: FAMILY MEDICINE CLINIC | Facility: CLINIC | Age: 68
End: 2023-08-23

## 2023-08-24 NOTE — PROGRESS NOTES
Cardiology Follow Up    [facility]  1955  [id number]  [address] [place] [id number]  [phone]  [phone]    1. Atrial fibrillation status post cardioversion [facility])  Diagnostic Sleep Study    Ambulatory referral to Cardiac Electrophysiology      2. Chronic diastolic (congestive) heart failure (HCC)  Basic metabolic panel      3. Essential hypertension        4. Dyslipidemia        5. Sleep related hypoxia  Diagnostic Sleep Study      6. New onset a-fib with RVR  apixaban (Eliquis) 5 mg    diltiazem (CARDIZEM CD) 360 MG 24 hr capsule    amiodarone 200 mg tablet      7. Atrial fibrillation with rapid ventricular response (HCC)  diltiazem (CARDIZEM CD) 360 MG 24 hr capsule    amiodarone 200 mg tablet      8. Acute CHF (congestive heart failure) (HCC)  furosemide (LASIX) 20 mg tablet          Discussion/Summary:  Paroxysmal atrial fibrillation:  New diagnosis. Had prolonged hospitalization requiring cardioversion on 2 separate days. Currently on amiodarone 200 mg twice daily and diltiazem 360 mg daily. She is in sinus rhythm per EKG today and heart rates have been controlled at home. She will continue twice daily amiodarone for 2 weeks and then reduce to 200 mg once daily. This is not a good long-term option for patient given her age. I will refer her to electrophysiology for consideration of further antiarrhythmic therapy versus atrial fibrillation ablation. She had a home sleep study 5 years ago which suggested sleep-related hypoxia without evidence of sleep apnea. However I requested she have a diagnostic sleep study. EF was low normal at 50% on both TTE and LAURENCE. Chronic diastolic congestive heart failure:  Developed congestive heart failure in the setting of rapid atrial fibrillation. Currently on Lasix 20 mg once daily. she appears euvolemic. Continue this dose. Check BMP to assess renal function and electrolytes. Continue daily weights. Hypertension:  Now controlled on diltiazem alone. She will return to the office in approximately 3 months with Dr. Rajni Feliz or sooner if necessary. She will see EP in the interim. She will call the office with any concerns. Interval History:   Craig Kunz is a 69 y.o. female with paroxysmal atrial fibrillation s/p recent cardioversion x 2, congestive heart failure in the setting of atrial fibrillation, hypertension, dyslipidemia who presents to the office today for hospital follow up. She was hospitalized last week with new onset atrial fibrillation with a rapid ventricular response. I had seen her for a visit in the office in regards to an elevated heart rate at home. She was found to be in atrial fibrillation with RVR. She was sent to the ER. Rate control was attempted but was unsuccessful. TTE showed low normal EF at 50%. She was also volume overloaded and received a few doses of IV lasix. A LAURENCE/cardioversion was attempted but she reverted immediately back to atrial fibrillation. She was started on IV and oral amiodarone. A few days later a repeat cardioversion was attempted which was successful. Discharge medications included diltiazem 360 mg daily, amiodarone 200 mg BID, and lasix 20 mg daily. Since discharge she reports feeling well. She is tolerating all her medications. She is checking her heart rate daily with readings in the 70's/80's on average. Blood pressure has been well controlled. Her dyspnea on exertion has resolved. She denies lower extremity edema, orthopnea, or PND. She denies chest pain/pressure/discomfort. She denies lightheadedness, dizziness, and syncope. She denies palpitations.      Medical Problems     Problem List     Reactive airway disease    Esophageal reflux    Hyperlipidemia    KOSTA (obstructive sleep apnea)    Migraine without aura and without status migrainosus, not intractable    Right-sided chest wall pain    Orthostatic hypotension Essential hypertension    Age-related osteoporosis without current pathological fracture    Bilateral hip pain    EL (renal artery stenosis) (HCC)    Neck pain    Impaired fasting blood sugar    Mild atherosclerosis of both carotid arteries    New onset a-fib with RVR    Acute diastolic congestive heart failure (HCC)    Wt Readings from Last 3 Encounters:   08/25/23 78.7 kg (173 lb 6.4 oz)   08/21/23 78.1 kg (172 lb 2.9 oz)   08/15/23 80.4 kg (177 lb 3.2 oz)                     Past Medical History:   Diagnosis Date   • Allergic reaction     Resolved 11/24/2017    • Allergic rhinitis     Resolved 11/24/2017    • Generalized anxiety disorder     Resolved 11/24/2017    • High cholesterol    • Hypertension    • Microscopic hematuria     Resolved 11/24/2017    • Postural dizziness with presyncope 03/11/2021   • Renal calculi     Resolved 61/08/0797    • Renal colic     Resolved 00/50/4030    • Renal cyst, acquired     Resolved 11/24/2017    • Restrictive lung disease     Resolved 11/24/2017    • Supraventricular tachycardia ([address])     Resolved 11/24/2017      Social History     Socioeconomic History   • Marital status: /Civil Union     Spouse name: Not on file   • Number of children: Not on file   • Years of education: Not on file   • Highest education level: Not on file   Occupational History   • Not on file   Tobacco Use   • Smoking status: Former   • Smokeless tobacco: Never   Vaping Use   • Vaping Use: Never used   Substance and Sexual Activity   • Alcohol use: Yes     Comment: occasional   • Drug use: Never   • Sexual activity: Not on file   Other Topics Concern   • Not on file   Social History Narrative   • Not on file     Social Determinants of Health     Financial Resource Strain: Not on file   Food Insecurity: No Food Insecurity (8/16/2023)    Hunger Vital Sign    • Worried About Running Out of Food in the Last Year: Never true    • Ran Out of Food in the Last Year: Never true   Transportation Needs: No Transportation Needs (8/16/2023)    PRAPARE - Transportation    • Lack of Transportation (Medical): No    • Lack of Transportation (Non-Medical): No   Physical Activity: Not on file   Stress: Not on file   Social Connections: Not on file   Intimate Partner Violence: Not on file   Housing Stability: Low Risk  (8/16/2023)    Housing Stability Vital Sign    • Unable to Pay for Housing in the Last Year: No    • Number of Places Lived in the Last Year: 1    • Unstable Housing in the Last Year: No      Family History   Problem Relation Age of Onset   • Lung cancer Mother    • Heart attack Father    • No Known Problems Sister    • No Known Problems Sister    • No Known Problems Sister    • Heart attack Maternal Grandmother    • Diabetes Paternal Grandmother    • Colon cancer Maternal Aunt    • Esophageal cancer Maternal Aunt    • No Known Problems Maternal Aunt    • No Known Problems Paternal Aunt    • Rheum arthritis Family         Rheumatoid arthritis with rheumatoid factor    • No Known Problems Son    • No Known Problems Son      Past Surgical History:   Procedure Laterality Date   • CHOLECYSTECTOMY     • CYSTOSCOPY  05/07/2014    Diagnostic.  Last assessed 5/7/2014     • TONSILLECTOMY     • TUBAL LIGATION         Current Outpatient Medications:   •  amiodarone 200 mg tablet, Take 1 tablet (200 mg total) by mouth 2 (two) times a day for 14 days, Disp: 28 tablet, Rfl: 0  •  [START ON 9/5/2023] amiodarone 200 mg tablet, Take 1 tablet (200 mg total) by mouth daily Do not start before September 5, 2023., Disp: 30 tablet, Rfl: 3  •  apixaban (Eliquis) 5 mg, Take 1 tablet (5 mg total) by mouth 2 (two) times a day, Disp: 60 tablet, Rfl: 3  •  butalbital-acetaminophen-caffeine (FIORICET,ESGIC) -40 mg per tablet, Take 1 tablet by mouth every 4 (four) hours as needed for headaches, Disp: 30 tablet, Rfl: 0  •  cholecalciferol (VITAMIN D3) 1,000 units tablet, Take 1,000 Units by mouth daily 2,000, Disp: , Rfl:   • denosumab (PROLIA) 60 mg/mL, Inject 1 mL (60 mg total) under the skin once for 1 dose, Disp: 1 mL, Rfl: 0  •  diltiazem (CARDIZEM CD) 360 MG 24 hr capsule, Take 1 capsule (360 mg total) by mouth daily, Disp: 30 capsule, Rfl: 3  •  fluticasone (FLONASE) 50 mcg/act nasal spray, 2 sprays into each nostril daily, Disp: 16 g, Rfl: 3  •  furosemide (LASIX) 20 mg tablet, Take 1 tablet (20 mg total) by mouth daily, Disp: 30 tablet, Rfl: 3  •  pantoprazole (PROTONIX) 40 mg tablet, Take 1 tablet (40 mg total) by mouth 2 (two) times a day, Disp: 180 tablet, Rfl: 1  •  simvastatin (ZOCOR) 20 mg tablet, TAKE 1 TABLET BY MOUTH  DAILY AT BEDTIME, Disp: 90 tablet, Rfl: 3  Allergies   Allergen Reactions   • Amlodipine Myalgia   • Codeine      Other reaction(s):  Other (See Comments)  headaches   • Fosamax [Alendronate] GI Intolerance   • Angiotensin Receptor Blockers Other (See Comments)     Metallic taste       Labs:     Chemistry        Component Value Date/Time     05/28/2015 0858    K 4.1 08/21/2023 0637    K 4.1 05/28/2015 0858     08/21/2023 0637     05/28/2015 0858    CO2 29 08/21/2023 0637    CO2 30 05/28/2015 0858    BUN 22 08/21/2023 0637    BUN 17 05/28/2015 0858    CREATININE 1.09 08/21/2023 0637    CREATININE 0.78 05/28/2015 0858        Component Value Date/Time    CALCIUM 9.2 08/21/2023 0637    CALCIUM 9.2 05/28/2015 0858    ALKPHOS 49 08/18/2023 0545    ALKPHOS 65 05/28/2015 0858    AST 17 08/18/2023 0545    AST 27 05/28/2015 0858    ALT 89 (H) 08/18/2023 0545    ALT 39 05/28/2015 0858    BILITOT 1.32 (H) 05/28/2015 0858            Lab Results   Component Value Date    CHOL 176 05/28/2015    CHOL 173 05/30/2014     Lab Results   Component Value Date    HDL 43 (L) 07/14/2023    HDL 46 (L) 06/23/2022    HDL 54 05/09/2018     Lab Results   Component Value Date    LDLCALC 95 07/14/2023    LDLCALC 118 (H) 06/23/2022    LDLCALC 94 05/09/2018     Lab Results   Component Value Date    TRIG 144 07/14/2023 TRIG 108 06/23/2022    TRIG 144 05/09/2018     No results found for: "CHOLHDL"    Imaging: Cardioversion (CA/MI ONLY)    Result Date: 8/21/2023  Narrative: Cardioversion Damian Orta is a 69 y.o. female. Indication for procedure: Cardioversion ([place] ONLY) PAF Requesting physician: Anticoagulation: yes The patient was identified in the OR. A time out procedure was performed. The patient was sedated by the anesthesia service. The patient was cardioverted to sinus rhythm with a 250 J biphasic shock. There were no complications. The patient was monitored for the appropriate time interval in the holding area, and was transferred back to the medical floor in stable condition. [facility]ENCE OR/GI (CA/MI ONLY)    Result Date: 8/17/2023  Narrative: •  Left Ventricle: Left ventricular cavity size is normal. Wall thickness is normal. The left ventricular ejection fraction is 50%. Systolic function is low normal. Wall motion is normal. •  Left Atrium: The atrium is moderately dilated. •  Right Atrium: The atrium is mildly dilated. •  Atrial Septum: No patent foramen ovale detected. •  Left Atrial Appendage: There is normal function. There is no thrombus. •  Mitral Valve: There is mild to moderate regurgitation. •  Tricuspid Valve: There is mild regurgitation. •  Aorta: There is a non-protruding atheroma. Cardioversion (CA/MI ONLY)    Result Date: 8/17/2023  Narrative: Cardioversion Damian Orta is a 69 y.o. female who follows with rachelle in the office. Indication for procedure: Cardioversion (CA/[place] ONLY) Requesting physician:elkin Anticoagulation:eliquis The patient was identified in the OR. A time out procedure was performed. The patient was sedated by the anesthesia service with propofol. The patient was cardioverted to sinus rhythm with a 200,250,250J biphasic shock. Each time reverted back to afib within a few minutes There were no complications.  The patient was monitored for the appropriate time interval in the holding area, and was transferred back to the medical floor in stable condition. Echo complete w/ contrast if indicated    Result Date: 8/16/2023  Narrative: •  Left Ventricle: Left ventricular cavity size is normal. Wall thickness is mildly increased. The left ventricular ejection fraction is 55%. Systolic function is normal. Wall motion is normal. Diastolic function is normal. •  Right Ventricle: Systolic function is normal. •  Left Atrium: The atrium is mildly dilated. •  Mitral Valve: There is mild regurgitation. •  Tricuspid Valve: There is mild regurgitation. The estimated right ventricular systolic pressure is 81.89 mmHg. CTA ED chest PE Study    Result Date: 8/15/2023  Narrative: CTA - CHEST WITH IV CONTRAST - PULMONARY ANGIOGRAM INDICATION:   dyspnea, tachycardia, + d dimer. COMPARISON: CTA chest PE study 3/26/2023 TECHNIQUE: CTA examination of the chest was performed using angiographic technique according to a protocol specifically tailored to evaluate for pulmonary embolism. Multiplanar 2D reformatted images were created from the source data. In addition, coronal 3D MIP postprocessing was performed on the acquisition scanner. Radiation dose length product (DLP) for this visit:  429.36 mGy-cm . This examination, like all CT scans performed in the [facility], was performed utilizing techniques to minimize radiation dose exposure, including the use of iterative  reconstruction and automated exposure control. IV Contrast:  85 mL of iohexol (OMNIPAQUE) FINDINGS: PULMONARY ARTERIAL TREE:  No pulmonary embolus is seen. LUNGS:  Lungs are clear. There is no tracheal or endobronchial lesion. PLEURA:  Unremarkable. HEART/GREAT VESSELS: Cardiomegaly. Coronary artery calcifications. No thoracic aortic aneurysm. MEDIASTINUM AND ERLIN:  Unremarkable. CHEST WALL AND LOWER NECK:   11 mm left thyroid hypodensity does not meet size criteria necessitating follow-up.  VISUALIZED STRUCTURES IN THE UPPER ABDOMEN: No new findings. OSSEOUS STRUCTURES:  No acute fracture or destructive osseous lesion. Impression: No acute findings in the chest, specifically, no pulmonary arterial embolism or pulmonary infiltrate/consolidation. Workstation performed: [id number]     XR chest 1 view portable    Result Date: 8/15/2023  Narrative: CHEST INDICATION:   dyspnea. COMPARISON: CXR and chest CT 3/26/2023. EXAM PERFORMED/VIEWS:  XR CHEST PORTABLE. FINDINGS: Cardiomediastinal silhouette normal. Lungs clear. No effusion or pneumothorax. Upper abdomen normal. Bones normal for age. Impression: No acute cardiopulmonary disease. Workstation performed: [id number]       ECG: normal sinus rhythm    Review of Systems   All other systems reviewed and are negative. Vitals:    08/25/23 1229   BP: 124/62   Pulse: 77   SpO2: 97%     Vitals:    08/25/23 1229   Weight: 78.7 kg (173 lb 6.4 oz)     Height: 5' 3" (160 cm)   Body mass index is 30.72 kg/m². Physical Exam:  Physical Exam  Vitals reviewed. Constitutional:       General: She is not in acute distress. Appearance: She is not diaphoretic. HENT:      Head: Normocephalic and atraumatic. Eyes:      Pupils: Pupils are equal, round, and reactive to light. Neck:      Vascular: No carotid bruit. Cardiovascular:      Rate and Rhythm: Normal rate and regular rhythm. Pulses:           Radial pulses are 2+ on the right side and 2+ on the left side. Dorsalis pedis pulses are 2+ on the right side and 2+ on the left side. Heart sounds: S1 normal and S2 normal. No murmur heard. Pulmonary:      Effort: Pulmonary effort is normal. No respiratory distress. Breath sounds: Normal breath sounds. No wheezing or rales. Abdominal:      General: There is no distension. Palpations: Abdomen is soft. Tenderness: There is no abdominal tenderness. Musculoskeletal:         General: No deformity. Normal range of motion.       Cervical back: Normal range of motion. Right lower leg: No edema. Left lower leg: No edema. Skin:     General: Skin is warm and dry. Findings: No erythema. Neurological:      General: No focal deficit present. Mental Status: She is alert and oriented to person, place, and time.    Psychiatric:         Mood and Affect: Mood normal.         Behavior: Behavior normal.

## 2023-08-25 ENCOUNTER — OFFICE VISIT (OUTPATIENT)
Dept: CARDIOLOGY CLINIC | Facility: HOSPITAL | Age: 68
End: 2023-08-25
Payer: COMMERCIAL

## 2023-08-25 VITALS
WEIGHT: 173.4 LBS | HEART RATE: 77 BPM | HEIGHT: 63 IN | BODY MASS INDEX: 30.72 KG/M2 | SYSTOLIC BLOOD PRESSURE: 124 MMHG | DIASTOLIC BLOOD PRESSURE: 62 MMHG | OXYGEN SATURATION: 97 %

## 2023-08-25 DIAGNOSIS — E78.5 DYSLIPIDEMIA: ICD-10-CM

## 2023-08-25 DIAGNOSIS — I10 ESSENTIAL HYPERTENSION: ICD-10-CM

## 2023-08-25 DIAGNOSIS — I50.32 CHRONIC DIASTOLIC (CONGESTIVE) HEART FAILURE (HCC): ICD-10-CM

## 2023-08-25 DIAGNOSIS — I48.91 ATRIAL FIBRILLATION WITH RAPID VENTRICULAR RESPONSE (HCC): ICD-10-CM

## 2023-08-25 DIAGNOSIS — I48.91 NEW ONSET A-FIB (HCC): ICD-10-CM

## 2023-08-25 DIAGNOSIS — I48.91 ATRIAL FIBRILLATION STATUS POST CARDIOVERSION (HCC): Primary | ICD-10-CM

## 2023-08-25 DIAGNOSIS — G47.34 SLEEP RELATED HYPOXIA: ICD-10-CM

## 2023-08-25 DIAGNOSIS — I50.9 ACUTE CHF (CONGESTIVE HEART FAILURE) (HCC): ICD-10-CM

## 2023-08-25 PROCEDURE — 99214 OFFICE O/P EST MOD 30 MIN: CPT | Performed by: PHYSICIAN ASSISTANT

## 2023-08-25 RX ORDER — FUROSEMIDE 20 MG/1
20 TABLET ORAL DAILY
Qty: 30 TABLET | Refills: 3 | Status: SHIPPED | OUTPATIENT
Start: 2023-08-25

## 2023-08-25 RX ORDER — AMIODARONE HYDROCHLORIDE 200 MG/1
200 TABLET ORAL DAILY
Qty: 30 TABLET | Refills: 3 | Status: SHIPPED | OUTPATIENT
Start: 2023-09-05 | End: 2023-10-05

## 2023-08-25 RX ORDER — DILTIAZEM HYDROCHLORIDE 360 MG/1
360 CAPSULE, EXTENDED RELEASE ORAL DAILY
Qty: 30 CAPSULE | Refills: 3 | Status: SHIPPED | OUTPATIENT
Start: 2023-08-25 | End: 2023-09-24

## 2023-08-29 ENCOUNTER — TELEPHONE (OUTPATIENT)
Dept: FAMILY MEDICINE CLINIC | Facility: CLINIC | Age: 68
End: 2023-08-29

## 2023-08-29 ENCOUNTER — OFFICE VISIT (OUTPATIENT)
Dept: FAMILY MEDICINE CLINIC | Facility: CLINIC | Age: 68
End: 2023-08-29
Payer: COMMERCIAL

## 2023-08-29 ENCOUNTER — HOSPITAL ENCOUNTER (OUTPATIENT)
Dept: NON INVASIVE DIAGNOSTICS | Facility: HOSPITAL | Age: 68
Discharge: HOME | End: 2023-08-29
Payer: COMMERCIAL

## 2023-08-29 VITALS
BODY MASS INDEX: 30.65 KG/M2 | HEIGHT: 63 IN | OXYGEN SATURATION: 99 % | TEMPERATURE: 97.2 F | HEART RATE: 136 BPM | DIASTOLIC BLOOD PRESSURE: 80 MMHG | WEIGHT: 173 LBS | SYSTOLIC BLOOD PRESSURE: 142 MMHG

## 2023-08-29 DIAGNOSIS — I48.19 PERSISTENT ATRIAL FIBRILLATION (HCC): ICD-10-CM

## 2023-08-29 DIAGNOSIS — I10 ESSENTIAL HYPERTENSION: ICD-10-CM

## 2023-08-29 DIAGNOSIS — I48.91 NEW ONSET A-FIB (HCC): Primary | ICD-10-CM

## 2023-08-29 DIAGNOSIS — I48.19 PERSISTENT ATRIAL FIBRILLATION (HCC): Primary | ICD-10-CM

## 2023-08-29 PROCEDURE — 93226 XTRNL ECG REC<48 HR SCAN A/R: CPT

## 2023-08-29 PROCEDURE — 99495 TRANSJ CARE MGMT MOD F2F 14D: CPT | Performed by: FAMILY MEDICINE

## 2023-08-29 PROCEDURE — 93225 XTRNL ECG REC<48 HRS REC: CPT

## 2023-08-29 NOTE — TELEPHONE ENCOUNTER
Dr. Dave Montes,    Thanks for the update. Deborah Treviño will be coming to get a 48 hour holter placed today. She also had leftover metoprolol succinate at home (25 mg tablets). She felt she had some mild side effects to this in the past and possibly some hypotension; however we are out of options for rate control at this time (on max dose diltiazem, amiodarone) and I asked her to resume the medication and see how she feels.     Thanks,    Carlos Cobb

## 2023-08-29 NOTE — TELEPHONE ENCOUNTER
Good Morning Tia,  I just saw Mrs. Ching today for her SHOAIB. Patient states since this weekend her pulse rate has been elevated in the 130s. Today it was also in the 130s, and her pulse is irregularly irregular. She is not having any chest pain or any increased shortness of breath, though she states she does have less energy compared to last week when her pulse rate was more normal.  I just wanted to give you a heads up on this, in case there is anything else we should be doing for her.   Thank you,  Gopi Eddy

## 2023-08-29 NOTE — PROGRESS NOTES
Transition of Care  Follow-up After Hospitalization    Underwood Samia 69 y.o. female   Date:  8/29/2023    TCM Call     Date and time call was made  8/23/2023 10:56 AM    Hospital care reviewed  Records reviewed    Patient was hospitialized at  [facility] [facility]    Date of Admission  08/15/23    Date of discharge  08/21/23    Diagnosis  afib, sleep apnea    Disposition  Home    Were the patients medications reviewed and updated  No    Current Symptoms  None      TCM Call     Post hospital issues  None    Should patient be enrolled in anticoag monitoring? No    Scheduled for follow up? Yes  [facility] 8/29    Patients specialists  Cardiologist    Cardiologist name  LIS OTTO 8/25/23    Did you obtain your prescribed medications  Yes    Do you need help managing your prescriptions or medications  No    Is transportation to your appointment needed  No    I have advised the patient to call PCP with any new or worsening symptoms  [address] records were reviewed. Medications upon discharge reviewed/updated. Discharge Disposition:    Follow up visits with other specialists:       Assessment and Plan:  I will send a message to cardiology about her pulse rate being elevated. Follow-up with other specialist as scheduled. Follow-up here in 1 month or as needed. Deborah Treviño was seen today for transition of care management. Diagnoses and all orders for this visit:    New onset a-fib with RVR    Essential hypertension            HPI:  SHOAIB. Patient was at needed for rapid A-fib. Was cardioverted twice. Patient states her pulse rate was good last week, and she had more energy. This week her pulse rate has been elevated in the 130s, and does not feel as peppy. She denies any chest pain or shortness of breath. She has been taking her medications as prescribed, including the amiodarone and Eliquis. ROS: Review of Systems   Constitutional: Negative. Respiratory: Negative. Cardiovascular: Negative. Gastrointestinal: Negative. Genitourinary: Negative. Past Medical History:   Diagnosis Date   • Allergic reaction     Resolved 11/24/2017    • Allergic rhinitis     Resolved 11/24/2017    • Generalized anxiety disorder     Resolved 11/24/2017    • High cholesterol    • Hypertension    • Microscopic hematuria     Resolved 11/24/2017    • Postural dizziness with presyncope 03/11/2021   • Renal calculi     Resolved 65/86/8384    • Renal colic     Resolved 91/13/3595    • Renal cyst, acquired     Resolved 11/24/2017    • Restrictive lung disease     Resolved 11/24/2017    • Supraventricular tachycardia ([address])     Resolved 11/24/2017        Past Surgical History:   Procedure Laterality Date   • CHOLECYSTECTOMY     • CYSTOSCOPY  05/07/2014    Diagnostic. Last assessed 5/7/2014     • TONSILLECTOMY     • TUBAL LIGATION         Social History     Socioeconomic History   • Marital status: /Civil Union     Spouse name: None   • Number of children: None   • Years of education: None   • Highest education level: None   Occupational History   • None   Tobacco Use   • Smoking status: Former   • Smokeless tobacco: Never   Vaping Use   • Vaping Use: Never used   Substance and Sexual Activity   • Alcohol use: Yes     Comment: occasional   • Drug use: Never   • Sexual activity: None   Other Topics Concern   • None   Social History Narrative   • None     Social Determinants of Health     Financial Resource Strain: Not on file   Food Insecurity: No Food Insecurity (8/16/2023)    Hunger Vital Sign    • Worried About Running Out of Food in the Last Year: Never true    • Ran Out of Food in the Last Year: Never true   Transportation Needs: No Transportation Needs (8/16/2023)    PRAPARE - Transportation    • Lack of Transportation (Medical): No    • Lack of Transportation (Non-Medical):  No   Physical Activity: Not on file   Stress: Not on file   Social Connections: Not on file   Intimate Partner Violence: Not on file   Housing Stability: Low Risk  (8/16/2023)    Housing Stability Vital Sign    • Unable to Pay for Housing in the Last Year: No    • Number of Places Lived in the Last Year: 1    • Unstable Housing in the Last Year: No       Family History   Problem Relation Age of Onset   • Lung cancer Mother    • Heart attack Father    • No Known Problems Sister    • No Known Problems Sister    • No Known Problems Sister    • Heart attack Maternal Grandmother    • Diabetes Paternal Grandmother    • Colon cancer Maternal Aunt    • Esophageal cancer Maternal Aunt    • No Known Problems Maternal Aunt    • No Known Problems Paternal Aunt    • Rheum arthritis Family         Rheumatoid arthritis with rheumatoid factor    • No Known Problems Son    • No Known Problems Son        Allergies   Allergen Reactions   • Amlodipine Myalgia   • Codeine      Other reaction(s):  Other (See Comments)  headaches   • Fosamax [Alendronate] GI Intolerance   • Angiotensin Receptor Blockers Other (See Comments)     Metallic taste         Current Outpatient Medications:   •  amiodarone 200 mg tablet, Take 1 tablet (200 mg total) by mouth 2 (two) times a day for 14 days, Disp: 28 tablet, Rfl: 0  •  apixaban (Eliquis) 5 mg, Take 1 tablet (5 mg total) by mouth 2 (two) times a day, Disp: 60 tablet, Rfl: 3  •  cholecalciferol (VITAMIN D3) 1,000 units tablet, Take 1,000 Units by mouth daily 2,000, Disp: , Rfl:   •  diltiazem (CARDIZEM CD) 360 MG 24 hr capsule, Take 1 capsule (360 mg total) by mouth daily, Disp: 30 capsule, Rfl: 3  •  fluticasone (FLONASE) 50 mcg/act nasal spray, 2 sprays into each nostril daily, Disp: 16 g, Rfl: 3  •  furosemide (LASIX) 20 mg tablet, Take 1 tablet (20 mg total) by mouth daily, Disp: 30 tablet, Rfl: 3  •  pantoprazole (PROTONIX) 40 mg tablet, Take 1 tablet (40 mg total) by mouth 2 (two) times a day, Disp: 180 tablet, Rfl: 1  •  simvastatin (ZOCOR) 20 mg tablet, TAKE 1 TABLET BY MOUTH  DAILY AT BEDTIME, Disp: 90 tablet, Rfl: 3  •  [START ON 9/5/2023] amiodarone 200 mg tablet, Take 1 tablet (200 mg total) by mouth daily Do not start before September 5, 2023. (Patient not taking: Reported on 8/29/2023 Do not start before September 5, 2023.), Disp: 30 tablet, Rfl: 3  •  butalbital-acetaminophen-caffeine (FIORICET,ESGIC) -40 mg per tablet, Take 1 tablet by mouth every 4 (four) hours as needed for headaches, Disp: 30 tablet, Rfl: 0  •  denosumab (PROLIA) 60 mg/mL, Inject 1 mL (60 mg total) under the skin once for 1 dose, Disp: 1 mL, Rfl: 0      Physical Exam:  /80   Pulse (!) 136   Temp (!) 97.2 °F (36.2 °C)   Ht 5' 3" (1.6 m)   Wt 78.5 kg (173 lb)   SpO2 99%   BMI 30.65 kg/m²     Physical Exam  Vitals reviewed. Constitutional:       General: She is not in acute distress. Appearance: Normal appearance. She is well-developed. She is not ill-appearing, toxic-appearing or diaphoretic. HENT:      Head: Normocephalic and atraumatic. Eyes:      Conjunctiva/sclera: Conjunctivae normal.   Cardiovascular:      Rate and Rhythm: Tachycardia present. Rhythm irregularly irregular. Heart sounds: Normal heart sounds. No murmur heard. No friction rub. No gallop. Pulmonary:      Effort: Pulmonary effort is normal. No respiratory distress. Breath sounds: Normal breath sounds. No wheezing, rhonchi or rales. Musculoskeletal:      Right lower leg: No edema. Left lower leg: No edema. Neurological:      General: No focal deficit present. Mental Status: She is alert and oriented to person, place, and time. Psychiatric:         Mood and Affect: Mood normal.         Behavior: Behavior normal.         Thought Content:  Thought content normal.         Judgment: Judgment normal.             Labs:  Lab Results   Component Value Date    WBC 7.88 08/21/2023    HGB 13.7 08/21/2023    HCT 43.0 08/21/2023    MCV 90 08/21/2023     08/21/2023     Lab Results   Component Value Date     05/28/2015    K 4.1 08/21/2023     08/21/2023    CO2 29 08/21/2023    ANIONGAP 5 05/28/2015    BUN 22 08/21/2023    CREATININE 1.09 08/21/2023    GLUCOSE 101 05/28/2015    GLUF 117 (H) 07/14/2023    CALCIUM 9.2 08/21/2023    AST 17 08/18/2023    ALT 89 (H) 08/18/2023    ALKPHOS 49 08/18/2023    PROT 7.7 05/28/2015    BILITOT 1.32 (H) 05/28/2015    EGFR 51 08/21/2023

## 2023-08-30 ENCOUNTER — TELEPHONE (OUTPATIENT)
Dept: SLEEP CENTER | Facility: CLINIC | Age: 68
End: 2023-08-30

## 2023-08-30 ENCOUNTER — APPOINTMENT (OUTPATIENT)
Dept: LAB | Facility: CLINIC | Age: 68
End: 2023-08-30
Payer: COMMERCIAL

## 2023-08-30 DIAGNOSIS — R89.9 ABNORMAL LABORATORY TEST RESULT: ICD-10-CM

## 2023-08-30 LAB
ANION GAP SERPL CALCULATED.3IONS-SCNC: 11 MMOL/L
BUN SERPL-MCNC: 26 MG/DL (ref 5–25)
CALCIUM SERPL-MCNC: 9.3 MG/DL (ref 8.4–10.2)
CHLORIDE SERPL-SCNC: 102 MMOL/L (ref 96–108)
CO2 SERPL-SCNC: 29 MMOL/L (ref 21–32)
CREAT SERPL-MCNC: 1.25 MG/DL (ref 0.6–1.3)
GFR SERPL CREATININE-BSD FRML MDRD: 43 ML/MIN/1.73SQ M
GLUCOSE P FAST SERPL-MCNC: 107 MG/DL (ref 65–99)
POTASSIUM SERPL-SCNC: 4.1 MMOL/L (ref 3.5–5.3)
SODIUM SERPL-SCNC: 142 MMOL/L (ref 135–147)

## 2023-08-30 PROCEDURE — 80048 BASIC METABOLIC PNL TOTAL CA: CPT | Performed by: PHYSICIAN ASSISTANT

## 2023-08-30 PROCEDURE — 36415 COLL VENOUS BLD VENIPUNCTURE: CPT | Performed by: PHYSICIAN ASSISTANT

## 2023-08-30 NOTE — TELEPHONE ENCOUNTER
----- Message from Pravin Reyes MD sent at 8/30/2023  8:40 AM EDT -----  Approved    ----- Message -----  From: Oralia Jimenez  Sent: 8/28/2023   9:32 AM EDT  To: Sleep Medicine [facility] Provider    This Diagnostic sleep study needs approval.     If approved please sign and return to clerical pool. If denied please include reasons why. Also provide alternative testing if warranted. Please sign and return to clerical pool.

## 2023-09-06 PROCEDURE — 93227 XTRNL ECG REC<48 HR R&I: CPT | Performed by: INTERNAL MEDICINE

## 2023-09-13 DIAGNOSIS — I48.91 ATRIAL FIBRILLATION WITH RAPID VENTRICULAR RESPONSE (HCC): ICD-10-CM

## 2023-09-13 DIAGNOSIS — I48.91 NEW ONSET A-FIB (HCC): ICD-10-CM

## 2023-09-13 RX ORDER — DILTIAZEM HYDROCHLORIDE 360 MG/1
360 CAPSULE, EXTENDED RELEASE ORAL DAILY
Qty: 90 CAPSULE | Refills: 3 | Status: SHIPPED | OUTPATIENT
Start: 2023-09-13 | End: 2024-09-07

## 2023-09-13 NOTE — TELEPHONE ENCOUNTER
Requested medication(s) are due for refill today: yes    Patient has already received a courtesy refill: no  Other reason request has been forwarded to provider:

## 2023-09-18 DIAGNOSIS — K21.9 GASTROESOPHAGEAL REFLUX DISEASE WITHOUT ESOPHAGITIS: ICD-10-CM

## 2023-09-18 RX ORDER — PANTOPRAZOLE SODIUM 40 MG/1
40 TABLET, DELAYED RELEASE ORAL 2 TIMES DAILY
Qty: 180 TABLET | Refills: 3 | Status: SHIPPED | OUTPATIENT
Start: 2023-09-18

## 2023-09-25 ENCOUNTER — TELEPHONE (OUTPATIENT)
Dept: OBGYN CLINIC | Facility: CLINIC | Age: 68
End: 2023-09-25

## 2023-09-25 NOTE — TELEPHONE ENCOUNTER
lm to reschedule 10/13 ortho appt in [place], due to dr being out of office, ask patient to call back to reschedule

## 2023-09-26 ENCOUNTER — OFFICE VISIT (OUTPATIENT)
Dept: FAMILY MEDICINE CLINIC | Facility: CLINIC | Age: 68
End: 2023-09-26
Payer: COMMERCIAL

## 2023-09-26 VITALS
TEMPERATURE: 98.3 F | SYSTOLIC BLOOD PRESSURE: 122 MMHG | DIASTOLIC BLOOD PRESSURE: 70 MMHG | WEIGHT: 178.2 LBS | OXYGEN SATURATION: 95 % | HEART RATE: 129 BPM | BODY MASS INDEX: 31.57 KG/M2 | HEIGHT: 63 IN

## 2023-09-26 DIAGNOSIS — I48.91 NEW ONSET A-FIB (HCC): ICD-10-CM

## 2023-09-26 DIAGNOSIS — B34.9 VIRAL INFECTION, UNSPECIFIED: Primary | ICD-10-CM

## 2023-09-26 DIAGNOSIS — I10 ESSENTIAL HYPERTENSION: ICD-10-CM

## 2023-09-26 DIAGNOSIS — E78.2 MIXED HYPERLIPIDEMIA: ICD-10-CM

## 2023-09-26 PROCEDURE — 99213 OFFICE O/P EST LOW 20 MIN: CPT | Performed by: FAMILY MEDICINE

## 2023-09-26 PROCEDURE — 87635 SARS-COV-2 COVID-19 AMP PRB: CPT | Performed by: FAMILY MEDICINE

## 2023-09-26 RX ORDER — SIMVASTATIN 20 MG
20 TABLET ORAL
Qty: 90 TABLET | Refills: 3 | Status: SHIPPED | OUTPATIENT
Start: 2023-09-26 | End: 2023-09-28

## 2023-09-26 RX ORDER — AMOXICILLIN 500 MG/1
500 CAPSULE ORAL EVERY 8 HOURS SCHEDULED
Qty: 21 CAPSULE | Refills: 0 | Status: SHIPPED | OUTPATIENT
Start: 2023-09-26 | End: 2023-10-03

## 2023-09-26 NOTE — PROGRESS NOTES
Name: Charles Jesus      : 1955      MRN: [id number]  Encounter Provider: Aneesh Diallo DO  Encounter Date: 2023   Encounter department: [address] [place] [address]   For her atrial fibrillation, she will continue to follow-up with cardiology. For her URI, I did a COVID test on her and I will start her on amoxicillin. She will start using her Flonase again and get Mucinex over-the-counter. She will call if her symptoms continue or increase. Follow-up in 3 months or PRN. 1. Viral infection, unspecified  -     COVID Only - Office Collect  -     amoxicillin (AMOXIL) 500 mg capsule; Take 1 capsule (500 mg total) by mouth every 8 (eight) hours for 7 days    2. Essential hypertension    3. New onset a-fib with RVR           Subjective     HPI  Review of Systems    Past Medical History:   Diagnosis Date   • Allergic reaction     Resolved 2017    • Allergic rhinitis     Resolved 2017    • Generalized anxiety disorder     Resolved 2017    • High cholesterol    • Hypertension    • Microscopic hematuria     Resolved 2017    • Postural dizziness with presyncope 2021   • Renal calculi     Resolved 6742    • Renal colic     Resolved     • Renal cyst, acquired     Resolved 2017    • Restrictive lung disease     Resolved 2017    • Supraventricular tachycardia ([address])     Resolved 2017      Past Surgical History:   Procedure Laterality Date   • CHOLECYSTECTOMY     • CYSTOSCOPY  2014    Diagnostic.  Last assessed 2014     • TONSILLECTOMY     • TUBAL LIGATION       Family History   Problem Relation Age of Onset   • Lung cancer Mother    • Heart attack Father    • No Known Problems Sister    • No Known Problems Sister    • No Known Problems Sister    • Heart attack Maternal Grandmother    • Diabetes Paternal Grandmother    • Colon cancer Maternal Aunt    • Esophageal cancer Maternal Aunt    • No Known Problems Maternal Aunt • No Known Problems Paternal Aunt    • Rheum arthritis Family         Rheumatoid arthritis with rheumatoid factor    • No Known Problems Son    • No Known Problems Son      Social History     Socioeconomic History   • Marital status: /Civil Union     Spouse name: None   • Number of children: None   • Years of education: None   • Highest education level: None   Occupational History   • None   Tobacco Use   • Smoking status: Former   • Smokeless tobacco: Never   Vaping Use   • Vaping Use: Never used   Substance and Sexual Activity   • Alcohol use: Yes     Comment: occasional   • Drug use: Never   • Sexual activity: None   Other Topics Concern   • None   Social History Narrative   • None     Social Determinants of Health     Financial Resource Strain: Not on file   Food Insecurity: No Food Insecurity (8/16/2023)    Hunger Vital Sign    • Worried About Running Out of Food in the Last Year: Never true    • Ran Out of Food in the Last Year: Never true   Transportation Needs: No Transportation Needs (8/16/2023)    PRAPARE - Transportation    • Lack of Transportation (Medical): No    • Lack of Transportation (Non-Medical): No   Physical Activity: Not on file   Stress: Not on file   Social Connections: Not on file   Intimate Partner Violence: Not on file   Housing Stability: Low Risk  (8/16/2023)    Housing Stability Vital Sign    • Unable to Pay for Housing in the Last Year: No    • Number of Places Lived in the Last Year: 1    • Unstable Housing in the Last Year: No     Current Outpatient Medications on File Prior to Visit   Medication Sig   • amiodarone 200 mg tablet Take 1 tablet (200 mg total) by mouth daily Do not start before September 5, 2023.    • apixaban (Eliquis) 5 mg Take 1 tablet (5 mg total) by mouth 2 (two) times a day   • butalbital-acetaminophen-caffeine (FIORICET,ESGIC) -40 mg per tablet Take 1 tablet by mouth every 4 (four) hours as needed for headaches   • cholecalciferol (VITAMIN D3) 1,000 units tablet Take 1,000 Units by mouth daily 2,000   • diltiazem (CARDIZEM CD) 360 MG 24 hr capsule Take 1 capsule (360 mg total) by mouth daily   • fluticasone (FLONASE) 50 mcg/act nasal spray 2 sprays into each nostril daily   • furosemide (LASIX) 20 mg tablet Take 1 tablet (20 mg total) by mouth daily   • pantoprazole (PROTONIX) 40 mg tablet TAKE 1 TABLET BY MOUTH TWICE  DAILY   • simvastatin (ZOCOR) 20 mg tablet TAKE 1 TABLET BY MOUTH  DAILY AT BEDTIME   • denosumab (PROLIA) 60 mg/mL Inject 1 mL (60 mg total) under the skin once for 1 dose   • [DISCONTINUED] amiodarone 200 mg tablet Take 1 tablet (200 mg total) by mouth 2 (two) times a day for 14 days (Patient not taking: Reported on 9/26/2023)     Allergies   Allergen Reactions   • Amlodipine Myalgia   • Codeine      Other reaction(s):  Other (See Comments)  headaches   • Fosamax [Alendronate] GI Intolerance   • Angiotensin Receptor Blockers Other (See Comments)     Metallic taste     Immunization History   Administered Date(s) Administered   • COVID-19 MODERNA VACC 0.5 ML IM 03/18/2021, 04/15/2021   • INFLUENZA 10/26/2018, 10/05/2019, 11/01/2020, 10/26/2022   • Influenza Quadrivalent Preservative Free 3 years and older IM 10/28/2015, 10/19/2017   • Influenza Quadrivalent, 6-35 Months IM 11/12/2014   • Influenza Split High Dose Preservative Free IM 10/05/2019   • Influenza, high dose seasonal 0.7 mL 10/26/2018, 10/10/2020, 10/21/2021, 10/26/2022   • Influenza, seasonal, injectable 12/11/2013   • Pneumococcal Polysaccharide PPV23 12/11/2013       Objective     /70   Pulse (!) 129   Temp 98.3 °F (36.8 °C)   Ht 5' 3" (1.6 m)   Wt 80.8 kg (178 lb 3.2 oz)   SpO2 95%   BMI 31.57 kg/m²     Physical Exam  Shonda Subramanian DO

## 2023-09-27 DIAGNOSIS — J01.90 ACUTE SINUSITIS, RECURRENCE NOT SPECIFIED, UNSPECIFIED LOCATION: ICD-10-CM

## 2023-09-27 RX ORDER — FLUTICASONE PROPIONATE 50 MCG
2 SPRAY, SUSPENSION (ML) NASAL DAILY
Qty: 16 G | Refills: 3 | Status: SHIPPED | OUTPATIENT
Start: 2023-09-27

## 2023-09-28 ENCOUNTER — TELEPHONE (OUTPATIENT)
Dept: FAMILY MEDICINE CLINIC | Facility: CLINIC | Age: 68
End: 2023-09-28

## 2023-09-28 DIAGNOSIS — E78.2 MIXED HYPERLIPIDEMIA: Primary | ICD-10-CM

## 2023-09-28 LAB — SARS-COV-2 RNA RESP QL NAA+PROBE: NEGATIVE

## 2023-09-28 RX ORDER — ATORVASTATIN CALCIUM 20 MG/1
20 TABLET, FILM COATED ORAL DAILY
Qty: 30 TABLET | Refills: 5 | Status: SHIPPED | OUTPATIENT
Start: 2023-09-28 | End: 2023-09-28 | Stop reason: SDUPTHER

## 2023-09-28 RX ORDER — ATORVASTATIN CALCIUM 20 MG/1
20 TABLET, FILM COATED ORAL DAILY
Qty: 90 TABLET | Refills: 3 | Status: SHIPPED | OUTPATIENT
Start: 2023-09-28

## 2023-09-28 NOTE — TELEPHONE ENCOUNTER
I will discontinue the simvastatin and put her on atorvastatin instead. Please let patient know that we are doing this because of the interaction between simvastatin and her Cardizem.

## 2023-09-28 NOTE — TELEPHONE ENCOUNTER
Pharmacist called stating pt is on Diltiazem 360 mg from Cardiology and Simvastatin 20 mg prescribed by you. The medications have an interaction at this dose.   Asking if you want to decrease the Simvastatin dose to 10 mg which would be fine with the Diltiazem 360 mg?

## 2023-10-17 ENCOUNTER — CONSULT (OUTPATIENT)
Dept: CARDIOLOGY CLINIC | Facility: CLINIC | Age: 68
End: 2023-10-17
Payer: COMMERCIAL

## 2023-10-17 VITALS
HEIGHT: 63 IN | SYSTOLIC BLOOD PRESSURE: 120 MMHG | WEIGHT: 182.7 LBS | BODY MASS INDEX: 32.37 KG/M2 | HEART RATE: 71 BPM | DIASTOLIC BLOOD PRESSURE: 60 MMHG

## 2023-10-17 DIAGNOSIS — I48.91 ATRIAL FIBRILLATION STATUS POST CARDIOVERSION (HCC): ICD-10-CM

## 2023-10-17 DIAGNOSIS — I48.91 NEW ONSET A-FIB (HCC): ICD-10-CM

## 2023-10-17 DIAGNOSIS — I48.91 ATRIAL FIBRILLATION WITH RAPID VENTRICULAR RESPONSE (HCC): ICD-10-CM

## 2023-10-17 PROCEDURE — 99204 OFFICE O/P NEW MOD 45 MIN: CPT | Performed by: INTERNAL MEDICINE

## 2023-10-17 RX ORDER — AMIODARONE HYDROCHLORIDE 200 MG/1
200 TABLET ORAL DAILY
Qty: 30 TABLET | Refills: 3 | Status: SHIPPED | OUTPATIENT
Start: 2023-10-17 | End: 2024-02-14

## 2023-10-17 NOTE — PROGRESS NOTES
EPS Consultation/New Patient Evaluation - German Constantino 77 y.o. female MRN: [id number]       Referring:Neli Ricci PA-C    CC/HPI:   It was a pleasure to see German Constantino in our arrhythmia clinic at [address]. As you know she is a 77 y.o. woman with atrial fibrillation s/p cardioversion, chronic diastolic heart failure, HTN, HLD, sleep related hypoxia who presents to discuss management of atrial fibrillation. Patient was seen in the office in August 2023 for rapid heart rate. She was noted to be in atrial fibrillation with RVR and sent to the emergency room. Rate control was attempted but was unsuccessful. Transthoracic echocardiogram showed preserved ejection fraction. After diuresing she had attempted LAURENCE/cardioversion but immediately went back into atrial fibrillation. She was started on IV and oral amiodarone and cardioversion was repeated few days after. The second attempt was successful and she was discharged on diltiazem 360 mg daily, amiodarone and Lasix 20 mg daily. She had a follow-up visit on August 25, 2023. She was doing well and tolerating medications. She now presents to discuss further options. She was also recommended to have sleep study. During the atrial fibrillation she felt very fatigue and shortness of breath. She felt great after being discharged from the hospital. She does have elevated heart rate at times in 120s but at most they last one day. She denies any dyspnea on exertion, swelling in legs, orthopnea, PND or syncope.      Past Medical History:  Past Medical History:   Diagnosis Date    Allergic reaction     Resolved 11/24/2017     Allergic rhinitis     Resolved 11/24/2017     Generalized anxiety disorder     Resolved 11/24/2017     High cholesterol     Hypertension     Microscopic hematuria     Resolved 11/24/2017     Postural dizziness with presyncope 03/11/2021    Renal calculi     Resolved 06/62/0164     Renal colic     Resolved 11/24/2017     Renal cyst, acquired     Resolved 11/24/2017     Restrictive lung disease     Resolved 11/24/2017     Supraventricular tachycardia     Resolved 11/24/2017        Medications:      Current Outpatient Medications:     amiodarone 200 mg tablet, Take 1 tablet (200 mg total) by mouth daily, Disp: 30 tablet, Rfl: 3    apixaban (Eliquis) 5 mg, Take 1 tablet (5 mg total) by mouth 2 (two) times a day, Disp: 180 tablet, Rfl: 3    atorvastatin (LIPITOR) 20 mg tablet, Take 1 tablet (20 mg total) by mouth daily, Disp: 90 tablet, Rfl: 3    butalbital-acetaminophen-caffeine (FIORICET,ESGIC) -40 mg per tablet, Take 1 tablet by mouth every 4 (four) hours as needed for headaches, Disp: 30 tablet, Rfl: 0    cholecalciferol (VITAMIN D3) 1,000 units tablet, Take 1,000 Units by mouth daily 2,000, Disp: , Rfl:     diltiazem (CARDIZEM CD) 360 MG 24 hr capsule, Take 1 capsule (360 mg total) by mouth daily, Disp: 90 capsule, Rfl: 3    fluticasone (FLONASE) 50 mcg/act nasal spray, 2 sprays into each nostril daily, Disp: 16 g, Rfl: 3    pantoprazole (PROTONIX) 40 mg tablet, TAKE 1 TABLET BY MOUTH TWICE  DAILY, Disp: 180 tablet, Rfl: 3    denosumab (PROLIA) 60 mg/mL, Inject 1 mL (60 mg total) under the skin once for 1 dose, Disp: 1 mL, Rfl: 0    furosemide (LASIX) 20 mg tablet, Take 1 tablet (20 mg total) by mouth daily (Patient not taking: Reported on 10/17/2023), Disp: 30 tablet, Rfl: 3     Family History   Problem Relation Age of Onset    Lung cancer Mother     Heart attack Father     No Known Problems Sister     No Known Problems Sister     No Known Problems Sister     Heart attack Maternal Grandmother     Diabetes Paternal Grandmother     Colon cancer Maternal Aunt     Esophageal cancer Maternal Aunt     No Known Problems Maternal Aunt     No Known Problems Paternal Aunt     Rheum arthritis Family         Rheumatoid arthritis with rheumatoid factor     No Known Problems Son     No Known Problems Son      Social History Socioeconomic History    Marital status: /Civil Union     Spouse name: Not on file    Number of children: Not on file    Years of education: Not on file    Highest education level: Not on file   Occupational History    Not on file   Tobacco Use    Smoking status: Former    Smokeless tobacco: Never   Vaping Use    Vaping Use: Never used   Substance and Sexual Activity    Alcohol use: Yes     Comment: occasional    Drug use: Never    Sexual activity: Not on file   Other Topics Concern    Not on file   Social History Narrative    Not on file     Social Determinants of Health     Financial Resource Strain: Not on file   Food Insecurity: No Food Insecurity (8/16/2023)    Hunger Vital Sign     Worried About Running Out of Food in the Last Year: Never true     Ran Out of Food in the Last Year: Never true   Transportation Needs: No Transportation Needs (8/16/2023)    PRAPARE - Transportation     Lack of Transportation (Medical): No     Lack of Transportation (Non-Medical): No   Physical Activity: Not on file   Stress: Not on file   Social Connections: Not on file   Intimate Partner Violence: Not on file   Housing Stability: Low Risk  (8/16/2023)    Housing Stability Vital Sign     Unable to Pay for Housing in the Last Year: No     Number of Places Lived in the Last Year: 1     Unstable Housing in the Last Year: No     Social History     Tobacco Use   Smoking Status Former   Smokeless Tobacco Never     Social History     Substance and Sexual Activity   Alcohol Use Yes    Comment: occasional       Review of Systems   Constitutional: Negative for chills and fever. HENT: Negative. Eyes:  Negative for blurred vision and double vision. Cardiovascular:  Positive for palpitations. Negative for chest pain, dyspnea on exertion, leg swelling, near-syncope, orthopnea, paroxysmal nocturnal dyspnea and syncope. Respiratory:  Negative for cough and sputum production. Endocrine: Negative. Skin: Negative. Negative for rash. Musculoskeletal: Negative. Negative for arthritis and joint pain. Gastrointestinal:  Negative for abdominal pain, nausea and vomiting. Genitourinary: Negative. Neurological: Negative. Negative for dizziness and light-headedness. Psychiatric/Behavioral: Negative. The patient is not nervous/anxious. Objective:     Vitals: Blood pressure 120/60, pulse 71, height 5' 3" (1.6 m), weight 82.9 kg (182 lb 11.2 oz). , Body mass index is 32.36 kg/m². ,        Physical Exam:    GEN: [employer] appears well, alert and oriented x 3, pleasant and cooperative; obese    HEENT: pupils equal, round, and reactive to light; extraocular muscles intact  NECK: supple, no carotid bruits   HEART: regular rhythm, normal S1 and S2, no murmurs, clicks, gallops or rubs   LUNGS: clear to auscultation bilaterally; no wheezes, rales, or rhonchi   ABDOMEN: normal bowel sounds, soft, no tenderness, no distention  EXTREMITIES: peripheral pulses normal; no clubbing, cyanosis, or edema  NEURO: no focal findings   SKIN: normal without suspicious lesions on exposed skin      Labs & Results:  Below is the patient's most recent value for Albumin, ALT, AST, BUN, Calcium, Chloride, Cholesterol, CO2, Creatinine, GFR, Glucose, HDL, Hematocrit, Hemoglobin, Hemoglobin A1C, LDL, Magnesium, Phosphorus, Platelets, Potassium, PSA, Sodium, Triglycerides, and WBC. Lab Results   Component Value Date    ALT 89 (H) 08/18/2023    AST 17 08/18/2023    BUN 26 (H) 08/30/2023    CALCIUM 9.3 08/30/2023     08/30/2023    CHOL 176 05/28/2015    CO2 29 08/30/2023    CREATININE 1.25 08/30/2023    HDL 43 (L) 07/14/2023    HCT 43.0 08/21/2023    HGB 13.7 08/21/2023    HGBA1C 5.9 (H) 07/14/2023    MG 2.0 08/20/2023     08/21/2023    K 4.1 08/30/2023     05/28/2015    TRIG 144 07/14/2023    WBC 7.88 08/21/2023     Note: for a comprehensive list of the patient's lab results, access the Results Review activity. Cardiac testing:     I personally reviewed the ECG performed in the clinic on 10/17/23. It reveals normal sinus rhythm. Echocardiograms:  Results for orders placed during the hospital encounter of 21    Echo complete with contrast if indicated    Narrative  [address]  [address] [facility], [address] [facility]  [phone]    Transthoracic Echocardiogram  2D, M-mode, Doppler, and Color Doppler    Study date:  18-Mar-2021    Patient: Johnson Silver  MR number: [id number]  Account number: [de-identified]  : 1955  Age: 77 years  Gender: Female  Status: Outpatient  Location: Echo lab  Height: 63 in  Weight: 168.7 lb  BP: 160/ 74 mmHg    Indications: ORTHOSTATIC HYPOTENSION, NEAR SYNCOPE    Diagnoses: I95.1 - Orthostatic hypotension    Sonographer:  Chetan العراقي RDCS  Primary Physician:  Andrzej Waller DO  Referring Physician:  Amanda Newby MD  Group:  [facility]  Interpreting Physician:  Martina Barnett MD    SUMMARY    PROCEDURE INFORMATION:  This was a technically difficult study. LEFT VENTRICLE:  Size was normal.  Systolic function was normal. Ejection fraction was estimated to be 60 %. There were no regional wall motion abnormalities. Wall thickness was at the upper limits of normal.    MITRAL VALVE:  There was mild regurgitation. AORTIC VALVE:  Transaortic velocity was increased due to increased transvalvular flow. The AV appears to open adequately on 2D imaging although imaging is suboptimal.  Cannot exclude mild AS. Valve peak gradient was 19 mmHg. The valve was not well visualized. TRICUSPID VALVE:  There was mild regurgitation. HISTORY: PRIOR HISTORY: REACTIVE AIRWAY DISEASE, OBSTRUCTIVE SLEEP APNEA, HYPERLIPIDEMIA, POSTURAL DIZZINESS, FAMILY HISTORY OF SUDDEN CARDIAC DEATH    PROCEDURE: The procedure was performed in the echo lab. This was a routine study. The transthoracic approach was used.  The study included complete 2D imaging, M-mode, complete spectral Doppler, and color Doppler. Images were obtained from  the parasternal, apical, subcostal, and suprasternal notch acoustic windows. This was a technically difficult study. LEFT VENTRICLE: Size was normal. Systolic function was normal. Ejection fraction was estimated to be 60 %. There were no regional wall motion abnormalities. Wall thickness was at the upper limits of normal.    RIGHT VENTRICLE: The size was normal. Systolic function was normal. Wall thickness was normal.    LEFT ATRIUM: Size was normal.    RIGHT ATRIUM: Size was normal.    MITRAL VALVE: Valve structure was normal. There was normal leaflet separation. DOPPLER: The transmitral velocity was within the normal range. There was no evidence for stenosis. There was mild regurgitation. AORTIC VALVE: The valve was not well visualized. DOPPLER: Transaortic velocity was increased due to increased transvalvular flow. The AV appears to open adequately on 2D imaging although imaging is suboptimal.  Cannot exclude mild AS. There was no significant regurgitation. TRICUSPID VALVE: The valve structure was normal. There was normal leaflet separation. DOPPLER: The transtricuspid velocity was within the normal range. There was no evidence for stenosis. There was mild regurgitation. PULMONIC VALVE: Not well visualized. PERICARDIUM: There was no pericardial effusion. The pericardium was normal in appearance. AORTA: The root exhibited normal size.     MEASUREMENT TABLES    DOPPLER MEASUREMENTS  Aortic valve   (Reference normals)  Peak gradient   19 mmHg   (--)    SYSTEM MEASUREMENT TABLES    2D  %FS: 43.85 %  Ao Diam: 2.81 cm  EDV(Teich): 60.69 ml  EF(Teich): 75.82 %  ESV(Teich): 14.67 ml  IVSd: 1.13 cm  LA Area: 19.63 cm2  LA Diam: 3.66 cm  LVEDV MOD A4C: 82.12 ml  LVEF MOD A4C: 43.32 %  LVESV MOD A4C: 46.55 ml  LVIDd: 3.77 cm  LVIDs: 2.12 cm  LVLd A4C: 7.78 cm  LVLs A4C: 6.77 cm  LVOT Diam: 1.88 cm  LVPWd: 1.13 cm  RA Area: 13.43 cm2  RVIDd: 3.21 cm  RWT: 0.6  SV MOD A4C: 35.57 ml  SV(Teich): 46.01 ml    CW  AV Env. Ti: 300.67 ms  AV VTI: 44.26 cm  AV Vmax: 2.2 m/s  AV Vmean: 1.47 m/s  AV maxP.35 mmHg  AV meanP.89 mmHg  PV Vmax: 1.21 m/s  PV maxP.81 mmHg    MM  TAPSE: 2.57 cm    PW  BLOSSOM (VTI): 1.25 cm2  BLOSSOM Vmax: 1.3 cm2  BLOSSOM Vmax, Pt: 1.23 cm2  AVAI (VTI): 0 cm2/m2  AVAI Vmax: 0 cm2/m2  AVAI Vmax, Pt: 0 cm2/m2  E' Lat: 0.11 m/s  E' Sept: 0.08 m/s  E/E' Lat: 8.91  E/E' Sept: 12.57  LVOT Env. Ti: 291.15 ms  LVOT VTI: 19.84 cm  LVOT Vmax: 0.97 m/s  LVOT Vmean: 0.68 m/s  LVOT maxPG: 3.8 mmHg  LVOT meanP.15 mmHg  LVSI Dopp: 30.71 ml/m2  LVSV Dopp: 55.28 ml  MV A Chinedu: 0.97 m/s  MV Dec Lares: 5.12 m/s2  MV DecT: 196.12 ms  MV E Chinedu: 1 m/s  MV E/A Ratio: 1.04  RVOT Vmax: 1.07 m/s  RVOT maxP.54 mmHg    [facility] Accredited Echocardiography Laboratory    Prepared and electronically signed by    Lee Ann Huerta MD  Signed 18-Mar-2021 10:23:23    No results found for this or any previous visit. Catheterizations:   No results found for this or any previous visit. Stress Tests:  Results for orders placed in visit on 11/16/15    NM myocardial perfusion spect (stress and/or rest)    Narrative  EXAM ROOM = [facility] STRESS  EXAM START = [id number]  EXAM STOP = [id number]  FILMS USED = 4 IMAGES    This stress test was performed by a cardiologist and the  cardiologist will render the interpretation. [address] [place] [address] Coordinator  [place] Radiologist- QUIQUE Ford MD  Electronically [id number], RAD MD  Released Date Time- 11/24/15 0956  ------------------------------------------------------------------------------  READ BY = 8450^DANA  RELEASED BY = 8450^DANA      Holter monitor -   No results found for this or any previous visit. No results found for this or any previous visit. ASSESSMENT/PLAN:      1.   Paroxysmal atrial fibrillation  Patient had been diagnosed with atrial fibrillation in the office in August 2023  She was sent to the hospital and rate control was attempted but was unsuccessful  She underwent LAURENCE cardioversion however went back into atrial fibrillation shortly after  She was loaded with amiodarone and cardioversion was repeated which was successful  She is currently maintained on amiodarone 200 mg daily, Cardizem 360 mg daily and Eliquis 5 mg twice daily  We discussed next option including rhythm control with antiarrhythmic therapy versus ablation procedure versus staying on rate control medications  After answering all the questions she opted to proceed with ablation procedure. Our office will be in touch with her to schedule LAURENCE/atrial fibrillation ablation. 2.  Hyperlipidemia  Maintained on Lipitor 20 mg daily    3. GERD  Maintained on Protonix 40 mg twice daily    4. Diastolic heart failure  Currently on Lasix 20 mg daily  Currently euvolemic    5.   Hypertension  Currently controlled on diltiazem

## 2023-10-18 ENCOUNTER — TELEPHONE (OUTPATIENT)
Dept: CARDIOLOGY CLINIC | Facility: CLINIC | Age: 68
End: 2023-10-18

## 2023-10-18 ENCOUNTER — PREP FOR PROCEDURE (OUTPATIENT)
Dept: CARDIOLOGY CLINIC | Facility: CLINIC | Age: 68
End: 2023-10-18

## 2023-10-18 DIAGNOSIS — I48.91 ATRIAL FIBRILLATION STATUS POST CARDIOVERSION (HCC): Primary | ICD-10-CM

## 2023-10-18 DIAGNOSIS — I48.0 PAROXYSMAL ATRIAL FIBRILLATION (HCC): ICD-10-CM

## 2023-10-18 NOTE — TELEPHONE ENCOUNTER
After answering all the questions she opted to proceed with ablation procedure. Our office will be in touch with her to schedule LAURENCE/atrial fibrillation ablation. Patient scheduled for LAURENCE/A fib at [facility] on 12/27/2023   with Dr Karen Gautam. Patient aware of general instructions, labs test required. Meds holds: Lasix to hold in the morning of the procedure. Can we please check insurance for service.

## 2023-10-21 DIAGNOSIS — M81.0 AGE-RELATED OSTEOPOROSIS WITHOUT CURRENT PATHOLOGICAL FRACTURE: ICD-10-CM

## 2023-10-23 RX ORDER — DENOSUMAB 60 MG/ML
INJECTION SUBCUTANEOUS
Qty: 1 ML | Refills: 0 | Status: SHIPPED | OUTPATIENT
Start: 2023-10-23

## 2023-10-24 ENCOUNTER — OFFICE VISIT (OUTPATIENT)
Dept: OBGYN CLINIC | Facility: CLINIC | Age: 68
End: 2023-10-24
Payer: COMMERCIAL

## 2023-10-24 VITALS
RESPIRATION RATE: 16 BRPM | SYSTOLIC BLOOD PRESSURE: 127 MMHG | HEIGHT: 63 IN | HEART RATE: 72 BPM | WEIGHT: 182 LBS | OXYGEN SATURATION: 98 % | BODY MASS INDEX: 32.25 KG/M2 | DIASTOLIC BLOOD PRESSURE: 79 MMHG

## 2023-10-24 DIAGNOSIS — M70.61 TROCHANTERIC BURSITIS OF RIGHT HIP: Primary | ICD-10-CM

## 2023-10-24 DIAGNOSIS — M70.62 TROCHANTERIC BURSITIS OF LEFT HIP: ICD-10-CM

## 2023-10-24 PROCEDURE — 99213 OFFICE O/P EST LOW 20 MIN: CPT | Performed by: STUDENT IN AN ORGANIZED HEALTH CARE EDUCATION/TRAINING PROGRAM

## 2023-10-24 PROCEDURE — 20610 DRAIN/INJ JOINT/BURSA W/O US: CPT | Performed by: STUDENT IN AN ORGANIZED HEALTH CARE EDUCATION/TRAINING PROGRAM

## 2023-10-24 RX ORDER — TRIAMCINOLONE ACETONIDE 40 MG/ML
80 INJECTION, SUSPENSION INTRA-ARTICULAR; INTRAMUSCULAR
Status: COMPLETED | OUTPATIENT
Start: 2023-10-24 | End: 2023-10-24

## 2023-10-24 RX ORDER — BUPIVACAINE HYDROCHLORIDE 2.5 MG/ML
4 INJECTION, SOLUTION INFILTRATION; PERINEURAL
Status: COMPLETED | OUTPATIENT
Start: 2023-10-24 | End: 2023-10-24

## 2023-10-24 RX ADMIN — TRIAMCINOLONE ACETONIDE 80 MG: 40 INJECTION, SUSPENSION INTRA-ARTICULAR; INTRAMUSCULAR at 09:45

## 2023-10-24 RX ADMIN — BUPIVACAINE HYDROCHLORIDE 4 ML: 2.5 INJECTION, SOLUTION INFILTRATION; PERINEURAL at 09:45

## 2023-10-24 NOTE — PROGRESS NOTES
Orthopedic Steroid Injection Note  Ina Hunt (58 y.o. female)  : 1955 Encounter Date: 10/24/2023  Dr. Amrit Gambino, DO, Orthopedic Surgeon    Assessment/Plan  77 y.o. female with greater trochanteric bursitis of the bilateral Hips  She was offered and accepted an injection(s) of Kenalog and Marcaine to her Bilateral hip and hip(s) for symptomatic relief of pain and inflammation. Patient tolerated the treatment(s) well. Ice and post injection protocol advised. Recommended that she continue home exercise program.   Continue with activities as tolerated  Continue with conservative management of bilateral Hips  Patient can get corticosteroid injections q 3 months PRN    Subjective:  77 y.o. female presents to the office for follow up on her bilateral hips. The patient was last seen on 23 where she received CS injection for treatment of greater trochanteric bursitis of bilateral hips. She reports relief following previous injections. However, she states that the pain has returned. She states that the pain is more significant on her right side, comparatively. She reports pain when lying directly on hips and with increased walking activity. She wish to proceed with a corticosteroid injection. Patient denies any other acute or associated complaints. She denies numbness or tingling. Patient denies any symptoms of infection such as fever, chills, or rash. ROS  Review of Systems   Constitutional:  Negative for chills, fever and unexpected weight change. HENT:  Negative for hearing loss, nosebleeds and sore throat. Eyes:  Negative for pain, redness and visual disturbance. Respiratory:  Negative for cough, shortness of breath and wheezing. Cardiovascular:  Negative for chest pain, palpitations and leg swelling. Gastrointestinal:  Negative for abdominal pain, nausea and vomiting. Endocrine: Negative for polydipsia and polyuria. Genitourinary:  Negative for dysuria and hematuria. Skin:  Negative for rash and wound. Neurological:  Negative for dizziness, numbness and headaches. Psychiatric/Behavioral:  Negative for decreased concentration and suicidal ideas. The patient is not nervous/anxious. All other systems reviewed and are negative. Past Medical History:   Diagnosis Date    Allergic reaction     Resolved 11/24/2017     Allergic rhinitis     Resolved 11/24/2017     Generalized anxiety disorder     Resolved 11/24/2017     High cholesterol     Hypertension     Microscopic hematuria     Resolved 11/24/2017     Postural dizziness with presyncope 03/11/2021    Renal calculi     Resolved 24/80/2719     Renal colic     Resolved 93/96/8675     Renal cyst, acquired     Resolved 11/24/2017     Restrictive lung disease     Resolved 11/24/2017     Supraventricular tachycardia     Resolved 11/24/2017      Past Surgical History:   Procedure Laterality Date    CHOLECYSTECTOMY      CYSTOSCOPY  05/07/2014    Diagnostic. Last assessed 5/7/2014      TONSILLECTOMY      TUBAL LIGATION       Results Reviewed       None            Physical Exam  Physical Exam  HENT:      Head: Normocephalic and atraumatic. Nose: Nose normal.   Eyes:      Conjunctiva/sclera: Conjunctivae normal.   Cardiovascular:      Rate and Rhythm: Normal rate. Pulmonary:      Effort: Pulmonary effort is normal.   Musculoskeletal:      Cervical back: Neck supple. Skin:     General: Skin is warm and dry. Capillary Refill: Capillary refill takes less than 2 seconds. Neurological:      Mental Status: She is alert and oriented to person, place, and time.    Psychiatric:         Mood and Affect: Mood normal.         Behavior: Behavior normal.       Ortho Exam  bilateral Hip -  Patient presents with no anatomical deformity  Ambulates with steady gait pattern  Uses No assistive devices  TTP over greater trochanter / trochanteric bursa  ROM: 0-15 seated IR, 0-30 seated ER. 0-100 seated hip flexion  Smooth passive circumduction   MMT: 4/5 throughout  Knee flexor and extensor mechanism intact  Ankle DF and PF mechanism intact  2+ TP and DP pulses with brisk capillary refill to the toes  Sural, saphenous, tibial, superficial and deep peroneal motor and sensory distribution intact  Sensation to light touch       Procedures  The risks and benefits of the injection (which include but are not limited to: infection, bleeding,damage to nerve/artery, need for further intervention), as well as the risks and benefits of all alternative treatments were explained and understood. The patient elected to proceed with injection. The procedure was done with aseptic technique, and the patient tolerated the procedure well with no complications. A corticosteroid injection was performed. May consider future corticosteroid injection depending on clinical exam/imaging. Large joint arthrocentesis: R greater trochanteric bursa  Universal Protocol:  Consent: Verbal consent obtained. Risks and benefits: risks, benefits and alternatives were discussed  Consent given by: patient  Timeout called at: 10/24/2023 10:14 AM.  Patient understanding: patient states understanding of the procedure being performed  Site marked: the operative site was marked  Patient identity confirmed: verbally with patient  Supporting Documentation  Indications: pain   Procedure Details  Location: hip - R greater trochanteric bursa  Needle gauge: 21 G. Ultrasound guidance: no  Approach: lateral  Medications administered: 4 mL bupivacaine 0.25 %; 80 mg triamcinolone acetonide 40 mg/mL    Patient tolerance: patient tolerated the procedure well with no immediate complications  Dressing:  Sterile dressing applied      Large joint arthrocentesis: L greater trochanteric bursa  Universal Protocol:  Consent: Verbal consent obtained.   Risks and benefits: risks, benefits and alternatives were discussed  Consent given by: patient  Timeout called at: 10/24/2023 10:16 AM.  Patient understanding: patient states understanding of the procedure being performed  Site marked: the operative site was marked  Patient identity confirmed: verbally with patient  Supporting Documentation  Indications: pain   Procedure Details  Location: hip - L greater trochanteric bursa  Needle gauge: 21 G.   Ultrasound guidance: no  Approach: lateral  Medications administered: 4 mL bupivacaine 0.25 %; 80 mg triamcinolone acetonide 40 mg/mL    Patient tolerance: patient tolerated the procedure well with no immediate complications  Dressing:  Sterile dressing applied          Scribe Attestation      I,:  Kathryn Gibbs am acting as a scribe while in the presence of the attending physician.:       I,:  Cinda Aviles DO personally performed the services described in this documentation    as scribed in my presence.:

## 2023-10-30 ENCOUNTER — TELEPHONE (OUTPATIENT)
Dept: FAMILY MEDICINE CLINIC | Facility: CLINIC | Age: 68
End: 2023-10-30

## 2023-11-01 ENCOUNTER — TELEPHONE (OUTPATIENT)
Dept: CARDIOLOGY CLINIC | Facility: CLINIC | Age: 68
End: 2023-11-01

## 2023-11-01 NOTE — TELEPHONE ENCOUNTER
Spoke with patient. She is going to D/c diltiazem and continue Amiodarone. She will keep the office updated on if this makes a difference in her tremors.

## 2023-11-01 NOTE — TELEPHONE ENCOUNTER
She can stop diltiazem and see if it improves. I don't want amiodarone to be stopped otherwise she will go back in the atrial fibrillation. Agree - should talk to PCP about sleep medications.

## 2023-11-01 NOTE — TELEPHONE ENCOUNTER
I actually spoke with patient last evening and Kevin spoke with patient again today. /75. HR 82. She is complaining of hand tremors, headaches, nausea, increased BP, lightheadedness and insomnia, states only sleeps 2 hours at a time. She is scheduled for a sleep study but not until next May. Had 2 home studies in the past and this is an in-lab study at [facility]. She states she feels not sleeping is from her nerves and I asked her to discuss with her PCP. She is in the process of changing PCPs and does not have an appointment until December. I did explain that we would not give her any medication in relation to her sleep or her nerves. She feels her symptoms are related to Amiodarone and/or diltiazem. She admits to skipping both of these medications this morning. Stopped Today: ofloxacin (ofloxacin): drops: 0.3% 1 drop four times a day into both eyes 03-

## 2023-11-01 NOTE — TELEPHONE ENCOUNTER
Michael, my name is Dwaine Bueno. I talked to some nurse yesterday. I don't remember her name. I'm calling back about my medication. I think he has to lower it or change it to something because  my hands are shaking so bad and I I'm thinking this is why I can't sleep. It has to be from this medication and it's just getting worse. I didn't even take it today. The only thing I took was the blood thinner because I don't know if it's the amiodarone. I don't know which medication it is, but I'm getting very depressed I'm up still since 3:00 in the morning. Again, I cannot relax to sleep. You can give me a call, please do something [phone]. Thank you.

## 2023-11-02 ENCOUNTER — OFFICE VISIT (OUTPATIENT)
Dept: FAMILY MEDICINE CLINIC | Facility: CLINIC | Age: 68
End: 2023-11-02
Payer: COMMERCIAL

## 2023-11-02 VITALS
WEIGHT: 177.4 LBS | BODY MASS INDEX: 31.43 KG/M2 | OXYGEN SATURATION: 97 % | DIASTOLIC BLOOD PRESSURE: 90 MMHG | HEIGHT: 63 IN | TEMPERATURE: 97.2 F | HEART RATE: 82 BPM | SYSTOLIC BLOOD PRESSURE: 164 MMHG

## 2023-11-02 DIAGNOSIS — F51.01 PRIMARY INSOMNIA: ICD-10-CM

## 2023-11-02 DIAGNOSIS — Z23 ENCOUNTER FOR IMMUNIZATION: ICD-10-CM

## 2023-11-02 DIAGNOSIS — F41.9 ANXIETY: Primary | ICD-10-CM

## 2023-11-02 DIAGNOSIS — I10 ESSENTIAL HYPERTENSION: ICD-10-CM

## 2023-11-02 PROCEDURE — 90471 IMMUNIZATION ADMIN: CPT | Performed by: FAMILY MEDICINE

## 2023-11-02 PROCEDURE — 99213 OFFICE O/P EST LOW 20 MIN: CPT | Performed by: FAMILY MEDICINE

## 2023-11-02 PROCEDURE — 90662 IIV NO PRSV INCREASED AG IM: CPT | Performed by: FAMILY MEDICINE

## 2023-11-02 RX ORDER — HYDROXYZINE HYDROCHLORIDE 25 MG/1
25 TABLET, FILM COATED ORAL EVERY 8 HOURS PRN
Qty: 30 TABLET | Refills: 0 | Status: SHIPPED | OUTPATIENT
Start: 2023-11-02

## 2023-11-02 RX ORDER — SERTRALINE HYDROCHLORIDE 25 MG/1
25 TABLET, FILM COATED ORAL DAILY
Qty: 30 TABLET | Refills: 5 | Status: SHIPPED | OUTPATIENT
Start: 2023-11-02 | End: 2024-04-30

## 2023-11-02 NOTE — PROGRESS NOTES
Name: Iain Bryan      : 1955      MRN: [id number]  Encounter Provider: Jessee Maddox DO  Encounter Date: 2023   Encounter department: [address]   I will try treating her anxiety with sertraline 25 mg daily. Rx also put in for hydroxyzine for her to take as needed. I will see her back in 2 weeks or as needed. Flu shot given today. 1. Anxiety  -     sertraline (ZOLOFT) 25 mg tablet; Take 1 tablet (25 mg total) by mouth daily  -     hydrOXYzine HCL (ATARAX) 25 mg tablet; Take 1 tablet (25 mg total) by mouth every 8 (eight) hours as needed for itching    2. Primary insomnia  -     hydrOXYzine HCL (ATARAX) 25 mg tablet; Take 1 tablet (25 mg total) by mouth every 8 (eight) hours as needed for itching    3. Essential hypertension    4. Encounter for immunization  -     influenza vaccine, high-dose, PF 0.7 mL (FLUZONE HIGH-DOSE)        Depression Screening and Follow-up Plan: Patient was screened for depression during today's encounter. They screened negative with a PHQ-2 score of 0. Subjective     Patient here today feeling shaky and anxious. She called cardiology, thinking it was her blood pressure medication, they told her to stop it. Patient still having trouble sleeping. No SI or HI. Review of Systems   Constitutional: Negative. Respiratory: Negative. Cardiovascular: Negative. Gastrointestinal: Negative. Genitourinary: Negative. Psychiatric/Behavioral:  Positive for sleep disturbance. Negative for suicidal ideas. The patient is nervous/anxious.         Past Medical History:   Diagnosis Date   • Allergic reaction     Resolved 2017    • Allergic rhinitis     Resolved 2017    • Generalized anxiety disorder     Resolved 2017    • High cholesterol    • Hypertension    • Microscopic hematuria     Resolved 2017    • Postural dizziness with presyncope 2021   • Renal calculi     Resolved 2017    • Renal colic     Resolved 16/32/4444    • Renal cyst, acquired     Resolved 11/24/2017    • Restrictive lung disease     Resolved 11/24/2017    • Supraventricular tachycardia     Resolved 11/24/2017      Past Surgical History:   Procedure Laterality Date   • CHOLECYSTECTOMY     • CYSTOSCOPY  05/07/2014    Diagnostic. Last assessed 5/7/2014     • TONSILLECTOMY     • TUBAL LIGATION       Family History   Problem Relation Age of Onset   • Lung cancer Mother    • Heart attack Father    • No Known Problems Sister    • No Known Problems Sister    • No Known Problems Sister    • Heart attack Maternal Grandmother    • Diabetes Paternal Grandmother    • Colon cancer Maternal Aunt    • Esophageal cancer Maternal Aunt    • No Known Problems Maternal Aunt    • No Known Problems Paternal Aunt    • Rheum arthritis Family         Rheumatoid arthritis with rheumatoid factor    • No Known Problems Son    • No Known Problems Son      Social History     Socioeconomic History   • Marital status: /Civil Union     Spouse name: None   • Number of children: None   • Years of education: None   • Highest education level: None   Occupational History   • None   Tobacco Use   • Smoking status: Former   • Smokeless tobacco: Never   Vaping Use   • Vaping Use: Never used   Substance and Sexual Activity   • Alcohol use: Yes     Comment: occasional   • Drug use: Never   • Sexual activity: None   Other Topics Concern   • None   Social History Narrative   • None     Social Determinants of Health     Financial Resource Strain: Not on file   Food Insecurity: No Food Insecurity (8/16/2023)    Hunger Vital Sign    • Worried About Running Out of Food in the Last Year: Never true    • Ran Out of Food in the Last Year: Never true   Transportation Needs: No Transportation Needs (8/16/2023)    PRAPARE - Transportation    • Lack of Transportation (Medical): No    • Lack of Transportation (Non-Medical):  No   Physical Activity: Not on file   Stress: Not on file   Social Connections: Not on file   Intimate Partner Violence: Not on file   Housing Stability: Low Risk  (8/16/2023)    Housing Stability Vital Sign    • Unable to Pay for Housing in the Last Year: No    • Number of Places Lived in the Last Year: 1    • Unstable Housing in the Last Year: No     Current Outpatient Medications on File Prior to Visit   Medication Sig   • amiodarone 200 mg tablet Take 1 tablet (200 mg total) by mouth daily   • apixaban (Eliquis) 5 mg Take 1 tablet (5 mg total) by mouth 2 (two) times a day   • atorvastatin (LIPITOR) 20 mg tablet Take 1 tablet (20 mg total) by mouth daily   • butalbital-acetaminophen-caffeine (FIORICET,ESGIC) -40 mg per tablet Take 1 tablet by mouth every 4 (four) hours as needed for headaches   • cholecalciferol (VITAMIN D3) 1,000 units tablet Take 1,000 Units by mouth daily 2,000   • fluticasone (FLONASE) 50 mcg/act nasal spray 2 sprays into each nostril daily   • pantoprazole (PROTONIX) 40 mg tablet TAKE 1 TABLET BY MOUTH TWICE  DAILY   • Prolia 60 MG/ML INJECT 60MG SUBCUTANEOUSLY EVERY 6 MONTHS   • furosemide (LASIX) 20 mg tablet Take 1 tablet (20 mg total) by mouth daily (Patient not taking: Reported on 10/17/2023)     Allergies   Allergen Reactions   • Amlodipine Myalgia   • Codeine      Other reaction(s):  Other (See Comments)  headaches   • Fosamax [Alendronate] GI Intolerance   • Angiotensin Receptor Blockers Other (See Comments)     Metallic taste     Immunization History   Administered Date(s) Administered   • COVID-19 MODERNA VACC 0.5 ML IM 03/18/2021, 04/15/2021, 11/03/2021   • INFLUENZA 10/26/2018, 10/05/2019, 11/01/2020, 10/26/2022   • Influenza Quadrivalent Preservative Free 3 years and older IM 10/28/2015, 10/19/2017   • Influenza Quadrivalent, 6-35 Months IM 11/12/2014   • Influenza Split High Dose Preservative Free IM 10/05/2019   • Influenza, high dose seasonal 0.7 mL 10/26/2018, 10/10/2020, 10/21/2021, 10/26/2022, 11/02/2023   • Influenza, seasonal, injectable 12/11/2013   • Pneumococcal Polysaccharide PPV23 12/11/2013       Objective     /90   Pulse 82   Temp (!) 97.2 °F (36.2 °C)   Ht 5' 3" (1.6 m)   Wt 80.5 kg (177 lb 6.4 oz)   SpO2 97%   BMI 31.42 kg/m²     Physical Exam  Vitals reviewed. Constitutional:       General: She is not in acute distress. Appearance: Normal appearance. She is well-developed. She is not ill-appearing, toxic-appearing or diaphoretic. HENT:      Head: Normocephalic and atraumatic. Eyes:      Conjunctiva/sclera: Conjunctivae normal.   Cardiovascular:      Rate and Rhythm: Normal rate and regular rhythm. Heart sounds: Normal heart sounds. No murmur heard. No friction rub. No gallop. Pulmonary:      Effort: Pulmonary effort is normal. No respiratory distress. Breath sounds: Normal breath sounds. No wheezing, rhonchi or rales. Musculoskeletal:      Right lower leg: No edema. Left lower leg: No edema. Neurological:      General: No focal deficit present. Mental Status: She is alert and oriented to person, place, and time. Psychiatric:         Mood and Affect: Mood normal.         Behavior: Behavior normal.         Thought Content:  Thought content normal.         Judgment: Judgment normal.       J Luis Hodgson DO

## 2023-11-13 ENCOUNTER — CLINICAL SUPPORT (OUTPATIENT)
Dept: FAMILY MEDICINE CLINIC | Facility: CLINIC | Age: 68
End: 2023-11-13
Payer: COMMERCIAL

## 2023-11-13 DIAGNOSIS — M81.0 AGE-RELATED OSTEOPOROSIS WITHOUT CURRENT PATHOLOGICAL FRACTURE: Primary | ICD-10-CM

## 2023-11-13 PROCEDURE — 96372 THER/PROPH/DIAG INJ SC/IM: CPT | Performed by: FAMILY MEDICINE

## 2023-11-20 DIAGNOSIS — F41.9 ANXIETY: ICD-10-CM

## 2023-11-20 DIAGNOSIS — F51.01 PRIMARY INSOMNIA: ICD-10-CM

## 2023-11-20 RX ORDER — HYDROXYZINE HYDROCHLORIDE 25 MG/1
TABLET, FILM COATED ORAL
Qty: 30 TABLET | Refills: 0 | Status: SHIPPED | OUTPATIENT
Start: 2023-11-20

## 2023-11-21 ENCOUNTER — OFFICE VISIT (OUTPATIENT)
Dept: FAMILY MEDICINE CLINIC | Facility: CLINIC | Age: 68
End: 2023-11-21
Payer: COMMERCIAL

## 2023-11-21 VITALS
DIASTOLIC BLOOD PRESSURE: 70 MMHG | BODY MASS INDEX: 31.96 KG/M2 | SYSTOLIC BLOOD PRESSURE: 140 MMHG | WEIGHT: 180.4 LBS | HEIGHT: 63 IN | TEMPERATURE: 98.2 F | OXYGEN SATURATION: 97 % | HEART RATE: 98 BPM

## 2023-11-21 DIAGNOSIS — F41.9 ANXIETY: ICD-10-CM

## 2023-11-21 DIAGNOSIS — J06.9 UPPER RESPIRATORY TRACT INFECTION, UNSPECIFIED TYPE: Primary | ICD-10-CM

## 2023-11-21 PROCEDURE — 99214 OFFICE O/P EST MOD 30 MIN: CPT | Performed by: FAMILY MEDICINE

## 2023-11-21 RX ORDER — SERTRALINE HYDROCHLORIDE 25 MG/1
25 TABLET, FILM COATED ORAL DAILY
Qty: 90 TABLET | Refills: 1 | Status: SHIPPED | OUTPATIENT
Start: 2023-11-21 | End: 2024-05-19

## 2023-11-21 RX ORDER — AZITHROMYCIN 250 MG/1
TABLET, FILM COATED ORAL
Qty: 6 TABLET | Refills: 0 | Status: SHIPPED | OUTPATIENT
Start: 2023-11-21 | End: 2023-11-25

## 2023-11-21 NOTE — PROGRESS NOTES
Name: Ina Hunt      : 1955      MRN: [id number]  Encounter Provider: Heidi Reyes DO  Encounter Date: 2023   Encounter department: [address] [place] [address]   For her URI, Rx for Zithromax. Continue Mucinex. Push fluids. Get a humidifier. Call if symptoms continue or increase. She will continue the sertraline and hydroxyzine. I will see her back in January as scheduled or as needed. 1. Upper respiratory tract infection, unspecified type  -     azithromycin (ZITHROMAX) 250 mg tablet; Take 2 tablets today then 1 tablet daily x 4 days    2. Anxiety  -     sertraline (ZOLOFT) 25 mg tablet; Take 1 tablet (25 mg total) by mouth daily           Subjective     Patient here today stating that on  started with scratchy dry throat. Then turned into a cough that is been productive. She has been taking Mucinex DM. No fever or chills. No shortness of breath. Patient has been tolerating the sertraline and hydroxyzine well. She is sleeping about 5 hours at night, which is an improvement for her. She feels less anxious. Sore Throat   Associated symptoms include coughing and headaches. Headache  Cough  Associated symptoms include headaches and a sore throat. Review of Systems   Constitutional: Negative. HENT:  Positive for sore throat. Respiratory:  Positive for cough. Cardiovascular: Negative. Gastrointestinal: Negative. Genitourinary: Negative. Neurological:  Positive for headaches.        Past Medical History:   Diagnosis Date   • Allergic reaction     Resolved 2017    • Allergic rhinitis     Resolved 2017    • Generalized anxiety disorder     Resolved 2017    • High cholesterol    • Hypertension    • Microscopic hematuria     Resolved 2017    • Postural dizziness with presyncope 2021   • Renal calculi     Resolved     • Renal colic     Resolved 4360    • Renal cyst, acquired     Resolved 11/24/2017    • Restrictive lung disease     Resolved 11/24/2017    • Supraventricular tachycardia     Resolved 11/24/2017      Past Surgical History:   Procedure Laterality Date   • CHOLECYSTECTOMY     • CYSTOSCOPY  05/07/2014    Diagnostic. Last assessed 5/7/2014     • TONSILLECTOMY     • TUBAL LIGATION       Family History   Problem Relation Age of Onset   • Lung cancer Mother    • Heart attack Father    • No Known Problems Sister    • No Known Problems Sister    • No Known Problems Sister    • Heart attack Maternal Grandmother    • Diabetes Paternal Grandmother    • Colon cancer Maternal Aunt    • Esophageal cancer Maternal Aunt    • No Known Problems Maternal Aunt    • No Known Problems Paternal Aunt    • Rheum arthritis Family         Rheumatoid arthritis with rheumatoid factor    • No Known Problems Son    • No Known Problems Son      Social History     Socioeconomic History   • Marital status: /Civil Union     Spouse name: None   • Number of children: None   • Years of education: None   • Highest education level: None   Occupational History   • None   Tobacco Use   • Smoking status: Former   • Smokeless tobacco: Never   Vaping Use   • Vaping Use: Never used   Substance and Sexual Activity   • Alcohol use: Yes     Comment: occasional   • Drug use: Never   • Sexual activity: None   Other Topics Concern   • None   Social History Narrative   • None     Social Determinants of Health     Financial Resource Strain: Not on file   Food Insecurity: No Food Insecurity (8/16/2023)    Hunger Vital Sign    • Worried About Running Out of Food in the Last Year: Never true    • Ran Out of Food in the Last Year: Never true   Transportation Needs: No Transportation Needs (8/16/2023)    PRAPARE - Transportation    • Lack of Transportation (Medical): No    • Lack of Transportation (Non-Medical):  No   Physical Activity: Not on file   Stress: Not on file   Social Connections: Not on file   Intimate Partner Violence: Not on file   Housing Stability: Low Risk  (8/16/2023)    Housing Stability Vital Sign    • Unable to Pay for Housing in the Last Year: No    • Number of Places Lived in the Last Year: 1    • Unstable Housing in the Last Year: No     Current Outpatient Medications on File Prior to Visit   Medication Sig   • amiodarone 200 mg tablet Take 1 tablet (200 mg total) by mouth daily   • apixaban (Eliquis) 5 mg Take 1 tablet (5 mg total) by mouth 2 (two) times a day   • atorvastatin (LIPITOR) 20 mg tablet Take 1 tablet (20 mg total) by mouth daily   • butalbital-acetaminophen-caffeine (FIORICET,ESGIC) -40 mg per tablet Take 1 tablet by mouth every 4 (four) hours as needed for headaches   • cholecalciferol (VITAMIN D3) 1,000 units tablet Take 1,000 Units by mouth daily 2,000   • fluticasone (FLONASE) 50 mcg/act nasal spray 2 sprays into each nostril daily   • hydrOXYzine HCL (ATARAX) 25 mg tablet TAKE 1 TABLET BY MOUTH EVERY 8 HOURS AS NEEDED FOR ITCHING   • pantoprazole (PROTONIX) 40 mg tablet TAKE 1 TABLET BY MOUTH TWICE  DAILY   • Prolia 60 MG/ML INJECT 60MG SUBCUTANEOUSLY EVERY 6 MONTHS   • [DISCONTINUED] sertraline (ZOLOFT) 25 mg tablet Take 1 tablet (25 mg total) by mouth daily   • furosemide (LASIX) 20 mg tablet Take 1 tablet (20 mg total) by mouth daily (Patient not taking: Reported on 10/17/2023)     Allergies   Allergen Reactions   • Amlodipine Myalgia   • Codeine      Other reaction(s):  Other (See Comments)  headaches   • Fosamax [Alendronate] GI Intolerance   • Angiotensin Receptor Blockers Other (See Comments)     Metallic taste     Immunization History   Administered Date(s) Administered   • COVID-19 MODERNA VACC 0.5 ML IM 03/18/2021, 04/15/2021, 11/03/2021   • INFLUENZA 10/26/2018, 10/05/2019, 11/01/2020, 10/26/2022   • Influenza Quadrivalent Preservative Free 3 years and older IM 10/28/2015, 10/19/2017   • Influenza Quadrivalent, 6-35 Months IM 11/12/2014   • Influenza Split High Dose Preservative Free IM 10/05/2019   • Influenza, high dose seasonal 0.7 mL 10/26/2018, 10/10/2020, 10/21/2021, 10/26/2022, 11/02/2023   • Influenza, seasonal, injectable 12/11/2013   • Pneumococcal Polysaccharide PPV23 12/11/2013       Objective     /70   Pulse 98   Temp 98.2 °F (36.8 °C)   Ht 5' 3" (1.6 m)   Wt 81.8 kg (180 lb 6.4 oz)   SpO2 97%   BMI 31.96 kg/m²     Physical Exam  Vitals reviewed. Constitutional:       General: She is not in acute distress. Appearance: Normal appearance. She is well-developed. She is not ill-appearing, toxic-appearing or diaphoretic. HENT:      Head: Normocephalic and atraumatic. Nose: Congestion present. Mouth/Throat:      Comments: Clear PND  Eyes:      Conjunctiva/sclera: Conjunctivae normal.   Cardiovascular:      Rate and Rhythm: Normal rate and regular rhythm. Heart sounds: Normal heart sounds. No murmur heard. No friction rub. No gallop. Pulmonary:      Effort: Pulmonary effort is normal. No respiratory distress. Breath sounds: Normal breath sounds. No wheezing, rhonchi or rales. Musculoskeletal:      Right lower leg: No edema. Left lower leg: No edema. Neurological:      General: No focal deficit present. Mental Status: She is alert and oriented to person, place, and time. Psychiatric:         Mood and Affect: Mood normal.         Behavior: Behavior normal.         Thought Content:  Thought content normal.         Judgment: Judgment normal.       Viviana Justin,

## 2023-11-27 ENCOUNTER — OFFICE VISIT (OUTPATIENT)
Dept: CARDIOLOGY CLINIC | Facility: HOSPITAL | Age: 68
End: 2023-11-27
Payer: COMMERCIAL

## 2023-11-27 VITALS
HEART RATE: 79 BPM | SYSTOLIC BLOOD PRESSURE: 150 MMHG | OXYGEN SATURATION: 94 % | HEIGHT: 63 IN | WEIGHT: 179 LBS | DIASTOLIC BLOOD PRESSURE: 80 MMHG | BODY MASS INDEX: 31.71 KG/M2

## 2023-11-27 DIAGNOSIS — I48.0 PAROXYSMAL ATRIAL FIBRILLATION (HCC): Primary | ICD-10-CM

## 2023-11-27 DIAGNOSIS — I95.1 ORTHOSTATIC HYPOTENSION: ICD-10-CM

## 2023-11-27 DIAGNOSIS — E78.2 MIXED HYPERLIPIDEMIA: ICD-10-CM

## 2023-11-27 DIAGNOSIS — I10 ESSENTIAL HYPERTENSION: ICD-10-CM

## 2023-11-27 PROCEDURE — 99214 OFFICE O/P EST MOD 30 MIN: CPT | Performed by: INTERNAL MEDICINE

## 2023-11-28 PROBLEM — I48.91 NEW ONSET A-FIB (HCC): Status: RESOLVED | Noted: 2023-08-15 | Resolved: 2023-11-28

## 2023-11-28 PROBLEM — I48.0 PAROXYSMAL ATRIAL FIBRILLATION (HCC): Status: ACTIVE | Noted: 2023-11-28

## 2023-11-28 NOTE — PROGRESS NOTES
Cardiology Follow Up    [facility]  1955  [id number]  [facility]  [address]  [address]  [address]  [address]  [phone]    1. Paroxysmal atrial fibrillation (HCC)        2. Essential hypertension        3. Orthostatic hypotension        4. Mixed hyperlipidemia              Discussion/Summary: All of her assessed cardiac problems are stable. I have reviewed her medications and made no changes. With her intolerance to medications, AF ablation is very appropriate. RTO 4 months. Interval History: I saw her for the 1st time in 4/2023. In 8/2023 she was found to be in AF with rapid ventricular response. She had some HARVEY but was relatively asymptomatic. She did not tolerate Cardizem or Metoprolol. She underwent LAURENCE / CV on 8/17/2023. She remains in SR and is on Amiodarone 200 mg daily and Eliquis. She had previously been on Losartan for HTN which she did tolerate. BP today 150/80. She is scheduled for AF ablation on 12/27/2023.            Patient Active Problem List   Diagnosis    Reactive airway disease    Esophageal reflux    Hyperlipidemia    KOSTA (obstructive sleep apnea)    Migraine without aura and without status migrainosus, not intractable    Right-sided chest wall pain    Orthostatic hypotension    Essential hypertension    Age-related osteoporosis without current pathological fracture    Bilateral hip pain    EL (renal artery stenosis) (HCC)    Neck pain    Impaired fasting blood sugar    Mild atherosclerosis of both carotid arteries    Acute diastolic congestive heart failure (HCC)    Anxiety    Primary insomnia    Paroxysmal atrial fibrillation ([address])     Past Medical History:   Diagnosis Date    Allergic reaction     Resolved 11/24/2017     Allergic rhinitis     Resolved 11/24/2017     Generalized anxiety disorder     Resolved 11/24/2017     High cholesterol     Hypertension     Microscopic hematuria     Resolved 11/24/2017     Postural dizziness with presyncope 03/11/2021    Renal calculi     Resolved 56/92/0055     Renal colic     Resolved 23/54/8646     Renal cyst, acquired     Resolved 11/24/2017     Restrictive lung disease     Resolved 11/24/2017     Supraventricular tachycardia     Resolved 11/24/2017      Social History     Socioeconomic History    Marital status: /Civil Union     Spouse name: Not on file    Number of children: Not on file    Years of education: Not on file    Highest education level: Not on file   Occupational History    Not on file   Tobacco Use    Smoking status: Former    Smokeless tobacco: Never   Vaping Use    Vaping Use: Never used   Substance and Sexual Activity    Alcohol use: Yes     Comment: occasional    Drug use: Never    Sexual activity: Not on file   Other Topics Concern    Not on file   Social History Narrative    Not on file     Social Determinants of Health     Financial Resource Strain: Not on file   Food Insecurity: No Food Insecurity (8/16/2023)    Hunger Vital Sign     Worried About Running Out of Food in the Last Year: Never true     Ran Out of Food in the Last Year: Never true   Transportation Needs: No Transportation Needs (8/16/2023)    PRAPARE - Transportation     Lack of Transportation (Medical): No     Lack of Transportation (Non-Medical):  No   Physical Activity: Not on file   Stress: Not on file   Social Connections: Not on file   Intimate Partner Violence: Not on file   Housing Stability: Low Risk  (8/16/2023)    Housing Stability Vital Sign     Unable to Pay for Housing in the Last Year: No     Number of Places Lived in the Last Year: 1     Unstable Housing in the Last Year: No      Family History   Problem Relation Age of Onset    Lung cancer Mother     Heart attack Father     No Known Problems Sister     No Known Problems Sister     No Known Problems Sister     Heart attack Maternal Grandmother     Diabetes Paternal Grandmother     Colon cancer Maternal Aunt     Esophageal cancer Maternal Aunt     No Known Problems Maternal Aunt     No Known Problems Paternal Aunt     Rheum arthritis Family         Rheumatoid arthritis with rheumatoid factor     No Known Problems Son     No Known Problems Son      Past Surgical History:   Procedure Laterality Date    CHOLECYSTECTOMY      CYSTOSCOPY  05/07/2014    Diagnostic. Last assessed 5/7/2014      TONSILLECTOMY      TUBAL LIGATION         Current Outpatient Medications:     amiodarone 200 mg tablet, Take 1 tablet (200 mg total) by mouth daily, Disp: 30 tablet, Rfl: 3    apixaban (Eliquis) 5 mg, Take 1 tablet (5 mg total) by mouth 2 (two) times a day, Disp: 180 tablet, Rfl: 3    atorvastatin (LIPITOR) 20 mg tablet, Take 1 tablet (20 mg total) by mouth daily, Disp: 90 tablet, Rfl: 3    butalbital-acetaminophen-caffeine (FIORICET,ESGIC) -40 mg per tablet, Take 1 tablet by mouth every 4 (four) hours as needed for headaches, Disp: 30 tablet, Rfl: 0    cholecalciferol (VITAMIN D3) 1,000 units tablet, Take 1,000 Units by mouth daily 2,000, Disp: , Rfl:     fluticasone (FLONASE) 50 mcg/act nasal spray, 2 sprays into each nostril daily, Disp: 16 g, Rfl: 3    hydrOXYzine HCL (ATARAX) 25 mg tablet, TAKE 1 TABLET BY MOUTH EVERY 8 HOURS AS NEEDED FOR ITCHING, Disp: 30 tablet, Rfl: 0    pantoprazole (PROTONIX) 40 mg tablet, TAKE 1 TABLET BY MOUTH TWICE  DAILY, Disp: 180 tablet, Rfl: 3    Prolia 60 MG/ML, INJECT 60MG SUBCUTANEOUSLY EVERY 6 MONTHS, Disp: 1 mL, Rfl: 0    sertraline (ZOLOFT) 25 mg tablet, Take 1 tablet (25 mg total) by mouth daily, Disp: 90 tablet, Rfl: 1    furosemide (LASIX) 20 mg tablet, Take 1 tablet (20 mg total) by mouth daily (Patient not taking: Reported on 10/17/2023), Disp: 30 tablet, Rfl: 3  Allergies   Allergen Reactions    Amlodipine Myalgia    Codeine      Other reaction(s):  Other (See Comments)  headaches    Fosamax [Alendronate] GI Intolerance    Angiotensin Receptor Blockers Other (See Comments)     Metallic taste     Vitals:    11/27/23 1346   BP: 150/80   Pulse: 79   SpO2: 94%   Weight: 81.2 kg (179 lb)   Height: 5' 3" (1.6 m)     Weight (last 2 days)       Date/Time Weight    11/27/23 1346 81.2 (179)           Blood pressure 150/80, pulse 79, height 5' 3" (1.6 m), weight 81.2 kg (179 lb), SpO2 94 %. , Body mass index is 31.71 kg/m². Labs:  Office Visit on 09/26/2023   Component Date Value    SARS-CoV-2 09/26/2023 Negative    Office Visit on 08/25/2023   Component Date Value    Sodium 08/30/2023 142     Potassium 08/30/2023 4.1     Chloride 08/30/2023 102     CO2 08/30/2023 29     ANION GAP 08/30/2023 11     BUN 08/30/2023 26 (H)     Creatinine 08/30/2023 1.25     Glucose, Fasting 08/30/2023 107 (H)     Calcium 08/30/2023 9.3     eGFR 08/30/2023 43    No results displayed because visit has over 200 results. Orders Only on 07/14/2023   Component Date Value    Hemoglobin A1C 07/14/2023 5.9 (H)     EAG 07/14/2023 123      Imaging: No results found. Review of Systems:  Review of Systems   Constitutional:  Negative for diaphoresis, fatigue, fever and unexpected weight change. HENT: Negative. Respiratory:  Negative for cough, shortness of breath and wheezing. Cardiovascular:  Negative for chest pain, palpitations and leg swelling. Gastrointestinal:  Negative for abdominal pain, diarrhea and nausea. Musculoskeletal:  Negative for gait problem and myalgias. Skin:  Negative for rash. Neurological:  Negative for dizziness and numbness. Psychiatric/Behavioral: Negative. Physical Exam:  Physical Exam  Constitutional:       Appearance: She is well-developed. HENT:      Head: Normocephalic and atraumatic. Eyes:      Pupils: Pupils are equal, round, and reactive to light. Neck:      Vascular: No JVD. Cardiovascular:      Rate and Rhythm: Regular rhythm. Pulses: Normal pulses. Carotid pulses are 2+ on the right side and 2+ on the left side.      Heart sounds: S1 normal and S2 normal.   Pulmonary:      Effort: Pulmonary effort is normal.      Breath sounds: Normal breath sounds. No wheezing or rales. Abdominal:      General: Bowel sounds are normal.      Palpations: Abdomen is soft. Musculoskeletal:         General: No tenderness. Normal range of motion. Cervical back: Normal range of motion and neck supple. Skin:     General: Skin is warm. Neurological:      Mental Status: She is alert and oriented to person, place, and time. Cranial Nerves: No cranial nerve deficit. Deep Tendon Reflexes: Reflexes are normal and symmetric.

## 2023-12-01 ENCOUNTER — TELEPHONE (OUTPATIENT)
Dept: CARDIOLOGY CLINIC | Facility: HOSPITAL | Age: 68
End: 2023-12-01

## 2023-12-01 NOTE — TELEPHONE ENCOUNTER
Salud Kingston called in stating she is having hand tremors since starting amiodarone per Dr. Lor Fuentesuse she can stop taking the amiodarone. Salud Kingston is aware.

## 2023-12-18 ENCOUNTER — APPOINTMENT (OUTPATIENT)
Dept: LAB | Facility: CLINIC | Age: 68
End: 2023-12-18
Payer: COMMERCIAL

## 2023-12-18 LAB
ALBUMIN SERPL BCP-MCNC: 4.2 G/DL (ref 3.5–5)
ALP SERPL-CCNC: 55 U/L (ref 34–104)
ALT SERPL W P-5'-P-CCNC: 26 U/L (ref 7–52)
ANION GAP SERPL CALCULATED.3IONS-SCNC: 10 MMOL/L
AST SERPL W P-5'-P-CCNC: 21 U/L (ref 13–39)
BASOPHILS # BLD AUTO: 0.08 THOUSANDS/ÂΜL (ref 0–0.1)
BASOPHILS NFR BLD AUTO: 1 % (ref 0–1)
BILIRUB SERPL-MCNC: 1.17 MG/DL (ref 0.2–1)
BUN SERPL-MCNC: 17 MG/DL (ref 5–25)
CALCIUM SERPL-MCNC: 9.3 MG/DL (ref 8.4–10.2)
CHLORIDE SERPL-SCNC: 104 MMOL/L (ref 96–108)
CO2 SERPL-SCNC: 28 MMOL/L (ref 21–32)
CREAT SERPL-MCNC: 0.95 MG/DL (ref 0.6–1.3)
EOSINOPHIL # BLD AUTO: 0.14 THOUSAND/ÂΜL (ref 0–0.61)
EOSINOPHIL NFR BLD AUTO: 2 % (ref 0–6)
ERYTHROCYTE [DISTWIDTH] IN BLOOD BY AUTOMATED COUNT: 13 % (ref 11.6–15.1)
GFR SERPL CREATININE-BSD FRML MDRD: 60 ML/MIN/1.73SQ M
GLUCOSE P FAST SERPL-MCNC: 95 MG/DL (ref 65–99)
HCT VFR BLD AUTO: 43.8 % (ref 34.8–46.1)
HGB BLD-MCNC: 14.5 G/DL (ref 11.5–15.4)
IMM GRANULOCYTES # BLD AUTO: 0.03 THOUSAND/UL (ref 0–0.2)
IMM GRANULOCYTES NFR BLD AUTO: 0 % (ref 0–2)
INR PPP: 0.99 (ref 0.84–1.19)
LYMPHOCYTES # BLD AUTO: 1.96 THOUSANDS/ÂΜL (ref 0.6–4.47)
LYMPHOCYTES NFR BLD AUTO: 28 % (ref 14–44)
MCH RBC QN AUTO: 29.7 PG (ref 26.8–34.3)
MCHC RBC AUTO-ENTMCNC: 33.1 G/DL (ref 31.4–37.4)
MCV RBC AUTO: 90 FL (ref 82–98)
MONOCYTES # BLD AUTO: 0.49 THOUSAND/ÂΜL (ref 0.17–1.22)
MONOCYTES NFR BLD AUTO: 7 % (ref 4–12)
NEUTROPHILS # BLD AUTO: 4.35 THOUSANDS/ÂΜL (ref 1.85–7.62)
NEUTS SEG NFR BLD AUTO: 62 % (ref 43–75)
NRBC BLD AUTO-RTO: 0 /100 WBCS
PLATELET # BLD AUTO: 180 THOUSANDS/UL (ref 149–390)
PMV BLD AUTO: 12.3 FL (ref 8.9–12.7)
POTASSIUM SERPL-SCNC: 4.2 MMOL/L (ref 3.5–5.3)
PROT SERPL-MCNC: 7 G/DL (ref 6.4–8.4)
PROTHROMBIN TIME: 13 SECONDS (ref 11.6–14.5)
RBC # BLD AUTO: 4.89 MILLION/UL (ref 3.81–5.12)
SODIUM SERPL-SCNC: 142 MMOL/L (ref 135–147)
WBC # BLD AUTO: 7.05 THOUSAND/UL (ref 4.31–10.16)

## 2023-12-26 ENCOUNTER — ANESTHESIA EVENT (OUTPATIENT)
Dept: NON INVASIVE DIAGNOSTICS | Facility: HOSPITAL | Age: 68
End: 2023-12-26
Payer: COMMERCIAL

## 2023-12-27 ENCOUNTER — ANESTHESIA (OUTPATIENT)
Dept: NON INVASIVE DIAGNOSTICS | Facility: HOSPITAL | Age: 68
End: 2023-12-27
Payer: COMMERCIAL

## 2023-12-27 ENCOUNTER — HOSPITAL ENCOUNTER (OUTPATIENT)
Facility: HOSPITAL | Age: 68
Setting detail: OUTPATIENT SURGERY
Discharge: HOME | End: 2023-12-28
Attending: INTERNAL MEDICINE | Admitting: INTERNAL MEDICINE
Payer: COMMERCIAL

## 2023-12-27 ENCOUNTER — HOSPITAL ENCOUNTER (OUTPATIENT)
Dept: NON INVASIVE DIAGNOSTICS | Facility: HOSPITAL | Age: 68
Discharge: HOME | End: 2023-12-27
Attending: INTERNAL MEDICINE
Payer: COMMERCIAL

## 2023-12-27 DIAGNOSIS — I48.0 PAROXYSMAL ATRIAL FIBRILLATION (HCC): Primary | ICD-10-CM

## 2023-12-27 DIAGNOSIS — I48.91 ATRIAL FIBRILLATION STATUS POST CARDIOVERSION (HCC): ICD-10-CM

## 2023-12-27 DIAGNOSIS — I48.0 PAROXYSMAL ATRIAL FIBRILLATION (HCC): ICD-10-CM

## 2023-12-27 LAB
ANION GAP SERPL CALCULATED.3IONS-SCNC: 8 MMOL/L
BASOPHILS # BLD AUTO: 0.08 THOUSANDS/ÂΜL (ref 0–0.1)
BASOPHILS NFR BLD AUTO: 1 % (ref 0–1)
BUN SERPL-MCNC: 20 MG/DL (ref 5–25)
CALCIUM SERPL-MCNC: 10.1 MG/DL (ref 8.4–10.2)
CHLORIDE SERPL-SCNC: 106 MMOL/L (ref 96–108)
CO2 SERPL-SCNC: 29 MMOL/L (ref 21–32)
CREAT SERPL-MCNC: 1.03 MG/DL (ref 0.6–1.3)
EOSINOPHIL # BLD AUTO: 0.11 THOUSAND/ÂΜL (ref 0–0.61)
EOSINOPHIL NFR BLD AUTO: 2 % (ref 0–6)
ERYTHROCYTE [DISTWIDTH] IN BLOOD BY AUTOMATED COUNT: 13.2 % (ref 11.6–15.1)
GFR SERPL CREATININE-BSD FRML MDRD: 55 ML/MIN/1.73SQ M
GLUCOSE P FAST SERPL-MCNC: 95 MG/DL (ref 65–99)
GLUCOSE SERPL-MCNC: 95 MG/DL (ref 65–140)
HCT VFR BLD AUTO: 42.1 % (ref 34.8–46.1)
HGB BLD-MCNC: 13.7 G/DL (ref 11.5–15.4)
IMM GRANULOCYTES # BLD AUTO: 0.04 THOUSAND/UL (ref 0–0.2)
IMM GRANULOCYTES NFR BLD AUTO: 1 % (ref 0–2)
INR PPP: 1.14 (ref 0.84–1.19)
KCT BLD-ACNC: 347 SEC (ref 89–137)
KCT BLD-ACNC: 354 SEC (ref 89–137)
KCT BLD-ACNC: 374 SEC (ref 89–137)
KCT BLD-ACNC: 388 SEC (ref 89–137)
KCT BLD-ACNC: 388 SEC (ref 89–137)
KCT BLD-ACNC: 408 SEC (ref 89–137)
KCT BLD-ACNC: 427 SEC (ref 89–137)
KCT BLD-ACNC: 504 SEC (ref 89–137)
LYMPHOCYTES # BLD AUTO: 1.69 THOUSANDS/ÂΜL (ref 0.6–4.47)
LYMPHOCYTES NFR BLD AUTO: 24 % (ref 14–44)
MCH RBC QN AUTO: 29.7 PG (ref 26.8–34.3)
MCHC RBC AUTO-ENTMCNC: 32.5 G/DL (ref 31.4–37.4)
MCV RBC AUTO: 91 FL (ref 82–98)
MONOCYTES # BLD AUTO: 0.58 THOUSAND/ÂΜL (ref 0.17–1.22)
MONOCYTES NFR BLD AUTO: 8 % (ref 4–12)
NEUTROPHILS # BLD AUTO: 4.45 THOUSANDS/ÂΜL (ref 1.85–7.62)
NEUTS SEG NFR BLD AUTO: 64 % (ref 43–75)
NRBC BLD AUTO-RTO: 0 /100 WBCS
PLATELET # BLD AUTO: 152 THOUSANDS/UL (ref 149–390)
PMV BLD AUTO: 12.1 FL (ref 8.9–12.7)
POTASSIUM SERPL-SCNC: 3.7 MMOL/L (ref 3.5–5.3)
PROTHROMBIN TIME: 14.5 SECONDS (ref 11.6–14.5)
RBC # BLD AUTO: 4.62 MILLION/UL (ref 3.81–5.12)
SODIUM SERPL-SCNC: 143 MMOL/L (ref 135–147)
SPECIMEN SOURCE: ABNORMAL
WBC # BLD AUTO: 6.95 THOUSAND/UL (ref 4.31–10.16)

## 2023-12-27 PROCEDURE — C1732 CATH, EP, DIAG/ABL, 3D/VECT: HCPCS | Performed by: INTERNAL MEDICINE

## 2023-12-27 PROCEDURE — C1894 INTRO/SHEATH, NON-LASER: HCPCS | Performed by: INTERNAL MEDICINE

## 2023-12-27 PROCEDURE — 85347 COAGULATION TIME ACTIVATED: CPT

## 2023-12-27 PROCEDURE — C1769 GUIDE WIRE: HCPCS | Performed by: INTERNAL MEDICINE

## 2023-12-27 PROCEDURE — C9113 INJ PANTOPRAZOLE SODIUM, VIA: HCPCS | Performed by: PHYSICIAN ASSISTANT

## 2023-12-27 PROCEDURE — C1766 INTRO/SHEATH,STRBLE,NON-PEEL: HCPCS | Performed by: INTERNAL MEDICINE

## 2023-12-27 PROCEDURE — 93657 TX L/R ATRIAL FIB ADDL: CPT | Performed by: INTERNAL MEDICINE

## 2023-12-27 PROCEDURE — 93005 ELECTROCARDIOGRAM TRACING: CPT

## 2023-12-27 PROCEDURE — 85025 COMPLETE CBC W/AUTO DIFF WBC: CPT | Performed by: PHYSICIAN ASSISTANT

## 2023-12-27 PROCEDURE — 85610 PROTHROMBIN TIME: CPT | Performed by: PHYSICIAN ASSISTANT

## 2023-12-27 PROCEDURE — NC001 PR NO CHARGE: Performed by: PHYSICIAN ASSISTANT

## 2023-12-27 PROCEDURE — 93623 PRGRMD STIMJ&PACG IV RX NFS: CPT | Performed by: INTERNAL MEDICINE

## 2023-12-27 PROCEDURE — 80048 BASIC METABOLIC PNL TOTAL CA: CPT | Performed by: PHYSICIAN ASSISTANT

## 2023-12-27 PROCEDURE — C1893 INTRO/SHEATH, FIXED,NON-PEEL: HCPCS | Performed by: INTERNAL MEDICINE

## 2023-12-27 PROCEDURE — C1759 CATH, INTRA ECHOCARDIOGRAPHY: HCPCS | Performed by: INTERNAL MEDICINE

## 2023-12-27 PROCEDURE — 93655 ICAR CATH ABLTJ DSCRT ARRHYT: CPT | Performed by: INTERNAL MEDICINE

## 2023-12-27 PROCEDURE — 76937 US GUIDE VASCULAR ACCESS: CPT | Performed by: INTERNAL MEDICINE

## 2023-12-27 PROCEDURE — 93656 COMPRE EP EVAL ABLTJ ATR FIB: CPT | Performed by: INTERNAL MEDICINE

## 2023-12-27 PROCEDURE — C1730 CATH, EP, 19 OR FEW ELECT: HCPCS | Performed by: INTERNAL MEDICINE

## 2023-12-27 RX ORDER — CEFAZOLIN SODIUM 2 G/50ML
SOLUTION INTRAVENOUS AS NEEDED
Status: DISCONTINUED | OUTPATIENT
Start: 2023-12-27 | End: 2023-12-27

## 2023-12-27 RX ORDER — SODIUM CHLORIDE 9 MG/ML
20 INJECTION, SOLUTION INTRAVENOUS ONCE
Status: DISCONTINUED | OUTPATIENT
Start: 2023-12-27 | End: 2023-12-27 | Stop reason: HOSPADM

## 2023-12-27 RX ORDER — PROTAMINE SULFATE 10 MG/ML
INJECTION, SOLUTION INTRAVENOUS AS NEEDED
Status: DISCONTINUED | OUTPATIENT
Start: 2023-12-27 | End: 2023-12-27

## 2023-12-27 RX ORDER — DILTIAZEM HYDROCHLORIDE 180 MG/1
360 CAPSULE, COATED, EXTENDED RELEASE ORAL DAILY
Status: DISCONTINUED | OUTPATIENT
Start: 2023-12-28 | End: 2023-12-28 | Stop reason: HOSPADM

## 2023-12-27 RX ORDER — FENTANYL CITRATE/PF 50 MCG/ML
25 SYRINGE (ML) INJECTION
Status: DISCONTINUED | OUTPATIENT
Start: 2023-12-27 | End: 2023-12-27 | Stop reason: HOSPADM

## 2023-12-27 RX ORDER — MIDAZOLAM HYDROCHLORIDE 2 MG/2ML
INJECTION, SOLUTION INTRAMUSCULAR; INTRAVENOUS AS NEEDED
Status: DISCONTINUED | OUTPATIENT
Start: 2023-12-27 | End: 2023-12-27

## 2023-12-27 RX ORDER — MELATONIN
1000 DAILY
Status: DISCONTINUED | OUTPATIENT
Start: 2023-12-28 | End: 2023-12-28 | Stop reason: HOSPADM

## 2023-12-27 RX ORDER — LIDOCAINE HYDROCHLORIDE 10 MG/ML
INJECTION, SOLUTION EPIDURAL; INFILTRATION; INTRACAUDAL; PERINEURAL AS NEEDED
Status: DISCONTINUED | OUTPATIENT
Start: 2023-12-27 | End: 2023-12-27

## 2023-12-27 RX ORDER — PROPOFOL 10 MG/ML
INJECTION, EMULSION INTRAVENOUS AS NEEDED
Status: DISCONTINUED | OUTPATIENT
Start: 2023-12-27 | End: 2023-12-27

## 2023-12-27 RX ORDER — PANTOPRAZOLE SODIUM 40 MG/1
40 TABLET, DELAYED RELEASE ORAL 2 TIMES DAILY
Status: DISCONTINUED | OUTPATIENT
Start: 2023-12-28 | End: 2023-12-27

## 2023-12-27 RX ORDER — HEPARIN SODIUM 10000 [USP'U]/100ML
INJECTION, SOLUTION INTRAVENOUS
Status: DISCONTINUED | OUTPATIENT
Start: 2023-12-27 | End: 2023-12-27 | Stop reason: HOSPADM

## 2023-12-27 RX ORDER — ATORVASTATIN CALCIUM 20 MG/1
20 TABLET, FILM COATED ORAL DAILY
Status: DISCONTINUED | OUTPATIENT
Start: 2023-12-27 | End: 2023-12-28 | Stop reason: HOSPADM

## 2023-12-27 RX ORDER — EPHEDRINE SULFATE 50 MG/ML
INJECTION INTRAVENOUS AS NEEDED
Status: DISCONTINUED | OUTPATIENT
Start: 2023-12-27 | End: 2023-12-27

## 2023-12-27 RX ORDER — FENTANYL CITRATE 50 UG/ML
INJECTION, SOLUTION INTRAMUSCULAR; INTRAVENOUS AS NEEDED
Status: DISCONTINUED | OUTPATIENT
Start: 2023-12-27 | End: 2023-12-27

## 2023-12-27 RX ORDER — HEPARIN SODIUM 1000 [USP'U]/ML
INJECTION, SOLUTION INTRAVENOUS; SUBCUTANEOUS CODE/TRAUMA/SEDATION MEDICATION
Status: DISCONTINUED | OUTPATIENT
Start: 2023-12-27 | End: 2023-12-27 | Stop reason: HOSPADM

## 2023-12-27 RX ORDER — PHENYLEPHRINE HCL IN 0.9% NACL 1 MG/10 ML
SYRINGE (ML) INTRAVENOUS AS NEEDED
Status: DISCONTINUED | OUTPATIENT
Start: 2023-12-27 | End: 2023-12-27

## 2023-12-27 RX ORDER — ROCURONIUM BROMIDE 10 MG/ML
INJECTION, SOLUTION INTRAVENOUS AS NEEDED
Status: DISCONTINUED | OUTPATIENT
Start: 2023-12-27 | End: 2023-12-27

## 2023-12-27 RX ORDER — ADENOSINE 3 MG/ML
INJECTION INTRAVENOUS CODE/TRAUMA/SEDATION MEDICATION
Status: DISCONTINUED | OUTPATIENT
Start: 2023-12-27 | End: 2023-12-27 | Stop reason: HOSPADM

## 2023-12-27 RX ORDER — HYDROXYZINE HYDROCHLORIDE 25 MG/1
25 TABLET, FILM COATED ORAL
Status: DISCONTINUED | OUTPATIENT
Start: 2023-12-27 | End: 2023-12-28 | Stop reason: HOSPADM

## 2023-12-27 RX ORDER — SERTRALINE HYDROCHLORIDE 25 MG/1
25 TABLET, FILM COATED ORAL DAILY
Status: DISCONTINUED | OUTPATIENT
Start: 2023-12-27 | End: 2023-12-28 | Stop reason: HOSPADM

## 2023-12-27 RX ORDER — SODIUM CHLORIDE 9 MG/ML
INJECTION, SOLUTION INTRAVENOUS CONTINUOUS PRN
Status: DISCONTINUED | OUTPATIENT
Start: 2023-12-27 | End: 2023-12-27

## 2023-12-27 RX ORDER — FLUTICASONE PROPIONATE 50 MCG
2 SPRAY, SUSPENSION (ML) NASAL DAILY
Status: DISCONTINUED | OUTPATIENT
Start: 2023-12-28 | End: 2023-12-28 | Stop reason: HOSPADM

## 2023-12-27 RX ORDER — FUROSEMIDE 20 MG/1
20 TABLET ORAL DAILY
Status: DISCONTINUED | OUTPATIENT
Start: 2023-12-27 | End: 2023-12-27

## 2023-12-27 RX ORDER — SODIUM CHLORIDE 9 MG/ML
50 INJECTION, SOLUTION INTRAVENOUS CONTINUOUS
Status: DISCONTINUED | OUTPATIENT
Start: 2023-12-27 | End: 2023-12-28

## 2023-12-27 RX ORDER — ACETAMINOPHEN 325 MG/1
650 TABLET ORAL EVERY 4 HOURS PRN
Status: DISCONTINUED | OUTPATIENT
Start: 2023-12-27 | End: 2023-12-28 | Stop reason: HOSPADM

## 2023-12-27 RX ORDER — PANTOPRAZOLE SODIUM 40 MG/10ML
40 INJECTION, POWDER, LYOPHILIZED, FOR SOLUTION INTRAVENOUS ONCE
Status: COMPLETED | OUTPATIENT
Start: 2023-12-27 | End: 2023-12-27

## 2023-12-27 RX ORDER — PANTOPRAZOLE SODIUM 40 MG/1
40 TABLET, DELAYED RELEASE ORAL 2 TIMES DAILY
Status: DISCONTINUED | OUTPATIENT
Start: 2023-12-27 | End: 2023-12-28 | Stop reason: HOSPADM

## 2023-12-27 RX ORDER — PANTOPRAZOLE SODIUM 40 MG/1
40 TABLET, DELAYED RELEASE ORAL DAILY
Status: DISCONTINUED | OUTPATIENT
Start: 2023-12-28 | End: 2023-12-27

## 2023-12-27 RX ADMIN — Medication 50 MCG: at 12:37

## 2023-12-27 RX ADMIN — MIDAZOLAM 2 MG: 1 INJECTION INTRAMUSCULAR; INTRAVENOUS at 08:27

## 2023-12-27 RX ADMIN — Medication 100 MCG: at 12:56

## 2023-12-27 RX ADMIN — Medication 100 MCG: at 08:48

## 2023-12-27 RX ADMIN — LIDOCAINE HYDROCHLORIDE 40 MG: 10 INJECTION, SOLUTION EPIDURAL; INFILTRATION; INTRACAUDAL; PERINEURAL at 08:43

## 2023-12-27 RX ADMIN — ACETAMINOPHEN 650 MG: 325 TABLET, FILM COATED ORAL at 16:15

## 2023-12-27 RX ADMIN — PANTOPRAZOLE SODIUM 40 MG: 40 TABLET, DELAYED RELEASE ORAL at 21:25

## 2023-12-27 RX ADMIN — Medication 50 MCG: at 12:27

## 2023-12-27 RX ADMIN — SERTRALINE 25 MG: 25 TABLET, FILM COATED ORAL at 16:13

## 2023-12-27 RX ADMIN — PROTAMINE SULFATE 70 MG: 10 INJECTION, SOLUTION INTRAVENOUS at 13:07

## 2023-12-27 RX ADMIN — ROCURONIUM BROMIDE 50 MG: 50 INJECTION, SOLUTION INTRAVENOUS at 08:43

## 2023-12-27 RX ADMIN — FUROSEMIDE 20 MG: 20 TABLET ORAL at 16:13

## 2023-12-27 RX ADMIN — Medication 100 MCG: at 12:45

## 2023-12-27 RX ADMIN — CEFAZOLIN SODIUM 2000 MG: 2 SOLUTION INTRAVENOUS at 12:58

## 2023-12-27 RX ADMIN — Medication 50 MCG: at 12:30

## 2023-12-27 RX ADMIN — Medication 100 MCG: at 12:51

## 2023-12-27 RX ADMIN — Medication 100 MCG: at 08:50

## 2023-12-27 RX ADMIN — FENTANYL CITRATE 25 MCG: 50 INJECTION INTRAMUSCULAR; INTRAVENOUS at 14:28

## 2023-12-27 RX ADMIN — SODIUM CHLORIDE: 0.9 INJECTION, SOLUTION INTRAVENOUS at 08:49

## 2023-12-27 RX ADMIN — SODIUM CHLORIDE: 9 INJECTION, SOLUTION INTRAVENOUS at 08:15

## 2023-12-27 RX ADMIN — CEFAZOLIN SODIUM 2000 MG: 2 SOLUTION INTRAVENOUS at 08:58

## 2023-12-27 RX ADMIN — PANTOPRAZOLE SODIUM 40 MG: 40 INJECTION, POWDER, FOR SOLUTION INTRAVENOUS at 08:39

## 2023-12-27 RX ADMIN — FENTANYL CITRATE 100 MCG: 50 INJECTION INTRAMUSCULAR; INTRAVENOUS at 08:43

## 2023-12-27 RX ADMIN — Medication 100 MCG: at 12:06

## 2023-12-27 RX ADMIN — FENTANYL CITRATE 50 MCG: 50 INJECTION INTRAMUSCULAR; INTRAVENOUS at 09:13

## 2023-12-27 RX ADMIN — SODIUM CHLORIDE 50 ML/HR: 0.9 INJECTION, SOLUTION INTRAVENOUS at 15:23

## 2023-12-27 RX ADMIN — EPHEDRINE SULFATE 10 MG: 50 INJECTION INTRAVENOUS at 09:34

## 2023-12-27 RX ADMIN — ROCURONIUM BROMIDE 20 MG: 50 INJECTION, SOLUTION INTRAVENOUS at 10:48

## 2023-12-27 RX ADMIN — EPHEDRINE SULFATE 10 MG: 50 INJECTION INTRAVENOUS at 08:51

## 2023-12-27 RX ADMIN — EPHEDRINE SULFATE 10 MG: 50 INJECTION INTRAVENOUS at 09:19

## 2023-12-27 RX ADMIN — ROCURONIUM BROMIDE 20 MG: 50 INJECTION, SOLUTION INTRAVENOUS at 11:25

## 2023-12-27 RX ADMIN — Medication 50 MCG: at 12:23

## 2023-12-27 RX ADMIN — Medication 100 MCG: at 12:43

## 2023-12-27 RX ADMIN — ATORVASTATIN CALCIUM 20 MG: 20 TABLET, FILM COATED ORAL at 16:13

## 2023-12-27 RX ADMIN — HYDROXYZINE HYDROCHLORIDE 25 MG: 25 TABLET, FILM COATED ORAL at 21:25

## 2023-12-27 RX ADMIN — PROPOFOL 140 MG: 10 INJECTION, EMULSION INTRAVENOUS at 08:43

## 2023-12-27 NOTE — Clinical Note
1st attempt to reach [address] [place] [address] at [phone]. [facility] for Tita to give our office a call back. EP catheter inserted at the right atrium.

## 2023-12-27 NOTE — ANESTHESIA PREPROCEDURE EVALUATION
Procedure:  Cardiac eps/afib ablation (Chest)    Echo 8/17/2023    Left Ventricle: Left ventricular cavity size is normal. Wall thickness is normal. The left ventricular ejection fraction is 50%. Systolic function is low normal. Wall motion is normal.    Left Atrium: The atrium is moderately dilated.    Right Atrium: The atrium is mildly dilated.    Atrial Septum: No patent foramen ovale detected.    Left Atrial Appendage: There is normal function. There is no thrombus.    Mitral Valve: There is mild to moderate regurgitation.    Tricuspid Valve: There is mild regurgitation.    Aorta: There is a non-protruding atheroma.  Relevant Problems   CARDIO   (+) Acute diastolic congestive heart failure (HCC)   (+) Essential hypertension   (+) Hyperlipidemia   (+) Migraine without aura and without status migrainosus, not intractable   (+) Paroxysmal atrial fibrillation (HCC)   (+) Right-sided chest wall pain      GI/HEPATIC   (+) Esophageal reflux      NEURO/PSYCH   (+) Anxiety   (+) Migraine without aura and without status migrainosus, not intractable      PULMONARY   (+) KOSTA (obstructive sleep apnea)        Physical Exam    Airway    Mallampati score: IV  TM Distance: >3 FB  Neck ROM: full     Dental   No notable dental hx     Cardiovascular  Rhythm: regular, Rate: normal    Pulmonary   Breath sounds clear to auscultation    Other Findings  post-pubertal.      Anesthesia Plan  ASA Score- 3     Anesthesia Type- general with ASA Monitors.         Additional Monitors: arterial line.    Airway Plan: ETT.    Comment: Risks/benefits and alternatives discussed with patient including possible PONV, sore throat, damage to teeth/lips/gums/esophagus, and possibility of rare anesthetic and surgical emergencies including but not limited to heart attack, stroke, and/or death. All questions were answered.  .       Plan Factors-Exercise tolerance (METS): >4 METS.    Chart reviewed.   Existing labs reviewed. Patient summary  reviewed.    Patient is not a current smoker.              Induction- intravenous.    Postoperative Plan- Plan for postoperative opioid use. Planned trial extubation    Informed Consent- Anesthetic plan and risks discussed with patient, spouse and sibling.  I personally reviewed this patient with the CRNA. Discussed and agreed on the Anesthesia Plan with the CRNA..

## 2023-12-27 NOTE — ANESTHESIA PROCEDURE NOTES
Arterial Line Insertion    Performed by: Nirmala Herman CRNA  Authorized by: Benton Cordova DO

## 2023-12-27 NOTE — H&P
[facility]  H&P: Electrophysiology  Date of Service: 2023  Hospital Day: 0  Name: Cindy Ching I  MRN: [id number]  Unit/Bed#: BE CATH LAB ROOM      Assessment/Plan   Paroxysmal atrial fibrillation  Diagnosed 2023 after noticing elevated heart rates on home monitor x 6 weeks  Cardioversion: August 2023 x 2-unsuccessful  Antiarrhythmics: Amiodarone 200 mg daily  Rate control- Cardizem discontinued due to concerns of tremors   Plan for atrial fibrillation ablation today with Dr. Ellington  Hyperlipidemia  Chronic diastolic heart failure  Hypertension  GERD       History of Present Illness   HPI: Cindy Ching is a 68 y.o. year old female with above past medical history who presents today for atrial fibrillation ablation with Dr. Ellington.  She initially presented to the cardiology office with complaints of an elevated heart rate on the home monitor over the course of the last 6 weeks.  She was noted to be in A-fib with RVR and sent to the hospital.  She was initiated on a Cardizem infusion and underwent 2 separate LAURENCE/cardioversions but it immediately went back into A-fib.  She was started on IV and oral amiodarone and discharged on diltiazem 360 mg, amiodarone 200 mg. She saw Dr. Ellington in October of this year at which time she was agreeable to undergo ablation.     Primary Cardiologist: Neli Elliott PA-C    Cardiac testin2023 EF 50%, LA mod dilated, RA mild dilated    EKG: Sinus rhythm     Historical Information   Past Medical History:  No date: Allergic reaction      Comment:  Resolved 2017   No date: Allergic rhinitis      Comment:  Resolved 2017   No date: Generalized anxiety disorder      Comment:  Resolved 2017   No date: High cholesterol  No date: Hypertension  No date: Microscopic hematuria      Comment:  Resolved 2017: Postural dizziness with presyncope  No date: Renal calculi      Comment:  Resolved  11/24/2017   No date: Renal colic      Comment:  Resolved 11/24/2017   No date: Renal cyst, acquired      Comment:  Resolved 11/24/2017   No date: Restrictive lung disease      Comment:  Resolved 11/24/2017   No date: Supraventricular tachycardia      Comment:  Resolved 11/24/2017  Past Surgical History:  No date: CHOLECYSTECTOMY  05/07/2014: CYSTOSCOPY      Comment:  Diagnostic. Last assessed 5/7/2014    No date: TONSILLECTOMY  No date: TUBAL LIGATION   Current Outpatient Medications   Medication Instructions    amiodarone 200 mg, Oral, Daily    apixaban (ELIQUIS) 5 mg, Oral, 2 times daily    atorvastatin (LIPITOR) 20 mg, Oral, Daily    butalbital-acetaminophen-caffeine (FIORICET,ESGIC) -40 mg per tablet 1 tablet, Oral, Every 4 hours PRN    cholecalciferol (VITAMIN D3) 1,000 Units, Oral, Daily, 2,000    fluticasone (FLONASE) 50 mcg/act nasal spray 2 sprays, Nasal, Daily    furosemide (LASIX) 20 mg, Oral, Daily    hydrOXYzine HCL (ATARAX) 25 mg tablet TAKE 1 TABLET BY MOUTH EVERY 8 HOURS AS NEEDED FOR ITCHING    pantoprazole (PROTONIX) 40 mg, Oral, 2 times daily    Prolia 60 MG/ML INJECT 60MG SUBCUTANEOUSLY EVERY 6 MONTHS    sertraline (ZOLOFT) 25 mg, Oral, Daily    Allergies   Allergen Reactions    Amlodipine Myalgia    Codeine      Other reaction(s): Other (See Comments)  headaches    Fosamax [Alendronate] GI Intolerance    Angiotensin Receptor Blockers Other (See Comments)     Metallic taste      Social History     Tobacco Use    Smoking status: Former    Smokeless tobacco: Never   Vaping Use    Vaping status: Never Used   Substance Use Topics    Alcohol use: Yes     Comment: occasional    Drug use: Never    Family History   Problem Relation Age of Onset    Lung cancer Mother     Heart attack Father     No Known Problems Sister     No Known Problems Sister     No Known Problems Sister     Heart attack Maternal Grandmother     Diabetes Paternal Grandmother     Colon cancer Maternal Aunt     Esophageal cancer  Maternal Aunt     No Known Problems Maternal Aunt     No Known Problems Paternal Aunt     Rheum arthritis Family         Rheumatoid arthritis with rheumatoid factor     No Known Problems Son     No Known Problems Son             Objective                            Vitals I/O    Most Recent Min/Max in 24hrs   Temp 98 °F (36.7 °C) Temp  Min: 98 °F (36.7 °C)  Max: 98 °F (36.7 °C)   Pulse 68 Pulse  Min: 68  Max: 68   Resp 20 Resp  Min: 20  Max: 20   BP (!) 197/79 BP  Min: 197/79  Max: 197/79   O2 Sat 96 % SpO2  Min: 96 %  Max: 96 %      Intake/Output Summary (Last 24 hours) at 12/27/2023 0904  Last data filed at 12/27/2023 0901  Gross per 24 hour   Intake 50 ml   Output 50 ml   Net 0 ml                        Physical Exam   Physical Exam  Vitals reviewed.   Constitutional:       General: She is not in acute distress.     Appearance: She is well-developed.   HENT:      Head: Normocephalic and atraumatic.   Eyes:      Pupils: Pupils are equal, round, and reactive to light.   Neck:      Vascular: No JVD.      Trachea: No tracheal deviation.   Cardiovascular:      Rate and Rhythm: Normal rate and regular rhythm.      Heart sounds: Normal heart sounds. No murmur heard.     No friction rub. No gallop.   Pulmonary:      Effort: Pulmonary effort is normal. No respiratory distress.      Breath sounds: Normal breath sounds. No wheezing or rales.   Chest:      Chest wall: No tenderness.   Abdominal:      General: There is no distension.      Palpations: Abdomen is soft.      Tenderness: There is no abdominal tenderness.   Skin:     General: Skin is warm and dry.   Neurological:      Mental Status: She is alert.            Labs:    CBC  Recent Labs     12/27/23  0642   WBC 6.95   HGB 13.7   HCT 42.1        BMP  Recent Labs     12/27/23  0642   SODIUM 143   K 3.7      CO2 29   AGAP 8   BUN 20   CREATININE 1.03   CALCIUM 10.1       Coags  Recent Labs     12/27/23  0642   INR 1.14        Additional Electrolytes  No  recent results       Blood Gas  No recent results  No recent results LFTs  No recent results    Infectious  No recent results  Glucose  Recent Labs     12/27/23  0642   GLUC 95           SIGNATURE: Jasmyne Villela PA-C

## 2023-12-27 NOTE — ANESTHESIA POSTPROCEDURE EVALUATION
Post-Op Assessment Note    CV Status:  Stable  Pain Score: 0    Pain management: adequate       Mental Status:  Arousable   PONV Controlled:  None   Airway Patency:  Patent  Airway: intubated     Post Op Vitals Reviewed: Yes      Staff: CRNA               /65 (12/27/23 1343)    Temp 97.7 °F (36.5 °C) (12/27/23 1343)    Pulse 96 (12/27/23 1343)   Resp 14 (12/27/23 1343)    SpO2 97 % (12/27/23 1343)

## 2023-12-27 NOTE — ANESTHESIA POSTPROCEDURE EVALUATION
Post-Op Assessment Note           Airway Patency:  Patent        Reason for prolonged intubation > 24 hours:  Inaccurate, pt not intubated postopReason for prolonged intubation > 48 hours:  Inaccurate, pt not intubated postop          BP 98/58 (12/27/23 1445)    Temp      Pulse 86 (12/27/23 1445)   Resp 14 (12/27/23 1445)    SpO2 94 % (12/27/23 1445)

## 2023-12-27 NOTE — ANESTHESIA PROCEDURE NOTES
"Arterial Line Insertion    Performed by: Nirmala Herman CRNA  Authorized by: Benton Cordova DO  Consent: Verbal consent obtained.  Risks and benefits: risks, benefits and alternatives were discussed  Consent given by: patient  Patient understanding: patient states understanding of the procedure being performed  Patient consent: the patient's understanding of the procedure matches consent given  Procedure consent: procedure consent matches procedure scheduled  Relevant documents: relevant documents present and verified  Test results: test results available and properly labeled  Site marked: the operative site was marked  Radiology Images: Radiology Images displayed and confirmed. If images not available, report reviewed  Patient identity confirmed: verbally with patient, arm band and hospital-assigned identification number  Time out: Immediately prior to procedure a \"time out\" was called to verify the correct patient, procedure, equipment, support staff and site/side marked as required.  Preparation: Patient was prepped and draped in the usual sterile fashion.  Indications: multiple ABGs and hemodynamic monitoring  Orientation:  Right  Location: radial artery  Procedure Details:  Needle gauge: 20  Seldinger technique: Seldinger technique used  Number of attempts: 1    Post-procedure:  Post-procedure: dressing applied  Waveform: good waveform  Post-procedure CNS: normal          "

## 2023-12-28 VITALS
WEIGHT: 179.01 LBS | HEIGHT: 63 IN | SYSTOLIC BLOOD PRESSURE: 126 MMHG | RESPIRATION RATE: 18 BRPM | HEART RATE: 92 BPM | DIASTOLIC BLOOD PRESSURE: 63 MMHG | TEMPERATURE: 98.2 F | OXYGEN SATURATION: 92 % | BODY MASS INDEX: 31.72 KG/M2

## 2023-12-28 LAB
ANION GAP SERPL CALCULATED.3IONS-SCNC: 7 MMOL/L
ATRIAL RATE: 62 BPM
ATRIAL RATE: 94 BPM
BUN SERPL-MCNC: 12 MG/DL (ref 5–25)
CALCIUM SERPL-MCNC: 7.7 MG/DL (ref 8.4–10.2)
CHLORIDE SERPL-SCNC: 108 MMOL/L (ref 96–108)
CO2 SERPL-SCNC: 26 MMOL/L (ref 21–32)
CREAT SERPL-MCNC: 0.92 MG/DL (ref 0.6–1.3)
ERYTHROCYTE [DISTWIDTH] IN BLOOD BY AUTOMATED COUNT: 13.4 % (ref 11.6–15.1)
GFR SERPL CREATININE-BSD FRML MDRD: 63 ML/MIN/1.73SQ M
GLUCOSE SERPL-MCNC: 94 MG/DL (ref 65–140)
HCT VFR BLD AUTO: 34 % (ref 34.8–46.1)
HGB BLD-MCNC: 10.9 G/DL (ref 11.5–15.4)
INR PPP: 1.15 (ref 0.84–1.19)
MCH RBC QN AUTO: 29.1 PG (ref 26.8–34.3)
MCHC RBC AUTO-ENTMCNC: 32.1 G/DL (ref 31.4–37.4)
MCV RBC AUTO: 91 FL (ref 82–98)
P AXIS: 56 DEGREES
P AXIS: 66 DEGREES
PLATELET # BLD AUTO: 129 THOUSANDS/UL (ref 149–390)
PMV BLD AUTO: 12.2 FL (ref 8.9–12.7)
POTASSIUM SERPL-SCNC: 3.2 MMOL/L (ref 3.5–5.3)
PR INTERVAL: 138 MS
PR INTERVAL: 140 MS
PROTHROMBIN TIME: 14.6 SECONDS (ref 11.6–14.5)
QRS AXIS: 49 DEGREES
QRS AXIS: 71 DEGREES
QRSD INTERVAL: 78 MS
QRSD INTERVAL: 79 MS
QT INTERVAL: 400 MS
QT INTERVAL: 416 MS
QTC INTERVAL: 422 MS
QTC INTERVAL: 501 MS
RBC # BLD AUTO: 3.74 MILLION/UL (ref 3.81–5.12)
SODIUM SERPL-SCNC: 141 MMOL/L (ref 135–147)
T WAVE AXIS: 38 DEGREES
T WAVE AXIS: 57 DEGREES
VENTRICULAR RATE: 62 BPM
VENTRICULAR RATE: 94 BPM
WBC # BLD AUTO: 7.81 THOUSAND/UL (ref 4.31–10.16)

## 2023-12-28 PROCEDURE — 85610 PROTHROMBIN TIME: CPT | Performed by: PHYSICIAN ASSISTANT

## 2023-12-28 PROCEDURE — 85027 COMPLETE CBC AUTOMATED: CPT | Performed by: PHYSICIAN ASSISTANT

## 2023-12-28 PROCEDURE — 80048 BASIC METABOLIC PNL TOTAL CA: CPT | Performed by: PHYSICIAN ASSISTANT

## 2023-12-28 PROCEDURE — 93010 ELECTROCARDIOGRAM REPORT: CPT | Performed by: INTERNAL MEDICINE

## 2023-12-28 RX ORDER — POTASSIUM CHLORIDE 20 MEQ/1
40 TABLET, EXTENDED RELEASE ORAL ONCE
Qty: 2 TABLET | Refills: 0 | Status: DISCONTINUED | OUTPATIENT
Start: 2023-12-28 | End: 2023-12-28 | Stop reason: HOSPADM

## 2023-12-28 RX ORDER — POTASSIUM CHLORIDE 20 MEQ/1
40 TABLET, EXTENDED RELEASE ORAL ONCE
Qty: 2 TABLET | Refills: 0 | Status: COMPLETED | OUTPATIENT
Start: 2023-12-28 | End: 2023-12-28

## 2023-12-28 RX ORDER — DILTIAZEM HYDROCHLORIDE 360 MG/1
360 CAPSULE, EXTENDED RELEASE ORAL DAILY
Qty: 90 CAPSULE | Refills: 3 | Status: SHIPPED | OUTPATIENT
Start: 2023-12-29 | End: 2024-12-23

## 2023-12-28 RX ADMIN — APIXABAN 5 MG: 5 TABLET, FILM COATED ORAL at 08:46

## 2023-12-28 RX ADMIN — POTASSIUM CHLORIDE 40 MEQ: 1500 TABLET, EXTENDED RELEASE ORAL at 10:35

## 2023-12-28 RX ADMIN — DILTIAZEM HYDROCHLORIDE 360 MG: 180 CAPSULE, COATED, EXTENDED RELEASE ORAL at 08:47

## 2023-12-28 RX ADMIN — PANTOPRAZOLE SODIUM 40 MG: 40 TABLET, DELAYED RELEASE ORAL at 08:46

## 2023-12-28 RX ADMIN — SERTRALINE 25 MG: 25 TABLET, FILM COATED ORAL at 08:46

## 2023-12-28 RX ADMIN — ATORVASTATIN CALCIUM 20 MG: 20 TABLET, FILM COATED ORAL at 08:46

## 2023-12-28 RX ADMIN — Medication 1000 UNITS: at 08:46

## 2023-12-28 NOTE — DISCHARGE INSTR - AVS FIRST PAGE
NEW MEDICATIONS:  Take Protonix 40 mg for 30 days   Take Eliquis 5 mg twice daily   Start cardizem 360mg daily      RESTRICTIONS:   No heavy lifting or strenuous activity for one week.    Do not submerge in any water for one week or until groin heals. You may shower. Please let soap and water run over the groins. Do not scrub the groins. Pat the area dry. You may place band-aid on groins daily for no more than 5 days.    If you notice bleeding, swelling, or large firm lumps near ablation incision, please contact Dr. Ellington's office at [phone].

## 2023-12-28 NOTE — DISCHARGE SUMMARY
Discharge Summary - Cindy Ching 68 y.o. female MRN: [id number]    Unit/Bed#: CW2 210-01 Encounter: [id number]      Admission Date: 12/27/2023   Discharge Date:     Discharge Diagnosis:   Paroxysmal atrial fibrillation  Diagnosed August 2023 after noticing elevated heart rates on home monitor x 6 weeks  Cardioversion: August 2023 x 2-unsuccessful  Antiarrhythmics: Amiodarone discontinued due to concerns of tremors  Rate control- Cardizem discontinued due to concerns of tremors, but patient restarted after conversation due to concerns of tachycardia.  She will maintain on this post procedure and we will readdress it at her outpatient follow up appointment.     Hyperlipidemia  Chronic diastolic heart failure  Hypertension  GERD    Procedures Performed:   -Atrial fibrillation ablation  -Intracardiac echo  -Transseptal puncture  -3D mapping (Carto)  -Posterior wall isolation  -CTI line placement   Orders Placed This Encounter   Procedures    Cardiac ep lab eps/ablations       Consultants: None    HPI: Please refer to the initial history and physical as well as procedure notes for full details. Briefly, Cindy Ching is a 68 y.o. year old female with atrial fibrillation.    She was seen by Dr. Ellington as an outpatient, and ablation was recommended. She presented this hospital admission to undergo this procedure.     Hospital Course: Cindy Ching presented at her baseline state of health. After the procedure was explained in detail and consent was obtained, she underwent ablation without complications. She tolerated the procedure well. She was then monitored overnight for further observation.    There were no acute issues or events overnight. The following morning She denied all cardiac complaints, including chest pain/pressure, dyspnea, palpitations, dizziness, lightheadedness, or syncope. Her vital signs were reviewed and labs are stable. Telemetry showed sinus rhythm with occasional sinus  tachycardia. Her groins were soft without significant hematoma or recurrent bleeding. Physical exam on the day of discharge was as follows:    Physical Exam  Vitals reviewed.   Constitutional:       General: She is not in acute distress.     Appearance: She is not ill-appearing.   HENT:      Head: Normocephalic.      Mouth/Throat:      Mouth: Mucous membranes are moist.   Cardiovascular:      Rate and Rhythm: Normal rate and regular rhythm.      Heart sounds: No murmur heard.     No friction rub. No gallop.   Pulmonary:      Effort: Pulmonary effort is normal. No respiratory distress.      Breath sounds: No wheezing or rales.   Abdominal:      Comments: Groin sites C/D/I without evidence of hematoma    Skin:     General: Skin is warm and dry.   Neurological:      Mental Status: She is alert.         She was given routine post ablation discharge instructions and restrictions, and these were explained in detail. She was given a follow up appointment and she was instructed to follow up with her primary cardiologist as previously instructed.    In terms of her medications:  Continue on anticoagulation with Eliquis 5 mg twice daily  Restart diltiazem 360 mg daily  Continue Protonix 40 mg twice daily for her GERD which will cover esophageal prophylaxis concerns post ablation x 30 days    She is stable for discharge at this time with all questions answered. She was discussed in detail with Dr. Ellington who is in agreement with this discharge summary.     Discharge Medications:  See after visit summary for reconciled discharge medications provided to patient and family.    Medications Prior to Admission   Medication    apixaban (Eliquis) 5 mg    atorvastatin (LIPITOR) 20 mg tablet    butalbital-acetaminophen-caffeine (FIORICET,ESGIC) -40 mg per tablet    cholecalciferol (VITAMIN D3) 1,000 units tablet    fluticasone (FLONASE) 50 mcg/act nasal spray    hydrOXYzine HCL (ATARAX) 25 mg tablet    pantoprazole (PROTONIX)  40 mg tablet    sertraline (ZOLOFT) 25 mg tablet    amiodarone 200 mg tablet    furosemide (LASIX) 20 mg tablet    Prolia 60 MG/ML         Pertininet Labs/diagnostics:  CBC with diff:   Results from last 7 days   Lab Units 12/28/23 0427 12/27/23  0642   WBC Thousand/uL 7.81 6.95   HEMOGLOBIN g/dL 10.9* 13.7   HEMATOCRIT % 34.0* 42.1   MCV fL 91 91   PLATELETS Thousands/uL 129* 152   RBC Million/uL 3.74* 4.62   MCH pg 29.1 29.7   MCHC g/dL 32.1 32.5   RDW % 13.4 13.2   MPV fL 12.2 12.1   NRBC AUTO /100 WBCs  --  0       BMP:  Results from last 7 days   Lab Units 12/28/23 0427 12/27/23  0642   POTASSIUM mmol/L 3.2* 3.7   CHLORIDE mmol/L 108 106   CO2 mmol/L 26 29   BUN mg/dL 12 20   CREATININE mg/dL 0.92 1.03   CALCIUM mg/dL 7.7* 10.1       Magnesium:       Coags:   Results from last 7 days   Lab Units 12/28/23 0427 12/27/23  0642   INR  1.15 1.14         Complications: none    Condition at Discharge: good     Discharge instructions/Information to patient and family:   See after visit summary for information provided to patient and family.      Provisions for Follow-Up Care:  See after visit summary for information related to follow-up care and any pertinent home health orders.      Disposition: Home    Planned Readmission: No    Discharge Statement   I spent 45 minutes minutes discharging the patient. This time was spent on the day of discharge. I had direct contact with the patient on the day of discharge. Additional documentation is required if more than 30 minutes were spent on discharge. Evaluating the incision, discussing discharge instructions and restrictions, arranging follow up appointments, discussing medications

## 2024-01-02 ENCOUNTER — OFFICE VISIT (OUTPATIENT)
Dept: FAMILY MEDICINE CLINIC | Facility: CLINIC | Age: 69
End: 2024-01-02
Payer: COMMERCIAL

## 2024-01-02 VITALS
WEIGHT: 184.8 LBS | HEIGHT: 63 IN | HEART RATE: 85 BPM | DIASTOLIC BLOOD PRESSURE: 62 MMHG | TEMPERATURE: 97.2 F | SYSTOLIC BLOOD PRESSURE: 118 MMHG | BODY MASS INDEX: 32.74 KG/M2 | OXYGEN SATURATION: 98 %

## 2024-01-02 DIAGNOSIS — F41.9 ANXIETY: Primary | ICD-10-CM

## 2024-01-02 DIAGNOSIS — I70.1 ATHEROSCLEROSIS OF RENAL ARTERY (HCC): ICD-10-CM

## 2024-01-02 DIAGNOSIS — F51.01 PRIMARY INSOMNIA: ICD-10-CM

## 2024-01-02 DIAGNOSIS — I50.9 HEART FAILURE, UNSPECIFIED HF CHRONICITY, UNSPECIFIED HEART FAILURE TYPE (HCC): ICD-10-CM

## 2024-01-02 DIAGNOSIS — I48.0 PAROXYSMAL ATRIAL FIBRILLATION (HCC): ICD-10-CM

## 2024-01-02 PROCEDURE — 99214 OFFICE O/P EST MOD 30 MIN: CPT | Performed by: FAMILY MEDICINE

## 2024-01-02 RX ORDER — HYDROXYZINE HYDROCHLORIDE 25 MG/1
25 TABLET, FILM COATED ORAL EVERY 8 HOURS PRN
Qty: 30 TABLET | Refills: 1 | Status: SHIPPED | OUTPATIENT
Start: 2024-01-02

## 2024-01-02 NOTE — PROGRESS NOTES
Name: Cindy Ching      : 1955      MRN: [id number]  Encounter Provider: Jericho Scott DO  Encounter Date: 2024   Encounter department: [facility]    Assessment & Plan   I ordered lab work for her to get done within a month to assess her hemoglobin and platelets.  Continue other medications as prescribed.  Follow-up with specialist as scheduled.  Follow-up here in 4 months or as needed  1. Anxiety  -     hydrOXYzine HCL (ATARAX) 25 mg tablet; Take 1 tablet (25 mg total) by mouth every 8 (eight) hours as needed for anxiety    2. Heart failure, unspecified HF chronicity, unspecified heart failure type (HCC)    3. Atherosclerosis of renal artery (HCC)  -     CBC and differential  -     Comprehensive metabolic panel    4. Paroxysmal atrial fibrillation (HCC)  -     CBC and differential  -     Comprehensive metabolic panel    5. Primary insomnia  -     hydrOXYzine HCL (ATARAX) 25 mg tablet; Take 1 tablet (25 mg total) by mouth every 8 (eight) hours as needed for anxiety        Depression Screening and Follow-up Plan: Patient was screened for depression during today's encounter. They screened negative with a PHQ-2 score of 0.        Subjective     Patient here today for follow-up.  Patient just had a cardiac ablation done last week.  Feeling well.  Not as fatigued.  Still takes hydroxyzine as needed for sleep.      Review of Systems   Constitutional: Negative.    Respiratory: Negative.     Cardiovascular: Negative.    Gastrointestinal: Negative.    Genitourinary: Negative.        Past Medical History:   Diagnosis Date   • Allergic reaction     Resolved 2017    • Allergic rhinitis     Resolved 2017    • Generalized anxiety disorder     Resolved 2017    • High cholesterol    • Hypertension    • Microscopic hematuria     Resolved 2017    • Postural dizziness with presyncope 2021   • Renal calculi     Resolved 2017    • Renal colic     Resolved 2017     • Renal cyst, acquired     Resolved 11/24/2017    • Restrictive lung disease     Resolved 11/24/2017    • Supraventricular tachycardia     Resolved 11/24/2017      Past Surgical History:   Procedure Laterality Date   • CARDIAC ELECTROPHYSIOLOGY PROCEDURE N/A 12/27/2023    Procedure: Cardiac eps/afib ablation;  Surgeon: Juan Ellington MD;  Location:  [facility];  Service: Cardiology   • CHOLECYSTECTOMY     • CYSTOSCOPY  05/07/2014    Diagnostic. Last assessed 5/7/2014     • TONSILLECTOMY     • TUBAL LIGATION       Family History   Problem Relation Age of Onset   • Lung cancer Mother    • Heart attack Father    • No Known Problems Sister    • No Known Problems Sister    • No Known Problems Sister    • Heart attack Maternal Grandmother    • Diabetes Paternal Grandmother    • Colon cancer Maternal Aunt    • Esophageal cancer Maternal Aunt    • No Known Problems Maternal Aunt    • No Known Problems Paternal Aunt    • Rheum arthritis Family         Rheumatoid arthritis with rheumatoid factor    • No Known Problems Son    • No Known Problems Son      Social History     Socioeconomic History   • Marital status: /Civil Union     Spouse name: None   • Number of children: None   • Years of education: None   • Highest education level: None   Occupational History   • None   Tobacco Use   • Smoking status: Former   • Smokeless tobacco: Never   Vaping Use   • Vaping status: Never Used   Substance and Sexual Activity   • Alcohol use: Yes     Comment: occasional   • Drug use: Never   • Sexual activity: None   Other Topics Concern   • None   Social History Narrative   • None     Social Determinants of Health     Financial Resource Strain: Not on file   Food Insecurity: No Food Insecurity (8/16/2023)    Hunger Vital Sign    • Worried About Running Out of Food in the Last Year: Never true    • Ran Out of Food in the Last Year: Never true   Transportation Needs: No Transportation Needs (8/16/2023)    PRAPARE -  Transportation    • Lack of Transportation (Medical): No    • Lack of Transportation (Non-Medical): No   Physical Activity: Not on file   Stress: Not on file   Social Connections: Not on file   Intimate Partner Violence: Not on file   Housing Stability: Low Risk  (8/16/2023)    Housing Stability Vital Sign    • Unable to Pay for Housing in the Last Year: No    • Number of Places Lived in the Last Year: 1    • Unstable Housing in the Last Year: No     Current Outpatient Medications on File Prior to Visit   Medication Sig   • apixaban (Eliquis) 5 mg Take 1 tablet (5 mg total) by mouth 2 (two) times a day   • atorvastatin (LIPITOR) 20 mg tablet Take 1 tablet (20 mg total) by mouth daily   • butalbital-acetaminophen-caffeine (FIORICET,ESGIC) -40 mg per tablet Take 1 tablet by mouth every 4 (four) hours as needed for headaches   • cholecalciferol (VITAMIN D3) 1,000 units tablet Take 1,000 Units by mouth daily 2,000   • diltiazem (CARDIZEM CD) 360 MG 24 hr capsule Take 1 capsule (360 mg total) by mouth daily   • fluticasone (FLONASE) 50 mcg/act nasal spray 2 sprays into each nostril daily   • pantoprazole (PROTONIX) 40 mg tablet TAKE 1 TABLET BY MOUTH TWICE  DAILY   • Prolia 60 MG/ML INJECT 60MG SUBCUTANEOUSLY EVERY 6 MONTHS   • sertraline (ZOLOFT) 25 mg tablet Take 1 tablet (25 mg total) by mouth daily   • [DISCONTINUED] hydrOXYzine HCL (ATARAX) 25 mg tablet TAKE 1 TABLET BY MOUTH EVERY 8 HOURS AS NEEDED FOR ITCHING     Allergies   Allergen Reactions   • Amlodipine Myalgia   • Codeine      Other reaction(s): Other (See Comments)  headaches   • Fosamax [Alendronate] GI Intolerance   • Angiotensin Receptor Blockers Other (See Comments)     Metallic taste     Immunization History   Administered Date(s) Administered   • COVID-19 MODERNA VACC 0.5 ML IM 03/18/2021, 04/15/2021, 11/03/2021   • INFLUENZA 10/26/2018, 10/05/2019, 11/01/2020, 10/26/2022   • Influenza Quadrivalent Preservative Free 3 years and older IM  "10/28/2015, 10/19/2017   • Influenza Quadrivalent, 6-35 Months IM 11/12/2014   • Influenza Split High Dose Preservative Free IM 10/05/2019   • Influenza, high dose seasonal 0.7 mL 10/26/2018, 10/10/2020, 10/21/2021, 10/26/2022, 11/02/2023   • Influenza, seasonal, injectable 12/11/2013   • Pneumococcal Polysaccharide PPV23 12/11/2013       Objective     /62   Pulse 85   Temp (!) 97.2 °F (36.2 °C)   Ht 5' 3\" (1.6 m)   Wt 83.8 kg (184 lb 12.8 oz)   SpO2 98%   BMI 32.74 kg/m²     Physical Exam  Vitals reviewed.   Constitutional:       General: She is not in acute distress.     Appearance: Normal appearance. She is well-developed. She is not ill-appearing, toxic-appearing or diaphoretic.   HENT:      Head: Normocephalic and atraumatic.   Eyes:      Conjunctiva/sclera: Conjunctivae normal.   Cardiovascular:      Rate and Rhythm: Normal rate and regular rhythm.      Heart sounds: Normal heart sounds. No murmur heard.     No friction rub. No gallop.   Pulmonary:      Effort: Pulmonary effort is normal. No respiratory distress.      Breath sounds: Normal breath sounds. No wheezing, rhonchi or rales.   Musculoskeletal:      Right lower leg: No edema.      Left lower leg: No edema.   Neurological:      General: No focal deficit present.      Mental Status: She is alert and oriented to person, place, and time.   Psychiatric:         Mood and Affect: Mood normal.         Behavior: Behavior normal.         Thought Content: Thought content normal.         Judgment: Judgment normal.       Jericho Scott, DO    "

## 2024-01-30 ENCOUNTER — OFFICE VISIT (OUTPATIENT)
Dept: OBGYN CLINIC | Facility: CLINIC | Age: 69
End: 2024-01-30
Payer: COMMERCIAL

## 2024-01-30 VITALS
BODY MASS INDEX: 32.6 KG/M2 | HEIGHT: 63 IN | SYSTOLIC BLOOD PRESSURE: 158 MMHG | HEART RATE: 92 BPM | RESPIRATION RATE: 16 BRPM | WEIGHT: 184 LBS | DIASTOLIC BLOOD PRESSURE: 92 MMHG

## 2024-01-30 DIAGNOSIS — M70.62 TROCHANTERIC BURSITIS OF BOTH HIPS: ICD-10-CM

## 2024-01-30 DIAGNOSIS — M16.0 PRIMARY OSTEOARTHRITIS OF BOTH HIPS: Primary | ICD-10-CM

## 2024-01-30 DIAGNOSIS — M70.61 TROCHANTERIC BURSITIS OF BOTH HIPS: ICD-10-CM

## 2024-01-30 PROCEDURE — 1159F MED LIST DOCD IN RCRD: CPT | Performed by: STUDENT IN AN ORGANIZED HEALTH CARE EDUCATION/TRAINING PROGRAM

## 2024-01-30 PROCEDURE — 3077F SYST BP >= 140 MM HG: CPT | Performed by: STUDENT IN AN ORGANIZED HEALTH CARE EDUCATION/TRAINING PROGRAM

## 2024-01-30 PROCEDURE — 1160F RVW MEDS BY RX/DR IN RCRD: CPT | Performed by: STUDENT IN AN ORGANIZED HEALTH CARE EDUCATION/TRAINING PROGRAM

## 2024-01-30 PROCEDURE — 3080F DIAST BP >= 90 MM HG: CPT | Performed by: STUDENT IN AN ORGANIZED HEALTH CARE EDUCATION/TRAINING PROGRAM

## 2024-01-30 PROCEDURE — 99213 OFFICE O/P EST LOW 20 MIN: CPT | Performed by: STUDENT IN AN ORGANIZED HEALTH CARE EDUCATION/TRAINING PROGRAM

## 2024-01-30 NOTE — PROGRESS NOTES
Orthopedic Steroid Injection Note  Cindy Ching (68 y.o. female)  : 1955 Encounter Date: 2024  Dr. Stevo Sebastian, DO, Orthopedic Surgeon    Assessment/Plan  68 y.o. female with greater trochanteric bursitis and osteoarthritis of the bilateral Hips  She was offered and declined a CS injection due to the pain of the injection itself  Recommended that she complete physical therapy for bilateral hip strengthening, patient agreed  Referral for physical therapy placed at time of visit  Continue with activities as tolerated  Continue with conservative management of bilateral Hips  Discussed future consideration for US guided bilateral hip joints if physical therapy does not decrease symptoms  Discussed use of long acting anti-inflammatory prior to days of increased walking.    Subjective:  68 y.o. female presents to the office for follow up on her bilateral hips. The patient was last seen on 10/14/23 where she received CS injection for treatment of greater trochanteric bursitis of bilateral hips. She reports relief following previous injections. However she states that the pain has returned. She states that she does not want to proceed with repeat injections due to th painful experience of the injection itself. She reports pain when lying directly on hips and with increased walking activity. She states that she is also getting an increasing amount of pain in her bilateral groin. She states that he has decreased her amount of walking and no longer will sit with her legs crossed to help avoid pain inducing behaviors.   Patient denies any other acute or associated complaints. She denies numbness or tingling. Patient denies any symptoms of infection such as fever, chills, or rash.     ROS  Review of Systems   Constitutional:  Negative for chills, fever and unexpected weight change.   HENT:  Negative for hearing loss, nosebleeds and sore throat.    Eyes:  Negative for pain, redness and visual disturbance.    Respiratory:  Negative for cough, shortness of breath and wheezing.    Cardiovascular:  Negative for chest pain, palpitations and leg swelling.   Gastrointestinal:  Negative for abdominal pain, nausea and vomiting.   Endocrine: Negative for polydipsia and polyuria.   Genitourinary:  Negative for dysuria and hematuria.   Skin:  Negative for rash and wound.   Neurological:  Negative for dizziness, numbness and headaches.   Psychiatric/Behavioral:  Negative for decreased concentration and suicidal ideas. The patient is not nervous/anxious.    All other systems reviewed and are negative.      Past Medical History:   Diagnosis Date   • Allergic reaction     Resolved 11/24/2017    • Allergic rhinitis     Resolved 11/24/2017    • Generalized anxiety disorder     Resolved 11/24/2017    • High cholesterol    • Hypertension    • Microscopic hematuria     Resolved 11/24/2017    • Postural dizziness with presyncope 03/11/2021   • Renal calculi     Resolved 11/24/2017    • Renal colic     Resolved 11/24/2017    • Renal cyst, acquired     Resolved 11/24/2017    • Restrictive lung disease     Resolved 11/24/2017    • Supraventricular tachycardia     Resolved 11/24/2017      Past Surgical History:   Procedure Laterality Date   • CARDIAC ELECTROPHYSIOLOGY PROCEDURE N/A 12/27/2023    Procedure: Cardiac eps/afib ablation;  Surgeon: Juan Ellington MD;  Location: [facility];  Service: Cardiology   • CHOLECYSTECTOMY     • CYSTOSCOPY  05/07/2014    Diagnostic. Last assessed 5/7/2014     • TONSILLECTOMY     • TUBAL LIGATION       Results Reviewed     None          Physical Exam  Physical Exam  HENT:      Head: Normocephalic and atraumatic.      Nose: Nose normal.   Eyes:      Conjunctiva/sclera: Conjunctivae normal.   Cardiovascular:      Rate and Rhythm: Normal rate.   Pulmonary:      Effort: Pulmonary effort is normal.   Musculoskeletal:      Cervical back: Neck supple.   Skin:     General: Skin is warm and dry.       Capillary Refill: Capillary refill takes less than 2 seconds.   Neurological:      Mental Status: She is alert and oriented to person, place, and time.   Psychiatric:         Mood and Affect: Mood normal.         Behavior: Behavior normal.       Ortho Exam  bilateral Hip -  Patient presents with no anatomical deformity  Ambulates with steady gait pattern  Uses No assistive devices  TTP over greater trochanter / trochanteric bursa, and bilateral groin pain  ROM: 0-15 seated IR, 0-30 seated ER. 0-100 seated hip flexion  Smooth passive circumduction   MMT: 4/5 throughout  Knee flexor and extensor mechanism intact  Ankle DF and PF mechanism intact  2+ TP and DP pulses with brisk capillary refill to the toes  Sural, saphenous, tibial, superficial and deep peroneal motor and sensory distribution intact  Sensation to light touch       Procedures  None performed today        Scribe Attestation    I,:  Ewa Menon am acting as a scribe while in the presence of the attending physician.:       I,:  Stevo Sebastian, DO personally performed the services described in this documentation    as scribed in my presence.:

## 2024-02-01 ENCOUNTER — OFFICE VISIT (OUTPATIENT)
Dept: CARDIOLOGY CLINIC | Facility: CLINIC | Age: 69
End: 2024-02-01
Payer: COMMERCIAL

## 2024-02-01 VITALS
DIASTOLIC BLOOD PRESSURE: 74 MMHG | SYSTOLIC BLOOD PRESSURE: 132 MMHG | WEIGHT: 182 LBS | HEIGHT: 63 IN | BODY MASS INDEX: 32.25 KG/M2 | HEART RATE: 71 BPM

## 2024-02-01 DIAGNOSIS — I48.0 PAROXYSMAL ATRIAL FIBRILLATION (HCC): Primary | ICD-10-CM

## 2024-02-01 PROCEDURE — 93000 ELECTROCARDIOGRAM COMPLETE: CPT | Performed by: PHYSICIAN ASSISTANT

## 2024-02-01 PROCEDURE — 99213 OFFICE O/P EST LOW 20 MIN: CPT | Performed by: PHYSICIAN ASSISTANT

## 2024-02-01 NOTE — PROGRESS NOTES
Electrophysiology Office Note    Cindy Whitaker  1955  [id number]  HEART & VASCULAR [facility] [place]  [address] [place] [id number]        Assessment/Plan     Primary diagnosis:   Paroxysmal atrial fibrillation  Diagnosed 2023 after noticing elevated heart rates on home monitor x 6 weeks  Cardioversion in 2023 unsuccessful x 2  Started on amiodarone 200 mg daily  Rate control with diltiazem  Status post atrial fibrillation ablation with PVI, posterior wall, CTI line placement by Dr. Ellington on 2023  Since ablation she has felt great.  She has not had any fast heart rates, or episodes of shortness of breath/palpitations/chest pain.  EKG here normal sinus rhythm with ventricular rate of 71 bpm with QTc stable at 441 ms.  No significant ectopy noted  Plan  For now continue current medical regiment  Follow-up in 3 months to ensure no further palpitations or concerns for A-fib  Is following up with Dr. Reed in the future.  Hyperlipidemia  Chronic diastolic heart failure  Hypertension  GERD.    Rhythm History:   Atrial fibrillation:   Status post cardioversion x 2 which failed  Started on amiodarone  Status post A-fib ablation with PVI, CTI line, posterior wall     Atrial flutter:     SVT:     VT/VF/PVC:     Device history:   Pacemaker:    Defibrillator:    BIV PPM:    BIV ICD:    ILR:      Cardiac Testing:     ECHO: Results for orders placed during the hospital encounter of 21    Echo complete with contrast if indicated    Narrative  [address] [address] [phone]    Transthoracic Echocardiogram  2D, M-mode, Doppler, and Color Doppler    Study date:  18-Mar-2021    Patient: CINDY WHITAKER  MR number: [id number]  Account number: [id number]  : 1955  Age: 65 years  Gender: Female  Status: Outpatient  Location: Echo lab  Height: 63 in  Weight: 168.7 lb  BP: 160/ 74  mmHg    Indications: ORTHOSTATIC HYPOTENSION, NEAR SYNCOPE    Diagnoses: I95.1 - Orthostatic hypotension    Sonographer:  Noa Ayoub RDCS  Primary Physician:  Jericho Scott DO  Referring Physician:  Misael Schaefer MD  Group:  [facility]  Interpreting Physician:  Jericho Reed MD    SUMMARY    PROCEDURE INFORMATION:  This was a technically difficult study.    LEFT VENTRICLE:  Size was normal.  Systolic function was normal. Ejection fraction was estimated to be 60 %.  There were no regional wall motion abnormalities.  Wall thickness was at the upper limits of normal.    MITRAL VALVE:  There was mild regurgitation.    AORTIC VALVE:  Transaortic velocity was increased due to increased transvalvular flow. The AV appears to open adequately on 2D imaging although imaging is suboptimal.  Cannot exclude mild AS.  Valve peak gradient was 19 mmHg.  The valve was not well visualized.    TRICUSPID VALVE:  There was mild regurgitation.    HISTORY: PRIOR HISTORY: REACTIVE AIRWAY DISEASE, OBSTRUCTIVE SLEEP APNEA, HYPERLIPIDEMIA, POSTURAL DIZZINESS, FAMILY HISTORY OF SUDDEN CARDIAC DEATH    PROCEDURE: The procedure was performed in the echo lab. This was a routine study. The transthoracic approach was used. The study included complete 2D imaging, M-mode, complete spectral Doppler, and color Doppler. Images were obtained from  the parasternal, apical, subcostal, and suprasternal notch acoustic windows. This was a technically difficult study.    LEFT VENTRICLE: Size was normal. Systolic function was normal. Ejection fraction was estimated to be 60 %. There were no regional wall motion abnormalities. Wall thickness was at the upper limits of normal.    RIGHT VENTRICLE: The size was normal. Systolic function was normal. Wall thickness was normal.    LEFT ATRIUM: Size was normal.    RIGHT ATRIUM: Size was normal.    MITRAL VALVE: Valve structure was normal. There was normal leaflet separation. DOPPLER:  The transmitral velocity was within the normal range. There was no evidence for stenosis. There was mild regurgitation.    AORTIC VALVE: The valve was not well visualized. DOPPLER: Transaortic velocity was increased due to increased transvalvular flow. The AV appears to open adequately on 2D imaging although imaging is suboptimal.  Cannot exclude mild AS. There was no significant regurgitation.    TRICUSPID VALVE: The valve structure was normal. There was normal leaflet separation. DOPPLER: The transtricuspid velocity was within the normal range. There was no evidence for stenosis. There was mild regurgitation.    PULMONIC VALVE: Not well visualized.    PERICARDIUM: There was no pericardial effusion. The pericardium was normal in appearance.    AORTA: The root exhibited normal size.    MEASUREMENT TABLES    DOPPLER MEASUREMENTS  Aortic valve   (Reference normals)  Peak gradient   19 mmHg   (--)    SYSTEM MEASUREMENT TABLES    2D  %FS: 43.85 %  Ao Diam: 2.81 cm  EDV(Teich): 60.69 ml  EF(Teich): 75.82 %  ESV(Teich): 14.67 ml  IVSd: 1.13 cm  LA Area: 19.63 cm2  LA Diam: 3.66 cm  LVEDV MOD A4C: 82.12 ml  LVEF MOD A4C: 43.32 %  LVESV MOD A4C: 46.55 ml  LVIDd: 3.77 cm  LVIDs: 2.12 cm  LVLd A4C: 7.78 cm  LVLs A4C: 6.77 cm  LVOT Diam: 1.88 cm  LVPWd: 1.13 cm  RA Area: 13.43 cm2  RVIDd: 3.21 cm  RWT: 0.6  SV MOD A4C: 35.57 ml  SV(Teich): 46.01 ml    CW  AV Env.Ti: 300.67 ms  AV VTI: 44.26 cm  AV Vmax: 2.2 m/s  AV Vmean: 1.47 m/s  AV maxP.35 mmHg  AV meanP.89 mmHg  PV Vmax: 1.21 m/s  PV maxP.81 mmHg    MM  TAPSE: 2.57 cm    PW  BLOSSOM (VTI): 1.25 cm2  BLOSSOM Vmax: 1.3 cm2  BLOSSOM Vmax, Pt: 1.23 cm2  AVAI (VTI): 0 cm2/m2  AVAI Vmax: 0 cm2/m2  AVAI Vmax, Pt: 0 cm2/m2  E' Lat: 0.11 m/s  E' Sept: 0.08 m/s  E/E' Lat: 8.91  E/E' Sept: 12.57  LVOT Env.Ti: 291.15 ms  LVOT VTI: 19.84 cm  LVOT Vmax: 0.97 m/s  LVOT Vmean: 0.68 m/s  LVOT maxPG: 3.8 mmHg  LVOT meanP.15 mmHg  LVSI Dopp: 30.71 ml/m2  LVSV Dopp: 55.28 ml  MV A Chinedu:  0.97 m/s  MV Dec Tyler: 5.12 m/s2  MV DecT: 196.12 ms  MV E Chinedu: 1 m/s  MV E/A Ratio: 1.04  RVOT Vmax: 1.07 m/s  RVOT maxP.54 mmHg    [employer] Accredited Echocardiography Laboratory    Prepared and electronically signed by    Jericho Reed MD  Signed 18-Mar-2021 10:23:23        History of Present Illness     HPI/INTERVAL HISTORY:  Cindy is a 68-year-old female with past medical history as stated above. She was first diagnosed with atrial fibrillation in 2023 after noticing elevated heart rates with lower extremity swelling and associated shortness of breath.  She was seen in [facility] and was attempted cardioversion however this failed.  She was then loaded with IV and p.o. amiodarone and cardioversion was attempted.  She did convert successfully to normal sinus rhythm however this lasted only 2 days.  She was then seen by electrophysiology and recommended for atrial fibrillation ablation which she underwent successfully on  with Dr. Ellington who performed posterior wall, pulmonary vein isolation as well as CTI line placement.  Since then she has felt well.  She has not had any exertional dyspnea, chest pain, or any other associated symptoms.  She has not felt any palpitations.  She is concerned about coming off diltiazem due to an elevation in her blood pressure as she states that it took a while to find right blood pressure medication for her.  She was hesitant about coming off of this medication due to rising blood pressure.      Review of Systems   Constitutional:  Negative for fatigue and fever.   Respiratory:  Negative for chest tightness and shortness of breath.    Neurological:  Negative for dizziness and light-headedness.   All other systems reviewed and are negative.    ROS as noted above, otherwise 12 point review of systems was performed and is negative.       Historical Information   Social History     Socioeconomic History    Marital status: /Civil  Union     Spouse name: Not on file    Number of children: Not on file    Years of education: Not on file    Highest education level: Not on file   Occupational History    Not on file   Tobacco Use    Smoking status: Former    Smokeless tobacco: Never   Vaping Use    Vaping status: Never Used   Substance and Sexual Activity    Alcohol use: Yes     Comment: occasional    Drug use: Never    Sexual activity: Not on file   Other Topics Concern    Not on file   Social History Narrative    Not on file     Social Determinants of Health     Financial Resource Strain: Not on file   Food Insecurity: No Food Insecurity (8/16/2023)    Hunger Vital Sign     Worried About Running Out of Food in the Last Year: Never true     Ran Out of Food in the Last Year: Never true   Transportation Needs: No Transportation Needs (8/16/2023)    PRAPARE - Transportation     Lack of Transportation (Medical): No     Lack of Transportation (Non-Medical): No   Physical Activity: Not on file   Stress: Not on file   Social Connections: Not on file   Intimate Partner Violence: Not on file   Housing Stability: Low Risk  (8/16/2023)    Housing Stability Vital Sign     Unable to Pay for Housing in the Last Year: No     Number of Places Lived in the Last Year: 1     Unstable Housing in the Last Year: No     Past Medical History:   Diagnosis Date    Allergic reaction     Resolved 11/24/2017     Allergic rhinitis     Resolved 11/24/2017     Generalized anxiety disorder     Resolved 11/24/2017     High cholesterol     Hypertension     Microscopic hematuria     Resolved 11/24/2017     Postural dizziness with presyncope 03/11/2021    Renal calculi     Resolved 11/24/2017     Renal colic     Resolved 11/24/2017     Renal cyst, acquired     Resolved 11/24/2017     Restrictive lung disease     Resolved 11/24/2017     Supraventricular tachycardia     Resolved 11/24/2017      Past Surgical History:   Procedure Laterality Date    CARDIAC ELECTROPHYSIOLOGY PROCEDURE  N/A 12/27/2023    Procedure: Cardiac eps/afib ablation;  Surgeon: Juan Ellington MD;  Location: [facility];  Service: Cardiology    CHOLECYSTECTOMY      CYSTOSCOPY  05/07/2014    Diagnostic. Last assessed 5/7/2014      TONSILLECTOMY      TUBAL LIGATION       Social History     Substance and Sexual Activity   Alcohol Use Yes    Comment: occasional     Social History     Substance and Sexual Activity   Drug Use Never     Social History     Tobacco Use   Smoking Status Former   Smokeless Tobacco Never     Family History   Problem Relation Age of Onset    Lung cancer Mother     Heart attack Father     No Known Problems Sister     No Known Problems Sister     No Known Problems Sister     Heart attack Maternal Grandmother     Diabetes Paternal Grandmother     Colon cancer Maternal Aunt     Esophageal cancer Maternal Aunt     No Known Problems Maternal Aunt     No Known Problems Paternal Aunt     Rheum arthritis Family         Rheumatoid arthritis with rheumatoid factor     No Known Problems Son     No Known Problems Son        Meds/Allergies       Current Outpatient Medications:     apixaban (Eliquis) 5 mg, Take 1 tablet (5 mg total) by mouth 2 (two) times a day, Disp: 180 tablet, Rfl: 3    atorvastatin (LIPITOR) 20 mg tablet, Take 1 tablet (20 mg total) by mouth daily, Disp: 90 tablet, Rfl: 3    butalbital-acetaminophen-caffeine (FIORICET,ESGIC) -40 mg per tablet, Take 1 tablet by mouth every 4 (four) hours as needed for headaches, Disp: 30 tablet, Rfl: 0    cholecalciferol (VITAMIN D3) 1,000 units tablet, Take 1,000 Units by mouth daily 2,000, Disp: , Rfl:     diltiazem (CARDIZEM CD) 360 MG 24 hr capsule, Take 1 capsule (360 mg total) by mouth daily, Disp: 90 capsule, Rfl: 3    fluticasone (FLONASE) 50 mcg/act nasal spray, 2 sprays into each nostril daily, Disp: 16 g, Rfl: 3    hydrOXYzine HCL (ATARAX) 25 mg tablet, Take 1 tablet (25 mg total) by mouth every 8 (eight) hours as needed for anxiety, Disp:  "30 tablet, Rfl: 1    pantoprazole (PROTONIX) 40 mg tablet, TAKE 1 TABLET BY MOUTH TWICE  DAILY, Disp: 180 tablet, Rfl: 3    Prolia 60 MG/ML, INJECT 60MG SUBCUTANEOUSLY EVERY 6 MONTHS, Disp: 1 mL, Rfl: 0    sertraline (ZOLOFT) 25 mg tablet, Take 1 tablet (25 mg total) by mouth daily, Disp: 90 tablet, Rfl: 1    Allergies   Allergen Reactions    Amlodipine Myalgia    Codeine      Other reaction(s): Other (See Comments)  headaches    Fosamax [Alendronate] GI Intolerance    Angiotensin Receptor Blockers Other (See Comments)     Metallic taste       Objective   Vitals: Blood pressure 132/74, pulse 71, height 5' 3\" (1.6 m), weight 82.6 kg (182 lb).        Physical Exam  Vitals and nursing note reviewed.   Constitutional:       General: She is not in acute distress.     Appearance: She is normal weight.   Cardiovascular:      Rate and Rhythm: Normal rate and regular rhythm.   Pulmonary:      Effort: Pulmonary effort is normal.      Breath sounds: Normal breath sounds.   Musculoskeletal:      Right lower leg: No edema.      Left lower leg: No edema.   Skin:     General: Skin is warm and dry.      Coloration: Skin is not jaundiced.   Neurological:      Mental Status: She is alert.   Psychiatric:         Mood and Affect: Mood normal.           Labs:  Admission on 12/27/2023, Discharged on 12/28/2023   Component Date Value    Sodium 12/27/2023 143     Potassium 12/27/2023 3.7     Chloride 12/27/2023 106     CO2 12/27/2023 29     ANION GAP 12/27/2023 8     BUN 12/27/2023 20     Creatinine 12/27/2023 1.03     Glucose 12/27/2023 95     Glucose, Fasting 12/27/2023 95     Calcium 12/27/2023 10.1     eGFR 12/27/2023 55     WBC 12/27/2023 6.95     RBC 12/27/2023 4.62     Hemoglobin 12/27/2023 13.7     Hematocrit 12/27/2023 42.1     MCV 12/27/2023 91     MCH 12/27/2023 29.7     MCHC 12/27/2023 32.5     RDW 12/27/2023 13.2     MPV 12/27/2023 12.1     Platelets 12/27/2023 152     nRBC 12/27/2023 0     Neutrophils Relative 12/27/2023 64  "    Immat GRANS % 12/27/2023 1     Lymphocytes Relative 12/27/2023 24     Monocytes Relative 12/27/2023 8     Eosinophils Relative 12/27/2023 2     Basophils Relative 12/27/2023 1     Neutrophils Absolute 12/27/2023 4.45     Immature Grans Absolute 12/27/2023 0.04     Lymphocytes Absolute 12/27/2023 1.69     Monocytes Absolute 12/27/2023 0.58     Eosinophils Absolute 12/27/2023 0.11     Basophils Absolute 12/27/2023 0.08     Protime 12/27/2023 14.5     INR 12/27/2023 1.14     Activated Clotting Time,* 12/27/2023 504 (H)     Specimen Type 12/27/2023 VENOUS     Activated Clotting Time,* 12/27/2023 408 (H)     Specimen Type 12/27/2023 ARTERIAL     Activated Clotting Time,* 12/27/2023 388 (H)     Specimen Type 12/27/2023 VENOUS     Activated Clotting Time,* 12/27/2023 374 (H)     Specimen Type 12/27/2023 ARTERIAL     Activated Clotting Time,* 12/27/2023 354 (H)     Specimen Type 12/27/2023 ARTERIAL     Activated Clotting Time,* 12/27/2023 347 (H)     Specimen Type 12/27/2023 VENOUS     Activated Clotting Time,* 12/27/2023 427 (H)     Specimen Type 12/27/2023 VENOUS     Activated Clotting Time,* 12/27/2023 388 (H)     Specimen Type 12/27/2023 VENOUS     WBC 12/28/2023 7.81     RBC 12/28/2023 3.74 (L)     Hemoglobin 12/28/2023 10.9 (L)     Hematocrit 12/28/2023 34.0 (L)     MCV 12/28/2023 91     MCH 12/28/2023 29.1     MCHC 12/28/2023 32.1     RDW 12/28/2023 13.4     Platelets 12/28/2023 129 (L)     MPV 12/28/2023 12.2     Sodium 12/28/2023 141     Potassium 12/28/2023 3.2 (L)     Chloride 12/28/2023 108     CO2 12/28/2023 26     ANION GAP 12/28/2023 7     BUN 12/28/2023 12     Creatinine 12/28/2023 0.92     Glucose 12/28/2023 94     Calcium 12/28/2023 7.7 (L)     eGFR 12/28/2023 63     Protime 12/28/2023 14.6 (H)     INR 12/28/2023 1.15    Hospital Outpatient Visit on 12/27/2023   Component Date Value    Ventricular Rate 12/27/2023 62     Atrial Rate 12/27/2023 62     MO Interval 12/27/2023 140     QRSD Interval  12/27/2023 78     QT Interval 12/27/2023 416     QTC Interval 12/27/2023 422     P Axis 12/27/2023 56     QRS Axis 12/27/2023 49     T Wave McGraws 12/27/2023 38     Ventricular Rate 12/27/2023 94     Atrial Rate 12/27/2023 94     IL Interval 12/27/2023 138     QRSD Interval 12/27/2023 79     QT Interval 12/27/2023 400     QTC Interval 12/27/2023 501     P Axis 12/27/2023 66     QRS Axis 12/27/2023 71     T Wave McGraws 12/27/2023 57        Imaging: I have personally reviewed pertinent reports.

## 2024-02-14 ENCOUNTER — EVALUATION (OUTPATIENT)
Dept: PHYSICAL THERAPY | Facility: HOME HEALTHCARE | Age: 69
End: 2024-02-14
Payer: COMMERCIAL

## 2024-02-14 DIAGNOSIS — M70.62 TROCHANTERIC BURSITIS OF BOTH HIPS: ICD-10-CM

## 2024-02-14 DIAGNOSIS — M16.0 PRIMARY OSTEOARTHRITIS OF BOTH HIPS: ICD-10-CM

## 2024-02-14 DIAGNOSIS — M70.61 TROCHANTERIC BURSITIS OF BOTH HIPS: ICD-10-CM

## 2024-02-14 PROCEDURE — 97161 PT EVAL LOW COMPLEX 20 MIN: CPT | Performed by: PHYSICAL THERAPIST

## 2024-02-14 PROCEDURE — 97110 THERAPEUTIC EXERCISES: CPT | Performed by: PHYSICAL THERAPIST

## 2024-02-14 NOTE — PROGRESS NOTES
"PT Evaluation     Today's date: 2024  Patient name: Cindy Ching  : 1955  MRN: [id number]  Referring provider: Stevo Sebastian DO  Dx:   Encounter Diagnosis     ICD-10-CM    1. Primary osteoarthritis of both hips  M16.0 Ambulatory Referral to Physical Therapy      2. Trochanteric bursitis of both hips  M70.61 Ambulatory Referral to Physical Therapy    M70.62                      Assessment  Assessment details: Pt Cindy Ching is a 68 y.o. who presents to OPPT with s/s consistent with B hip OA and trochanteric bursitis. Pt presenting with limited hip mobility, mild decreased LE strength, joint inflammation, soft tissue restrictions/tightness, and increased pain with Wb'ing activities. Pt notes difficulty with stair negotiation and prolonged ambulation due to increasing pain.  Pt would benefit from skilled therapy services to address outlined impairments, work towards goals, and restore pts PLOF. Thank you!    Impairments: abnormal gait, abnormal or restricted ROM, activity intolerance, impaired balance, impaired physical strength, lacks appropriate home exercise program, pain with function, safety issue and weight-bearing intolerance  Understanding of Dx/Px/POC: good   Prognosis: good    Goals  STGs to be achieved in 4 weeks:  -Pt to demonstrate reduced subjective pain rating \"at worst\" by at least 2-3 points from Initial Eval to allow for reduced pain with ADLs and improved functional activity tolerance.   -Pt to demonstrate improved quadriceps flexibility to improve pelvic mobility and symmetry anteriorly/posteriorly.   -Pt to demonstrate increased MMT of BLE LE by at least 1/2 grade in order to improve safety and stability with ADLs and functional mobility.     LTGs to be achieved upon discharge:   -Pt will be I with HEP in order to continue to improve quality of life and independence and reduce risk for re-injury.   -Pt to demonstrate return to activities of daily living without " limiations or restrictions.   -Pt will return to ambulation > 1 hour to help facilitate return to community activities independently   -Pt to demonstrate improved function as noted by achieving or exceeding predicted score on FOTO outcomes assessment tool.          Plan  Patient would benefit from: skilled physical therapy  Planned modality interventions: thermotherapy: hydrocollator packs and cryotherapy  Planned therapy interventions: manual therapy, neuromuscular re-education, home exercise program, functional ROM exercises, gait training, flexibility, therapeutic exercise, therapeutic activities, patient education and balance  Frequency: 2x week  Duration in weeks: 12  Plan of Care beginning date: 2/14/2024  Plan of Care expiration date: 5/8/2024  Treatment plan discussed with: patient and referring physician      Subjective Evaluation    History of Present Illness  Mechanism of injury: Per ortho MD note: 68 y.o. female presents to the office for follow up on her bilateral hips. The patient was last seen on 10/14/23 where she received CS injection for treatment of greater trochanteric bursitis of bilateral hips. She reports relief following previous injections. However she states that the pain has returned. She states that she does not want to proceed with repeat injections due to th painful experience of the injection itself. She reports pain when lying directly on hips and with increased walking activity. She states that she is also getting an increasing amount of pain in her bilateral groin. She states that he has decreased her amount of walking and no longer will sit with her legs crossed to help avoid pain inducing behaviors.     Pt notes that she does not have follow up with MD scheduled; will see how therapy goes first.       Patient Goals  Patient goals for therapy: decreased edema, decreased pain, improved balance, increased motion, increased strength and independence with ADLs/IADLs    Pain  At best  pain ratin  At worst pain ratin  Quality: dull ache  Aggravating factors: standing, walking and stair climbing    Social Support  Steps to enter house: yes  Stairs in house: yes       Diagnostic Tests  X-ray: abnormal (mild OA)  Treatments  Previous treatment: injection treatment  Current treatment: physical therapy      Objective     Tenderness     Left Hip   Tenderness in the greater trochanter.     Right Hip   Tenderness in the greater trochanter.     Neurological Testing     Sensation     Hip   Left Hip   Intact: light touch    Right Hip   Intact: light touch    Active Range of Motion   Left Hip   Flexion: 110 degrees     Right Hip   Flexion: 105 degrees     Strength/Myotome Testing     Left Hip   Planes of Motion   Flexion: 4-    Right Hip   Planes of Motion   Flexion: 4    Left Knee   Flexion: 4  Extension: 4+    Right Knee   Flexion: 4+  Extension: 5    Left Ankle/Foot   Dorsiflexion: 4+  Plantar flexion: 5    Right Ankle/Foot   Dorsiflexion: 4+  Plantar flexion: 5    Tests     Left Hip   Positive piriformis.   Modified Thien: Positive.     Right Hip   Positive piriformis.   Modified Thien: Positive.     Additional Tests Details  SLR L 75;  R 65     Ambulation   Weight-Bearing Status   Assistive device used: none    Ambulation: Level Surfaces     Additional Level Surfaces Ambulation Details  Tolerance x 1 block     Ambulation: Stairs     Additional Stairs Ambulation Details  Variable dependent on pain levels              Re-eval Date: 3/14/24        Manuals        Quad, HS, piriformis                                 Neuro Re-Ed         Minerva lungmarlyn         Balance as appropriate                                                 Ther Ex        NuStep         HR/TR        Standing hip flex/abd/ext        Squats        LAQ        Hip add        Hip abd        SLR        S/L SLR        Clamshells        Bridges         Self quad stretch HEP       Self HS stretch  HEP               Ther Activity         Step-ups  Fwd/Lat        Step-down                        Gait Training                        Modalities        CP prn

## 2024-02-15 ENCOUNTER — TELEPHONE (OUTPATIENT)
Dept: FAMILY MEDICINE CLINIC | Facility: CLINIC | Age: 69
End: 2024-02-15

## 2024-02-15 NOTE — TELEPHONE ENCOUNTER
I recommend she splits the sertraline in half, taking a half a pill daily for 1 week, then a half a pill every other day for 3 doses, then can stop    As per the Prolia, I do not see her loss as a side effect, though any side effect is possible.  I do not have any other recommendations for treating her osteoporosis.  I recommend that she stays on it.

## 2024-02-15 NOTE — TELEPHONE ENCOUNTER
"Pt was started on sertraline a few months ago and believes it has \"kicked in\" and is causing her to have bilateral foot and leg swelling.  Pt states she did not take the Sertraline last night and this morning her legs and feet are not swollen anymore.    Pt also states she is having a lot of hair loss and noticed it when she started on the prolia injections.  Asking if there is something else you can prescribe?  Unable to take Fosamax due to GI disturbance.   "

## 2024-02-20 ENCOUNTER — OFFICE VISIT (OUTPATIENT)
Dept: PHYSICAL THERAPY | Facility: HOME HEALTHCARE | Age: 69
End: 2024-02-20
Payer: COMMERCIAL

## 2024-02-20 DIAGNOSIS — M16.0 PRIMARY OSTEOARTHRITIS OF BOTH HIPS: Primary | ICD-10-CM

## 2024-02-20 DIAGNOSIS — M70.61 TROCHANTERIC BURSITIS OF BOTH HIPS: ICD-10-CM

## 2024-02-20 DIAGNOSIS — M70.62 TROCHANTERIC BURSITIS OF BOTH HIPS: ICD-10-CM

## 2024-02-20 PROCEDURE — 97110 THERAPEUTIC EXERCISES: CPT | Performed by: PHYSICAL THERAPIST

## 2024-02-20 PROCEDURE — 97140 MANUAL THERAPY 1/> REGIONS: CPT | Performed by: PHYSICAL THERAPIST

## 2024-02-20 NOTE — PROGRESS NOTES
"Daily Note     Today's date: 2024  Patient name: Cindy Ching  : 1955  MRN: [id number]  Referring provider: Stevo Sebastian DO  Dx:   Encounter Diagnosis     ICD-10-CM    1. Primary osteoarthritis of both hips  M16.0       2. Trochanteric bursitis of both hips  M70.61     M70.62                      Subjective: Pt reports that she is sore and painful in both hips today because she spent a lot of time on her feet and walking around the past couple of days.  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  Pt also notes discomfort in low back from bending over frequently making the baskets.  L hip has been bothering pt more than R since IE.        Objective: See treatment diary below      Assessment: Reviewed HEP to assess for proper form for completion. Good performance exercises with c/o minor discomfort with L stance leg for standing activities.  Fatigue noted at end of session.  Notable quad tightness B/L, L > R; improved with stretching and STM.        Plan: Continue per plan of care.      Re-eval Date: 3/14/24        Manuals       Quad, HS, piriformis   KK/HZ      STM/foam roller   KK/HZ                      Neuro Re-Ed         Bosu lunges         Balance as appropriate                                                 Ther Ex        NuStep   Lvl 1 10'      HR/TR  X 20 ea       Standing hip flex/abd/ext  X 10 ea Warren      Squats  X 10       LAQ  5\" x 15 Warren      Hip add  5\" x 10      Hip abd  Green x 10       SLR  X 10 ea      S/L SLR        Clamshells        Bridges         Self quad stretch HEP Reviewed      Self HS stretch  HEP Reviewed              Ther Activity        Step-ups  Fwd/Lat  C fwd only x 5 ea leg lead      Step-down  C x 5 ea leg lead                       Gait Training                        Modalities        CP prn                         "

## 2024-02-22 ENCOUNTER — OFFICE VISIT (OUTPATIENT)
Dept: PHYSICAL THERAPY | Facility: HOME HEALTHCARE | Age: 69
End: 2024-02-22
Payer: COMMERCIAL

## 2024-02-22 DIAGNOSIS — M70.62 TROCHANTERIC BURSITIS OF BOTH HIPS: ICD-10-CM

## 2024-02-22 DIAGNOSIS — M70.61 TROCHANTERIC BURSITIS OF BOTH HIPS: ICD-10-CM

## 2024-02-22 DIAGNOSIS — M16.0 PRIMARY OSTEOARTHRITIS OF BOTH HIPS: Primary | ICD-10-CM

## 2024-02-22 PROCEDURE — 97110 THERAPEUTIC EXERCISES: CPT

## 2024-02-22 PROCEDURE — 97140 MANUAL THERAPY 1/> REGIONS: CPT

## 2024-02-22 NOTE — PROGRESS NOTES
"Daily Note     Today's date: 2024  Patient name: Cindy Ching  : 1955  MRN: [id number]  Referring provider: Stevo Sebastian DO  Dx:   Encounter Diagnosis     ICD-10-CM    1. Primary osteoarthritis of both hips  M16.0       2. Trochanteric bursitis of both hips  M70.61     M70.62                  Subjective: Pt reports she has a little pain in her hips today.       Objective: See treatment diary below      Assessment: Tolerated treatment fairly well. Verbal cues needed to perform exercises correctly. Progressed to increased reps with step ups, step downs, hip adduction and hip adduction today.  Pt reports soreness but no increased pain both hips t/o exercise. Fatigue noted with exercise. Bilateral quad tightness, L> R  evident with stretching. Patient would benefit from continued PT      Plan: Continue per plan of care.      Re-eval Date: 3/14/24        Manuals      Quad, HS, piriformis   KK/HZ JK     STM/foam roller   KK/HZ JK                     Neuro Re-Ed         Bosu lunges         Balance as appropriate                                                 Ther Ex        NuStep   Lvl 1 10' L1  10'      HR/TR  X 20 ea  X 20 ea      Standing hip flex/abd/ext  X 10 ea Warren 1x 10 ea Warren     Squats  X 10  X 10      LAQ  5\" x 15 Warren 5\"x 15 Warren     Hip add  5\" x 10 5\"x 15     Hip abd  Green x 10  Green x 15     SLR  X 10 ea X 10 Warren      S/L SLR        Clamshells        Bridges         Self quad stretch HEP Reviewed      Self HS stretch  HEP Reviewed              Ther Activity        Step-ups  Fwd/Lat  C fwd only x 5 ea leg lead C fwd  1x10  Warren     Step-down  C x 5 ea leg lead  C x 10  Warren                     Gait Training                        Modalities        CP prn                           "

## 2024-02-23 ENCOUNTER — TELEPHONE (OUTPATIENT)
Dept: FAMILY MEDICINE CLINIC | Facility: CLINIC | Age: 69
End: 2024-02-23

## 2024-02-23 NOTE — TELEPHONE ENCOUNTER
Pt started taking Hydroxyzine for anxiety and sleep and it is helping but now c/o dry blotchy itchy skin patches on face that is spreading.  She believes it is due to the medication.  Please advise.

## 2024-02-23 NOTE — TELEPHONE ENCOUNTER
It is possible it could be from the Atarax.  She could try splitting the Atarax in half, taking a half a pill to see if it still helps her anxiety and her sleep without having the side effects.

## 2024-02-27 ENCOUNTER — OFFICE VISIT (OUTPATIENT)
Dept: PHYSICAL THERAPY | Facility: HOME HEALTHCARE | Age: 69
End: 2024-02-27
Payer: COMMERCIAL

## 2024-02-27 DIAGNOSIS — M70.62 TROCHANTERIC BURSITIS OF BOTH HIPS: ICD-10-CM

## 2024-02-27 DIAGNOSIS — M16.0 PRIMARY OSTEOARTHRITIS OF BOTH HIPS: Primary | ICD-10-CM

## 2024-02-27 DIAGNOSIS — M70.61 TROCHANTERIC BURSITIS OF BOTH HIPS: ICD-10-CM

## 2024-02-27 PROCEDURE — 97140 MANUAL THERAPY 1/> REGIONS: CPT

## 2024-02-27 PROCEDURE — 97530 THERAPEUTIC ACTIVITIES: CPT

## 2024-02-27 PROCEDURE — 97110 THERAPEUTIC EXERCISES: CPT

## 2024-02-27 NOTE — PROGRESS NOTES
"Daily Note     Today's date: 2024  Patient name: Cindy Ching  : 1955  MRN: [id number]  Referring provider: Stevo Sebastian DO  Dx:   Encounter Diagnosis     ICD-10-CM    1. Primary osteoarthritis of both hips  M16.0       2. Trochanteric bursitis of both hips  M70.61     M70.62           Start Time: 952  Stop Time: 1037  Total time in clinic (min): 45 minutes    Subjective: Patient reports bilateral hip soreness pre-tx.       Objective: See treatment diary below      Assessment: Patient tolerated treatment session well. Good performance exhibited with all TE throughout duration of treatment session. Gradually progressed POC as noted below to challenge strength and muscular endurance of BLEs. Patient challenged and fatigued by progressions however, was overall tolerable for patient. Noted most restrictions within proximal ITB on R>L side however, improved post manual work. Patient exhibited improved BLE ROM/flexibility by conclusion of visit. Patient would benefit from continued strengthening and stretching to address functional deficits.       Plan: Continue per POC. Increase reps/resistance as tolerated.      Re-eval Date: 3/14/24        Manuals     Quad, HS, piriformis   KK/HZ JK MS    STM/foam roller   KK/HZ JK MS                    Neuro Re-Ed         Bosu lunges         Balance as appropriate                                                 Ther Ex        NuStep   Lvl 1 10' L1  10'  L1  10'     HR/TR  X 20 ea  X 20 ea  X20 ea    Standing hip flex/abd/ext  X 10 ea Warren 1x 10 ea Warren X15 ea warren    Squats  X 10  X 10  x10    LAQ  5\" x 15 Warren 5\"x 15 Warren 5\"x 20 Warren    Hip add  5\" x 10 5\"x 15 5\" x 20    Hip abd  Green x 10  Green x 15 Green 3\" x 15    SLR  X 10 ea X 10 Warren  X10 ea    S/L SLR        Clamshells        Bridges     x10    Self quad stretch HEP Reviewed      Self HS stretch  HEP Reviewed              Ther Activity        Step-ups  Fwd/Lat  C fwd only x 5 ea leg lead C " fwd  1x10  Warren C fwd/lat  1x10  Warren    Step-down  C x 5 ea leg lead  C x 10  Warren C x 10  Warren                    Gait Training                        Modalities        CP prn

## 2024-02-29 ENCOUNTER — OFFICE VISIT (OUTPATIENT)
Dept: PHYSICAL THERAPY | Facility: HOME HEALTHCARE | Age: 69
End: 2024-02-29
Payer: COMMERCIAL

## 2024-02-29 DIAGNOSIS — M70.62 TROCHANTERIC BURSITIS OF BOTH HIPS: ICD-10-CM

## 2024-02-29 DIAGNOSIS — M70.61 TROCHANTERIC BURSITIS OF BOTH HIPS: ICD-10-CM

## 2024-02-29 DIAGNOSIS — M16.0 PRIMARY OSTEOARTHRITIS OF BOTH HIPS: Primary | ICD-10-CM

## 2024-02-29 PROCEDURE — 97110 THERAPEUTIC EXERCISES: CPT | Performed by: PHYSICAL THERAPIST

## 2024-02-29 PROCEDURE — 97140 MANUAL THERAPY 1/> REGIONS: CPT | Performed by: PHYSICAL THERAPIST

## 2024-02-29 NOTE — PROGRESS NOTES
"Daily Note     Today's date: 2024  Patient name: Cindy Ching  : 1955  MRN: [id number]  Referring provider: Stevo Sebastian DO  Dx:   Encounter Diagnosis     ICD-10-CM    1. Primary osteoarthritis of both hips  M16.0       2. Trochanteric bursitis of both hips  M70.61     M70.62                      Subjective: Pt reporting that she is sore today, maybe because of the rainy weather yesterday. Just an achy pain about 1-2/10.       Objective: See treatment diary below      Assessment: Pt tolerable to progression of reps this date. She did have increase in pain in B hips with step down activity. HS curl added to address posterior chain weakness. Pt also remaining with tightness t/o B hips including HS, quad, and ITB with need for continued therapy to address.     Plan: Continue per plan of care.      Re-eval Date: 3/14/24        Manuals    Quad, HS, piriformis   KK/HZ JK MS HZ   STM/foam roller   KK/HZ JK MS HZ                   Neuro Re-Ed         Bosu lunges         Balance as appropriate                                                 Ther Ex        NuStep   Lvl 1 10' L1  10'  L1  10'  L1 10 minutes    HR/TR  X 20 ea  X 20 ea  X20 ea X 20    Standing hip flex/abd/ext  X 10 ea Warren 1x 10 ea Warren X15 ea warren X 15 ea warren   Squats  X 10  X 10  x10 X 15   LAQ  5\" x 15 Warren 5\"x 15 Warren 5\"x 20 Warren 5\" x 15 warren    HS curl      Green x 15 warren    Hip add  5\" x 10 5\"x 15 5\" x 20    Hip abd  Green x 10  Green x 15 Green 3\" x 15 Green x 20    SLR  X 10 ea X 10 Warren  X10 ea X 15 warren    S/L SLR        Clamshells        Bridges     x10    Self quad stretch HEP Reviewed      Self HS stretch  HEP Reviewed              Ther Activity        Step-ups  Fwd/Lat  C fwd only x 5 ea leg lead C fwd  1x10  Warren C fwd/lat  1x10  Warren C fwd/lat   X 15 warren    Step-down  C x 5 ea leg lead  C x 10  Warren C x 10  Warren C x 10 warren                    Gait Training                        Modalities        CP prn           "

## 2024-03-05 ENCOUNTER — OFFICE VISIT (OUTPATIENT)
Dept: PHYSICAL THERAPY | Facility: HOME HEALTHCARE | Age: 69
End: 2024-03-05
Payer: COMMERCIAL

## 2024-03-05 ENCOUNTER — OFFICE VISIT (OUTPATIENT)
Dept: FAMILY MEDICINE CLINIC | Facility: CLINIC | Age: 69
End: 2024-03-05
Payer: COMMERCIAL

## 2024-03-05 VITALS
WEIGHT: 189.2 LBS | OXYGEN SATURATION: 94 % | BODY MASS INDEX: 33.52 KG/M2 | SYSTOLIC BLOOD PRESSURE: 128 MMHG | HEIGHT: 63 IN | DIASTOLIC BLOOD PRESSURE: 86 MMHG | TEMPERATURE: 96 F | HEART RATE: 90 BPM

## 2024-03-05 DIAGNOSIS — M70.62 TROCHANTERIC BURSITIS OF BOTH HIPS: ICD-10-CM

## 2024-03-05 DIAGNOSIS — M16.0 PRIMARY OSTEOARTHRITIS OF BOTH HIPS: Primary | ICD-10-CM

## 2024-03-05 DIAGNOSIS — M70.61 TROCHANTERIC BURSITIS OF BOTH HIPS: ICD-10-CM

## 2024-03-05 DIAGNOSIS — F41.9 ANXIETY: ICD-10-CM

## 2024-03-05 DIAGNOSIS — F51.01 PRIMARY INSOMNIA: Primary | ICD-10-CM

## 2024-03-05 PROCEDURE — 99213 OFFICE O/P EST LOW 20 MIN: CPT | Performed by: FAMILY MEDICINE

## 2024-03-05 PROCEDURE — 97140 MANUAL THERAPY 1/> REGIONS: CPT

## 2024-03-05 PROCEDURE — 97110 THERAPEUTIC EXERCISES: CPT

## 2024-03-05 NOTE — PROGRESS NOTES
Name: Cindy Ching      : 1955      MRN: [id number]  Encounter Provider: Jericho Scott DO  Encounter Date: 3/5/2024   Encounter department: [facility]    Assessment & Plan   Since her hydroxyzine did help relax her, I told her she may take it as needed.  I am not sure if it is the cause of her dry skin, but if it does get worse, she will call us.  I encouraged her to get her CBC and CMP done that I ordered back in January.  Follow-up here in May as scheduled or as needed  1. Primary insomnia    2. Anxiety           Subjective     Patient here today for follow-up on her insomnia and anxiety.  Patient stopped her sertraline, because she was gaining weight on it.  She stopped her hydroxyzine, because she felt it was making her face itch.  She states that is better now.  She did feel the hydroxyzine did help relax her.      Review of Systems   Constitutional: Negative.    Respiratory: Negative.     Cardiovascular: Negative.    Gastrointestinal: Negative.    Genitourinary: Negative.        Past Medical History:   Diagnosis Date   • Allergic reaction     Resolved 2017    • Allergic rhinitis     Resolved 2017    • Generalized anxiety disorder     Resolved 2017    • High cholesterol    • Hypertension    • Microscopic hematuria     Resolved 2017    • Postural dizziness with presyncope 2021   • Renal calculi     Resolved 2017    • Renal colic     Resolved 2017    • Renal cyst, acquired     Resolved 2017    • Restrictive lung disease     Resolved 2017    • Supraventricular tachycardia     Resolved 2017      Past Surgical History:   Procedure Laterality Date   • CARDIAC ELECTROPHYSIOLOGY PROCEDURE N/A 2023    Procedure: Cardiac eps/afib ablation;  Surgeon: Juan Ellington MD;  Location: [facility];  Service: Cardiology   • CHOLECYSTECTOMY     • CYSTOSCOPY  2014    Diagnostic. Last assessed 2014     • TONSILLECTOMY      • TUBAL LIGATION       Family History   Problem Relation Age of Onset   • Lung cancer Mother    • Heart attack Father    • No Known Problems Sister    • No Known Problems Sister    • No Known Problems Sister    • Heart attack Maternal Grandmother    • Diabetes Paternal Grandmother    • Colon cancer Maternal Aunt    • Esophageal cancer Maternal Aunt    • No Known Problems Maternal Aunt    • No Known Problems Paternal Aunt    • Rheum arthritis Family         Rheumatoid arthritis with rheumatoid factor    • No Known Problems Son    • No Known Problems Son      Social History     Socioeconomic History   • Marital status: /Civil Union     Spouse name: None   • Number of children: None   • Years of education: None   • Highest education level: None   Occupational History   • None   Tobacco Use   • Smoking status: Former   • Smokeless tobacco: Never   Vaping Use   • Vaping status: Never Used   Substance and Sexual Activity   • Alcohol use: Yes     Comment: occasional   • Drug use: Never   • Sexual activity: None   Other Topics Concern   • None   Social History Narrative   • None     Social Determinants of Health     Financial Resource Strain: Not on file   Food Insecurity: No Food Insecurity (8/16/2023)    Hunger Vital Sign    • Worried About Running Out of Food in the Last Year: Never true    • Ran Out of Food in the Last Year: Never true   Transportation Needs: No Transportation Needs (8/16/2023)    PRAPARE - Transportation    • Lack of Transportation (Medical): No    • Lack of Transportation (Non-Medical): No   Physical Activity: Not on file   Stress: Not on file   Social Connections: Not on file   Intimate Partner Violence: Not on file   Housing Stability: Low Risk  (8/16/2023)    Housing Stability Vital Sign    • Unable to Pay for Housing in the Last Year: No    • Number of Places Lived in the Last Year: 1    • Unstable Housing in the Last Year: No     Current Outpatient Medications on File Prior to Visit  "  Medication Sig   • apixaban (Eliquis) 5 mg Take 1 tablet (5 mg total) by mouth 2 (two) times a day   • atorvastatin (LIPITOR) 20 mg tablet Take 1 tablet (20 mg total) by mouth daily   • butalbital-acetaminophen-caffeine (FIORICET,ESGIC) -40 mg per tablet Take 1 tablet by mouth every 4 (four) hours as needed for headaches   • cholecalciferol (VITAMIN D3) 1,000 units tablet Take 1,000 Units by mouth daily 2,000   • diltiazem (CARDIZEM CD) 360 MG 24 hr capsule Take 1 capsule (360 mg total) by mouth daily   • fluticasone (FLONASE) 50 mcg/act nasal spray 2 sprays into each nostril daily   • hydrOXYzine HCL (ATARAX) 25 mg tablet Take 1 tablet (25 mg total) by mouth every 8 (eight) hours as needed for anxiety   • pantoprazole (PROTONIX) 40 mg tablet TAKE 1 TABLET BY MOUTH TWICE  DAILY   • Prolia 60 MG/ML INJECT 60MG SUBCUTANEOUSLY EVERY 6 MONTHS   • [DISCONTINUED] sertraline (ZOLOFT) 25 mg tablet Take 1 tablet (25 mg total) by mouth daily (Patient not taking: Reported on 3/5/2024)     Allergies   Allergen Reactions   • Amlodipine Myalgia   • Codeine      Other reaction(s): Other (See Comments)  headaches   • Fosamax [Alendronate] GI Intolerance   • Angiotensin Receptor Blockers Other (See Comments)     Metallic taste     Immunization History   Administered Date(s) Administered   • COVID-19 MODERNA VACC 0.5 ML IM 03/18/2021, 04/15/2021, 11/03/2021   • INFLUENZA 10/26/2018, 10/05/2019, 11/01/2020, 10/26/2022   • Influenza Quadrivalent Preservative Free 3 years and older IM 10/28/2015, 10/19/2017   • Influenza Quadrivalent, 6-35 Months IM 11/12/2014   • Influenza Split High Dose Preservative Free IM 10/05/2019   • Influenza, high dose seasonal 0.7 mL 10/26/2018, 10/10/2020, 10/21/2021, 10/26/2022, 11/02/2023   • Influenza, seasonal, injectable 12/11/2013   • Pneumococcal Polysaccharide PPV23 12/11/2013       Objective     /86   Pulse 90   Temp (!) 96 °F (35.6 °C)   Ht 5' 3\" (1.6 m)   Wt 85.8 kg (189 lb 3.2 " oz)   SpO2 94%   BMI 33.52 kg/m²     Physical Exam  Vitals reviewed.   Constitutional:       General: She is not in acute distress.     Appearance: Normal appearance. She is well-developed. She is not ill-appearing, toxic-appearing or diaphoretic.   HENT:      Head: Normocephalic and atraumatic.   Eyes:      Conjunctiva/sclera: Conjunctivae normal.   Cardiovascular:      Rate and Rhythm: Normal rate and regular rhythm.      Heart sounds: Normal heart sounds. No murmur heard.     No friction rub. No gallop.   Pulmonary:      Effort: Pulmonary effort is normal. No respiratory distress.      Breath sounds: Normal breath sounds. No wheezing, rhonchi or rales.   Musculoskeletal:      Right lower leg: No edema.      Left lower leg: No edema.   Neurological:      General: No focal deficit present.      Mental Status: She is alert and oriented to person, place, and time.   Psychiatric:         Mood and Affect: Mood normal.         Behavior: Behavior normal.         Thought Content: Thought content normal.         Judgment: Judgment normal.       Jericho Scott,

## 2024-03-05 NOTE — PROGRESS NOTES
"Daily Note     Today's date: 3/5/2024  Patient name: Cindy Ching  : 1955  MRN: [id number]  Referring provider: Stevo Sebastian DO  Dx:   Encounter Diagnosis     ICD-10-CM    1. Primary osteoarthritis of both hips  M16.0       2. Trochanteric bursitis of both hips  M70.61     M70.62                      Subjective: Pt reports her hips are sore today.       Objective: See treatment diary below      Assessment: Tolerated treatment fairly well. Some verbal cues needed to perform exercises correctly. Pt reports soreness both hips t/o exercise. Fatigue noted with exercise. Tightness evident B LE with stretching.  Patient would benefit from continued PT      Plan: Continue per plan of care.      Re-eval Date: 3/14/24        Manuals 3/5 2/20 2/22 2/27 2/29   Quad, HS, piriformis  JK KK/HZ JK MS HZ   STM/foam roller  JK KK/HZ JK MS HZ                   Neuro Re-Ed         Bosu lunges         Balance as appropriate                                                 Ther Ex        NuStep  L2 10'  Lvl 1 10' L1  10'  L1  10'  L1 10 minutes    HR/TR X 20 ea  X 20 ea  X 20 ea  X20 ea X 20    Standing hip flex/abd/ext X 15 ea Warren  X 10 ea Warren 1x 10 ea Warren X15 ea warren X 15 ea warren   Squats X 15  X 10  X 10  x10 X 15   LAQ 5\"x15 Warren  5\" x 15 Warren 5\"x 15 Warren 5\"x 20 Warren 5\" x 15 warren    HS curl  Green x 15 Warren     Green x 15 warren    Hip add 5\" x 20 5\" x 10 5\"x 15 5\" x 20    Hip abd Green x 20  Green x 10  Green x 15 Green 3\" x 15 Green x 20    SLR X 15 Warren  X 10 ea X 10 Warren  X10 ea X 15 warren    S/L SLR        Clamshells        Bridges     x10    Self quad stretch  Reviewed      Self HS stretch   Reviewed              Ther Activity        Step-ups  Fwd/Lat C fwd/Lat   X 15 Warren C fwd only x 5 ea leg lead C fwd  1x10  Warren C fwd/lat  1x10  Warren C fwd/lat   X 15 warren    Step-down C x 10 Warren  C x 5 ea leg lead  C x 10  Warren C x 10  Warren C x 10 warren                    Gait Training                        Modalities        CP prn              "

## 2024-03-07 ENCOUNTER — OFFICE VISIT (OUTPATIENT)
Dept: PHYSICAL THERAPY | Facility: HOME HEALTHCARE | Age: 69
End: 2024-03-07
Payer: COMMERCIAL

## 2024-03-07 DIAGNOSIS — M16.0 PRIMARY OSTEOARTHRITIS OF BOTH HIPS: Primary | ICD-10-CM

## 2024-03-07 DIAGNOSIS — M70.62 TROCHANTERIC BURSITIS OF BOTH HIPS: ICD-10-CM

## 2024-03-07 DIAGNOSIS — M70.61 TROCHANTERIC BURSITIS OF BOTH HIPS: ICD-10-CM

## 2024-03-07 PROCEDURE — 97110 THERAPEUTIC EXERCISES: CPT | Performed by: PHYSICAL THERAPIST

## 2024-03-07 NOTE — PROGRESS NOTES
"Daily Note     Today's date: 3/7/2024  Patient name: Cnidy Ching  : 1955  MRN: [id number]  Referring provider: Stevo Sebastian DO  Dx:   Encounter Diagnosis     ICD-10-CM    1. Primary osteoarthritis of both hips  M16.0       2. Trochanteric bursitis of both hips  M70.61     M70.62                      Subjective: Pt reporting that she is still very sore and the soreness in going into her lower back now.       Objective: See treatment diary below      Assessment: PT modified session significantly with focus on addition of self stretching activities which were provided for HEP. Pt remains with tightness into B quad and restrictions with HS flexibility due to pain anteriorly at B hips. Reviewed us of CP at home to address inflammatory response.     Plan: Continue per plan of care.      Re-eval Date: 3/14/24        Manuals 3/5 3/7 2/22 2/27 2/29   Quad, HS, piriformis  JK Quad + HS   HZ  JK MS HZ   STM/foam roller  JK  JK MS HZ                   Neuro Re-Ed         Bosu lunges         Balance as appropriate                                                 Ther Ex        NuStep  L2 10'  L2 10'  L1  10'  L1  10'  L1 10 minutes    HR/TR X 20 ea   X 20 ea  X20 ea X 20    Standing hip flex/abd/ext X 15 ea Warren  X 15 ea warren  1x 10 ea Warren X15 ea warren X 15 ea warren   Squats X 15   X 10  x10 X 15   LAQ 5\"x15 Warren  5\" x 15 warren  5\"x 15 Warren 5\"x 20 Warren 5\" x 15 warren    HS curl  Green x 15 Warren     Green x 15 warren    Hip add 5\" x 20  5\"x 15 5\" x 20    Hip abd Green x 20   Green x 15 Green 3\" x 15 Green x 20    SLR X 15 Warren   X 10 Warren  X10 ea X 15 warren    S/L SLR        Clamshells        Bridges     x10    Self quad stretch        Self HS stretch         LTR  10\" x 5       SKTC  10\" x 5               Ther Activity        Step-ups  Fwd/Lat C fwd/Lat   X 15 Warren  C fwd  1x10  Warren C fwd/lat  1x10  Warren C fwd/lat   X 15 warren    Step-down C x 10 Warren   C x 10  Warren C x 10  Warren C x 10 warren                    Gait Training                    "     Modalities        CP prn

## 2024-03-08 ENCOUNTER — TELEPHONE (OUTPATIENT)
Dept: FAMILY MEDICINE CLINIC | Facility: CLINIC | Age: 69
End: 2024-03-08

## 2024-03-08 NOTE — TELEPHONE ENCOUNTER
Agree with wearing the compression stockings and elevating legs.  Have her come in early next week for blood pressure and weight check

## 2024-03-08 NOTE — TELEPHONE ENCOUNTER
Spoke with pt and made aware and gave her directions as per Dr Scott   Sent her to front to schedule appointment

## 2024-03-08 NOTE — TELEPHONE ENCOUNTER
Pt called she states she had an appointment with you on Tuesday she states she mentioned her ankles were swollen but she had chinese the night before so that may have contributed to it.     She states that it has since spread up her leg a little and down her foot.   She states it is not red, when she first wakes the swelling is down a little, she is wearing her compression stockings today and is trying to keep them elevated.      Please advise

## 2024-03-11 ENCOUNTER — OFFICE VISIT (OUTPATIENT)
Dept: FAMILY MEDICINE CLINIC | Facility: CLINIC | Age: 69
End: 2024-03-11
Payer: COMMERCIAL

## 2024-03-11 VITALS
WEIGHT: 190.8 LBS | BODY MASS INDEX: 33.81 KG/M2 | TEMPERATURE: 95.8 F | OXYGEN SATURATION: 96 % | HEIGHT: 63 IN | DIASTOLIC BLOOD PRESSURE: 72 MMHG | SYSTOLIC BLOOD PRESSURE: 130 MMHG | HEART RATE: 90 BPM

## 2024-03-11 DIAGNOSIS — R60.0 BILATERAL LEG EDEMA: Primary | ICD-10-CM

## 2024-03-11 PROCEDURE — 99213 OFFICE O/P EST LOW 20 MIN: CPT | Performed by: FAMILY MEDICINE

## 2024-03-11 RX ORDER — POTASSIUM CHLORIDE 750 MG/1
10 TABLET, EXTENDED RELEASE ORAL DAILY
Qty: 30 TABLET | Refills: 3 | Status: SHIPPED | OUTPATIENT
Start: 2024-03-11

## 2024-03-11 RX ORDER — HYDROCHLOROTHIAZIDE 12.5 MG/1
12.5 TABLET ORAL DAILY
Qty: 30 TABLET | Refills: 3 | Status: SHIPPED | OUTPATIENT
Start: 2024-03-11 | End: 2024-09-07

## 2024-03-11 NOTE — PROGRESS NOTES
Name: Cindy Ching      : 1955      MRN: [id number]  Encounter Provider: Jericho Scott DO  Encounter Date: 3/11/2024   Encounter department: [facility]    Assessment & Plan   To help with her edema, Rx for hydrochlorothiazide, 12.5 mg daily.  I also will have her take 10 mEq of potassium with it.  Towards the end of this week, or early next week, get the lab work I ordered, CMP and CBC.  Follow-up as scheduled or as needed  1. Bilateral leg edema  -     hydroCHLOROthiazide 12.5 mg tablet; Take 1 tablet (12.5 mg total) by mouth daily  -     potassium chloride (Klor-Con M10) 10 mEq tablet; Take 1 tablet (10 mEq total) by mouth daily        Depression Screening and Follow-up Plan: Patient was screened for depression during today's encounter. They screened negative with a PHQ-2 score of 0.        Subjective     Patient here today for follow-up.  Patient's been noticing she continues to get lower extremity edema and ankle edema.  It is getting bothersome for her.      Review of Systems   Constitutional: Negative.    Respiratory: Negative.     Cardiovascular:  Positive for leg swelling. Negative for chest pain and palpitations.   Gastrointestinal: Negative.    Genitourinary: Negative.        Past Medical History:   Diagnosis Date   • Allergic reaction     Resolved 2017    • Allergic rhinitis     Resolved 2017    • Generalized anxiety disorder     Resolved 2017    • High cholesterol    • Hypertension    • Microscopic hematuria     Resolved 2017    • Postural dizziness with presyncope 2021   • Renal calculi     Resolved 2017    • Renal colic     Resolved 2017    • Renal cyst, acquired     Resolved 2017    • Restrictive lung disease     Resolved 2017    • Supraventricular tachycardia     Resolved 2017      Past Surgical History:   Procedure Laterality Date   • CARDIAC ELECTROPHYSIOLOGY PROCEDURE N/A 2023    Procedure: Cardiac  eps/afib ablation;  Surgeon: Juan Ellington MD;  Location: [facility];  Service: Cardiology   • CHOLECYSTECTOMY     • CYSTOSCOPY  05/07/2014    Diagnostic. Last assessed 5/7/2014     • TONSILLECTOMY     • TUBAL LIGATION       Family History   Problem Relation Age of Onset   • Lung cancer Mother    • Heart attack Father    • No Known Problems Sister    • No Known Problems Sister    • No Known Problems Sister    • Heart attack Maternal Grandmother    • Diabetes Paternal Grandmother    • Colon cancer Maternal Aunt    • Esophageal cancer Maternal Aunt    • No Known Problems Maternal Aunt    • No Known Problems Paternal Aunt    • Rheum arthritis Family         Rheumatoid arthritis with rheumatoid factor    • No Known Problems Son    • No Known Problems Son      Social History     Socioeconomic History   • Marital status: /Civil Union     Spouse name: None   • Number of children: None   • Years of education: None   • Highest education level: None   Occupational History   • None   Tobacco Use   • Smoking status: Former   • Smokeless tobacco: Never   Vaping Use   • Vaping status: Never Used   Substance and Sexual Activity   • Alcohol use: Yes     Comment: occasional   • Drug use: Never   • Sexual activity: None   Other Topics Concern   • None   Social History Narrative   • None     Social Determinants of Health     Financial Resource Strain: Not on file   Food Insecurity: No Food Insecurity (8/16/2023)    Hunger Vital Sign    • Worried About Running Out of Food in the Last Year: Never true    • Ran Out of Food in the Last Year: Never true   Transportation Needs: No Transportation Needs (8/16/2023)    PRAPARE - Transportation    • Lack of Transportation (Medical): No    • Lack of Transportation (Non-Medical): No   Physical Activity: Not on file   Stress: Not on file   Social Connections: Not on file   Intimate Partner Violence: Not on file   Housing Stability: Low Risk  (8/16/2023)    Housing Stability  Vital Sign    • Unable to Pay for Housing in the Last Year: No    • Number of Places Lived in the Last Year: 1    • Unstable Housing in the Last Year: No     Current Outpatient Medications on File Prior to Visit   Medication Sig   • apixaban (Eliquis) 5 mg Take 1 tablet (5 mg total) by mouth 2 (two) times a day   • atorvastatin (LIPITOR) 20 mg tablet Take 1 tablet (20 mg total) by mouth daily   • butalbital-acetaminophen-caffeine (FIORICET,ESGIC) -40 mg per tablet Take 1 tablet by mouth every 4 (four) hours as needed for headaches   • cholecalciferol (VITAMIN D3) 1,000 units tablet Take 1,000 Units by mouth daily 2,000   • diltiazem (CARDIZEM CD) 360 MG 24 hr capsule Take 1 capsule (360 mg total) by mouth daily   • fluticasone (FLONASE) 50 mcg/act nasal spray 2 sprays into each nostril daily   • hydrOXYzine HCL (ATARAX) 25 mg tablet Take 1 tablet (25 mg total) by mouth every 8 (eight) hours as needed for anxiety   • pantoprazole (PROTONIX) 40 mg tablet TAKE 1 TABLET BY MOUTH TWICE  DAILY   • Prolia 60 MG/ML INJECT 60MG SUBCUTANEOUSLY EVERY 6 MONTHS     Allergies   Allergen Reactions   • Amlodipine Myalgia   • Codeine      Other reaction(s): Other (See Comments)  headaches   • Fosamax [Alendronate] GI Intolerance   • Angiotensin Receptor Blockers Other (See Comments)     Metallic taste     Immunization History   Administered Date(s) Administered   • COVID-19 MODERNA VACC 0.5 ML IM 03/18/2021, 04/15/2021, 11/03/2021   • INFLUENZA 10/26/2018, 10/05/2019, 11/01/2020, 10/26/2022   • Influenza Quadrivalent Preservative Free 3 years and older IM 10/28/2015, 10/19/2017   • Influenza Quadrivalent, 6-35 Months IM 11/12/2014   • Influenza Split High Dose Preservative Free IM 10/05/2019   • Influenza, high dose seasonal 0.7 mL 10/26/2018, 10/10/2020, 10/21/2021, 10/26/2022, 11/02/2023   • Influenza, seasonal, injectable 12/11/2013   • Pneumococcal Polysaccharide PPV23 12/11/2013       Objective     /72   Pulse 90   " Temp (!) 95.8 °F (35.4 °C)   Ht 5' 3\" (1.6 m)   Wt 86.5 kg (190 lb 12.8 oz)   SpO2 96%   BMI 33.80 kg/m²     Physical Exam  Vitals reviewed.   Constitutional:       General: She is not in acute distress.     Appearance: Normal appearance. She is well-developed. She is not ill-appearing, toxic-appearing or diaphoretic.   HENT:      Head: Normocephalic and atraumatic.   Eyes:      Conjunctiva/sclera: Conjunctivae normal.   Cardiovascular:      Rate and Rhythm: Normal rate and regular rhythm.      Heart sounds: Normal heart sounds. No murmur heard.     No friction rub. No gallop.   Pulmonary:      Effort: Pulmonary effort is normal. No respiratory distress.      Breath sounds: Normal breath sounds. No wheezing, rhonchi or rales.   Musculoskeletal:      Right lower leg: Edema present.      Left lower leg: Edema present.   Neurological:      General: No focal deficit present.      Mental Status: She is alert and oriented to person, place, and time.   Psychiatric:         Mood and Affect: Mood normal.         Behavior: Behavior normal.         Thought Content: Thought content normal.         Judgment: Judgment normal.       Jericho Scott, DO    "

## 2024-03-12 ENCOUNTER — OFFICE VISIT (OUTPATIENT)
Dept: PHYSICAL THERAPY | Facility: HOME HEALTHCARE | Age: 69
End: 2024-03-12
Payer: COMMERCIAL

## 2024-03-12 DIAGNOSIS — M70.61 TROCHANTERIC BURSITIS OF BOTH HIPS: ICD-10-CM

## 2024-03-12 DIAGNOSIS — M70.62 TROCHANTERIC BURSITIS OF BOTH HIPS: ICD-10-CM

## 2024-03-12 DIAGNOSIS — M16.0 PRIMARY OSTEOARTHRITIS OF BOTH HIPS: Primary | ICD-10-CM

## 2024-03-12 PROCEDURE — 97140 MANUAL THERAPY 1/> REGIONS: CPT

## 2024-03-12 PROCEDURE — 97110 THERAPEUTIC EXERCISES: CPT

## 2024-03-12 NOTE — PROGRESS NOTES
"PT Re-Evaluation     Today's date: 3/12/2024  Patient name: Cindy Ching  : 1955  MRN: [id number]  Referring provider: Stevo Sebastian DO  Dx:   Encounter Diagnosis     ICD-10-CM    1. Primary osteoarthritis of both hips  M16.0       2. Trochanteric bursitis of both hips  M70.61     M70.62                      Assessment  Assessment details: UPDATE: Pt reporting that she still gets the pain when she walks too long and doesn't feel like that has changed since starting therapy. She will need to phone MD to schedule follow up appt to discuss alternative treatment options at this time. Continue with POC until pt is able to return to MD. Thank you.     Pt Cindy Ching is a 68 y.o. who presents to OPPT with s/s consistent with B hip OA and trochanteric bursitis. Pt presenting with limited hip mobility, mild decreased LE strength, joint inflammation, soft tissue restrictions/tightness, and increased pain with Wb'ing activities. Pt notes difficulty with stair negotiation and prolonged ambulation due to increasing pain.  Pt would benefit from skilled therapy services to address outlined impairments, work towards goals, and restore pts PLOF. Thank you!    Impairments: abnormal gait, abnormal or restricted ROM, activity intolerance, impaired balance, impaired physical strength, lacks appropriate home exercise program, pain with function, safety issue and weight-bearing intolerance  Understanding of Dx/Px/POC: good   Prognosis: good    Goals  STGs to be achieved in 4 weeks:  -Pt to demonstrate reduced subjective pain rating \"at worst\" by at least 2-3 points from Initial Eval to allow for reduced pain with ADLs and improved functional activity tolerance. - ongoing   -Pt to demonstrate improved quadriceps flexibility to improve pelvic mobility and symmetry anteriorly/posteriorly - ongoing    -Pt to demonstrate increased MMT of BLE LE by at least 1/2 grade in order to improve safety and stability with ADLs and " functional mobility - ongoing      LTGs to be achieved upon discharge:   -Pt will be I with HEP in order to continue to improve quality of life and independence and reduce risk for re-injury.   -Pt to demonstrate return to activities of daily living without limiations or restrictions.   -Pt will return to ambulation > 1 hour to help facilitate return to community activities independently   -Pt to demonstrate improved function as noted by achieving or exceeding predicted score on FOTO outcomes assessment tool.          Plan  Patient would benefit from: skilled physical therapy  Planned modality interventions: thermotherapy: hydrocollator packs and cryotherapy  Planned therapy interventions: manual therapy, neuromuscular re-education, home exercise program, functional ROM exercises, gait training, flexibility, therapeutic exercise, therapeutic activities, patient education and balance  Frequency: 2x week  Duration in weeks: 12  Plan of Care beginning date: 2/14/2024  Plan of Care expiration date: 5/8/2024  Treatment plan discussed with: patient and referring physician      Subjective Evaluation    History of Present Illness  Mechanism of injury: Per ortho MD note: 68 y.o. female presents to the office for follow up on her bilateral hips. The patient was last seen on 10/14/23 where she received CS injection for treatment of greater trochanteric bursitis of bilateral hips. She reports relief following previous injections. However she states that the pain has returned. She states that she does not want to proceed with repeat injections due to th painful experience of the injection itself. She reports pain when lying directly on hips and with increased walking activity. She states that she is also getting an increasing amount of pain in her bilateral groin. She states that he has decreased her amount of walking and no longer will sit with her legs crossed to help avoid pain inducing behaviors.     Pt notes that she does  not have follow up with MD scheduled; will see how therapy goes first.       Patient Goals  Patient goals for therapy: decreased edema, decreased pain, improved balance, increased motion, increased strength and independence with ADLs/IADLs    Pain  At best pain ratin  At worst pain ratin  Quality: dull ache  Aggravating factors: standing, walking and stair climbing    Social Support  Steps to enter house: yes  Stairs in house: yes       Diagnostic Tests  X-ray: abnormal (mild OA)  Treatments  Previous treatment: injection treatment  Current treatment: physical therapy      Objective     Tenderness     Left Hip   Tenderness in the greater trochanter.     Right Hip   Tenderness in the greater trochanter.     Neurological Testing     Sensation     Hip   Left Hip   Intact: light touch    Right Hip   Intact: light touch    Active Range of Motion   Left Hip   Flexion: 110 degrees     Right Hip   Flexion: 105 degrees     Strength/Myotome Testing     Left Hip   Planes of Motion   Flexion: 4-    Right Hip   Planes of Motion   Flexion: 4    Left Knee   Flexion: 4  Extension: 4+    Right Knee   Flexion: 4+  Extension: 5    Left Ankle/Foot   Dorsiflexion: 4+  Plantar flexion: 5    Right Ankle/Foot   Dorsiflexion: 4+  Plantar flexion: 5    Tests     Left Hip   Positive piriformis.   Modified Thien: Positive.     Right Hip   Positive piriformis.   Modified Thien: Positive.     Additional Tests Details  SLR L 75;  R 65     Ambulation   Weight-Bearing Status   Assistive device used: none    Ambulation: Level Surfaces     Additional Level Surfaces Ambulation Details  Tolerance x 1 block     Ambulation: Stairs     Additional Stairs Ambulation Details  Variable dependent on pain levels

## 2024-03-12 NOTE — PROGRESS NOTES
"Daily Note     Today's date: 3/12/2024  Patient name: Cindy Ching  : 1955  MRN: [id number]  Referring provider: Stevo Sebastian DO  Dx:   Encounter Diagnosis     ICD-10-CM    1. Primary osteoarthritis of both hips  M16.0       2. Trochanteric bursitis of both hips  M70.61     M70.62                      Subjective: Pt reports she feels better than last time she was at PT. Pt reports she has soreness in her R hip.       Objective: See treatment diary below      Assessment: Tolerated treatment fairly well. Some verbal cues needed to perform exercises correctly. Progressed back to full program today with good tolerance. Pt reports soreness R hip > L hip with exercise. Tightness evident Warren LE with stretching.  Patient would benefit from continued PT      Plan: Continue per plan of care.      Re-eval Date: 3/14/24        Manuals 3/5 3/7 3/12 2/27 2/29   Quad, HS, piriformis  JK Quad + HS   HZ  JK MS HZ   STM/foam roller  JK  JK MS HZ                   Neuro Re-Ed         Bosu lunges         Balance as appropriate                                                 Ther Ex        NuStep  L2 10'  L2 10'  L2  10'  L1  10'  L1 10 minutes    HR/TR X 20 ea   X 20 ea  X20 ea X 20    Standing hip flex/abd/ext X 15 ea Warren  X 15 ea warren  1x 15  ea Warren X15 ea warren X 15 ea warren   Squats X 15   X 15  x10 X 15   LAQ 5\"x15 Warren  5\" x 15 warren  5\"x 15 Warren 5\"x 20 Warren 5\" x 15 warren    HS curl  Green x 15 Warren   Green x 15 Warren   Green x 15 warren    Hip add 5\" x 20  5\"x 20 5\" x 20    Hip abd Green x 20   Green x 20 Green 3\" x 15 Green x 20    SLR X 15 Warren   1 X 15 Warren  X10 ea X 15 warren    S/L SLR        Clamshells        Bridges     x10    Self quad stretch        Self HS stretch         LTR  10\" x 5  10\"x 5     SKTC  10\" x 5  10\" x 5             Ther Activity        Step-ups  Fwd/Lat C fwd/Lat   X 15 Warren  C fwd  1x15  Warren C fwd/lat  1x10  Warren C fwd/lat   X 15 warren    Step-down C x 10 Warren   C x 10  Warren C x 10  Warren C x 10 warren                 "    Gait Training                        Modalities        CP prn

## 2024-03-14 ENCOUNTER — APPOINTMENT (OUTPATIENT)
Dept: PHYSICAL THERAPY | Facility: HOME HEALTHCARE | Age: 69
End: 2024-03-14
Payer: COMMERCIAL

## 2024-03-19 ENCOUNTER — APPOINTMENT (OUTPATIENT)
Dept: LAB | Facility: CLINIC | Age: 69
End: 2024-03-19
Payer: COMMERCIAL

## 2024-03-19 ENCOUNTER — APPOINTMENT (OUTPATIENT)
Dept: PHYSICAL THERAPY | Facility: HOME HEALTHCARE | Age: 69
End: 2024-03-19
Payer: COMMERCIAL

## 2024-03-19 ENCOUNTER — OFFICE VISIT (OUTPATIENT)
Dept: OBGYN CLINIC | Facility: CLINIC | Age: 69
End: 2024-03-19
Payer: COMMERCIAL

## 2024-03-19 VITALS
SYSTOLIC BLOOD PRESSURE: 143 MMHG | HEART RATE: 87 BPM | DIASTOLIC BLOOD PRESSURE: 82 MMHG | RESPIRATION RATE: 16 BRPM | BODY MASS INDEX: 33.66 KG/M2 | WEIGHT: 190 LBS | HEIGHT: 63 IN

## 2024-03-19 DIAGNOSIS — M16.0 PRIMARY OSTEOARTHRITIS OF BOTH HIPS: Primary | ICD-10-CM

## 2024-03-19 LAB
ALBUMIN SERPL BCP-MCNC: 4.3 G/DL (ref 3.5–5)
ALP SERPL-CCNC: 67 U/L (ref 34–104)
ALT SERPL W P-5'-P-CCNC: 18 U/L (ref 7–52)
ANION GAP SERPL CALCULATED.3IONS-SCNC: 10 MMOL/L (ref 4–13)
AST SERPL W P-5'-P-CCNC: 16 U/L (ref 13–39)
BASOPHILS # BLD AUTO: 0.08 THOUSANDS/ÂΜL (ref 0–0.1)
BASOPHILS NFR BLD AUTO: 1 % (ref 0–1)
BILIRUB SERPL-MCNC: 0.59 MG/DL (ref 0.2–1)
BUN SERPL-MCNC: 23 MG/DL (ref 5–25)
CALCIUM SERPL-MCNC: 9.3 MG/DL (ref 8.4–10.2)
CHLORIDE SERPL-SCNC: 103 MMOL/L (ref 96–108)
CO2 SERPL-SCNC: 28 MMOL/L (ref 21–32)
CREAT SERPL-MCNC: 1.02 MG/DL (ref 0.6–1.3)
EOSINOPHIL # BLD AUTO: 0.16 THOUSAND/ÂΜL (ref 0–0.61)
EOSINOPHIL NFR BLD AUTO: 2 % (ref 0–6)
ERYTHROCYTE [DISTWIDTH] IN BLOOD BY AUTOMATED COUNT: 12.3 % (ref 11.6–15.1)
GFR SERPL CREATININE-BSD FRML MDRD: 55 ML/MIN/1.73SQ M
GLUCOSE P FAST SERPL-MCNC: 99 MG/DL (ref 65–99)
HCT VFR BLD AUTO: 45.1 % (ref 34.8–46.1)
HGB BLD-MCNC: 14.1 G/DL (ref 11.5–15.4)
IMM GRANULOCYTES # BLD AUTO: 0.02 THOUSAND/UL (ref 0–0.2)
IMM GRANULOCYTES NFR BLD AUTO: 0 % (ref 0–2)
LYMPHOCYTES # BLD AUTO: 1.57 THOUSANDS/ÂΜL (ref 0.6–4.47)
LYMPHOCYTES NFR BLD AUTO: 21 % (ref 14–44)
MCH RBC QN AUTO: 28.3 PG (ref 26.8–34.3)
MCHC RBC AUTO-ENTMCNC: 31.3 G/DL (ref 31.4–37.4)
MCV RBC AUTO: 91 FL (ref 82–98)
MONOCYTES # BLD AUTO: 0.62 THOUSAND/ÂΜL (ref 0.17–1.22)
MONOCYTES NFR BLD AUTO: 9 % (ref 4–12)
NEUTROPHILS # BLD AUTO: 4.88 THOUSANDS/ÂΜL (ref 1.85–7.62)
NEUTS SEG NFR BLD AUTO: 67 % (ref 43–75)
NRBC BLD AUTO-RTO: 0 /100 WBCS
PLATELET # BLD AUTO: 161 THOUSANDS/UL (ref 149–390)
PMV BLD AUTO: 12.9 FL (ref 8.9–12.7)
POTASSIUM SERPL-SCNC: 4.1 MMOL/L (ref 3.5–5.3)
PROT SERPL-MCNC: 7.3 G/DL (ref 6.4–8.4)
RBC # BLD AUTO: 4.98 MILLION/UL (ref 3.81–5.12)
SODIUM SERPL-SCNC: 141 MMOL/L (ref 135–147)
WBC # BLD AUTO: 7.33 THOUSAND/UL (ref 4.31–10.16)

## 2024-03-19 PROCEDURE — 99213 OFFICE O/P EST LOW 20 MIN: CPT | Performed by: STUDENT IN AN ORGANIZED HEALTH CARE EDUCATION/TRAINING PROGRAM

## 2024-03-19 NOTE — PROGRESS NOTES
Orthopedic Steroid Injection Note  Cindy Ching (68 y.o. female)  : 1955 Encounter Date: 3/19/2024  Dr. Stevo Sebastian, DO, Orthopedic Surgeon    Assessment/Plan  68 y.o. female with greater trochanteric bursitis and osteoarthritis of the bilateral Hips  She was offered and declined a CS injection due to the pain of the injection itself  She has tried and failed physical therapy due to pain being worse after completion of PT  Continue with activities as tolerated  Continue with conservative management of bilateral Hips, to include US guided CS injection into the bilateral hip joints with spine and pain  Referral to spine and pain was placed for US guided CS injection of bilateral hips  Discussed use of long acting anti-inflammatory prior to days of increased walking.    Subjective:  68 y.o. female presents to the office for follow up on her bilateral hips. The patient was last seen on 24 where she began physical therapy for treatment of greater trochanteric bursitis and osteoarthritis of bilateral hips. She reports relief following previous CS injections however, she denies any future desire for bilateral hip greater trochanteric bursitis injections due to pain of the injection itself. She reports pain when lying directly on hips and with increased walking activity. She states that she is also getting an increasing amount of pain in her bilateral groin. She states that he has decreased her amount of walking to decrease amount of pain.  Patient denies any other acute or associated complaints. She denies numbness or tingling. Patient denies any symptoms of infection such as fever, chills, or rash.     ROS  Review of Systems   Constitutional:  Negative for chills, fever and unexpected weight change.   HENT:  Negative for hearing loss, nosebleeds and sore throat.    Eyes:  Negative for pain, redness and visual disturbance.   Respiratory:  Negative for cough, shortness of breath and wheezing.     Cardiovascular:  Negative for chest pain, palpitations and leg swelling.   Gastrointestinal:  Negative for abdominal pain, nausea and vomiting.   Endocrine: Negative for polydipsia and polyuria.   Genitourinary:  Negative for dysuria and hematuria.   Skin:  Negative for rash and wound.   Neurological:  Negative for dizziness, numbness and headaches.   Psychiatric/Behavioral:  Negative for decreased concentration and suicidal ideas. The patient is not nervous/anxious.    All other systems reviewed and are negative.      Past Medical History:   Diagnosis Date   • Allergic reaction     Resolved 11/24/2017    • Allergic rhinitis     Resolved 11/24/2017    • Generalized anxiety disorder     Resolved 11/24/2017    • High cholesterol    • Hypertension    • Microscopic hematuria     Resolved 11/24/2017    • Postural dizziness with presyncope 03/11/2021   • Renal calculi     Resolved 11/24/2017    • Renal colic     Resolved 11/24/2017    • Renal cyst, acquired     Resolved 11/24/2017    • Restrictive lung disease     Resolved 11/24/2017    • Supraventricular tachycardia     Resolved 11/24/2017      Past Surgical History:   Procedure Laterality Date   • CARDIAC ELECTROPHYSIOLOGY PROCEDURE N/A 12/27/2023    Procedure: Cardiac eps/afib ablation;  Surgeon: Juan Ellington MD;  Location: [facility];  Service: Cardiology   • CHOLECYSTECTOMY     • CYSTOSCOPY  05/07/2014    Diagnostic. Last assessed 5/7/2014     • TONSILLECTOMY     • TUBAL LIGATION       Results Reviewed     None          Physical Exam  Physical Exam  HENT:      Head: Normocephalic and atraumatic.      Nose: Nose normal.   Eyes:      Conjunctiva/sclera: Conjunctivae normal.   Cardiovascular:      Rate and Rhythm: Normal rate.   Pulmonary:      Effort: Pulmonary effort is normal.   Musculoskeletal:      Cervical back: Neck supple.   Skin:     General: Skin is warm and dry.      Capillary Refill: Capillary refill takes less than 2 seconds.   Neurological:       Mental Status: She is alert and oriented to person, place, and time.   Psychiatric:         Mood and Affect: Mood normal.         Behavior: Behavior normal.       Ortho Exam  bilateral Hip -  Patient presents with no anatomical deformity  Ambulates with steady gait pattern  Uses No assistive devices  TTP over greater trochanter / trochanteric bursa, and bilateral groin pain  ROM: 0-15 seated IR, 0-30 seated ER. 0-100 seated hip flexion  Smooth passive circumduction   MMT: 4/5 throughout  Knee flexor and extensor mechanism intact  Ankle DF and PF mechanism intact  2+ TP and DP pulses with brisk capillary refill to the toes  Sural, saphenous, tibial, superficial and deep peroneal motor and sensory distribution intact  Sensation to light touch       Procedures  None performed today        Scribe Attestation    I,:  Ewa Menon am acting as a scribe while in the presence of the attending physician.:       I,:  Stevo Sebastian DO personally performed the services described in this documentation    as scribed in my presence.:

## 2024-03-21 ENCOUNTER — APPOINTMENT (OUTPATIENT)
Dept: PHYSICAL THERAPY | Facility: HOME HEALTHCARE | Age: 69
End: 2024-03-21
Payer: COMMERCIAL

## 2024-03-26 ENCOUNTER — OFFICE VISIT (OUTPATIENT)
Dept: CARDIOLOGY CLINIC | Facility: HOSPITAL | Age: 69
End: 2024-03-26
Payer: COMMERCIAL

## 2024-03-26 VITALS
OXYGEN SATURATION: 94 % | HEART RATE: 92 BPM | HEIGHT: 63 IN | DIASTOLIC BLOOD PRESSURE: 72 MMHG | SYSTOLIC BLOOD PRESSURE: 134 MMHG | BODY MASS INDEX: 33.45 KG/M2 | WEIGHT: 188.8 LBS

## 2024-03-26 DIAGNOSIS — I95.1 ORTHOSTATIC HYPOTENSION: ICD-10-CM

## 2024-03-26 DIAGNOSIS — R60.0 BILATERAL LEG EDEMA: ICD-10-CM

## 2024-03-26 DIAGNOSIS — I48.0 PAROXYSMAL ATRIAL FIBRILLATION (HCC): Primary | ICD-10-CM

## 2024-03-26 DIAGNOSIS — I10 ESSENTIAL HYPERTENSION: ICD-10-CM

## 2024-03-26 PROBLEM — I50.31 ACUTE DIASTOLIC CONGESTIVE HEART FAILURE (HCC): Status: RESOLVED | Noted: 2023-08-15 | Resolved: 2024-03-26

## 2024-03-26 PROCEDURE — 99214 OFFICE O/P EST MOD 30 MIN: CPT | Performed by: INTERNAL MEDICINE

## 2024-03-26 RX ORDER — FUROSEMIDE 20 MG/1
20 TABLET ORAL
Qty: 30 TABLET | Refills: 4 | Status: SHIPPED | OUTPATIENT
Start: 2024-03-26

## 2024-03-26 NOTE — PROGRESS NOTES
Cardiology Follow Up    Cindy Ching  1955  [id number]  [facility] [address] [id number] [phone]    1. Paroxysmal atrial fibrillation (HCC)  furosemide (LASIX) 20 mg tablet      2. Orthostatic hypotension        3. Essential hypertension        4. Bilateral leg edema  furosemide (LASIX) 20 mg tablet            Discussion/Summary: I will have her take Lasix 20 mg 2x a week as needed to see if this helps with her LE edema. The rest of her medications remain the same.   No cardiac testing is ordered.  RTO 6 months. If LE edema persists, will consider stopping her Cardizem.      Interval History: She continues to do well since her AF ablation on 12/27/2023. She denies CP, SOB, palpitations. She is limited by hip problems. She gets LE edema as the day goes on.  Her weight is up from 179 to 188 lbs.    /72.    She has normal LV function.    Patient Active Problem List   Diagnosis    Reactive airway disease    Esophageal reflux    Hyperlipidemia    KOSTA (obstructive sleep apnea)    Migraine without aura and without status migrainosus, not intractable    Right-sided chest wall pain    Orthostatic hypotension    Essential hypertension    Age-related osteoporosis without current pathological fracture    Bilateral hip pain    EL (renal artery stenosis) (HCC)    Neck pain    Impaired fasting blood sugar    Mild atherosclerosis of both carotid arteries    Anxiety    Primary insomnia    Paroxysmal atrial fibrillation (HCC)    Bilateral leg edema     Past Medical History:   Diagnosis Date    Allergic reaction     Resolved 11/24/2017     Allergic rhinitis     Resolved 11/24/2017     Generalized anxiety disorder     Resolved 11/24/2017     High cholesterol     Hypertension     Microscopic hematuria     Resolved 11/24/2017     Postural dizziness with presyncope 03/11/2021    Renal calculi      Resolved 11/24/2017     Renal colic     Resolved 11/24/2017     Renal cyst, acquired     Resolved 11/24/2017     Restrictive lung disease     Resolved 11/24/2017     Supraventricular tachycardia     Resolved 11/24/2017      Social History     Socioeconomic History    Marital status: /Civil Union     Spouse name: Not on file    Number of children: Not on file    Years of education: Not on file    Highest education level: Not on file   Occupational History    Not on file   Tobacco Use    Smoking status: Former    Smokeless tobacco: Never   Vaping Use    Vaping status: Never Used   Substance and Sexual Activity    Alcohol use: Yes     Comment: occasional    Drug use: Never    Sexual activity: Not on file   Other Topics Concern    Not on file   Social History Narrative    Not on file     Social Determinants of Health     Financial Resource Strain: Not on file   Food Insecurity: No Food Insecurity (8/16/2023)    Hunger Vital Sign     Worried About Running Out of Food in the Last Year: Never true     Ran Out of Food in the Last Year: Never true   Transportation Needs: No Transportation Needs (8/16/2023)    PRAPARE - Transportation     Lack of Transportation (Medical): No     Lack of Transportation (Non-Medical): No   Physical Activity: Not on file   Stress: Not on file   Social Connections: Not on file   Intimate Partner Violence: Not on file   Housing Stability: Low Risk  (8/16/2023)    Housing Stability Vital Sign     Unable to Pay for Housing in the Last Year: No     Number of Places Lived in the Last Year: 1     Unstable Housing in the Last Year: No      Family History   Problem Relation Age of Onset    Lung cancer Mother     Heart attack Father     No Known Problems Sister     No Known Problems Sister     No Known Problems Sister     Heart attack Maternal Grandmother     Diabetes Paternal Grandmother     Colon cancer Maternal Aunt     Esophageal cancer Maternal Aunt     No Known Problems Maternal Aunt     No  Known Problems Paternal Aunt     Rheum arthritis Family         Rheumatoid arthritis with rheumatoid factor     No Known Problems Son     No Known Problems Son      Past Surgical History:   Procedure Laterality Date    CARDIAC ELECTROPHYSIOLOGY PROCEDURE N/A 12/27/2023    Procedure: Cardiac eps/afib ablation;  Surgeon: Juan Ellington MD;  Location: [facility];  Service: Cardiology    CHOLECYSTECTOMY      CYSTOSCOPY  05/07/2014    Diagnostic. Last assessed 5/7/2014      TONSILLECTOMY      TUBAL LIGATION         Current Outpatient Medications:     apixaban (Eliquis) 5 mg, Take 1 tablet (5 mg total) by mouth 2 (two) times a day, Disp: 180 tablet, Rfl: 3    atorvastatin (LIPITOR) 20 mg tablet, Take 1 tablet (20 mg total) by mouth daily, Disp: 90 tablet, Rfl: 3    butalbital-acetaminophen-caffeine (FIORICET,ESGIC) -40 mg per tablet, Take 1 tablet by mouth every 4 (four) hours as needed for headaches, Disp: 30 tablet, Rfl: 0    cholecalciferol (VITAMIN D3) 1,000 units tablet, Take 1,000 Units by mouth daily 2,000, Disp: , Rfl:     diltiazem (CARDIZEM CD) 360 MG 24 hr capsule, Take 1 capsule (360 mg total) by mouth daily, Disp: 90 capsule, Rfl: 3    fluticasone (FLONASE) 50 mcg/act nasal spray, 2 sprays into each nostril daily, Disp: 16 g, Rfl: 3    furosemide (LASIX) 20 mg tablet, Take 1 tablet (20 mg total) by mouth every third day as needed (LE edema), Disp: 30 tablet, Rfl: 4    hydroCHLOROthiazide 12.5 mg tablet, Take 1 tablet (12.5 mg total) by mouth daily, Disp: 30 tablet, Rfl: 3    hydrOXYzine HCL (ATARAX) 25 mg tablet, Take 1 tablet (25 mg total) by mouth every 8 (eight) hours as needed for anxiety, Disp: 30 tablet, Rfl: 1    pantoprazole (PROTONIX) 40 mg tablet, TAKE 1 TABLET BY MOUTH TWICE  DAILY, Disp: 180 tablet, Rfl: 3    potassium chloride (Klor-Con M10) 10 mEq tablet, Take 1 tablet (10 mEq total) by mouth daily, Disp: 30 tablet, Rfl: 3    Prolia 60 MG/ML, INJECT 60MG SUBCUTANEOUSLY EVERY 6  "MONTHS, Disp: 1 mL, Rfl: 0  Allergies   Allergen Reactions    Amlodipine Myalgia    Codeine      Other reaction(s): Other (See Comments)  headaches    Fosamax [Alendronate] GI Intolerance    Angiotensin Receptor Blockers Other (See Comments)     Metallic taste     Vitals:    03/26/24 1342   BP: 134/72   Pulse: 92   SpO2: 94%   Weight: 85.6 kg (188 lb 12.8 oz)   Height: 5' 3\" (1.6 m)     Weight (last 2 days)       Date/Time Weight    03/26/24 1342 85.6 (188.8)           Blood pressure 134/72, pulse 92, height 5' 3\" (1.6 m), weight 85.6 kg (188 lb 12.8 oz), SpO2 94%., Body mass index is 33.44 kg/m².    Labs:  Office Visit on 01/02/2024   Component Date Value    WBC 03/19/2024 7.33     RBC 03/19/2024 4.98     Hemoglobin 03/19/2024 14.1     Hematocrit 03/19/2024 45.1     MCV 03/19/2024 91     MCH 03/19/2024 28.3     MCHC 03/19/2024 31.3 (L)     RDW 03/19/2024 12.3     MPV 03/19/2024 12.9 (H)     Platelets 03/19/2024 161     nRBC 03/19/2024 0     Neutrophils Relative 03/19/2024 67     Immature Grans % 03/19/2024 0     Lymphocytes Relative 03/19/2024 21     Monocytes Relative 03/19/2024 9     Eosinophils Relative 03/19/2024 2     Basophils Relative 03/19/2024 1     Neutrophils Absolute 03/19/2024 4.88     Absolute Immature Grans 03/19/2024 0.02     Absolute Lymphocytes 03/19/2024 1.57     Absolute Monocytes 03/19/2024 0.62     Eosinophils Absolute 03/19/2024 0.16     Basophils Absolute 03/19/2024 0.08     Sodium 03/19/2024 141     Potassium 03/19/2024 4.1     Chloride 03/19/2024 103     CO2 03/19/2024 28     ANION GAP 03/19/2024 10     BUN 03/19/2024 23     Creatinine 03/19/2024 1.02     Glucose, Fasting 03/19/2024 99     Calcium 03/19/2024 9.3     AST 03/19/2024 16     ALT 03/19/2024 18     Alkaline Phosphatase 03/19/2024 67     Total Protein 03/19/2024 7.3     Albumin 03/19/2024 4.3     Total Bilirubin 03/19/2024 0.59     eGFR 03/19/2024 55    Admission on 12/27/2023, Discharged on 12/28/2023   Component Date Value    " Sodium 12/27/2023 143     Potassium 12/27/2023 3.7     Chloride 12/27/2023 106     CO2 12/27/2023 29     ANION GAP 12/27/2023 8     BUN 12/27/2023 20     Creatinine 12/27/2023 1.03     Glucose 12/27/2023 95     Glucose, Fasting 12/27/2023 95     Calcium 12/27/2023 10.1     eGFR 12/27/2023 55     WBC 12/27/2023 6.95     RBC 12/27/2023 4.62     Hemoglobin 12/27/2023 13.7     Hematocrit 12/27/2023 42.1     MCV 12/27/2023 91     MCH 12/27/2023 29.7     MCHC 12/27/2023 32.5     RDW 12/27/2023 13.2     MPV 12/27/2023 12.1     Platelets 12/27/2023 152     nRBC 12/27/2023 0     Neutrophils Relative 12/27/2023 64     Immature Grans % 12/27/2023 1     Lymphocytes Relative 12/27/2023 24     Monocytes Relative 12/27/2023 8     Eosinophils Relative 12/27/2023 2     Basophils Relative 12/27/2023 1     Neutrophils Absolute 12/27/2023 4.45     Absolute Immature Grans 12/27/2023 0.04     Absolute Lymphocytes 12/27/2023 1.69     Absolute Monocytes 12/27/2023 0.58     Eosinophils Absolute 12/27/2023 0.11     Basophils Absolute 12/27/2023 0.08     Protime 12/27/2023 14.5     INR 12/27/2023 1.14     Activated Clotting Time,* 12/27/2023 504 (H)     Specimen Type 12/27/2023 VENOUS     Activated Clotting Time,* 12/27/2023 408 (H)     Specimen Type 12/27/2023 ARTERIAL     Activated Clotting Time,* 12/27/2023 388 (H)     Specimen Type 12/27/2023 VENOUS     Activated Clotting Time,* 12/27/2023 374 (H)     Specimen Type 12/27/2023 ARTERIAL     Activated Clotting Time,* 12/27/2023 354 (H)     Specimen Type 12/27/2023 ARTERIAL     Activated Clotting Time,* 12/27/2023 347 (H)     Specimen Type 12/27/2023 VENOUS     Activated Clotting Time,* 12/27/2023 427 (H)     Specimen Type 12/27/2023 VENOUS     Activated Clotting Time,* 12/27/2023 388 (H)     Specimen Type 12/27/2023 VENOUS     WBC 12/28/2023 7.81     RBC 12/28/2023 3.74 (L)     Hemoglobin 12/28/2023 10.9 (L)     Hematocrit 12/28/2023 34.0 (L)     MCV 12/28/2023 91     MCH 12/28/2023 29.1      MCHC 12/28/2023 32.1     RDW 12/28/2023 13.4     Platelets 12/28/2023 129 (L)     MPV 12/28/2023 12.2     Sodium 12/28/2023 141     Potassium 12/28/2023 3.2 (L)     Chloride 12/28/2023 108     CO2 12/28/2023 26     ANION GAP 12/28/2023 7     BUN 12/28/2023 12     Creatinine 12/28/2023 0.92     Glucose 12/28/2023 94     Calcium 12/28/2023 7.7 (L)     eGFR 12/28/2023 63     Protime 12/28/2023 14.6 (H)     INR 12/28/2023 1.15    Hospital Outpatient Visit on 12/27/2023   Component Date Value    Ventricular Rate 12/27/2023 62     Atrial Rate 12/27/2023 62     DC Interval 12/27/2023 140     QRSD Interval 12/27/2023 78     QT Interval 12/27/2023 416     QTC Interval 12/27/2023 422     P Axis 12/27/2023 56     QRS Axis 12/27/2023 49     T Wave [place] 12/27/2023 38     Ventricular Rate 12/27/2023 94     Atrial Rate 12/27/2023 94     DC Interval 12/27/2023 138     QRSD Interval 12/27/2023 79     QT Interval 12/27/2023 400     QTC Interval 12/27/2023 501     P Axis 12/27/2023 66     QRS Axis 12/27/2023 71     T Wave Axis 12/27/2023 57    Telephone on 10/18/2023   Component Date Value    WBC 12/18/2023 7.05     RBC 12/18/2023 4.89     Hemoglobin 12/18/2023 14.5     Hematocrit 12/18/2023 43.8     MCV 12/18/2023 90     MCH 12/18/2023 29.7     MCHC 12/18/2023 33.1     RDW 12/18/2023 13.0     MPV 12/18/2023 12.3     Platelets 12/18/2023 180     nRBC 12/18/2023 0     Neutrophils Relative 12/18/2023 62     Immature Grans % 12/18/2023 0     Lymphocytes Relative 12/18/2023 28     Monocytes Relative 12/18/2023 7     Eosinophils Relative 12/18/2023 2     Basophils Relative 12/18/2023 1     Neutrophils Absolute 12/18/2023 4.35     Absolute Immature Grans 12/18/2023 0.03     Absolute Lymphocytes 12/18/2023 1.96     Absolute Monocytes 12/18/2023 0.49     Eosinophils Absolute 12/18/2023 0.14     Basophils Absolute 12/18/2023 0.08     Sodium 12/18/2023 142     Potassium 12/18/2023 4.2     Chloride 12/18/2023 104     CO2 12/18/2023 28     ANION  GAP 12/18/2023 10     BUN 12/18/2023 17     Creatinine 12/18/2023 0.95     Glucose, Fasting 12/18/2023 95     Calcium 12/18/2023 9.3     AST 12/18/2023 21     ALT 12/18/2023 26     Alkaline Phosphatase 12/18/2023 55     Total Protein 12/18/2023 7.0     Albumin 12/18/2023 4.2     Total Bilirubin 12/18/2023 1.17 (H)     eGFR 12/18/2023 60     Protime 12/18/2023 13.0     INR 12/18/2023 0.99      Imaging: No results found.    Review of Systems:  Review of Systems   Constitutional:  Negative for diaphoresis, fatigue, fever and unexpected weight change.   HENT: Negative.     Respiratory:  Negative for cough, shortness of breath and wheezing.    Cardiovascular:  Positive for leg swelling. Negative for chest pain and palpitations.   Gastrointestinal:  Negative for abdominal pain, diarrhea and nausea.   Musculoskeletal:  Negative for gait problem and myalgias.   Skin:  Negative for rash.   Neurological:  Negative for dizziness and numbness.   Psychiatric/Behavioral: Negative.         Physical Exam:  Physical Exam  Constitutional:       Appearance: She is well-developed.   HENT:      Head: Normocephalic and atraumatic.   Eyes:      Pupils: Pupils are equal, round, and reactive to light.   Neck:      Vascular: No JVD.   Cardiovascular:      Rate and Rhythm: Regular rhythm.      Pulses: Normal pulses.           Carotid pulses are 2+ on the right side and 2+ on the left side.     Heart sounds: S1 normal and S2 normal.   Pulmonary:      Effort: Pulmonary effort is normal.      Breath sounds: Normal breath sounds. No wheezing or rales.   Abdominal:      General: Bowel sounds are normal.      Palpations: Abdomen is soft.   Musculoskeletal:         General: No tenderness. Normal range of motion.      Cervical back: Normal range of motion and neck supple.      Comments: 1+ b/l LE edema   Skin:     General: Skin is warm.   Neurological:      Mental Status: She is alert and oriented to person, place, and time.      Cranial Nerves: No  cranial nerve deficit.      Deep Tendon Reflexes: Reflexes are normal and symmetric.

## 2024-03-27 ENCOUNTER — APPOINTMENT (OUTPATIENT)
Dept: RADIOLOGY | Facility: MEDICAL CENTER | Age: 69
End: 2024-03-27
Payer: COMMERCIAL

## 2024-03-27 ENCOUNTER — OFFICE VISIT (OUTPATIENT)
Dept: PAIN MEDICINE | Facility: CLINIC | Age: 69
End: 2024-03-27
Payer: COMMERCIAL

## 2024-03-27 VITALS
OXYGEN SATURATION: 98 % | BODY MASS INDEX: 33.35 KG/M2 | DIASTOLIC BLOOD PRESSURE: 65 MMHG | RESPIRATION RATE: 16 BRPM | WEIGHT: 188.2 LBS | SYSTOLIC BLOOD PRESSURE: 132 MMHG | HEART RATE: 92 BPM | HEIGHT: 63 IN

## 2024-03-27 DIAGNOSIS — G89.4 CHRONIC PAIN SYNDROME: ICD-10-CM

## 2024-03-27 DIAGNOSIS — G89.29 CHRONIC BILATERAL LOW BACK PAIN WITH BILATERAL SCIATICA: ICD-10-CM

## 2024-03-27 DIAGNOSIS — M47.816 LUMBAR SPONDYLOSIS: ICD-10-CM

## 2024-03-27 DIAGNOSIS — M54.42 CHRONIC BILATERAL LOW BACK PAIN WITH BILATERAL SCIATICA: Primary | ICD-10-CM

## 2024-03-27 DIAGNOSIS — M51.16 LUMBAR DISC DISEASE WITH RADICULOPATHY: ICD-10-CM

## 2024-03-27 DIAGNOSIS — G89.29 CHRONIC BILATERAL LOW BACK PAIN WITH BILATERAL SCIATICA: Primary | ICD-10-CM

## 2024-03-27 DIAGNOSIS — M54.41 CHRONIC BILATERAL LOW BACK PAIN WITH BILATERAL SCIATICA: Primary | ICD-10-CM

## 2024-03-27 DIAGNOSIS — M16.0 PRIMARY OSTEOARTHRITIS OF HIPS, BILATERAL: ICD-10-CM

## 2024-03-27 DIAGNOSIS — M54.42 CHRONIC BILATERAL LOW BACK PAIN WITH BILATERAL SCIATICA: ICD-10-CM

## 2024-03-27 DIAGNOSIS — M54.41 CHRONIC BILATERAL LOW BACK PAIN WITH BILATERAL SCIATICA: ICD-10-CM

## 2024-03-27 PROCEDURE — 99204 OFFICE O/P NEW MOD 45 MIN: CPT | Performed by: ANESTHESIOLOGY

## 2024-03-27 PROCEDURE — 72110 X-RAY EXAM L-2 SPINE 4/>VWS: CPT

## 2024-03-27 NOTE — H&P (VIEW-ONLY)
Assessment  1. Chronic bilateral low back pain with bilateral sciatica    2. Primary osteoarthritis of hips, bilateral    3. Lumbar disc disease with radiculopathy    4. Lumbar spondylosis    5. Chronic pain syndrome        Plan  Patient is a 68-year-old female complains of bilateral hip pain and lumbosacral pain with chronic patient secondary to lumbar degenerative disc disease, lumbar spondylosis, lumbar radiculopathy, primary osteoarthritis bilateral hips, greater trochanteric bursitis presents to office for initial consultation.  Patient does report having significant relief from bilateral bursa steroid injections however if she does walk a knee distance greater than 3 blocks she will start to experience intense hip and butt cheek pain and occasional groin pain which will stop her in her tracks.  Patient has most likely combination of hip pain and lumbar radicular symptoms.  Most likely in the L1-L2 nerve root distribution.  At this time we will continue conservative therapy and obtain more diagnostic imaging to plan for interventional management.      1.  We will schedule patient for bilateral hip intra-articular steroid injections  2.  We will also order an x-ray of the lumbar spine to better assess the degenerative changes or correlate patient's current presentation.  Which could be possibly L1-L2 involvement  3.  We will order an MRI of the lumbar spine to better assess the discogenic pathology by patient's bilateral lower extremity radicular symptoms  4.  We will also provide patient with physical therapy lumbar core strengthening  5.  Follow-up 1 month to review diagnostic imaging    My impressions and treatment recommendations were discussed in detail with the patient who verbalized understanding and had no further questions.  Discharge instructions were provided. I personally saw and examined the patient and I agree with the above discussed plan of care.    Orders Placed This Encounter   Procedures    XR  spine lumbar minimum 4 views non injury     Standing Status:   Future     Standing Expiration Date:   3/27/2028     Scheduling Instructions:      Bring along any outside films relating to this procedure.          MRI lumbar spine wo contrast     Standing Status:   Future     Standing Expiration Date:   3/27/2028     Scheduling Instructions:      There is no preparation for this test. Please leave your jewelry and valuables at home, wedding rings are the exception. Please bring your insurance cards, a form of photo ID and a list of your medications with you. Arrive 15 minutes prior to your appointment time in order to register. Please bring       any prior CT or MRI studies of this area that were not performed at a [facility].            To schedule this appointment, please contact Central Scheduling at [phone].     Order Specific Question:   What is the patient's sedation requirement? If Medication for Claustrophobia is selected, order medication at this point.     Answer:   No Sedation     Order Specific Question:   Does the patient have metallic implants?     Answer:   No    Ambulatory referral to Physical Therapy     Standing Status:   Future     Standing Expiration Date:   3/27/2025     Referral Priority:   Routine     Referral Type:   Physical Therapy     Referral Reason:   Specialty Services Required     Requested Specialty:   Physical Therapy     Number of Visits Requested:   1     Expiration Date:   3/28/2025     No orders of the defined types were placed in this encounter.      History of Present Illness    Cindy Ching is a 68 y.o. female complains of low back pain and bilateral hip pain with chronic pain syndrome secondary to primary osteoarthritis bilateral hips, lumbar radiculopathy, greater trochanteric bursitis presents to office for initial consultation.  Patient reports suffering from this pain for approximately 6 months.  Patient reports her pain is 4 out of 10, nearly  constant without a particular time pattern.  Patient reports her pain is sharp, throbbing, cutting pain without any significant weakness.  Patient reports pain is increased by bending, walking, exercise.  Pain is not decreased by anything.  Pain does not change with kneeling, laying down, standing, sitting, relaxation, coughing/sneezing, bowel movements, turning the head.  Patient reports moderate relief from greater trochanteric bursa steroid injections and heat/ice treatment.  Patient denies any alleviation of symptoms with physical therapy.  Patient uses Tylenol to help alleviate some of her pain specially at nighttime.    I have personally reviewed and/or updated the patient's past medical history, past surgical history, family history, social history, current medications, allergies, and vital signs today.     Review of Systems   Constitutional: Negative.    HENT: Negative.     Eyes: Negative.    Respiratory: Negative.     Cardiovascular:  Positive for leg swelling.   Gastrointestinal: Negative.    Endocrine: Negative.    Genitourinary: Negative.    Musculoskeletal:  Positive for arthralgias and gait problem.   Skin: Negative.    Allergic/Immunologic: Negative.    Hematological: Negative.    Psychiatric/Behavioral: Negative.         Patient Active Problem List   Diagnosis    Reactive airway disease    Esophageal reflux    Hyperlipidemia    KOSTA (obstructive sleep apnea)    Migraine without aura and without status migrainosus, not intractable    Right-sided chest wall pain    Orthostatic hypotension    Essential hypertension    Age-related osteoporosis without current pathological fracture    Bilateral hip pain    EL (renal artery stenosis) (HCC)    Neck pain    Impaired fasting blood sugar    Mild atherosclerosis of both carotid arteries    Anxiety    Primary insomnia    Paroxysmal atrial fibrillation (HCC)    Bilateral leg edema       Past Medical History:   Diagnosis Date    Allergic reaction     Resolved  11/24/2017     Allergic rhinitis     Resolved 11/24/2017     Generalized anxiety disorder     Resolved 11/24/2017     High cholesterol     Hypertension     Microscopic hematuria     Resolved 11/24/2017     Postural dizziness with presyncope 03/11/2021    Renal calculi     Resolved 11/24/2017     Renal colic     Resolved 11/24/2017     Renal cyst, acquired     Resolved 11/24/2017     Restrictive lung disease     Resolved 11/24/2017     Supraventricular tachycardia     Resolved 11/24/2017        Past Surgical History:   Procedure Laterality Date    CARDIAC ELECTROPHYSIOLOGY PROCEDURE N/A 12/27/2023    Procedure: Cardiac eps/afib ablation;  Surgeon: Juan Ellington MD;  Location:  [facility];  Service: Cardiology    CHOLECYSTECTOMY      CYSTOSCOPY  05/07/2014    Diagnostic. Last assessed 5/7/2014      TONSILLECTOMY      TUBAL LIGATION         Family History   Problem Relation Age of Onset    Lung cancer Mother     Heart attack Father     No Known Problems Sister     No Known Problems Sister     No Known Problems Sister     Heart attack Maternal Grandmother     Diabetes Paternal Grandmother     Colon cancer Maternal Aunt     Esophageal cancer Maternal Aunt     No Known Problems Maternal Aunt     No Known Problems Paternal Aunt     Rheum arthritis Family         Rheumatoid arthritis with rheumatoid factor     No Known Problems Son     No Known Problems Son        Social History     Occupational History    Not on file   Tobacco Use    Smoking status: Former    Smokeless tobacco: Never   Vaping Use    Vaping status: Never Used   Substance and Sexual Activity    Alcohol use: Yes     Comment: occasional    Drug use: Never    Sexual activity: Not on file       Current Outpatient Medications on File Prior to Visit   Medication Sig    apixaban (Eliquis) 5 mg Take 1 tablet (5 mg total) by mouth 2 (two) times a day    atorvastatin (LIPITOR) 20 mg tablet Take 1 tablet (20 mg total) by mouth daily     "butalbital-acetaminophen-caffeine (FIORICET,ESGIC) -40 mg per tablet Take 1 tablet by mouth every 4 (four) hours as needed for headaches    cholecalciferol (VITAMIN D3) 1,000 units tablet Take 1,000 Units by mouth daily 2,000    diltiazem (CARDIZEM CD) 360 MG 24 hr capsule Take 1 capsule (360 mg total) by mouth daily    fluticasone (FLONASE) 50 mcg/act nasal spray 2 sprays into each nostril daily    furosemide (LASIX) 20 mg tablet Take 1 tablet (20 mg total) by mouth every third day as needed (LE edema)    hydroCHLOROthiazide 12.5 mg tablet Take 1 tablet (12.5 mg total) by mouth daily    hydrOXYzine HCL (ATARAX) 25 mg tablet Take 1 tablet (25 mg total) by mouth every 8 (eight) hours as needed for anxiety    pantoprazole (PROTONIX) 40 mg tablet TAKE 1 TABLET BY MOUTH TWICE  DAILY    potassium chloride (Klor-Con M10) 10 mEq tablet Take 1 tablet (10 mEq total) by mouth daily    Prolia 60 MG/ML INJECT 60MG SUBCUTANEOUSLY EVERY 6 MONTHS     No current facility-administered medications on file prior to visit.       Allergies   Allergen Reactions    Amlodipine Myalgia    Codeine      Other reaction(s): Other (See Comments)  headaches    Fosamax [Alendronate] GI Intolerance    Angiotensin Receptor Blockers Other (See Comments)     Metallic taste       Physical Exam    /65   Pulse 92   Resp 16   Ht 5' 3\" (1.6 m)   Wt 85.4 kg (188 lb 3.2 oz)   SpO2 98%   BMI 33.34 kg/m²     Constitutional: normal, well developed, well nourished, alert, in no distress and non-toxic and no overt pain behavior. and obese  Eyes: anicteric  HEENT: grossly intact  Neck: supple, symmetric, trachea midline and no masses   Pulmonary:even and unlabored  Cardiovascular:No edema or pitting edema present  Skin:Normal without rashes or lesions and well hydrated  Psychiatric:Mood and affect appropriate  Neurologic:Cranial Nerves II-XII grossly intact  Musculoskeletal:antalgic, pain with flexion and internal/external rotation " bilateral    Lumbar/Sacral Spine examination demonstrates.  Decreased range of motion lumbar spine with pain upon: flexion, lateral rotation to the left/right, and bending to the left/right.  Bilateral lumbar paraspinals tender to palpation. Muscle spasms noted in the lumbar area bilaterally. 4/5 lower extremity strength in all muscle groups bilaterally. Positive seated straight leg raise for bilateral lower extremities.  Sensitivity to light touch intact bilateral lower extremities. 2+ reflexes in the patella and Achilles.  No ankle clonus    Imaging

## 2024-03-27 NOTE — PROGRESS NOTES
Assessment  1. Chronic bilateral low back pain with bilateral sciatica    2. Primary osteoarthritis of hips, bilateral    3. Lumbar disc disease with radiculopathy    4. Lumbar spondylosis    5. Chronic pain syndrome        Plan  Patient is a 68-year-old female complains of bilateral hip pain and lumbosacral pain with chronic patient secondary to lumbar degenerative disc disease, lumbar spondylosis, lumbar radiculopathy, primary osteoarthritis bilateral hips, greater trochanteric bursitis presents to office for initial consultation.  Patient does report having significant relief from bilateral bursa steroid injections however if she does walk a knee distance greater than 3 blocks she will start to experience intense hip and butt cheek pain and occasional groin pain which will stop her in her tracks.  Patient has most likely combination of hip pain and lumbar radicular symptoms.  Most likely in the L1-L2 nerve root distribution.  At this time we will continue conservative therapy and obtain more diagnostic imaging to plan for interventional management.      1.  We will schedule patient for bilateral hip intra-articular steroid injections  2.  We will also order an x-ray of the lumbar spine to better assess the degenerative changes or correlate patient's current presentation.  Which could be possibly L1-L2 involvement  3.  We will order an MRI of the lumbar spine to better assess the discogenic pathology by patient's bilateral lower extremity radicular symptoms  4.  We will also provide patient with physical therapy lumbar core strengthening  5.  Follow-up 1 month to review diagnostic imaging    My impressions and treatment recommendations were discussed in detail with the patient who verbalized understanding and had no further questions.  Discharge instructions were provided. I personally saw and examined the patient and I agree with the above discussed plan of care.    Orders Placed This Encounter   Procedures    XR  spine lumbar minimum 4 views non injury     Standing Status:   Future     Standing Expiration Date:   3/27/2028     Scheduling Instructions:      Bring along any outside films relating to this procedure.          MRI lumbar spine wo contrast     Standing Status:   Future     Standing Expiration Date:   3/27/2028     Scheduling Instructions:      There is no preparation for this test. Please leave your jewelry and valuables at home, wedding rings are the exception. Please bring your insurance cards, a form of photo ID and a list of your medications with you. Arrive 15 minutes prior to your appointment time in order to register. Please bring       any prior CT or MRI studies of this area that were not performed at a [facility].            To schedule this appointment, please contact Central Scheduling at [phone].     Order Specific Question:   What is the patient's sedation requirement? If Medication for Claustrophobia is selected, order medication at this point.     Answer:   No Sedation     Order Specific Question:   Does the patient have metallic implants?     Answer:   No    Ambulatory referral to Physical Therapy     Standing Status:   Future     Standing Expiration Date:   3/27/2025     Referral Priority:   Routine     Referral Type:   Physical Therapy     Referral Reason:   Specialty Services Required     Requested Specialty:   Physical Therapy     Number of Visits Requested:   1     Expiration Date:   3/28/2025     No orders of the defined types were placed in this encounter.      History of Present Illness    Cindy Ching is a 68 y.o. female complains of low back pain and bilateral hip pain with chronic pain syndrome secondary to primary osteoarthritis bilateral hips, lumbar radiculopathy, greater trochanteric bursitis presents to office for initial consultation.  Patient reports suffering from this pain for approximately 6 months.  Patient reports her pain is 4 out of 10, nearly  constant without a particular time pattern.  Patient reports her pain is sharp, throbbing, cutting pain without any significant weakness.  Patient reports pain is increased by bending, walking, exercise.  Pain is not decreased by anything.  Pain does not change with kneeling, laying down, standing, sitting, relaxation, coughing/sneezing, bowel movements, turning the head.  Patient reports moderate relief from greater trochanteric bursa steroid injections and heat/ice treatment.  Patient denies any alleviation of symptoms with physical therapy.  Patient uses Tylenol to help alleviate some of her pain specially at nighttime.    I have personally reviewed and/or updated the patient's past medical history, past surgical history, family history, social history, current medications, allergies, and vital signs today.     Review of Systems   Constitutional: Negative.    HENT: Negative.     Eyes: Negative.    Respiratory: Negative.     Cardiovascular:  Positive for leg swelling.   Gastrointestinal: Negative.    Endocrine: Negative.    Genitourinary: Negative.    Musculoskeletal:  Positive for arthralgias and gait problem.   Skin: Negative.    Allergic/Immunologic: Negative.    Hematological: Negative.    Psychiatric/Behavioral: Negative.         Patient Active Problem List   Diagnosis    Reactive airway disease    Esophageal reflux    Hyperlipidemia    KOSTA (obstructive sleep apnea)    Migraine without aura and without status migrainosus, not intractable    Right-sided chest wall pain    Orthostatic hypotension    Essential hypertension    Age-related osteoporosis without current pathological fracture    Bilateral hip pain    EL (renal artery stenosis) (HCC)    Neck pain    Impaired fasting blood sugar    Mild atherosclerosis of both carotid arteries    Anxiety    Primary insomnia    Paroxysmal atrial fibrillation (HCC)    Bilateral leg edema       Past Medical History:   Diagnosis Date    Allergic reaction     Resolved  11/24/2017     Allergic rhinitis     Resolved 11/24/2017     Generalized anxiety disorder     Resolved 11/24/2017     High cholesterol     Hypertension     Microscopic hematuria     Resolved 11/24/2017     Postural dizziness with presyncope 03/11/2021    Renal calculi     Resolved 11/24/2017     Renal colic     Resolved 11/24/2017     Renal cyst, acquired     Resolved 11/24/2017     Restrictive lung disease     Resolved 11/24/2017     Supraventricular tachycardia     Resolved 11/24/2017        Past Surgical History:   Procedure Laterality Date    CARDIAC ELECTROPHYSIOLOGY PROCEDURE N/A 12/27/2023    Procedure: Cardiac eps/afib ablation;  Surgeon: Juan Ellington MD;  Location:  CARDIAC CATH LAB;  Service: Cardiology    CHOLECYSTECTOMY      CYSTOSCOPY  05/07/2014    Diagnostic. Last assessed 5/7/2014      TONSILLECTOMY      TUBAL LIGATION         Family History   Problem Relation Age of Onset    Lung cancer Mother     Heart attack Father     No Known Problems Sister     No Known Problems Sister     No Known Problems Sister     Heart attack Maternal Grandmother     Diabetes Paternal Grandmother     Colon cancer Maternal Aunt     Esophageal cancer Maternal Aunt     No Known Problems Maternal Aunt     No Known Problems Paternal Aunt     Rheum arthritis Family         Rheumatoid arthritis with rheumatoid factor     No Known Problems Son     No Known Problems Son        Social History     Occupational History    Not on file   Tobacco Use    Smoking status: Former    Smokeless tobacco: Never   Vaping Use    Vaping status: Never Used   Substance and Sexual Activity    Alcohol use: Yes     Comment: occasional    Drug use: Never    Sexual activity: Not on file       Current Outpatient Medications on File Prior to Visit   Medication Sig    apixaban (Eliquis) 5 mg Take 1 tablet (5 mg total) by mouth 2 (two) times a day    atorvastatin (LIPITOR) 20 mg tablet Take 1 tablet (20 mg total) by mouth daily     "butalbital-acetaminophen-caffeine (FIORICET,ESGIC) -40 mg per tablet Take 1 tablet by mouth every 4 (four) hours as needed for headaches    cholecalciferol (VITAMIN D3) 1,000 units tablet Take 1,000 Units by mouth daily 2,000    diltiazem (CARDIZEM CD) 360 MG 24 hr capsule Take 1 capsule (360 mg total) by mouth daily    fluticasone (FLONASE) 50 mcg/act nasal spray 2 sprays into each nostril daily    furosemide (LASIX) 20 mg tablet Take 1 tablet (20 mg total) by mouth every third day as needed (LE edema)    hydroCHLOROthiazide 12.5 mg tablet Take 1 tablet (12.5 mg total) by mouth daily    hydrOXYzine HCL (ATARAX) 25 mg tablet Take 1 tablet (25 mg total) by mouth every 8 (eight) hours as needed for anxiety    pantoprazole (PROTONIX) 40 mg tablet TAKE 1 TABLET BY MOUTH TWICE  DAILY    potassium chloride (Klor-Con M10) 10 mEq tablet Take 1 tablet (10 mEq total) by mouth daily    Prolia 60 MG/ML INJECT 60MG SUBCUTANEOUSLY EVERY 6 MONTHS     No current facility-administered medications on file prior to visit.       Allergies   Allergen Reactions    Amlodipine Myalgia    Codeine      Other reaction(s): Other (See Comments)  headaches    Fosamax [Alendronate] GI Intolerance    Angiotensin Receptor Blockers Other (See Comments)     Metallic taste       Physical Exam    /65   Pulse 92   Resp 16   Ht 5' 3\" (1.6 m)   Wt 85.4 kg (188 lb 3.2 oz)   SpO2 98%   BMI 33.34 kg/m²     Constitutional: normal, well developed, well nourished, alert, in no distress and non-toxic and no overt pain behavior. and obese  Eyes: anicteric  HEENT: grossly intact  Neck: supple, symmetric, trachea midline and no masses   Pulmonary:even and unlabored  Cardiovascular:No edema or pitting edema present  Skin:Normal without rashes or lesions and well hydrated  Psychiatric:Mood and affect appropriate  Neurologic:Cranial Nerves II-XII grossly intact  Musculoskeletal:antalgic, pain with flexion and internal/external rotation " bilateral    Lumbar/Sacral Spine examination demonstrates.  Decreased range of motion lumbar spine with pain upon: flexion, lateral rotation to the left/right, and bending to the left/right.  Bilateral lumbar paraspinals tender to palpation. Muscle spasms noted in the lumbar area bilaterally. 4/5 lower extremity strength in all muscle groups bilaterally. Positive seated straight leg raise for bilateral lower extremities.  Sensitivity to light touch intact bilateral lower extremities. 2+ reflexes in the patella and Achilles.  No ankle clonus    Imaging

## 2024-04-05 ENCOUNTER — HOSPITAL ENCOUNTER (OUTPATIENT)
Dept: MRI IMAGING | Facility: HOSPITAL | Age: 69
Discharge: HOME | End: 2024-04-05
Attending: ANESTHESIOLOGY
Payer: COMMERCIAL

## 2024-04-05 DIAGNOSIS — M47.816 LUMBAR SPONDYLOSIS: ICD-10-CM

## 2024-04-05 DIAGNOSIS — G89.4 CHRONIC PAIN SYNDROME: ICD-10-CM

## 2024-04-05 DIAGNOSIS — M54.42 CHRONIC BILATERAL LOW BACK PAIN WITH BILATERAL SCIATICA: ICD-10-CM

## 2024-04-05 DIAGNOSIS — M51.16 LUMBAR DISC DISEASE WITH RADICULOPATHY: ICD-10-CM

## 2024-04-05 DIAGNOSIS — M54.41 CHRONIC BILATERAL LOW BACK PAIN WITH BILATERAL SCIATICA: ICD-10-CM

## 2024-04-05 DIAGNOSIS — G89.29 CHRONIC BILATERAL LOW BACK PAIN WITH BILATERAL SCIATICA: ICD-10-CM

## 2024-04-05 PROCEDURE — 72148 MRI LUMBAR SPINE W/O DYE: CPT

## 2024-04-11 ENCOUNTER — NURSE TRIAGE (OUTPATIENT)
Age: 69
End: 2024-04-11

## 2024-04-11 NOTE — PROGRESS NOTES
Cardiology Follow Up    Cindy Ching  1955  [id number]  [facility] [address] [id number] [phone]    1. Bilateral leg edema        2. Essential hypertension  losartan (COZAAR) 50 mg tablet      3. Paroxysmal atrial fibrillation (HCC)        4. Mixed hyperlipidemia            Discussion/Summary:    LE edema  Patient began to note bilateral lower extremity edema in early March with no changes to her diet or medications noted  Dr. Scott, PCP started her on HCTZ 12.5 mg once daily  Patient noted no improvement in lower extremity edema with this medication  Patient seen by Dr. Reed on 3/26/2024 and had noted lower extremity edema and weight gain but no complaints of shortness of breath or orthopnea; She was given Lasix 20 mg to take twice a week  Patient notes some improvement in pedal and toe edema but no improvement in lower extremity edema with Lasix; she has been elevating her legs at home and has been following low sodium diet  Per Dr. Reed documentation if Lasix did not improve lower extremity edema he would consider discontinuing diltiazem as she is on a high dose of 360 mg once daily  Today on physical exam she has at least +1 lower extremity edema up to mid calf but lungs are clear to auscultation with no overt JVD; weight is same today as was during last OV   Again, tells me today no dyspnea on exertion orthopnea  As per documented plan by Dr Reed from last OV; We will discontinue diltiazem and I will have her continue her Lasix twice a week--- we will restart losartan 50 mg once daily for blood pressure as she has tolerated this in the past (patient with history of intolerance to multiple medications)  She will contact her office early next week for an update on lower extremity edema  If LE edema continues despite discontinuation of Cardizem we can consider increasing  Lasix dosing and/or switching to torsemide as this may then be a diagnosis of CHF and not adverse reaction to diltiazem  As long as patient is not having any new symptoms such as shortness of breath or orthopnea she does not need a repeat office visit--- this can then be managed via message to provider when she calls with updates on her status    PAF  Known hx of Paroxysmal atrial fibrillation  Diagnosed August 2023 after noticing elevated heart rates on home monitor x 6 weeks  Cardioversion in August 2023 unsuccessful x 2  Started on amiodarone 200 mg daily  Rate controlled with diltiazem  Status post atrial fibrillation ablation with PVI, posterior wall, CTI line placement by Dr. Ellington on 12/27/2023  Now on diltiazem 360 mg QD as well as Eliquis 5 mg BID for stroke prevention; no longer on amiodarone  Rhythm is regular on exam today  She continues with bilateral lower extremity edema and we will discontinue diltiazem; cannot start metoprolol as she has been intolerant of this in the past  I did discuss with the patient today risk of being off all AVN blockers if she should revert back to A-fib; if discontinuation of diltiazem does not help her lower extremity edema we can consider restarting diltiazem    Essential HTN  controlled  Currently Maintained on diltiazem 360 mg QD which we will now discontinue in the setting of bilateral lower extremity edema  She had been tolerant of losartan 50 mg once daily in the past and we will restart this  She will monitor her blood pressure at home twice daily for 1 week and contact our office with results  Low sodium diet reinforced    Hyperlipidemia  Lipid panel from July 2023  Triglycerides 144  HDL 43  LDL 95  Continue Lipitor 20 mg QD    She will return to the office in 6 months to follow up with Dr Reed    Interval History:   Cindy Ching is a 68 y.o. female with PMH of HLD, HTN, PAF now status post ablation December 2023 returns to the office today secondary  to continued lower extremity edema.  The patient returns to the office today secondary to continued lower extremity edema.  The patient gives me a timeline telling me that in early March she began to notice bilateral lower extremity edema without any change in her lifestyle such as diet and she had been compliant with her medications.  She had previously not been on a diuretic.  She had seen her family doctor in mid March and he had prescribed her HCTZ 12.5 mg once daily and she states this did not help her edema at all.  She was then seen by Dr. Reed on 3/6/2024 at which time he did note lower extremity edema.  However, the patient denied any other symptoms of CHF such as shortness of breath or orthopnea.  At the time Dr. Reed discontinued her HCTZ and gave her Lasix 20 mg that he wanted her to take twice a week.  He did document that if her lower extremity persisted despite addition of Lasix he would consider discontinuing diltiazem as she is on an elevated dose at 360 mg a day which we know can contribute to lower extremity edema as an adverse effect.  She states that she did take Lasix as prescribed and has since taken it a few times since last office visit and states that she does not feel that it has increased her urination much and has perhaps improved her pedal and toe edema but not necessarily the edema in her calves and ankles.  Today on physical exam she does have at least +1 bilateral lower extremity edema and her legs are tight up to just above the mid calf area.  No edema above the knees.  Skin is slightly warm but no erythema noted.  She does not have any toe or pedal edema on exam.  Her lungs are clear to auscultation bilaterally with no overt JVD.  The patient again does not complain of any dyspnea or orthopnea to me.  Her weight is stable and is 188 pounds which is what she was during last office visit with Dr. Reed.  I will proceed with plan as documented by Dr. Reed and his last  office note, we will have her discontinue diltiazem.  We did discuss the implications of her being off all AVN blockers if she should revert back to A-fib but the patient has been intolerant to metoprolol in the past.  We will have her continue taking Lasix 20 mg twice a week for now and we will add losartan 50 mg once daily for blood pressure management as she has been intolerant of this in the past (the patient has a history of intolerance to multiple medications).  She will monitor her blood pressure at home twice daily and will call our office on Tuesday.  On Tuesday I am asking her to update us on her blood pressure readings as well as if the discontinuation of diltiazem assisted in her lower extremity edema at all.  If it does not help at all we may need to consider that this could be a CHF exacerbation as opposed to an adverse effect of diltiazem and we may need to increase Lasix dose at that time versus consider change to torsemide.  Her blood pressure is well-controlled today.  She is following a low-sodium diet.    Medical Problems       Problem List       Reactive airway disease    Esophageal reflux    Hyperlipidemia    KOSTA (obstructive sleep apnea)    Migraine without aura and without status migrainosus, not intractable    Right-sided chest wall pain    Orthostatic hypotension    Essential hypertension    Age-related osteoporosis without current pathological fracture    Bilateral hip pain    EL (renal artery stenosis) (HCC)    Neck pain    Impaired fasting blood sugar    Mild atherosclerosis of both carotid arteries    Anxiety    Primary insomnia    Paroxysmal atrial fibrillation (HCC)    Bilateral leg edema        Past Medical History:   Diagnosis Date    Allergic reaction     Resolved 11/24/2017     Allergic rhinitis     Resolved 11/24/2017     Generalized anxiety disorder     Resolved 11/24/2017     High cholesterol     Hypertension     Microscopic hematuria     Resolved 11/24/2017     Postural  dizziness with presyncope 03/11/2021    Renal calculi     Resolved 11/24/2017     Renal colic     Resolved 11/24/2017     Renal cyst, acquired     Resolved 11/24/2017     Restrictive lung disease     Resolved 11/24/2017     Supraventricular tachycardia     Resolved 11/24/2017      Social History     Socioeconomic History    Marital status: /Civil Union     Spouse name: Not on file    Number of children: Not on file    Years of education: Not on file    Highest education level: Not on file   Occupational History    Not on file   Tobacco Use    Smoking status: Former    Smokeless tobacco: Never   Vaping Use    Vaping status: Never Used   Substance and Sexual Activity    Alcohol use: Yes     Comment: occasional    Drug use: Never    Sexual activity: Not on file   Other Topics Concern    Not on file   Social History Narrative    Not on file     Social Determinants of Health     Financial Resource Strain: Not on file   Food Insecurity: No Food Insecurity (8/16/2023)    Hunger Vital Sign     Worried About Running Out of Food in the Last Year: Never true     Ran Out of Food in the Last Year: Never true   Transportation Needs: No Transportation Needs (8/16/2023)    PRAPARE - Transportation     Lack of Transportation (Medical): No     Lack of Transportation (Non-Medical): No   Physical Activity: Not on file   Stress: Not on file   Social Connections: Not on file   Intimate Partner Violence: Not on file   Housing Stability: Low Risk  (8/16/2023)    Housing Stability Vital Sign     Unable to Pay for Housing in the Last Year: No     Number of Places Lived in the Last Year: 1     Unstable Housing in the Last Year: No      Family History   Problem Relation Age of Onset    Lung cancer Mother     Heart attack Father     No Known Problems Sister     No Known Problems Sister     No Known Problems Sister     Heart attack Maternal Grandmother     Diabetes Paternal Grandmother     Colon cancer Maternal Aunt     Esophageal cancer  Maternal Aunt     No Known Problems Maternal Aunt     No Known Problems Paternal Aunt     Rheum arthritis Family         Rheumatoid arthritis with rheumatoid factor     No Known Problems Son     No Known Problems Son      Past Surgical History:   Procedure Laterality Date    CARDIAC ELECTROPHYSIOLOGY PROCEDURE N/A 12/27/2023    Procedure: Cardiac eps/afib ablation;  Surgeon: Juan Ellington MD;  Location: [facility];  Service: Cardiology    CHOLECYSTECTOMY      CYSTOSCOPY  05/07/2014    Diagnostic. Last assessed 5/7/2014      TONSILLECTOMY      TUBAL LIGATION         Current Outpatient Medications:     apixaban (Eliquis) 5 mg, Take 1 tablet (5 mg total) by mouth 2 (two) times a day, Disp: 180 tablet, Rfl: 3    atorvastatin (LIPITOR) 20 mg tablet, Take 1 tablet (20 mg total) by mouth daily, Disp: 90 tablet, Rfl: 3    butalbital-acetaminophen-caffeine (FIORICET,ESGIC) -40 mg per tablet, Take 1 tablet by mouth every 4 (four) hours as needed for headaches, Disp: 30 tablet, Rfl: 0    cholecalciferol (VITAMIN D3) 1,000 units tablet, Take 1,000 Units by mouth daily 2,000, Disp: , Rfl:     fluticasone (FLONASE) 50 mcg/act nasal spray, 2 sprays into each nostril daily, Disp: 16 g, Rfl: 3    furosemide (LASIX) 20 mg tablet, Take 1 tablet (20 mg total) by mouth every third day as needed (LE edema), Disp: 30 tablet, Rfl: 4    hydrOXYzine HCL (ATARAX) 25 mg tablet, Take 1 tablet (25 mg total) by mouth every 8 (eight) hours as needed for anxiety, Disp: 30 tablet, Rfl: 1    losartan (COZAAR) 50 mg tablet, Take 1 tablet (50 mg total) by mouth daily, Disp: 30 tablet, Rfl: 5    pantoprazole (PROTONIX) 40 mg tablet, TAKE 1 TABLET BY MOUTH TWICE  DAILY, Disp: 180 tablet, Rfl: 3    potassium chloride (Klor-Con M10) 10 mEq tablet, Take 1 tablet (10 mEq total) by mouth daily (Patient taking differently: Take 10 mEq by mouth if needed As needed with the lasix), Disp: 30 tablet, Rfl: 3    Prolia 60 MG/ML, INJECT 60MG  "SUBCUTANEOUSLY EVERY 6 MONTHS, Disp: 1 mL, Rfl: 0    hydroCHLOROthiazide 12.5 mg tablet, Take 1 tablet (12.5 mg total) by mouth daily (Patient not taking: Reported on 4/12/2024), Disp: 30 tablet, Rfl: 3  Allergies   Allergen Reactions    Amlodipine Myalgia    Codeine      Other reaction(s): Other (See Comments)  headaches    Fosamax [Alendronate] GI Intolerance    Angiotensin Receptor Blockers Other (See Comments)     Metallic taste       Labs:     Chemistry        Component Value Date/Time     05/28/2015 0858    K 4.1 03/19/2024 0738    K 4.1 05/28/2015 0858     03/19/2024 0738     05/28/2015 0858    CO2 28 03/19/2024 0738    CO2 30 05/28/2015 0858    BUN 23 03/19/2024 0738    BUN 17 05/28/2015 0858    CREATININE 1.02 03/19/2024 0738    CREATININE 0.78 05/28/2015 0858        Component Value Date/Time    CALCIUM 9.3 03/19/2024 0738    CALCIUM 9.2 05/28/2015 0858    ALKPHOS 67 03/19/2024 0738    ALKPHOS 65 05/28/2015 0858    AST 16 03/19/2024 0738    AST 27 05/28/2015 0858    ALT 18 03/19/2024 0738    ALT 39 05/28/2015 0858    BILITOT 1.32 (H) 05/28/2015 0858            Lab Results   Component Value Date    CHOL 176 05/28/2015    CHOL 173 05/30/2014     Lab Results   Component Value Date    HDL 43 (L) 07/14/2023    HDL 46 (L) 06/23/2022    HDL 54 05/09/2018     Lab Results   Component Value Date    LDLCALC 95 07/14/2023    LDLCALC 118 (H) 06/23/2022    LDLCALC 94 05/09/2018     Lab Results   Component Value Date    TRIG 144 07/14/2023    TRIG 108 06/23/2022    TRIG 144 05/09/2018     No results found for: \"CHOLHDL\"    Imaging: XR spine lumbar minimum 4 views non injury    Result Date: 3/27/2024  Narrative: LUMBAR SPINE INDICATION:   Lumbago with sciatica, left side. Lumbago with sciatica, right side. Other chronic pain. COMPARISON:  None. VIEWS:  XR SPINE LUMBAR MINIMUM 4 VIEWS NON INJURY FINDINGS: There are 5 non rib bearing lumbar vertebral bodies. There is no evidence of acute fracture or " "destructive osseous lesion. Mild scoliotic deformity is noted.  Alignment is otherwise unremarkable. Multilevel mild to moderate discogenic degenerative disease The pedicles appear intact. No pars defects. There are atherosclerotic calcifications. There is a 3.7 cm infrarenal abdominal aortic aneurysm. soft tissues are otherwise unremarkable.     Impression: No acute osseous abnormality.  Degenerative changes as described. Electronically signed: 03/27/2024 01:57 PM Addison Joyner MD      Review of Systems   Constitutional: Negative for chills, fever and malaise/fatigue.   HENT:  Negative for congestion.    Cardiovascular:  Positive for leg swelling. Negative for chest pain, dyspnea on exertion, orthopnea and palpitations.   Respiratory:  Negative for cough, shortness of breath (no SOB at rest) and wheezing.    Endocrine: Negative.    Hematologic/Lymphatic: Negative.    Skin: Negative.    Musculoskeletal: Negative.    Gastrointestinal:  Negative for bloating, abdominal pain, nausea and vomiting.   Genitourinary: Negative.    Neurological:  Negative for dizziness and light-headedness.   Psychiatric/Behavioral: Negative.     All other systems reviewed and are negative.      Vitals:    04/12/24 0802   BP: 122/78   Pulse: 88   SpO2: 96%     Vitals:    04/12/24 0802   Weight: 85.3 kg (188 lb)     Height: 5' 3\" (160 cm)   Body mass index is 33.3 kg/m².    Physical Exam:  Physical Exam  Vitals reviewed.   Constitutional:       General: She is not in acute distress.     Appearance: She is obese.   HENT:      Head: Normocephalic and atraumatic.      Mouth/Throat:      Mouth: Mucous membranes are moist.   Cardiovascular:      Rate and Rhythm: Normal rate and regular rhythm.      Heart sounds: Normal heart sounds, S1 normal and S2 normal. No murmur heard.  Pulmonary:      Effort: Pulmonary effort is normal. No respiratory distress.      Breath sounds: Normal breath sounds.   Abdominal:      General: Bowel sounds are normal. " There is no distension.      Palpations: Abdomen is soft.   Musculoskeletal:         General: Normal range of motion.      Cervical back: Normal range of motion and neck supple.      Right lower le+ Pitting Edema present.      Left lower le+ Pitting Edema present.   Skin:     General: Skin is warm and dry.   Neurological:      Mental Status: She is alert and oriented to person, place, and time.   Psychiatric:         Mood and Affect: Mood normal.

## 2024-04-11 NOTE — TELEPHONE ENCOUNTER
"Patient has been taking furosemide 25 mg daily every third day as needed for edema along with Kcl 10 meq whenever she takes the furosemide. Instructions given at  with Dr Reed on 3/26. Cindy said the edema is not improving.  She is not short of breath. She does weigh herself and her weight is stable but she has not lost weight.  The edema is concerning to her.  She said Dr Reed told her to stop the HCTZ when she saw him in the office so she is no longer taking it.    Please call patient for office visit.          Reason for Disposition   MILD swelling of both ankles (i.e., pedal edema) AND new-onset or worsening    Answer Assessment - Initial Assessment Questions  1. ONSET: \"When did the swelling start?\" (e.g., minutes, hours, days)      Two months ago  2. LOCATION: \"What part of the leg is swollen?\"  \"Are both legs swollen or just one leg?\"      Entire leg, both legs   3. SEVERITY: \"How bad is the swelling?\" Moderate    7. CAUSE: \"What do you think is causing the leg swelling?\"     \"Water pill is not working so well\"         9. RECURRENT SYMPTOM: \"Have you had leg swelling before?\" If Yes, ask: \"When was the last time?\" \"What happened that time?\"      Ongoing since the beginning of the year.  10. OTHER SYMPTOMS: \"Do you have any other symptoms?\" (e.g., chest pain, difficulty breathing) No other symptoms.    Protocols used: Leg Swelling and Edema-ADULT-[place]    "

## 2024-04-12 ENCOUNTER — OFFICE VISIT (OUTPATIENT)
Dept: CARDIOLOGY CLINIC | Facility: HOSPITAL | Age: 69
End: 2024-04-12
Payer: COMMERCIAL

## 2024-04-12 VITALS
SYSTOLIC BLOOD PRESSURE: 122 MMHG | HEART RATE: 88 BPM | BODY MASS INDEX: 33.31 KG/M2 | HEIGHT: 63 IN | WEIGHT: 188 LBS | OXYGEN SATURATION: 96 % | DIASTOLIC BLOOD PRESSURE: 78 MMHG

## 2024-04-12 DIAGNOSIS — E78.2 MIXED HYPERLIPIDEMIA: ICD-10-CM

## 2024-04-12 DIAGNOSIS — I10 ESSENTIAL HYPERTENSION: ICD-10-CM

## 2024-04-12 DIAGNOSIS — I48.0 PAROXYSMAL ATRIAL FIBRILLATION (HCC): ICD-10-CM

## 2024-04-12 DIAGNOSIS — R60.0 BILATERAL LEG EDEMA: Primary | ICD-10-CM

## 2024-04-12 PROBLEM — I50.32 CHRONIC DIASTOLIC (CONGESTIVE) HEART FAILURE (HCC): Status: ACTIVE | Noted: 2024-04-12

## 2024-04-12 PROCEDURE — 99214 OFFICE O/P EST MOD 30 MIN: CPT | Performed by: NURSE PRACTITIONER

## 2024-04-12 RX ORDER — LOSARTAN POTASSIUM 50 MG/1
50 TABLET ORAL DAILY
Qty: 30 TABLET | Refills: 5 | Status: SHIPPED | OUTPATIENT
Start: 2024-04-12

## 2024-04-12 NOTE — PATIENT INSTRUCTIONS
1.  We are going to stop the diltiazem medication and restart losartan 50 mg once daily in its place  2.  I have sent the losartan medication to your pharmacy so please pick it up  3.  Please take one of your Lasix 20 mg tablets this Sunday as prescribed by Dr. Reed  4.  Please continue to monitor your blood pressure twice daily, measuring it once in the morning at least 1 hour after taking your medications and resting for 5 minutes and then again in the afternoon  5.  Please contact our office on Tuesday to let us know how your swelling is doing  6.  Continue to follow a low-salt diet and elevate your legs for about 20 minutes at a time twice a day

## 2024-04-14 DIAGNOSIS — M81.0 AGE-RELATED OSTEOPOROSIS WITHOUT CURRENT PATHOLOGICAL FRACTURE: ICD-10-CM

## 2024-04-15 RX ORDER — DENOSUMAB 60 MG/ML
INJECTION SUBCUTANEOUS
Qty: 1 ML | Refills: 0 | Status: SHIPPED | OUTPATIENT
Start: 2024-04-15

## 2024-04-16 ENCOUNTER — ANESTHESIA (OUTPATIENT)
Dept: PERIOP | Facility: HOSPITAL | Age: 69
End: 2024-04-16
Payer: COMMERCIAL

## 2024-04-16 ENCOUNTER — HOSPITAL ENCOUNTER (OUTPATIENT)
Facility: HOSPITAL | Age: 69
Setting detail: OUTPATIENT SURGERY
Discharge: HOME | End: 2024-04-16
Attending: ANESTHESIOLOGY | Admitting: ANESTHESIOLOGY
Payer: COMMERCIAL

## 2024-04-16 ENCOUNTER — ANESTHESIA EVENT (OUTPATIENT)
Dept: PERIOP | Facility: HOSPITAL | Age: 69
End: 2024-04-16
Payer: COMMERCIAL

## 2024-04-16 ENCOUNTER — APPOINTMENT (OUTPATIENT)
Dept: RADIOLOGY | Facility: HOSPITAL | Age: 69
End: 2024-04-16
Payer: COMMERCIAL

## 2024-04-16 VITALS
HEART RATE: 80 BPM | TEMPERATURE: 97.8 F | DIASTOLIC BLOOD PRESSURE: 72 MMHG | OXYGEN SATURATION: 95 % | RESPIRATION RATE: 18 BRPM | SYSTOLIC BLOOD PRESSURE: 132 MMHG

## 2024-04-16 DIAGNOSIS — M25.551 BILATERAL HIP PAIN: Primary | ICD-10-CM

## 2024-04-16 DIAGNOSIS — M25.552 BILATERAL HIP PAIN: Primary | ICD-10-CM

## 2024-04-16 PROCEDURE — 77002 NEEDLE LOCALIZATION BY XRAY: CPT

## 2024-04-16 PROCEDURE — 20610 DRAIN/INJ JOINT/BURSA W/O US: CPT | Performed by: ANESTHESIOLOGY

## 2024-04-16 PROCEDURE — 77002 NEEDLE LOCALIZATION BY XRAY: CPT | Performed by: ANESTHESIOLOGY

## 2024-04-16 RX ORDER — LIDOCAINE HYDROCHLORIDE 10 MG/ML
INJECTION, SOLUTION EPIDURAL; INFILTRATION; INTRACAUDAL; PERINEURAL AS NEEDED
Status: DISCONTINUED | OUTPATIENT
Start: 2024-04-16 | End: 2024-04-16

## 2024-04-16 RX ORDER — LIDOCAINE HYDROCHLORIDE 10 MG/ML
INJECTION, SOLUTION EPIDURAL; INFILTRATION; INTRACAUDAL; PERINEURAL AS NEEDED
Status: DISCONTINUED | OUTPATIENT
Start: 2024-04-16 | End: 2024-04-16 | Stop reason: HOSPADM

## 2024-04-16 RX ORDER — PROPOFOL 10 MG/ML
INJECTION, EMULSION INTRAVENOUS AS NEEDED
Status: DISCONTINUED | OUTPATIENT
Start: 2024-04-16 | End: 2024-04-16

## 2024-04-16 RX ORDER — MIDAZOLAM HYDROCHLORIDE 2 MG/2ML
INJECTION, SOLUTION INTRAMUSCULAR; INTRAVENOUS AS NEEDED
Status: DISCONTINUED | OUTPATIENT
Start: 2024-04-16 | End: 2024-04-16

## 2024-04-16 RX ORDER — SODIUM CHLORIDE, SODIUM LACTATE, POTASSIUM CHLORIDE, CALCIUM CHLORIDE 600; 310; 30; 20 MG/100ML; MG/100ML; MG/100ML; MG/100ML
INJECTION, SOLUTION INTRAVENOUS CONTINUOUS PRN
Status: DISCONTINUED | OUTPATIENT
Start: 2024-04-16 | End: 2024-04-16

## 2024-04-16 RX ORDER — METHYLPREDNISOLONE ACETATE 80 MG/ML
INJECTION, SUSPENSION INTRA-ARTICULAR; INTRALESIONAL; INTRAMUSCULAR; SOFT TISSUE AS NEEDED
Status: DISCONTINUED | OUTPATIENT
Start: 2024-04-16 | End: 2024-04-16 | Stop reason: HOSPADM

## 2024-04-16 RX ORDER — BUPIVACAINE HYDROCHLORIDE 2.5 MG/ML
INJECTION, SOLUTION EPIDURAL; INFILTRATION; INTRACAUDAL AS NEEDED
Status: DISCONTINUED | OUTPATIENT
Start: 2024-04-16 | End: 2024-04-16 | Stop reason: HOSPADM

## 2024-04-16 RX ADMIN — MIDAZOLAM 2 MG: 1 INJECTION INTRAMUSCULAR; INTRAVENOUS at 09:40

## 2024-04-16 RX ADMIN — PROPOFOL 50 MG: 10 INJECTION, EMULSION INTRAVENOUS at 09:54

## 2024-04-16 RX ADMIN — SODIUM CHLORIDE, SODIUM LACTATE, POTASSIUM CHLORIDE, AND CALCIUM CHLORIDE: .6; .31; .03; .02 INJECTION, SOLUTION INTRAVENOUS at 09:30

## 2024-04-16 RX ADMIN — LIDOCAINE HYDROCHLORIDE 50 MG: 10 INJECTION, SOLUTION EPIDURAL; INFILTRATION; INTRACAUDAL; PERINEURAL at 09:50

## 2024-04-16 RX ADMIN — PROPOFOL 50 MG: 10 INJECTION, EMULSION INTRAVENOUS at 09:50

## 2024-04-16 NOTE — INTERVAL H&P NOTE
H&P reviewed. After examining the patient I find no changes in the patients condition since the H&P had been written.    Vitals:    04/16/24 0908   BP: 145/71   Pulse: 86   Resp: 18   Temp: (!) 96.9 °F (36.1 °C)   SpO2: 94%

## 2024-04-16 NOTE — OP NOTE
OPERATIVE REPORT  PATIENT NAME: Cindy Ching    :  1955  MRN: [id number]  Pt Location: [facility] OR ROOM 01    SURGERY DATE: 2024    Surgeons and Role:     * Margaret Quesada MD - Primary    Preop Diagnosis:  Primary osteoarthritis of hips, bilateral [M16.0]    Post-Op Diagnosis Codes:     * Primary osteoarthritis of hips, bilateral [M16.0]    Procedure(s):  Bilateral - Bilateral hip intra-articular steroid injection    Specimen(s):  * No specimens in log *    Estimated Blood Loss:   Minimal    Drains:  * No LDAs found *    Anesthesia Type:   IV Sedation with Anesthesia    Operative Indications:  Primary osteoarthritis of hips, bilateral [M16.0]      Operative Findings:  same    Complications:   None    Procedure and Technique:  Fluoroscopically-guided bilateral intraarticular hip injection      After discussing the risks, benefits, and alternatives to the procedure, the patient expressed understanding and wished to proceed.  The patient was brought to the fluoroscopic suite and placed in the supine position. Procedural pause conducted to verify:  correct patient identity, procedure to be performed, and as applicable, correct side and site, correct patient position, and availability of implants, special equipment and special requirements.  The femoral artery was palpated and marked.  Using fluoroscopy, an AP view was utilized to visualize the bilateral hip joint.  Next, a point at the junction of the ipsilateral femoral head and femoral neck was identified, and noted to be a safe distance lateral to the pulsation of the aforementioned femoral artery.  The skin was sterilely prepped and draped in the usual fashion using Chloraprep skin prep.  The skin and subcutaneous tissues were anesthetized with 0.5% lidocaine.  Using fluoroscopic guidance, a 3.5 inch 22 gauge spinal needle was advanced into the joint.  After negative aspiration, 2 mL of Omnipaque 300 contrast was injected which confirmed  intra-articular placement without evidence of intravascular uptake. A 4 ml solution consisting of 40 mg of Depo-Medrol in 0.25% bupivacaine was injected.  The needle was withdrawn from the skin while being flushed with lidocaine.  The patient tolerated the procedure well, and there were no apparent complications.  After appropriate observation, the patient was dismissed from the outpatient procedure area in good condition under their own power.                 I was present for the entire procedure.    Patient Disposition:  hemodynamically stable        SIGNATURE: Margaret Quesada MD  DATE: April 16, 2024  TIME: 10:01 AM

## 2024-04-16 NOTE — DISCHARGE INSTR - AVS FIRST PAGE

## 2024-04-16 NOTE — ANESTHESIA PREPROCEDURE EVALUATION
Procedure:  Bilateral hip intra-articular steroid injection (Bilateral: Hip)    Relevant Problems   CARDIO   (+) Essential hypertension   (+) Hyperlipidemia   (+) Migraine without aura and without status migrainosus, not intractable   (+) Paroxysmal atrial fibrillation (HCC)   (+) EL (renal artery stenosis) (HCC)   (+) Right-sided chest wall pain      GI/HEPATIC   (+) Esophageal reflux      NEURO/PSYCH   (+) Anxiety   (+) Migraine without aura and without status migrainosus, not intractable      PULMONARY   (+) KOSTA (obstructive sleep apnea)      Cardiovascular/Peripheral Vascular   (+) Chronic diastolic (congestive) heart failure (HCC)      Respiratory/Allergy   (+) Reactive airway disease        Physical Exam    Airway    Mallampati score: III  TM Distance: >3 FB  Neck ROM: full     Dental       Cardiovascular      Pulmonary      Other Findings  post-pubertal.      Anesthesia Plan  ASA Score- 3     Anesthesia Type- IV sedation with anesthesia with ASA Monitors.         Additional Monitors:     Airway Plan:     Comment: Patient visibly anxious and wishing to be asleep for the procedure. We discussed the spectrum of sedation and the need to choose a suitable depth of anesthesia for Dr. Quesada to perform the procedure. She is aware that this may mean conscious or light sedation or deep sedation if allowable. We discussed the desire to avoid moderate sedation as this may render her amnestic but unable to participate in positioning and otherwise not redirectable .       Plan Factors-Exercise tolerance (METS): >4 METS.    Chart reviewed.   Existing labs reviewed. Patient summary reviewed.                  Induction- intravenous.    Postoperative Plan-     Informed Consent- Anesthetic plan and risks discussed with patient.  I personally reviewed this patient with the CRNA. Discussed and agreed on the Anesthesia Plan with the CRNA..

## 2024-04-17 ENCOUNTER — TELEPHONE (OUTPATIENT)
Age: 69
End: 2024-04-17

## 2024-04-17 NOTE — TELEPHONE ENCOUNTER
Pt called stating that she recently started taking Losartan 50mg daily and was told to call yesterday with update on how she is doing. She states that her Wt is now 185lbs and she still has slight ankle swelling.     Reports BP readings:    Sat-730am-before medication /67  3:15pm- 3 hours after taking Losartan /75  930pm-/75    Sun-8am before medication /77  2pm- after takikng losartan /67    Mon-8am-before medication /76  1pm- after mediation /68     Tues-815am Before medication /82   7:15pm-/87   10pm-/84-   had steriod injection that day    Wed-8am before medication /83   9am-after medication /79     Heart rate range

## 2024-04-18 ENCOUNTER — TELEPHONE (OUTPATIENT)
Dept: CARDIOLOGY CLINIC | Facility: HOSPITAL | Age: 69
End: 2024-04-18

## 2024-04-18 NOTE — TELEPHONE ENCOUNTER
I saw the patient in the office on 4/12/2024 for continued lower extremity edema.  During prior office visit with Dr. Reed on 3/26/2024 he had started her on Lasix every third day which according to the patient during my office visit did not help her at all.  According to Dr. Reed' documentation during his last office visit if her lower extremity edema persisted he would consider discontinuing diltiazem 360 mg once daily.  During my office visit with the patient I did discontinue diltiazem.  The patient utilized Lasix twice since my office with her and she states that today her lower extremity edema has completely resolved.  She states she has noted a great improvement in lower extremity edema since stopping diltiazem.  Her blood pressure is listed as below per documentation from nursing staff via message sent to me.  She states her blood pressures have gotten even better over the past few days.  I have asked her to continue taking her blood pressure twice daily for 1 week and contact our office with results.  At this point time she will utilize Lasix on an as-needed basis.  She states she does get some intermittent swelling throughout the day and I do think she has an element of venous insufficiency therefore I have asked her to continue to elevate her legs throughout the day and follow a low-sodium diet.  Again, the patient will only utilize Lasix on an as-needed basis for weight gain or lower extremity edema.  She will contact me next week with blood pressure readings to determine if we need to increase losartan at all.  She does note her heart rate at times has been in the low 100s or mid 90s but this may have been shortly after she sat down.  I have asked her to make sure she is sitting and resting for at least 5 minutes and monitor her heart rate while she is monitoring her blood pressure.  If her heart rate is elevated into the 100s to 1 teens I have asked her to feel her pulse to determine if it is  regular or irregular and give me this information when I talk to her next week.

## 2024-04-19 ENCOUNTER — TELEPHONE (OUTPATIENT)
Dept: FAMILY MEDICINE CLINIC | Facility: CLINIC | Age: 69
End: 2024-04-19

## 2024-04-19 NOTE — TELEPHONE ENCOUNTER
Pharmacy called to set up delivery date of prolia. Set up for 4-23-24, LVM with patient that it is coming and we will call her when it comes in.

## 2024-04-23 ENCOUNTER — TELEPHONE (OUTPATIENT)
Dept: PAIN MEDICINE | Facility: CLINIC | Age: 69
End: 2024-04-23

## 2024-04-25 ENCOUNTER — TELEPHONE (OUTPATIENT)
Age: 69
End: 2024-04-25

## 2024-04-25 NOTE — TELEPHONE ENCOUNTER
Pt called with an update of BP's  She took lasix 2 x since last Thursday when you spoke to her and edema is completely gone     BP's taken with an automatic arm cuff one hour before and after am meds.     4/18  7:45 124/77, 87            9 am 128/71, 91           6 pm 128/66, 86  4/19  7:30 128/77, 78            9:30 136/84, 92            5 pm 152/77, 85 (had a drink)  4/20  145/77, 95           135/77, 92           5 pm 143/80, 90  4/21  9:30 138/72, 92           5:30 131/72, 91  4/22  8:30- 124/79, 95           9:30 127/72, 88           5:30 144/76, 95  4/23- 7 am 119/72, 87           124/73, 83           8:30 pm 123/66, 98  4/24  8:30 124/69, 86           9:30 125/76, 96           4 pm 124/70, 91  Today 120/70, 87            127/75, 85

## 2024-05-03 ENCOUNTER — OFFICE VISIT (OUTPATIENT)
Dept: FAMILY MEDICINE CLINIC | Facility: CLINIC | Age: 69
End: 2024-05-03
Payer: COMMERCIAL

## 2024-05-03 VITALS
HEIGHT: 63 IN | BODY MASS INDEX: 32.67 KG/M2 | SYSTOLIC BLOOD PRESSURE: 116 MMHG | OXYGEN SATURATION: 98 % | HEART RATE: 92 BPM | WEIGHT: 184.4 LBS | DIASTOLIC BLOOD PRESSURE: 60 MMHG

## 2024-05-03 DIAGNOSIS — F41.9 ANXIETY: ICD-10-CM

## 2024-05-03 DIAGNOSIS — I10 ESSENTIAL HYPERTENSION: Primary | ICD-10-CM

## 2024-05-03 PROCEDURE — 99213 OFFICE O/P EST LOW 20 MIN: CPT | Performed by: FAMILY MEDICINE

## 2024-05-03 PROCEDURE — 1160F RVW MEDS BY RX/DR IN RCRD: CPT | Performed by: FAMILY MEDICINE

## 2024-05-03 PROCEDURE — 1159F MED LIST DOCD IN RCRD: CPT | Performed by: FAMILY MEDICINE

## 2024-05-03 NOTE — PROGRESS NOTES
Name: Cindy Ching      : 1955      MRN: [id number]  Encounter Provider: Jericho Scott DO  Encounter Date: 5/3/2024   Encounter department: [facility]    Assessment & Plan   Continue same medications.  Follow-up with specialist as scheduled.  Follow-up in 4 months or as needed.  1. Essential hypertension    2. Anxiety           Subjective     Patient here today for follow-up.  Denies any chest pain or shortness of breath.  Blood pressure under good control.  They now have her on losartan, and she is tolerating it well.  Her anxiety has been better also.  Rarely needing the hydroxyzine.      Review of Systems   Constitutional: Negative.    Respiratory: Negative.     Cardiovascular: Negative.    Gastrointestinal: Negative.    Genitourinary: Negative.        Past Medical History:   Diagnosis Date   • Allergic reaction     Resolved 2017    • Allergic rhinitis     Resolved 2017    • Generalized anxiety disorder     Resolved 2017    • High cholesterol    • Hypertension    • Microscopic hematuria     Resolved 2017    • Postural dizziness with presyncope 2021   • Renal calculi     Resolved 2017    • Renal colic     Resolved 2017    • Renal cyst, acquired     Resolved 2017    • Restrictive lung disease     Resolved 2017    • Supraventricular tachycardia     Resolved 2017      Past Surgical History:   Procedure Laterality Date   • CARDIAC ELECTROPHYSIOLOGY PROCEDURE N/A 2023    Procedure: Cardiac eps/afib ablation;  Surgeon: Juan Ellington MD;  Location:  CARDIAC CATH LAB;  Service: Cardiology   • CHOLECYSTECTOMY     • CYSTOSCOPY  2014    Diagnostic. Last assessed 2014     • [place] ARTHROCENTESIS ASPIR&/INJ MAJOR JT/BURSA W/O US Bilateral 2024    Procedure: Bilateral hip intra-articular steroid injection;  Surgeon: Margaret Quesada MD;  Location: [facility];  Service: Pain Management    • TONSILLECTOMY     •  TUBAL LIGATION       Family History   Problem Relation Age of Onset   • Lung cancer Mother    • Heart attack Father    • No Known Problems Sister    • No Known Problems Sister    • No Known Problems Sister    • Heart attack Maternal Grandmother    • Diabetes Paternal Grandmother    • Colon cancer Maternal Aunt    • Esophageal cancer Maternal Aunt    • No Known Problems Maternal Aunt    • No Known Problems Paternal Aunt    • Rheum arthritis Family         Rheumatoid arthritis with rheumatoid factor    • No Known Problems Son    • No Known Problems Son      Social History     Socioeconomic History   • Marital status: /Civil Union     Spouse name: None   • Number of children: None   • Years of education: None   • Highest education level: None   Occupational History   • None   Tobacco Use   • Smoking status: Former   • Smokeless tobacco: Never   Vaping Use   • Vaping status: Never Used   Substance and Sexual Activity   • Alcohol use: Yes     Comment: occasional   • Drug use: Never   • Sexual activity: None   Other Topics Concern   • None   Social History Narrative   • None     Social Determinants of Health     Financial Resource Strain: Not on file   Food Insecurity: No Food Insecurity (8/16/2023)    Hunger Vital Sign    • Worried About Running Out of Food in the Last Year: Never true    • Ran Out of Food in the Last Year: Never true   Transportation Needs: No Transportation Needs (8/16/2023)    PRAPARE - Transportation    • Lack of Transportation (Medical): No    • Lack of Transportation (Non-Medical): No   Physical Activity: Not on file   Stress: Not on file   Social Connections: Not on file   Intimate Partner Violence: Not on file   Housing Stability: Low Risk  (8/16/2023)    Housing Stability Vital Sign    • Unable to Pay for Housing in the Last Year: No    • Number of Places Lived in the Last Year: 1    • Unstable Housing in the Last Year: No     Current Outpatient Medications on File Prior to Visit    Medication Sig   • apixaban (Eliquis) 5 mg Take 1 tablet (5 mg total) by mouth 2 (two) times a day   • atorvastatin (LIPITOR) 20 mg tablet Take 1 tablet (20 mg total) by mouth daily   • butalbital-acetaminophen-caffeine (FIORICET,ESGIC) -40 mg per tablet Take 1 tablet by mouth every 4 (four) hours as needed for headaches   • cholecalciferol (VITAMIN D3) 1,000 units tablet Take 1,000 Units by mouth daily 2,000   • fluticasone (FLONASE) 50 mcg/act nasal spray 2 sprays into each nostril daily   • hydrOXYzine HCL (ATARAX) 25 mg tablet Take 1 tablet (25 mg total) by mouth every 8 (eight) hours as needed for anxiety   • losartan (COZAAR) 50 mg tablet Take 1 tablet (50 mg total) by mouth daily   • pantoprazole (PROTONIX) 40 mg tablet TAKE 1 TABLET BY MOUTH TWICE  DAILY   • Prolia 60 MG/ML INJECT 60MG SUBCUTANEOUSLY EVERY 6 MONTHS   • [DISCONTINUED] furosemide (LASIX) 20 mg tablet Take 1 tablet (20 mg total) by mouth every third day as needed (LE edema) (Patient not taking: Reported on 5/3/2024)   • [DISCONTINUED] potassium chloride (Klor-Con M10) 10 mEq tablet Take 1 tablet (10 mEq total) by mouth daily (Patient not taking: Reported on 5/3/2024)     Allergies   Allergen Reactions   • Amlodipine Myalgia   • Codeine      Other reaction(s): Other (See Comments)  headaches   • Fosamax [Alendronate] GI Intolerance   • Angiotensin Receptor Blockers Other (See Comments)     Metallic taste     Immunization History   Administered Date(s) Administered   • COVID-19 MODERNA VACC 0.5 ML IM 03/18/2021, 04/15/2021, 11/03/2021   • INFLUENZA 10/26/2018, 10/05/2019, 11/01/2020, 10/26/2022   • Influenza Quadrivalent Preservative Free 3 years and older IM 10/28/2015, 10/19/2017   • Influenza Quadrivalent, 6-35 Months IM 11/12/2014   • Influenza Split High Dose Preservative Free IM 10/05/2019   • Influenza, high dose seasonal 0.7 mL 10/26/2018, 10/10/2020, 10/21/2021, 10/26/2022, 11/02/2023   • Influenza, seasonal, injectable  "12/11/2013   • Pneumococcal Polysaccharide PPV23 12/11/2013       Objective     /60   Pulse 92   Ht 5' 3\" (1.6 m)   Wt 83.6 kg (184 lb 6.4 oz)   SpO2 98%   BMI 32.66 kg/m²     Physical Exam  Vitals reviewed.   Constitutional:       General: She is not in acute distress.     Appearance: Normal appearance. She is well-developed. She is not ill-appearing, toxic-appearing or diaphoretic.   HENT:      Head: Normocephalic and atraumatic.   Eyes:      Conjunctiva/sclera: Conjunctivae normal.   Cardiovascular:      Rate and Rhythm: Normal rate and regular rhythm.      Heart sounds: Normal heart sounds. No murmur heard.     No friction rub. No gallop.   Pulmonary:      Effort: Pulmonary effort is normal. No respiratory distress.      Breath sounds: Normal breath sounds. No wheezing, rhonchi or rales.   Musculoskeletal:      Right lower leg: No edema.      Left lower leg: No edema.   Neurological:      General: No focal deficit present.      Mental Status: She is alert and oriented to person, place, and time.   Psychiatric:         Mood and Affect: Mood normal.         Behavior: Behavior normal.         Thought Content: Thought content normal.         Judgment: Judgment normal.       Jericho Scott, DO    "

## 2024-05-17 ENCOUNTER — APPOINTMENT (OUTPATIENT)
Dept: RADIOLOGY | Facility: MEDICAL CENTER | Age: 69
End: 2024-05-17
Payer: COMMERCIAL

## 2024-05-17 ENCOUNTER — CLINICAL SUPPORT (OUTPATIENT)
Dept: FAMILY MEDICINE CLINIC | Facility: CLINIC | Age: 69
End: 2024-05-17
Payer: COMMERCIAL

## 2024-05-17 ENCOUNTER — TELEPHONE (OUTPATIENT)
Age: 69
End: 2024-05-17

## 2024-05-17 ENCOUNTER — OFFICE VISIT (OUTPATIENT)
Dept: PAIN MEDICINE | Facility: CLINIC | Age: 69
End: 2024-05-17
Payer: COMMERCIAL

## 2024-05-17 VITALS
HEIGHT: 63 IN | DIASTOLIC BLOOD PRESSURE: 75 MMHG | HEART RATE: 87 BPM | WEIGHT: 180 LBS | BODY MASS INDEX: 31.89 KG/M2 | SYSTOLIC BLOOD PRESSURE: 130 MMHG | RESPIRATION RATE: 16 BRPM

## 2024-05-17 DIAGNOSIS — M25.552 BILATERAL HIP PAIN: Primary | ICD-10-CM

## 2024-05-17 DIAGNOSIS — M70.61 TROCHANTERIC BURSITIS OF BOTH HIPS: ICD-10-CM

## 2024-05-17 DIAGNOSIS — M25.552 BILATERAL HIP PAIN: ICD-10-CM

## 2024-05-17 DIAGNOSIS — M81.0 OSTEOPOROSIS, UNSPECIFIED OSTEOPOROSIS TYPE, UNSPECIFIED PATHOLOGICAL FRACTURE PRESENCE: Primary | ICD-10-CM

## 2024-05-17 DIAGNOSIS — M70.62 TROCHANTERIC BURSITIS OF BOTH HIPS: ICD-10-CM

## 2024-05-17 DIAGNOSIS — M25.551 BILATERAL HIP PAIN: Primary | ICD-10-CM

## 2024-05-17 DIAGNOSIS — M25.551 BILATERAL HIP PAIN: ICD-10-CM

## 2024-05-17 PROCEDURE — 99214 OFFICE O/P EST MOD 30 MIN: CPT | Performed by: NURSE PRACTITIONER

## 2024-05-17 PROCEDURE — 73522 X-RAY EXAM HIPS BI 3-4 VIEWS: CPT

## 2024-05-17 PROCEDURE — 96372 THER/PROPH/DIAG INJ SC/IM: CPT | Performed by: FAMILY MEDICINE

## 2024-05-17 RX ORDER — LIDOCAINE 50 MG/G
2 PATCH TOPICAL DAILY
Qty: 60 PATCH | Refills: 1 | Status: SHIPPED | OUTPATIENT
Start: 2024-05-17

## 2024-05-17 NOTE — TELEPHONE ENCOUNTER
Caller: Cindy     Doctor: Dipak     Reason for call: Patient would like to be put on cancellation list for procedure please advise     Call back#: [phone]

## 2024-05-17 NOTE — PROGRESS NOTES
Assessment:  1. Bilateral hip pain    2. Trochanteric bursitis of both hips        Plan:  While the patient was in the office today, I did have a thorough conversation regarding their chronic pain syndrome, medication management, and treatment plan options.  Is being seen for a follow-up visit.  She recently underwent bilateral hip joint injections on 4/16/2024.  She reports minimal and temporary relief.  She states that trochanteric bursa injections seem to be more helpful.  She tried physical therapy in the past which aggravated her pain.    At this point, we will date x-rays of hips to rule out worsening OA.    Schedule ultrasound-guided bilateral greater trochanteric bursa injections.    Continue over-the-counter Voltaren gel as needed.    Try Lidoderm patch 5%.  Apply 1 patch to each hip daily.  12 hours on/12 hours off.  I advised patient that if insurance will not authorize this prescription, she can try over-the-counter lidocaine patch 4%.    Follow-up 1 month after injection.      My impressions and treatment recommendations were discussed in detail with the patient who verbalized understanding and had no further questions.  Discharge instructions were provided. I personally saw and examined the patient and I agree with the above discussed plan of care.    Orders Placed This Encounter   Procedures    XR hips bilateral 3-4 vw w pelvis if performed     Standing Status:   Future     Number of Occurrences:   1     Standing Expiration Date:   5/17/2028     Scheduling Instructions:      Bring along any outside films relating to this procedure.           New Medications Ordered This Visit   Medications    lidocaine (Lidoderm) 5 %     Sig: Apply 2 patches topically over 12 hours daily Remove & Discard patch within 12 hours or as directed by MD     Dispense:  60 patch     Refill:  1       History of Present Illness:  Cindy Ching is a 68 y.o. female who presents for a follow up office visit in regards to Hip  Pain.   The patient’s current symptoms include complaints of bilateral hip pain, intermittent groin pain.  Current pain level is a 7/10.  Quality pain is described as sharp, throbbing.    I have personally reviewed and/or updated the patient's past medical history, past surgical history, family history, social history, current medications, allergies, and vital signs today.     Review of Systems   Constitutional: Negative.    HENT: Negative.     Eyes: Negative.    Respiratory: Negative.     Cardiovascular: Negative.    Gastrointestinal: Negative.    Endocrine: Negative.    Genitourinary: Negative.    Musculoskeletal:  Positive for gait problem.        Decreased range of motion   Skin: Negative.    Allergic/Immunologic: Negative.    Hematological: Negative.    Psychiatric/Behavioral: Negative.         Patient Active Problem List   Diagnosis    Reactive airway disease    Esophageal reflux    Hyperlipidemia    KOSTA (obstructive sleep apnea)    Migraine without aura and without status migrainosus, not intractable    Right-sided chest wall pain    Orthostatic hypotension    Essential hypertension    Age-related osteoporosis without current pathological fracture    Bilateral hip pain    EL (renal artery stenosis) (HCC)    Neck pain    Impaired fasting blood sugar    Mild atherosclerosis of both carotid arteries    Anxiety    Primary insomnia    Paroxysmal atrial fibrillation (HCC)    Bilateral leg edema    Chronic diastolic (congestive) heart failure (HCC)    Trochanteric bursitis of both hips       Past Medical History:   Diagnosis Date    Allergic reaction     Resolved 11/24/2017     Allergic rhinitis     Resolved 11/24/2017     Generalized anxiety disorder     Resolved 11/24/2017     High cholesterol     Hypertension     Microscopic hematuria     Resolved 11/24/2017     Postural dizziness with presyncope 03/11/2021    Renal calculi     Resolved 11/24/2017     Renal colic     Resolved 11/24/2017     Renal cyst, acquired      Resolved 11/24/2017     Restrictive lung disease     Resolved 11/24/2017     Supraventricular tachycardia     Resolved 11/24/2017        Past Surgical History:   Procedure Laterality Date    CARDIAC ELECTROPHYSIOLOGY PROCEDURE N/A 12/27/2023    Procedure: Cardiac eps/afib ablation;  Surgeon: Juan Ellington MD;  Location:  [facility]ARD[facility] C[facility] LAB;  Service: Cardiology    CHOLECYSTECTOMY      CYSTOSCOPY  05/07/2014    Diagnostic. Last assessed 5/7/2014      [place] ARTHROCENTESIS ASPIR&/INJ MAJOR JT/BURSA W/O US Bilateral 4/16/2024    Procedure: Bilateral hip intra-articular steroid injection;  Surgeon: Margaret Quesada MD;  Location: [facility];  Service: Pain Management     TONSILLECTOMY      TUBAL LIGATION         Family History   Problem Relation Age of Onset    Lung cancer Mother     Heart attack Father     No Known Problems Sister     No Known Problems Sister     No Known Problems Sister     Heart attack Maternal Grandmother     Diabetes Paternal Grandmother     Colon cancer Maternal Aunt     Esophageal cancer Maternal Aunt     No Known Problems Maternal Aunt     No Known Problems Paternal Aunt     Rheum arthritis Family         Rheumatoid arthritis with rheumatoid factor     No Known Problems Son     No Known Problems Son        Social History     Occupational History    Not on file   Tobacco Use    Smoking status: Former    Smokeless tobacco: Never   Vaping Use    Vaping status: Never Used   Substance and Sexual Activity    Alcohol use: Yes     Comment: occasional    Drug use: Never    Sexual activity: Not on file       Current Outpatient Medications on File Prior to Visit   Medication Sig    apixaban (Eliquis) 5 mg Take 1 tablet (5 mg total) by mouth 2 (two) times a day    atorvastatin (LIPITOR) 20 mg tablet Take 1 tablet (20 mg total) by mouth daily    butalbital-acetaminophen-caffeine (FIORICET,ESGIC) -40 mg per tablet Take 1 tablet by mouth every 4 (four) hours as needed for headaches     "cholecalciferol (VITAMIN D3) 1,000 units tablet Take 1,000 Units by mouth daily 2,000    fluticasone (FLONASE) 50 mcg/act nasal spray 2 sprays into each nostril daily    hydrOXYzine HCL (ATARAX) 25 mg tablet Take 1 tablet (25 mg total) by mouth every 8 (eight) hours as needed for anxiety    losartan (COZAAR) 50 mg tablet Take 1 tablet (50 mg total) by mouth daily    pantoprazole (PROTONIX) 40 mg tablet TAKE 1 TABLET BY MOUTH TWICE  DAILY    Prolia 60 MG/ML INJECT 60MG SUBCUTANEOUSLY EVERY 6 MONTHS     No current facility-administered medications on file prior to visit.       Allergies   Allergen Reactions    Amlodipine Myalgia    Codeine      Other reaction(s): Other (See Comments)  headaches    Fosamax [Alendronate] GI Intolerance    Angiotensin Receptor Blockers Other (See Comments)     Metallic taste       Physical Exam:    /75   Pulse 87   Resp 16   Ht 5' 3\" (1.6 m)   Wt 81.6 kg (180 lb)   BMI 31.89 kg/m²     Constitutional:normal, well developed, well nourished, alert, in no distress and non-toxic and no overt pain behavior.  Eyes:anicteric  HEENT:grossly intact  Neck:supple, symmetric, trachea midline and no masses   Pulmonary:even and unlabored  Cardiovascular:No edema or pitting edema present  Skin:Normal without rashes or lesions and well hydrated  Psychiatric:Mood and affect appropriate  Neurologic:Cranial Nerves II-XII grossly intact  Musculoskeletal: Gait is slow and guarded.  There is significant tenderness over bilateral greater trochanteric bursa.    Imaging  MRI LUMBAR SPINE WITHOUT CONTRAST     INDICATION: M54.42: Lumbago with sciatica, left side  M54.41: Lumbago with sciatica, right side  G89.29: Other chronic pain  M51.16: Intervertebral disc disorders with radiculopathy, lumbar region  M47.816: Spondylosis without myelopathy or radiculopathy, lumbar region  G89.4: Chronic pain syndrome.     COMPARISON: 3/27/2024     TECHNIQUE:  Multiplanar, multisequence imaging of the lumbar spine was " performed. .        IMAGE QUALITY:  Diagnostic     FINDINGS:     VERTEBRAL BODIES:  There are 5 lumbar type vertebral bodies.  Normal alignment of the lumbar spine.  No spondylolysis or spondylolisthesis. No scoliosis.  No compression fracture.    Scattered degenerative endplate changes.  No focally suspicious marrow   lesions.  No bone marrow edema or compression abnormality.     SACRUM:  Normal signal within the sacrum. No evidence of insufficiency or stress fracture.     DISTAL CORD AND CONUS:  Normal size and signal within the distal cord and conus. The conus medullaris terminates at the L2 level.     PARASPINAL SOFT TISSUES: Multiple rounded T2 hyperintense lesions noted in both kidneys, several are too small to characterize. In the upper pole of the right kidney there is a 5 cm rounded T2 hyperintense lesion compatible with a cyst series 2, image 9.   Immediately medial to this is a second 3.5 cm cyst.     LOWER THORACIC DISC SPACES:  Normal disc height and signal.  No disc herniation, canal stenosis or foraminal narrowing.     LUMBAR DISC SPACES:     L1-L2: There is no focal disk herniation, central canal or neural foraminal narrowing.  Bilateral facet hypertrophy noted.     L2-L3: Tiny central disc protrusion. No significant central canal or neural foraminal narrowing.     L3-L4: Small focus of high signal in the posterior annulus noted. Small central/right paracentral disc protrusion. No significant central canal or neural foraminal narrowing.     L4-L5: Small focus of high signal in the posterior annulus indicative of a high intensity zone. There is a diffuse disk bulge.  No significant central canal or neural foraminal narrowing.     L5-S1: There is loss of disc height and signal. There is bilateral facet hypertrophy. There is a right neural foraminal disc protrusion. Moderate right neural foraminal narrowing. Moderate subarticular recess narrowing. Central canal patent. Left neural   foramen patent.      OTHER FINDINGS:  None.     IMPRESSION:     Multilevel spondylosis most pronounced on the right at L5-S1        LUMBAR SPINE     INDICATION:   Lumbago with sciatica, left side. Lumbago with sciatica, right side. Other chronic pain.      COMPARISON:  None.     VIEWS:  XR SPINE LUMBAR MINIMUM 4 VIEWS NON INJURY     FINDINGS:     There are 5 non rib bearing lumbar vertebral bodies.      There is no evidence of acute fracture or destructive osseous lesion.     Mild scoliotic deformity is noted.  Alignment is otherwise unremarkable.      Multilevel mild to moderate discogenic degenerative disease     The pedicles appear intact. No pars defects.     There are atherosclerotic calcifications. There is a 3.7 cm infrarenal abdominal aortic aneurysm. soft tissues are otherwise unremarkable.     IMPRESSION:        No acute osseous abnormality.       Degenerative changes as described.

## 2024-05-17 NOTE — TELEPHONE ENCOUNTER
PA for LIDO 5% PATCH Approved     Date(s) approved UNTIL 11/17/2024        Patient advised by          [x] MyChart Message  [] Phone call   []LMOM  []L/M to call office as no active Communication consent on file  []Unable to leave detailed message as VM not approved on Communication consent       Pharmacy advised by    [x]Fax  []Phone call    Approval letter scanned into Media Yes

## 2024-05-17 NOTE — TELEPHONE ENCOUNTER
PA for LIDO 5% PATCHES    Submitted via    []CMM-KEY   [x][employer]-Case ID # Case ID: [id number]   []Faxed to plan   []Other website   []Phone call Case ID #     Office notes sent, clinical questions answered. Awaiting determination    Turnaround time for your insurance to make a decision on your Prior Authorization can take 7-21 business days.

## 2024-06-10 ENCOUNTER — TELEPHONE (OUTPATIENT)
Dept: PAIN MEDICINE | Facility: CLINIC | Age: 69
End: 2024-06-10

## 2024-06-10 NOTE — TELEPHONE ENCOUNTER
----- Message from Barbara Kocher, CRNP sent at 6/10/2024 12:13 PM EDT -----  Please call patient and let her know that x-ray of both hips revealed mild arthritis, bilaterally.

## 2024-06-11 ENCOUNTER — PROCEDURE VISIT (OUTPATIENT)
Age: 69
End: 2024-06-11
Payer: COMMERCIAL

## 2024-06-11 VITALS
SYSTOLIC BLOOD PRESSURE: 158 MMHG | HEIGHT: 63 IN | HEART RATE: 85 BPM | DIASTOLIC BLOOD PRESSURE: 85 MMHG | BODY MASS INDEX: 31.89 KG/M2 | WEIGHT: 180 LBS

## 2024-06-11 DIAGNOSIS — G89.4 CHRONIC PAIN SYNDROME: ICD-10-CM

## 2024-06-11 DIAGNOSIS — M70.61 TROCHANTERIC BURSITIS OF BOTH HIPS: Primary | ICD-10-CM

## 2024-06-11 DIAGNOSIS — M70.62 TROCHANTERIC BURSITIS OF BOTH HIPS: Primary | ICD-10-CM

## 2024-06-11 PROCEDURE — 20611 DRAIN/INJ JOINT/BURSA W/US: CPT | Performed by: ANESTHESIOLOGY

## 2024-06-11 RX ORDER — BUPIVACAINE HYDROCHLORIDE 2.5 MG/ML
2 INJECTION, SOLUTION INFILTRATION; PERINEURAL
Status: COMPLETED | OUTPATIENT
Start: 2024-06-11 | End: 2024-06-11

## 2024-06-11 RX ORDER — METHYLPREDNISOLONE ACETATE 40 MG/ML
1 INJECTION, SUSPENSION INTRA-ARTICULAR; INTRALESIONAL; INTRAMUSCULAR; SOFT TISSUE
Status: COMPLETED | OUTPATIENT
Start: 2024-06-11 | End: 2024-06-11

## 2024-06-11 RX ADMIN — BUPIVACAINE HYDROCHLORIDE 2 ML: 2.5 INJECTION, SOLUTION INFILTRATION; PERINEURAL at 14:20

## 2024-06-11 RX ADMIN — METHYLPREDNISOLONE ACETATE 1 ML: 40 INJECTION, SUSPENSION INTRA-ARTICULAR; INTRALESIONAL; INTRAMUSCULAR; SOFT TISSUE at 14:20

## 2024-06-11 NOTE — PATIENT INSTRUCTIONS
Do not apply heat to any area that is numb. If you have discomfort or soreness at the injection site, you may apply ice today, 20 minutes on and 20 minutes off. Tomorrow you may use ice or warm, moist heat. Do not apply ice or heat directly to the skin.  If you experience severe shortness of breath, go to the Emergency Room.  You may have numbness for several hours from the local anesthetic. Please use caution and common sense, especially with weight-bearing activities.  You may have an increase or change in the discomfort for 36-48 hours after your treatment. Apply ice and continue with any pain medicine you have been prescribed.  Do not do anything strenuous today. You may shower, but no tub baths or hot tubs today. You may resume your normal activities tomorrow, but do not “overdo it”. Resume normal activities slowly when you are feeling better.  If you experience redness, drainage or swelling at the injection site, or if you develop a fever above 100 degrees, please call [facility] at [phone] or go to the Emergency Room.  Continue to take all routine medicines prescribed by your primary care physician unless otherwise instructed by our staff. Most blood thinners should be started again according to your regularly scheduled dosing. If you have any questions, please give our office a call.    As no general anesthesia was used in today's procedure, you should not experience any side effects related to anesthesia.       If you have a problem specifically related to your procedure, please call our office at [phone].  Problems not related to your procedure should be directed to your primary care physician.

## 2024-06-11 NOTE — PROGRESS NOTES
"Large joint arthrocentesis: bilateral greater trochanteric bursa  Universal Protocol:  Consent: Verbal consent obtained. Written consent obtained.  Risks and benefits: risks, benefits and alternatives were discussed  Consent given by: patient  Time out: Immediately prior to procedure a \"time out\" was called to verify the correct patient, procedure, equipment, support staff and site/side marked as required.  Timeout called at: 6/11/2024 2:39 PM.  Patient understanding: patient states understanding of the procedure being performed  Patient consent: the patient's understanding of the procedure matches consent given  Procedure consent: procedure consent matches procedure scheduled  Relevant documents: relevant documents present and verified  Test results: test results available and properly labeled  Site marked: the operative site was marked  Radiology Images displayed and confirmed. If images not available, report reviewed: imaging studies not available  Required items: required blood products, implants, devices, and special equipment available  Patient identity confirmed: verbally with patient  Supporting Documentation  Indications: pain   Procedure Details  Location: hip - bilateral greater trochanteric bursa  Preparation: Patient was prepped and draped in the usual sterile fashion  Needle size: 22 G  Ultrasound guidance: yes  Approach: lateral    Medications (Right): 2 mL bupivacaine 0.25 %; 1 mL methylPREDNISolone acetate 40 mg/mLMedications (Left): 2 mL bupivacaine 0.25 %; 1 mL methylPREDNISolone acetate 40 mg/mL   Patient tolerance: patient tolerated the procedure well with no immediate complications  Dressing:  Sterile dressing applied          "

## 2024-06-12 ENCOUNTER — TELEPHONE (OUTPATIENT)
Age: 69
End: 2024-06-12

## 2024-06-12 DIAGNOSIS — J01.90 ACUTE SINUSITIS, RECURRENCE NOT SPECIFIED, UNSPECIFIED LOCATION: Primary | ICD-10-CM

## 2024-06-12 RX ORDER — AZITHROMYCIN 250 MG/1
TABLET, FILM COATED ORAL
Qty: 6 TABLET | Refills: 0 | Status: SHIPPED | OUTPATIENT
Start: 2024-06-12 | End: 2024-06-16

## 2024-06-12 NOTE — TELEPHONE ENCOUNTER
"Pt reports she is having her \"typical sinus infection\". Pt reports s/s pressure in head, sore throat, mucus clear.    She is requesting PCP send in the usual ABX for pt. Please further advise pt at Phone: [phone]   "

## 2024-06-25 ENCOUNTER — TELEPHONE (OUTPATIENT)
Dept: PAIN MEDICINE | Facility: CLINIC | Age: 69
End: 2024-06-25

## 2024-07-12 ENCOUNTER — OFFICE VISIT (OUTPATIENT)
Dept: PAIN MEDICINE | Facility: CLINIC | Age: 69
End: 2024-07-12
Payer: COMMERCIAL

## 2024-07-12 VITALS
HEART RATE: 105 BPM | TEMPERATURE: 98 F | BODY MASS INDEX: 31.68 KG/M2 | WEIGHT: 178.8 LBS | OXYGEN SATURATION: 97 % | DIASTOLIC BLOOD PRESSURE: 72 MMHG | SYSTOLIC BLOOD PRESSURE: 130 MMHG | HEIGHT: 63 IN | RESPIRATION RATE: 18 BRPM

## 2024-07-12 DIAGNOSIS — M25.551 BILATERAL HIP PAIN: Primary | ICD-10-CM

## 2024-07-12 DIAGNOSIS — M25.552 BILATERAL HIP PAIN: Primary | ICD-10-CM

## 2024-07-12 DIAGNOSIS — M70.61 TROCHANTERIC BURSITIS OF BOTH HIPS: ICD-10-CM

## 2024-07-12 DIAGNOSIS — M70.62 TROCHANTERIC BURSITIS OF BOTH HIPS: ICD-10-CM

## 2024-07-12 PROCEDURE — 99214 OFFICE O/P EST MOD 30 MIN: CPT | Performed by: NURSE PRACTITIONER

## 2024-07-12 RX ORDER — LIDOCAINE 50 MG/G
2 PATCH TOPICAL DAILY
Qty: 60 PATCH | Refills: 5 | Status: SHIPPED | OUTPATIENT
Start: 2024-07-12

## 2024-07-12 NOTE — PROGRESS NOTES
Assessment:  1. Bilateral hip pain    2. Trochanteric bursitis of both hips        Plan:  While the patient was in the office today, I did have a thorough conversation regarding their chronic pain syndrome, medication management, and treatment plan options.  Patient is being seen for a follow-up visit.  She underwent ultrasound-guided bilateral greater trochanteric bursa injections on 6/11/2024.  She is reporting up to 95% improvement in her hip pain at times.  She is still very limited in her ability to walk for prolonged periods of time due to the hip pain.  She previously tried physical therapy which actually seem to aggravate her pain.    Recommend that she tries aqua therapy.  I provided her with a referral and a list of facilities.    She is reporting relief with the use of Lidoderm patch.  Renewed Lidoderm patch 5%.  Apply 2 patches daily.  12 hours on/12 hours off.  A prescription was sent to her pharmacy with 5 refills.    Follow-up in 2 months.  Call sooner if pain increases.    History of Present Illness:  The patient is a 68 y.o. female who presents for a follow up office visit in regards to Hip Pain (1 month follow up after injection).   The patient’s current symptoms include complaints of bilateral hip pain.  Current pain level is a 1/10.  Quality of pain is described as dull, aching, pressure-like.  Pain increases with ambulation and prolonged sitting.    Current pain medications includes: Lidoderm patch 5%.  Apply 2 patches daily.  12 hours on/12 hours off.    I have personally reviewed and/or updated the patient's past medical history, past surgical history, family history, social history, current medications, allergies, and vital signs today.         Review of Systems  Review of Systems   Musculoskeletal:         Hip pain   All other systems reviewed and are negative.          Past Medical History:   Diagnosis Date    Allergic reaction     Resolved 11/24/2017     Allergic rhinitis     Resolved  11/24/2017     Generalized anxiety disorder     Resolved 11/24/2017     High cholesterol     Hypertension     Microscopic hematuria     Resolved 11/24/2017     Postural dizziness with presyncope 03/11/2021    Renal calculi     Resolved 11/24/2017     Renal colic     Resolved 11/24/2017     Renal cyst, acquired     Resolved 11/24/2017     Restrictive lung disease     Resolved 11/24/2017     Supraventricular tachycardia     Resolved 11/24/2017        Past Surgical History:   Procedure Laterality Date    CARDIAC ELECTROPHYSIOLOGY PROCEDURE N/A 12/27/2023    Procedure: Cardiac eps/afib ablation;  Surgeon: Juan Ellington MD;  Location:  CARDIAC CATH LAB;  Service: Cardiology    CHOLECYSTECTOMY      CYSTOSCOPY  05/07/2014    Diagnostic. Last assessed 5/7/2014      [place] ARTHROCENTESIS ASPIR&/INJ MAJOR JT/BURSA W/O US Bilateral 4/16/2024    Procedure: Bilateral hip intra-articular steroid injection;  Surgeon: Margaret Quesada MD;  Location: [facility];  Service: Pain Management     TONSILLECTOMY      TUBAL LIGATION         Family History   Problem Relation Age of Onset    Lung cancer Mother     Heart attack Father     No Known Problems Sister     No Known Problems Sister     No Known Problems Sister     Heart attack Maternal Grandmother     Diabetes Paternal Grandmother     Colon cancer Maternal Aunt     Esophageal cancer Maternal Aunt     No Known Problems Maternal Aunt     No Known Problems Paternal Aunt     Rheum arthritis Family         Rheumatoid arthritis with rheumatoid factor     No Known Problems Son     No Known Problems Son        Social History     Occupational History    Not on file   Tobacco Use    Smoking status: Former    Smokeless tobacco: Never   Vaping Use    Vaping status: Never Used   Substance and Sexual Activity    Alcohol use: Yes     Comment: occasional    Drug use: Never    Sexual activity: Not on file         Current Outpatient Medications:     apixaban (Eliquis) 5 mg, Take 1 tablet (5 mg  "total) by mouth 2 (two) times a day, Disp: 180 tablet, Rfl: 3    atorvastatin (LIPITOR) 20 mg tablet, Take 1 tablet (20 mg total) by mouth daily, Disp: 90 tablet, Rfl: 3    butalbital-acetaminophen-caffeine (FIORICET,ESGIC) -40 mg per tablet, Take 1 tablet by mouth every 4 (four) hours as needed for headaches, Disp: 30 tablet, Rfl: 0    cholecalciferol (VITAMIN D3) 1,000 units tablet, Take 1,000 Units by mouth daily 2,000, Disp: , Rfl:     fluticasone (FLONASE) 50 mcg/act nasal spray, 2 sprays into each nostril daily, Disp: 16 g, Rfl: 3    hydrOXYzine HCL (ATARAX) 25 mg tablet, Take 1 tablet (25 mg total) by mouth every 8 (eight) hours as needed for anxiety, Disp: 30 tablet, Rfl: 1    lidocaine (Lidoderm) 5 %, Apply 2 patches topically over 12 hours daily Remove & Discard patch within 12 hours or as directed by MD, Disp: 60 patch, Rfl: 5    losartan (COZAAR) 50 mg tablet, Take 1 tablet (50 mg total) by mouth daily, Disp: 30 tablet, Rfl: 5    pantoprazole (PROTONIX) 40 mg tablet, TAKE 1 TABLET BY MOUTH TWICE  DAILY, Disp: 180 tablet, Rfl: 3    Prolia 60 MG/ML, INJECT 60MG SUBCUTANEOUSLY EVERY 6 MONTHS, Disp: 1 mL, Rfl: 0    Allergies   Allergen Reactions    Amlodipine Myalgia    Codeine      Other reaction(s): Other (See Comments)  headaches    Fosamax [Alendronate] GI Intolerance    Angiotensin Receptor Blockers Other (See Comments)     Metallic taste       Physical Exam:    /72 (BP Location: Left arm, Patient Position: Sitting, Cuff Size: Large)   Pulse 105   Temp 98 °F (36.7 °C) (Temporal)   Resp 18   Ht 5' 3\" (1.6 m)   Wt 81.1 kg (178 lb 12.8 oz)   SpO2 97%   BMI 31.67 kg/m²     Constitutional:normal, well developed, well nourished, alert, in no distress and non-toxic and no overt pain behavior.  Eyes:anicteric  HEENT:grossly intact  Neck:supple, symmetric, trachea midline and no masses   Pulmonary:even and unlabored  Cardiovascular:No edema or pitting edema present  Skin:Normal without rashes " or lesions and well hydrated  Psychiatric:Mood and affect appropriate  Neurologic:Cranial Nerves II-XII grossly intact  Musculoskeletal: Numbness over bilateral greater trochanteric bursa.    Imaging      Study Result    Narrative & Impression   XR HIPS BILATERAL 3-4 VW W PELVIS IF PERFORMED     INDICATION: M25.551: Pain in right hip  M25.552: Pain in left hip.     COMPARISON: 4/14/2023     FINDINGS:  Intact bony pelvis.  Mild degenerative lower lumbar spine  Unremarkable soft tissues.     RIGHT HIP:  No acute fracture or dislocation.  Mild degenerative hip joint, unchanged  No lytic or blastic osseous lesion.     LEFT HIP:  No acute fracture or dislocation.  Mild degenerative hip joint, unchanged  No lytic or blastic osseous lesion.     IMPRESSION:     Mild bilateral degenerative hip joints, unchanged     No acute osseous abnormality.       Study Result    Narrative & Impression   MRI LUMBAR SPINE WITHOUT CONTRAST     INDICATION: M54.42: Lumbago with sciatica, left side  M54.41: Lumbago with sciatica, right side  G89.29: Other chronic pain  M51.16: Intervertebral disc disorders with radiculopathy, lumbar region  M47.816: Spondylosis without myelopathy or radiculopathy, lumbar region  G89.4: Chronic pain syndrome.     COMPARISON: 3/27/2024     TECHNIQUE:  Multiplanar, multisequence imaging of the lumbar spine was performed. .        IMAGE QUALITY:  Diagnostic     FINDINGS:     VERTEBRAL BODIES:  There are 5 lumbar type vertebral bodies.  Normal alignment of the lumbar spine.  No spondylolysis or spondylolisthesis. No scoliosis.  No compression fracture.    Scattered degenerative endplate changes.  No focally suspicious marrow   lesions.  No bone marrow edema or compression abnormality.     SACRUM:  Normal signal within the sacrum. No evidence of insufficiency or stress fracture.     DISTAL CORD AND CONUS:  Normal size and signal within the distal cord and conus. The conus medullaris terminates at the L2 level.      PARASPINAL SOFT TISSUES: Multiple rounded T2 hyperintense lesions noted in both kidneys, several are too small to characterize. In the upper pole of the right kidney there is a 5 cm rounded T2 hyperintense lesion compatible with a cyst series 2, image 9.   Immediately medial to this is a second 3.5 cm cyst.     LOWER THORACIC DISC SPACES:  Normal disc height and signal.  No disc herniation, canal stenosis or foraminal narrowing.     LUMBAR DISC SPACES:     L1-L2: There is no focal disk herniation, central canal or neural foraminal narrowing.  Bilateral facet hypertrophy noted.     L2-L3: Tiny central disc protrusion. No significant central canal or neural foraminal narrowing.     L3-L4: Small focus of high signal in the posterior annulus noted. Small central/right paracentral disc protrusion. No significant central canal or neural foraminal narrowing.     L4-L5: Small focus of high signal in the posterior annulus indicative of a high intensity zone. There is a diffuse disk bulge.  No significant central canal or neural foraminal narrowing.     L5-S1: There is loss of disc height and signal. There is bilateral facet hypertrophy. There is a right neural foraminal disc protrusion. Moderate right neural foraminal narrowing. Moderate subarticular recess narrowing. Central canal patent. Left neural   foramen patent.     OTHER FINDINGS:  None.     IMPRESSION:     Multilevel spondylosis most pronounced on the right at L5-S1       Study Result    Narrative & Impression         LUMBAR SPINE     INDICATION:   Lumbago with sciatica, left side. Lumbago with sciatica, right side. Other chronic pain.      COMPARISON:  None.     VIEWS:  XR SPINE LUMBAR MINIMUM 4 VIEWS NON INJURY     FINDINGS:     There are 5 non rib bearing lumbar vertebral bodies.      There is no evidence of acute fracture or destructive osseous lesion.     Mild scoliotic deformity is noted.  Alignment is otherwise unremarkable.      Multilevel mild to moderate  discogenic degenerative disease     The pedicles appear intact. No pars defects.     There are atherosclerotic calcifications. There is a 3.7 cm infrarenal abdominal aortic aneurysm. soft tissues are otherwise unremarkable.     IMPRESSION:        No acute osseous abnormality.       Degenerative changes as described          Study Result    Narrative & Impression   BILATERAL HIPS AND PELVIS     INDICATION:   M25.551: Pain in right hip  M25.552: Pain in left hip.       COMPARISON:  Bilateral hip x-ray 8/26/2022     VIEWS:  XR HIPS BILATERAL 2 VW W PELVIS IF PERFORMED  Images: 5     FINDINGS:  The bony pelvis appears intact.  Degenerative changes visualized lower lumbar spine.      LEFT HIP:  There is no acute fracture or dislocation.  Mild left hip osteoarthritis is seen.  No lytic or blastic osseous lesion.  Soft tissues are unremarkable.     RIGHT HIP:  There is no acute fracture or dislocation.  Mild right hip osteoarthritis is seen.  No lytic or blastic osseous lesion.  Soft tissues are unremarkable.     IMPRESSION:     Mild degenerative changes of bilateral hips.       No orders to display       Orders Placed This Encounter   Procedures    Ambulatory Referral to Physical Therapy

## 2024-07-16 ENCOUNTER — HOSPITAL ENCOUNTER (OUTPATIENT)
Dept: MAMMOGRAPHY | Facility: HOSPITAL | Age: 69
Discharge: HOME | End: 2024-07-16
Payer: COMMERCIAL

## 2024-07-16 VITALS — BODY MASS INDEX: 31.54 KG/M2 | WEIGHT: 178 LBS | HEIGHT: 63 IN

## 2024-07-16 DIAGNOSIS — Z12.31 OTHER SCREENING MAMMOGRAM: ICD-10-CM

## 2024-07-16 PROCEDURE — 77063 BREAST TOMOSYNTHESIS BI: CPT

## 2024-07-16 PROCEDURE — 77067 SCR MAMMO BI INCL CAD: CPT

## 2024-07-25 ENCOUNTER — OFFICE VISIT (OUTPATIENT)
Dept: FAMILY MEDICINE CLINIC | Facility: CLINIC | Age: 69
End: 2024-07-25
Payer: COMMERCIAL

## 2024-07-25 VITALS
HEART RATE: 84 BPM | WEIGHT: 179.6 LBS | OXYGEN SATURATION: 97 % | HEIGHT: 63 IN | DIASTOLIC BLOOD PRESSURE: 82 MMHG | SYSTOLIC BLOOD PRESSURE: 124 MMHG | TEMPERATURE: 97.4 F | BODY MASS INDEX: 31.82 KG/M2

## 2024-07-25 DIAGNOSIS — G47.33 OSA (OBSTRUCTIVE SLEEP APNEA): ICD-10-CM

## 2024-07-25 DIAGNOSIS — I10 ESSENTIAL HYPERTENSION: ICD-10-CM

## 2024-07-25 DIAGNOSIS — J06.9 UPPER RESPIRATORY TRACT INFECTION, UNSPECIFIED TYPE: Primary | ICD-10-CM

## 2024-07-25 PROCEDURE — 99214 OFFICE O/P EST MOD 30 MIN: CPT | Performed by: FAMILY MEDICINE

## 2024-07-25 RX ORDER — CEFUROXIME AXETIL 500 MG/1
500 TABLET ORAL EVERY 12 HOURS SCHEDULED
Qty: 14 TABLET | Refills: 0 | Status: SHIPPED | OUTPATIENT
Start: 2024-07-25 | End: 2024-08-01

## 2024-07-25 NOTE — PROGRESS NOTES
Ambulatory Visit  Name: Cindy Ching      : 1955      MRN: [id number]  Encounter Provider: Carroll Zepeda DO  Encounter Date: 2024   Encounter department: [facility]    Assessment & Plan   1. Upper respiratory tract infection, unspecified type         History of Present Illness     HPI  Review of Systems  Past Medical History:   Diagnosis Date   • Allergic reaction     Resolved 2017    • Allergic rhinitis     Resolved 2017    • Generalized anxiety disorder     Resolved 2017    • High cholesterol    • Hypertension    • Microscopic hematuria     Resolved 2017    • Postural dizziness with presyncope 2021   • Renal calculi     Resolved 2017    • Renal colic     Resolved 2017    • Renal cyst, acquired     Resolved 2017    • Restrictive lung disease     Resolved 2017    • Supraventricular tachycardia     Resolved 2017      Past Surgical History:   Procedure Laterality Date   • CARDIAC ELECTROPHYSIOLOGY PROCEDURE N/A 2023    Procedure: Cardiac eps/afib ablation;  Surgeon: Juan Ellington MD;  Location:  CARDIAC CATH LAB;  Service: Cardiology   • CHOLECYSTECTOMY     • CYSTOSCOPY  2014    Diagnostic. Last assessed 2014     • [place] ARTHROCENTESIS ASPIR&/INJ MAJOR JT/BURSA W/O US Bilateral 2024    Procedure: Bilateral hip intra-articular steroid injection;  Surgeon: Margaret Quesada MD;  Location: [facility];  Service: Pain Management    • TONSILLECTOMY     • TUBAL LIGATION       Family History   Problem Relation Age of Onset   • Lung cancer Mother    • Heart attack Father    • No Known Problems Sister    • No Known Problems Sister    • No Known Problems Sister    • Heart attack Maternal Grandmother    • Diabetes Paternal Grandmother    • Colon cancer Maternal Aunt    • Esophageal cancer Maternal Aunt    • No Known Problems Maternal Aunt    • No Known Problems Paternal Aunt    • Rheum arthritis Family          Rheumatoid arthritis with rheumatoid factor    • No Known Problems Son    • No Known Problems Son      Social History     Tobacco Use   • Smoking status: Former   • Smokeless tobacco: Never   Vaping Use   • Vaping status: Never Used   Substance and Sexual Activity   • Alcohol use: Yes     Comment: occasional   • Drug use: Never   • Sexual activity: Not on file     Current Outpatient Medications on File Prior to Visit   Medication Sig   • apixaban (Eliquis) 5 mg Take 1 tablet (5 mg total) by mouth 2 (two) times a day   • atorvastatin (LIPITOR) 20 mg tablet Take 1 tablet (20 mg total) by mouth daily   • butalbital-acetaminophen-caffeine (FIORICET,ESGIC) -40 mg per tablet Take 1 tablet by mouth every 4 (four) hours as needed for headaches   • cholecalciferol (VITAMIN D3) 1,000 units tablet Take 1,000 Units by mouth daily 2,000   • fluticasone (FLONASE) 50 mcg/act nasal spray 2 sprays into each nostril daily   • hydrOXYzine HCL (ATARAX) 25 mg tablet Take 1 tablet (25 mg total) by mouth every 8 (eight) hours as needed for anxiety   • lidocaine (Lidoderm) 5 % Apply 2 patches topically over 12 hours daily Remove & Discard patch within 12 hours or as directed by MD   • losartan (COZAAR) 50 mg tablet Take 1 tablet (50 mg total) by mouth daily   • pantoprazole (PROTONIX) 40 mg tablet TAKE 1 TABLET BY MOUTH TWICE  DAILY   • Prolia 60 MG/ML INJECT 60MG SUBCUTANEOUSLY EVERY 6 MONTHS     Allergies   Allergen Reactions   • Amlodipine Myalgia   • Codeine      Other reaction(s): Other (See Comments)  headaches   • Fosamax [Alendronate] GI Intolerance   • Angiotensin Receptor Blockers Other (See Comments)     Metallic taste     Immunization History   Administered Date(s) Administered   • COVID-19 MODERNA VACC 0.5 ML IM 03/18/2021, 04/15/2021, 11/03/2021   • INFLUENZA 10/26/2018, 10/05/2019, 11/01/2020, 10/26/2022   • Influenza Quadrivalent Preservative Free 3 years and older IM 10/28/2015, 10/19/2017   • Influenza Quadrivalent,  "6-35 Months IM 11/12/2014   • Influenza Split High Dose Preservative Free IM 10/05/2019   • Influenza, high dose seasonal 0.7 mL 10/26/2018, 10/10/2020, 10/21/2021, 10/26/2022, 11/02/2023   • Influenza, seasonal, injectable 12/11/2013   • Pneumococcal Polysaccharide PPV23 12/11/2013     Objective     /82   Pulse 84   Temp (!) 97.4 °F (36.3 °C)   Ht 5' 3\" (1.6 m)   Wt 81.5 kg (179 lb 9.6 oz)   SpO2 97%   BMI 31.81 kg/m²     Physical Exam    "

## 2024-08-02 ENCOUNTER — NURSE TRIAGE (OUTPATIENT)
Age: 69
End: 2024-08-02

## 2024-08-02 NOTE — TELEPHONE ENCOUNTER
"Reason for Disposition   Nursing judgment     Spoke with patient, wanted to let Dr Reed know that the sleep study was denied by her insurance company. Inquiring if he would want her to do one at home instead.    Also inquiring about the Eliquis; bruising/bleeding easily; asking if it can be lowered.    [phone]    Please advise.    Answer Assessment - Initial Assessment Questions  1. REASON FOR CALL or QUESTION: \"What is your reason for calling today?\" or \"How can I best help you?\" or \"What question do you have that I can help answer?\"      \"I was ordered a sleep study a few months ago but my insurance denied it. I wanted to let him know and see if he wanted to order one for at home.\"    Protocols used: Information Only Call - No Triage-ADULT-[place]    "

## 2024-08-02 NOTE — TELEPHONE ENCOUNTER
Spoke with patient and informed her that Tia is not available until Monday and will check with her about a home sleep study.     Also, She informs that she is not bleeding, but brusing from the Eliquis. Advised her that Eliquis dose is not decreased unless there is an issue with kidney function.

## 2024-08-05 ENCOUNTER — TELEPHONE (OUTPATIENT)
Age: 69
End: 2024-08-05

## 2024-08-05 DIAGNOSIS — Z01.89 NEED FOR ASSESSMENT FOR SLEEP APNEA: Primary | ICD-10-CM

## 2024-08-05 NOTE — TELEPHONE ENCOUNTER
Pt was recently treated with abx and now has a yeast infection. She is wondering if some oral medication can be called in to [facility].

## 2024-08-06 DIAGNOSIS — I10 ESSENTIAL HYPERTENSION: ICD-10-CM

## 2024-08-06 DIAGNOSIS — E78.2 MIXED HYPERLIPIDEMIA: ICD-10-CM

## 2024-08-06 DIAGNOSIS — B37.31 VAGINAL MONILIASIS: Primary | ICD-10-CM

## 2024-08-06 RX ORDER — FLUCONAZOLE 150 MG/1
150 TABLET ORAL ONCE
Qty: 1 TABLET | Refills: 0 | Status: SHIPPED | OUTPATIENT
Start: 2024-08-06 | End: 2024-08-06

## 2024-08-06 NOTE — TELEPHONE ENCOUNTER
Reason for call:   [x] Refill   [] Prior Auth  [] Other:     Office:   [x] PCP/Provider - Jericho Scott,    [] Specialty/Provider -     Medication: atorvastatin (LIPITOR) 20 mg tablet     Dose/Frequency:     20 mg, Oral, Daily       Quantity: 100    Pharmacy: [facility] - [facility] [address]     Does the patient have enough for 3 days?   [x] Yes   [] No - Send as HP to POD

## 2024-08-06 NOTE — TELEPHONE ENCOUNTER
Patient would like the prescription sent to [facility] to get a 100 day supply.    Reason for call:   [x] Refill   [] Prior Auth  [] Other:     Office:   [] PCP/Provider -   [x] Specialty/Provider - Cardio    Medication:     losartan (COZAAR) 50 mg tablet       Dose/Frequency:     50 mg, Oral, Daily       Quantity: 100    Pharmacy: [facility] ([facility]) - [place], [place] - [address]     Does the patient have enough for 3 days?   [x] Yes   [] No - Send as HP to POD

## 2024-08-07 RX ORDER — ATORVASTATIN CALCIUM 20 MG/1
20 TABLET, FILM COATED ORAL DAILY
Qty: 30 TABLET | Refills: 0 | Status: SHIPPED | OUTPATIENT
Start: 2024-08-07

## 2024-08-07 RX ORDER — LOSARTAN POTASSIUM 50 MG/1
50 TABLET ORAL DAILY
Qty: 100 TABLET | Refills: 1 | Status: SHIPPED | OUTPATIENT
Start: 2024-08-07

## 2024-08-16 DIAGNOSIS — G47.33 OSA (OBSTRUCTIVE SLEEP APNEA): Primary | ICD-10-CM

## 2024-08-16 DIAGNOSIS — J01.90 ACUTE SINUSITIS, RECURRENCE NOT SPECIFIED, UNSPECIFIED LOCATION: ICD-10-CM

## 2024-08-16 RX ORDER — FLUTICASONE PROPIONATE 50 MCG
2 SPRAY, SUSPENSION (ML) NASAL DAILY
Qty: 48 G | Refills: 1 | Status: SHIPPED | OUTPATIENT
Start: 2024-08-16

## 2024-08-16 NOTE — TELEPHONE ENCOUNTER
Reason for call:   [x] Refill   [] Prior Auth  [x] Other:[facility] requested a new script      Office:   [x] PCP/Provider - Dr Scott   [] Specialty/Provider -     Medication: dlonase    Dose/Frequency: 50 mcg/act 2 sprays in each nostril daily     Quantity: 16 g    Pharmacy: [facility]    Does the patient have enough for 3 days?   [x] Yes   [] No - Send as HP to POD

## 2024-08-21 DIAGNOSIS — K21.9 GASTROESOPHAGEAL REFLUX DISEASE WITHOUT ESOPHAGITIS: ICD-10-CM

## 2024-08-21 RX ORDER — PANTOPRAZOLE SODIUM 40 MG/1
40 TABLET, DELAYED RELEASE ORAL 2 TIMES DAILY
Qty: 180 TABLET | Refills: 0 | Status: SHIPPED | OUTPATIENT
Start: 2024-08-21

## 2024-09-05 ENCOUNTER — TELEPHONE (OUTPATIENT)
Age: 69
End: 2024-09-05

## 2024-09-05 DIAGNOSIS — E78.2 MIXED HYPERLIPIDEMIA: ICD-10-CM

## 2024-09-05 DIAGNOSIS — I10 ESSENTIAL HYPERTENSION: ICD-10-CM

## 2024-09-05 NOTE — TELEPHONE ENCOUNTER
Pt is requesting to switch back to simvastatin due to nausea from the atorvastatin, she is also requesting migraine medication please advise and assist

## 2024-09-05 NOTE — TELEPHONE ENCOUNTER
WOOD with recommendations that she will have to address this with her new provider at the time of her visit that is scheduled. If she would like this addressed sooner, to call back to schedule sooner appt

## 2024-09-06 RX ORDER — ATORVASTATIN CALCIUM 20 MG/1
20 TABLET, FILM COATED ORAL DAILY
Qty: 30 TABLET | Refills: 11 | Status: SHIPPED | OUTPATIENT
Start: 2024-09-06

## 2024-09-06 RX ORDER — LOSARTAN POTASSIUM 50 MG/1
50 TABLET ORAL DAILY
Qty: 100 TABLET | Refills: 1 | Status: SHIPPED | OUTPATIENT
Start: 2024-09-06

## 2024-09-12 ENCOUNTER — TELEPHONE (OUTPATIENT)
Dept: CARDIOLOGY CLINIC | Facility: CLINIC | Age: 69
End: 2024-09-12

## 2024-09-12 NOTE — TELEPHONE ENCOUNTER
Pt is scheduled for an EGD/ Colon on 9/19/24. [facility] would like pt to hold Eliquis 2 days prior to procedure.    Please advise

## 2024-09-16 ENCOUNTER — OFFICE VISIT (OUTPATIENT)
Dept: FAMILY MEDICINE CLINIC | Facility: CLINIC | Age: 69
End: 2024-09-16
Payer: COMMERCIAL

## 2024-09-16 VITALS
OXYGEN SATURATION: 96 % | BODY MASS INDEX: 32.21 KG/M2 | HEIGHT: 63 IN | WEIGHT: 181.8 LBS | SYSTOLIC BLOOD PRESSURE: 118 MMHG | DIASTOLIC BLOOD PRESSURE: 72 MMHG | TEMPERATURE: 98.1 F | HEART RATE: 99 BPM

## 2024-09-16 DIAGNOSIS — G47.33 OSA (OBSTRUCTIVE SLEEP APNEA): ICD-10-CM

## 2024-09-16 DIAGNOSIS — I48.0 PAROXYSMAL ATRIAL FIBRILLATION (HCC): ICD-10-CM

## 2024-09-16 DIAGNOSIS — F41.9 ANXIETY: ICD-10-CM

## 2024-09-16 DIAGNOSIS — I70.1 RAS (RENAL ARTERY STENOSIS) (HCC): ICD-10-CM

## 2024-09-16 DIAGNOSIS — R73.01 IMPAIRED FASTING BLOOD SUGAR: ICD-10-CM

## 2024-09-16 DIAGNOSIS — G43.009 MIGRAINE WITHOUT AURA AND WITHOUT STATUS MIGRAINOSUS, NOT INTRACTABLE: ICD-10-CM

## 2024-09-16 DIAGNOSIS — I65.23 MILD ATHEROSCLEROSIS OF BOTH CAROTID ARTERIES: ICD-10-CM

## 2024-09-16 DIAGNOSIS — E78.5 HYPERLIPIDEMIA, UNSPECIFIED HYPERLIPIDEMIA TYPE: ICD-10-CM

## 2024-09-16 DIAGNOSIS — I48.91 NEW ONSET A-FIB (HCC): ICD-10-CM

## 2024-09-16 DIAGNOSIS — F51.01 PRIMARY INSOMNIA: ICD-10-CM

## 2024-09-16 DIAGNOSIS — I50.32 CHRONIC DIASTOLIC (CONGESTIVE) HEART FAILURE (HCC): ICD-10-CM

## 2024-09-16 DIAGNOSIS — I10 ESSENTIAL HYPERTENSION: Primary | ICD-10-CM

## 2024-09-16 PROCEDURE — 99214 OFFICE O/P EST MOD 30 MIN: CPT | Performed by: FAMILY MEDICINE

## 2024-09-16 RX ORDER — BUTALBITAL, ACETAMINOPHEN AND CAFFEINE 50; 325; 40 MG/1; MG/1; MG/1
1 TABLET ORAL EVERY 4 HOURS PRN
Qty: 30 TABLET | Refills: 0 | Status: SHIPPED | OUTPATIENT
Start: 2024-09-16

## 2024-09-16 RX ORDER — HYDROXYZINE HYDROCHLORIDE 25 MG/1
25 TABLET, FILM COATED ORAL EVERY 8 HOURS PRN
Qty: 30 TABLET | Refills: 1 | Status: SHIPPED | OUTPATIENT
Start: 2024-09-16

## 2024-09-16 RX ORDER — SERTRALINE HYDROCHLORIDE 25 MG/1
25 TABLET, FILM COATED ORAL DAILY
Qty: 90 TABLET | Refills: 3 | Status: SHIPPED | OUTPATIENT
Start: 2024-09-16 | End: 2025-03-15

## 2024-09-16 NOTE — PROGRESS NOTES
Ambulatory Visit  Name: Cindy Ching      : 1955      MRN: [id number]  Encounter Provider: Augusto Singh DO  Encounter Date: 2024   Encounter department: [facility]    Assessment & Plan  Essential hypertension    Orders:    CBC and differential; Future    Comprehensive metabolic panel; Future    TSH, 3rd generation with Free T4 reflex; Future    Lipid panel; Future    Mild atherosclerosis of both carotid arteries    Orders:    CBC and differential; Future    Comprehensive metabolic panel; Future    TSH, 3rd generation with Free T4 reflex; Future    Lipid panel; Future    Paroxysmal atrial fibrillation (HCC)    Orders:    CBC and differential; Future    Comprehensive metabolic panel; Future    TSH, 3rd generation with Free T4 reflex; Future    Lipid panel; Future    EL (renal artery stenosis) (HCC)         KOSTA (obstructive sleep apnea)         Impaired fasting blood sugar    Orders:    Hemoglobin A1C; Future    Hyperlipidemia, unspecified hyperlipidemia type         Chronic diastolic (congestive) heart failure (HCC)  Wt Readings from Last 3 Encounters:   24 82.5 kg (181 lb 12.8 oz)   24 81.5 kg (179 lb 9.6 oz)   24 80.7 kg (178 lb)                    Migraine without aura and without status migrainosus, not intractable         Anxiety    Orders:    sertraline (ZOLOFT) 25 mg tablet; Take 1 tablet (25 mg total) by mouth daily       History of Present Illness     The patient is a very pleasant 69-year-old female who presents this morning to establish care.  The patient has several concerns.  Past medical history, surgical history, medication list and allergies as well as social history were reviewed.    1.  Hyperlipidemia.  Patient states she was on simvastatin at 1 time but there was a drug interaction with diltiazem that she was on.  Cardiologist then put the patient on atorvastatin.  The patient takes atorvastatin at bedtime and believes it is causing nausea.  I  "had a long discussion with the patient.  The patient will try atorvastatin with her evening meal and let me know.    2.  Anxiety.  Patient is currently on hydroxyzine as needed.  Patient had a favorable response to sertraline in the past.  Will restart sertraline at 25 mg daily.          Review of Systems   Constitutional:  Negative for chills and fever.   HENT:  Negative for ear pain and sore throat.    Eyes:  Negative for pain and visual disturbance.   Respiratory:  Negative for cough and shortness of breath.    Cardiovascular:  Negative for chest pain and palpitations.   Gastrointestinal:  Negative for abdominal pain and vomiting.   Genitourinary:  Negative for dysuria and hematuria.   Musculoskeletal:  Negative for arthralgias and back pain.   Skin:  Negative for color change and rash.   Neurological:  Negative for seizures and syncope.   Psychiatric/Behavioral:  Negative for dysphoric mood, self-injury and suicidal ideas. The patient is nervous/anxious.    All other systems reviewed and are negative.          Objective     /72   Pulse 99   Temp 98.1 °F (36.7 °C)   Ht 5' 3\" (1.6 m)   Wt 82.5 kg (181 lb 12.8 oz)   SpO2 96%   BMI 32.20 kg/m²     Physical Exam  Vitals and nursing note reviewed.   Constitutional:       General: She is not in acute distress.     Appearance: She is well-developed.   HENT:      Head: Normocephalic and atraumatic.      Nose: Nose normal.      Mouth/Throat:      Mouth: Mucous membranes are moist.   Eyes:      Conjunctiva/sclera: Conjunctivae normal.      Pupils: Pupils are equal, round, and reactive to light.   Cardiovascular:      Rate and Rhythm: Normal rate and regular rhythm.      Pulses: Normal pulses.      Heart sounds: Normal heart sounds. No murmur heard.     No friction rub. No gallop.   Pulmonary:      Effort: Pulmonary effort is normal. No respiratory distress.      Breath sounds: Normal breath sounds. No wheezing, rhonchi or rales.   Abdominal:      General: Bowel " sounds are normal.      Palpations: Abdomen is soft.      Tenderness: There is no abdominal tenderness. There is no guarding or rebound.   Musculoskeletal:         General: No swelling.      Cervical back: Neck supple.      Right lower leg: No edema.      Left lower leg: No edema.   Skin:     General: Skin is warm and dry.      Capillary Refill: Capillary refill takes less than 2 seconds.   Neurological:      General: No focal deficit present.      Mental Status: She is alert and oriented to person, place, and time.   Psychiatric:         Mood and Affect: Mood normal.         Behavior: Behavior normal.         Thought Content: Thought content normal.         Judgment: Judgment normal.

## 2024-09-16 NOTE — TELEPHONE ENCOUNTER
Reason for call: pt is unsure if she should be still taking this medication   [x] Refill   [] Prior Auth  [] Other:     Office:   [] PCP/Provider -   [x] Specialty/Provider - Cardio    Medication:       Pharmacy: [facility]    Does the patient have enough for 3 days?   [] Yes   [x] No - Send as HP to POD

## 2024-09-16 NOTE — ASSESSMENT & PLAN NOTE
Orders:    CBC and differential; Future    Comprehensive metabolic panel; Future    TSH, 3rd generation with Free T4 reflex; Future    Lipid panel; Future

## 2024-09-16 NOTE — ASSESSMENT & PLAN NOTE
Wt Readings from Last 3 Encounters:   09/16/24 82.5 kg (181 lb 12.8 oz)   07/25/24 81.5 kg (179 lb 9.6 oz)   07/16/24 80.7 kg (178 lb)

## 2024-09-17 NOTE — TELEPHONE ENCOUNTER
Routed/faxed hold note to [facility]. Again, since received a letter for Eliquis  hold.

## 2024-09-26 ENCOUNTER — APPOINTMENT (OUTPATIENT)
Dept: LAB | Facility: CLINIC | Age: 69
End: 2024-09-26
Payer: COMMERCIAL

## 2024-09-26 DIAGNOSIS — I48.0 PAROXYSMAL ATRIAL FIBRILLATION (HCC): ICD-10-CM

## 2024-09-26 DIAGNOSIS — I65.23 MILD ATHEROSCLEROSIS OF BOTH CAROTID ARTERIES: ICD-10-CM

## 2024-09-26 DIAGNOSIS — I10 ESSENTIAL HYPERTENSION: ICD-10-CM

## 2024-09-26 DIAGNOSIS — R73.01 IMPAIRED FASTING BLOOD SUGAR: ICD-10-CM

## 2024-09-26 LAB
ALBUMIN SERPL BCG-MCNC: 4.2 G/DL (ref 3.5–5)
ALP SERPL-CCNC: 55 U/L (ref 34–104)
ALT SERPL W P-5'-P-CCNC: 18 U/L (ref 7–52)
ANION GAP SERPL CALCULATED.3IONS-SCNC: 10 MMOL/L (ref 4–13)
AST SERPL W P-5'-P-CCNC: 18 U/L (ref 13–39)
BASOPHILS # BLD AUTO: 0.07 THOUSANDS/ΜL (ref 0–0.1)
BASOPHILS NFR BLD AUTO: 1 % (ref 0–1)
BILIRUB SERPL-MCNC: 1.22 MG/DL (ref 0.2–1)
BUN SERPL-MCNC: 23 MG/DL (ref 5–25)
CALCIUM SERPL-MCNC: 9.7 MG/DL (ref 8.4–10.2)
CHLORIDE SERPL-SCNC: 102 MMOL/L (ref 96–108)
CHOLEST SERPL-MCNC: 216 MG/DL
CO2 SERPL-SCNC: 29 MMOL/L (ref 21–32)
CREAT SERPL-MCNC: 1.16 MG/DL (ref 0.6–1.3)
EOSINOPHIL # BLD AUTO: 0.16 THOUSAND/ΜL (ref 0–0.61)
EOSINOPHIL NFR BLD AUTO: 2 % (ref 0–6)
ERYTHROCYTE [DISTWIDTH] IN BLOOD BY AUTOMATED COUNT: 12.6 % (ref 11.6–15.1)
EST. AVERAGE GLUCOSE BLD GHB EST-MCNC: 126 MG/DL
GFR SERPL CREATININE-BSD FRML MDRD: 47 ML/MIN/1.73SQ M
GLUCOSE P FAST SERPL-MCNC: 93 MG/DL (ref 65–99)
HBA1C MFR BLD: 6 %
HCT VFR BLD AUTO: 41.9 % (ref 34.8–46.1)
HDLC SERPL-MCNC: 58 MG/DL
HGB BLD-MCNC: 13.4 G/DL (ref 11.5–15.4)
IMM GRANULOCYTES # BLD AUTO: 0.02 THOUSAND/UL (ref 0–0.2)
IMM GRANULOCYTES NFR BLD AUTO: 0 % (ref 0–2)
LDLC SERPL CALC-MCNC: 136 MG/DL (ref 0–100)
LYMPHOCYTES # BLD AUTO: 2.03 THOUSANDS/ΜL (ref 0.6–4.47)
LYMPHOCYTES NFR BLD AUTO: 27 % (ref 14–44)
MCH RBC QN AUTO: 29.3 PG (ref 26.8–34.3)
MCHC RBC AUTO-ENTMCNC: 32 G/DL (ref 31.4–37.4)
MCV RBC AUTO: 92 FL (ref 82–98)
MONOCYTES # BLD AUTO: 0.6 THOUSAND/ΜL (ref 0.17–1.22)
MONOCYTES NFR BLD AUTO: 8 % (ref 4–12)
NEUTROPHILS # BLD AUTO: 4.69 THOUSANDS/ΜL (ref 1.85–7.62)
NEUTS SEG NFR BLD AUTO: 62 % (ref 43–75)
NONHDLC SERPL-MCNC: 158 MG/DL
NRBC BLD AUTO-RTO: 0 /100 WBCS
PLATELET # BLD AUTO: 143 THOUSANDS/UL (ref 149–390)
PMV BLD AUTO: 12.7 FL (ref 8.9–12.7)
POTASSIUM SERPL-SCNC: 4.5 MMOL/L (ref 3.5–5.3)
PROT SERPL-MCNC: 7.2 G/DL (ref 6.4–8.4)
RBC # BLD AUTO: 4.57 MILLION/UL (ref 3.81–5.12)
SODIUM SERPL-SCNC: 141 MMOL/L (ref 135–147)
TRIGL SERPL-MCNC: 110 MG/DL
TSH SERPL DL<=0.05 MIU/L-ACNC: 1.98 UIU/ML (ref 0.45–4.5)
WBC # BLD AUTO: 7.57 THOUSAND/UL (ref 4.31–10.16)

## 2024-09-26 PROCEDURE — 80061 LIPID PANEL: CPT

## 2024-09-26 PROCEDURE — 84443 ASSAY THYROID STIM HORMONE: CPT

## 2024-09-26 PROCEDURE — 85025 COMPLETE CBC W/AUTO DIFF WBC: CPT

## 2024-09-26 PROCEDURE — 36415 COLL VENOUS BLD VENIPUNCTURE: CPT

## 2024-09-26 PROCEDURE — 83036 HEMOGLOBIN GLYCOSYLATED A1C: CPT

## 2024-09-26 PROCEDURE — 80053 COMPREHEN METABOLIC PANEL: CPT

## 2024-10-09 DIAGNOSIS — M81.0 AGE-RELATED OSTEOPOROSIS WITHOUT CURRENT PATHOLOGICAL FRACTURE: ICD-10-CM

## 2024-10-09 RX ORDER — DENOSUMAB 60 MG/ML
INJECTION SUBCUTANEOUS
Qty: 1 ML | Refills: 0 | Status: CANCELLED | OUTPATIENT
Start: 2024-10-09

## 2024-10-09 NOTE — TELEPHONE ENCOUNTER
Reason for call:   [x] Refill   [] Prior Auth  [] Other:     Office:   [x] PCP/Provider - Bea ANTONY  [] Specialty/Provider -     Medication: Prolia 60 MG/ML     Dose/Frequency: INJECT 60MG SUBCUTANEOUSLY EVERY 6 MONTHS     Quantity: 1mL    Pharmacy: [facility] - [place], [place] - [address]     Does the patient have enough for 3 days?   [x] Yes   [] No - Send as HP to POD

## 2024-10-10 DIAGNOSIS — M81.0 AGE-RELATED OSTEOPOROSIS WITHOUT CURRENT PATHOLOGICAL FRACTURE: ICD-10-CM

## 2024-10-10 RX ORDER — DENOSUMAB 60 MG/ML
60 INJECTION SUBCUTANEOUS
Qty: 1 ML | Refills: 1 | Status: SHIPPED | OUTPATIENT
Start: 2024-10-10

## 2024-10-28 ENCOUNTER — TELEPHONE (OUTPATIENT)
Dept: FAMILY MEDICINE CLINIC | Facility: CLINIC | Age: 69
End: 2024-10-28

## 2024-10-28 ENCOUNTER — TELEPHONE (OUTPATIENT)
Age: 69
End: 2024-10-28

## 2024-10-28 NOTE — TELEPHONE ENCOUNTER
Pharmacy called to set up delivery of Prolia injection     Delivery is set up for Wed, 10/30/24 during working hours.

## 2024-10-28 NOTE — TELEPHONE ENCOUNTER
[facility] called to schedule prolia orders for pt. Triage nurse transferred pharmacy to office to further schedule.

## 2024-10-29 DIAGNOSIS — Z13.89 SCREENING FOR GENITOURINARY CONDITION: Primary | ICD-10-CM

## 2024-10-30 ENCOUNTER — APPOINTMENT (OUTPATIENT)
Dept: LAB | Facility: CLINIC | Age: 69
End: 2024-10-30
Payer: COMMERCIAL

## 2024-10-30 DIAGNOSIS — Z13.89 SCREENING FOR GENITOURINARY CONDITION: ICD-10-CM

## 2024-10-30 LAB
ANION GAP SERPL CALCULATED.3IONS-SCNC: 8 MMOL/L (ref 4–13)
BUN SERPL-MCNC: 19 MG/DL (ref 5–25)
CALCIUM SERPL-MCNC: 9.3 MG/DL (ref 8.4–10.2)
CHLORIDE SERPL-SCNC: 105 MMOL/L (ref 96–108)
CO2 SERPL-SCNC: 29 MMOL/L (ref 21–32)
CREAT SERPL-MCNC: 1.01 MG/DL (ref 0.6–1.3)
GFR SERPL CREATININE-BSD FRML MDRD: 56 ML/MIN/1.73SQ M
GLUCOSE P FAST SERPL-MCNC: 93 MG/DL (ref 65–99)
POTASSIUM SERPL-SCNC: 4 MMOL/L (ref 3.5–5.3)
SODIUM SERPL-SCNC: 142 MMOL/L (ref 135–147)

## 2024-10-30 PROCEDURE — 80048 BASIC METABOLIC PNL TOTAL CA: CPT

## 2024-10-30 PROCEDURE — 36415 COLL VENOUS BLD VENIPUNCTURE: CPT

## 2024-11-18 ENCOUNTER — CLINICAL SUPPORT (OUTPATIENT)
Dept: FAMILY MEDICINE CLINIC | Facility: CLINIC | Age: 69
End: 2024-11-18
Payer: COMMERCIAL

## 2024-11-18 DIAGNOSIS — Z23 ENCOUNTER FOR IMMUNIZATION: Primary | ICD-10-CM

## 2024-11-18 DIAGNOSIS — M81.0 AGE-RELATED OSTEOPOROSIS WITHOUT CURRENT PATHOLOGICAL FRACTURE: ICD-10-CM

## 2024-11-18 PROCEDURE — 96372 THER/PROPH/DIAG INJ SC/IM: CPT | Performed by: FAMILY MEDICINE

## 2024-11-18 PROCEDURE — 90471 IMMUNIZATION ADMIN: CPT | Performed by: FAMILY MEDICINE

## 2024-11-18 PROCEDURE — 90662 IIV NO PRSV INCREASED AG IM: CPT | Performed by: FAMILY MEDICINE

## 2024-12-30 ENCOUNTER — TELEPHONE (OUTPATIENT)
Dept: FAMILY MEDICINE CLINIC | Facility: CLINIC | Age: 69
End: 2024-12-30

## 2024-12-30 DIAGNOSIS — J06.9 UPPER RESPIRATORY TRACT INFECTION, UNSPECIFIED TYPE: Primary | ICD-10-CM

## 2024-12-30 DIAGNOSIS — B00.1 COLD SORE: ICD-10-CM

## 2024-12-30 RX ORDER — AMOXICILLIN 500 MG/1
500 CAPSULE ORAL EVERY 8 HOURS SCHEDULED
Qty: 30 CAPSULE | Refills: 0 | Status: SHIPPED | OUTPATIENT
Start: 2024-12-30 | End: 2025-01-09

## 2024-12-30 RX ORDER — VALACYCLOVIR HYDROCHLORIDE 1 G/1
2000 TABLET, FILM COATED ORAL 2 TIMES DAILY
Qty: 4 TABLET | Refills: 0 | Status: SHIPPED | OUTPATIENT
Start: 2024-12-30 | End: 2024-12-31

## 2024-12-30 NOTE — TELEPHONE ENCOUNTER
C/o  stuffy head with drainage down the throat. Can you rx for amoxil? She also has a cold sore, can you rx for that also?

## 2025-01-07 DIAGNOSIS — K21.9 GASTROESOPHAGEAL REFLUX DISEASE WITHOUT ESOPHAGITIS: ICD-10-CM

## 2025-01-07 NOTE — TELEPHONE ENCOUNTER
Reason for call:   [x] Refill   [] Prior Auth  [] Other:     Office:   [x] PCP/Provider - ag  [] Specialty/Provider -     Medication: pantoprazole 40 mg 1 bid, 180    Pharmacy:   [facility]      Does the patient have enough for 3 days?   [x] Yes   [] No - Send as HP to POD

## 2025-01-08 RX ORDER — PANTOPRAZOLE SODIUM 40 MG/1
40 TABLET, DELAYED RELEASE ORAL 2 TIMES DAILY
Qty: 180 TABLET | Refills: 1 | Status: SHIPPED | OUTPATIENT
Start: 2025-01-08

## 2025-02-05 DIAGNOSIS — I10 ESSENTIAL HYPERTENSION: ICD-10-CM

## 2025-02-05 RX ORDER — LOSARTAN POTASSIUM 50 MG/1
50 TABLET ORAL DAILY
Qty: 90 TABLET | Refills: 1 | Status: SHIPPED | OUTPATIENT
Start: 2025-02-05

## 2025-03-20 ENCOUNTER — OFFICE VISIT (OUTPATIENT)
Dept: FAMILY MEDICINE CLINIC | Facility: CLINIC | Age: 70
End: 2025-03-20
Payer: COMMERCIAL

## 2025-03-20 VITALS
WEIGHT: 186.2 LBS | BODY MASS INDEX: 32.99 KG/M2 | DIASTOLIC BLOOD PRESSURE: 68 MMHG | OXYGEN SATURATION: 96 % | HEIGHT: 63 IN | HEART RATE: 94 BPM | SYSTOLIC BLOOD PRESSURE: 128 MMHG | TEMPERATURE: 98.2 F

## 2025-03-20 DIAGNOSIS — I70.1 RAS (RENAL ARTERY STENOSIS) (HCC): ICD-10-CM

## 2025-03-20 DIAGNOSIS — I50.32 CHRONIC DIASTOLIC (CONGESTIVE) HEART FAILURE (HCC): Primary | ICD-10-CM

## 2025-03-20 DIAGNOSIS — I65.23 MILD ATHEROSCLEROSIS OF BOTH CAROTID ARTERIES: ICD-10-CM

## 2025-03-20 DIAGNOSIS — G43.009 MIGRAINE WITHOUT AURA AND WITHOUT STATUS MIGRAINOSUS, NOT INTRACTABLE: ICD-10-CM

## 2025-03-20 DIAGNOSIS — I48.0 PAROXYSMAL ATRIAL FIBRILLATION (HCC): ICD-10-CM

## 2025-03-20 DIAGNOSIS — E78.5 HYPERLIPIDEMIA, UNSPECIFIED HYPERLIPIDEMIA TYPE: ICD-10-CM

## 2025-03-20 DIAGNOSIS — F41.9 ANXIETY: ICD-10-CM

## 2025-03-20 DIAGNOSIS — J06.9 UPPER RESPIRATORY TRACT INFECTION, UNSPECIFIED TYPE: ICD-10-CM

## 2025-03-20 DIAGNOSIS — M25.551 BILATERAL HIP PAIN: ICD-10-CM

## 2025-03-20 DIAGNOSIS — M25.552 BILATERAL HIP PAIN: ICD-10-CM

## 2025-03-20 DIAGNOSIS — I10 ESSENTIAL HYPERTENSION: ICD-10-CM

## 2025-03-20 DIAGNOSIS — M81.0 AGE-RELATED OSTEOPOROSIS WITHOUT CURRENT PATHOLOGICAL FRACTURE: ICD-10-CM

## 2025-03-20 DIAGNOSIS — R73.01 IMPAIRED FASTING BLOOD SUGAR: ICD-10-CM

## 2025-03-20 DIAGNOSIS — G47.33 OSA (OBSTRUCTIVE SLEEP APNEA): ICD-10-CM

## 2025-03-20 DIAGNOSIS — K21.9 GASTROESOPHAGEAL REFLUX DISEASE, UNSPECIFIED WHETHER ESOPHAGITIS PRESENT: ICD-10-CM

## 2025-03-20 DIAGNOSIS — F51.01 PRIMARY INSOMNIA: ICD-10-CM

## 2025-03-20 PROCEDURE — 99214 OFFICE O/P EST MOD 30 MIN: CPT | Performed by: FAMILY MEDICINE

## 2025-03-20 RX ORDER — AMOXICILLIN 500 MG/1
500 CAPSULE ORAL EVERY 8 HOURS SCHEDULED
Qty: 30 CAPSULE | Refills: 0 | Status: SHIPPED | OUTPATIENT
Start: 2025-03-20 | End: 2025-03-30

## 2025-03-20 RX ORDER — HYDROXYZINE HYDROCHLORIDE 25 MG/1
25 TABLET, FILM COATED ORAL EVERY 8 HOURS PRN
Qty: 30 TABLET | Refills: 1 | Status: SHIPPED | OUTPATIENT
Start: 2025-03-20

## 2025-03-20 RX ORDER — SUCRALFATE 1 G/1
1 TABLET ORAL DAILY
COMMUNITY
Start: 2025-03-01

## 2025-03-20 NOTE — ASSESSMENT & PLAN NOTE
Continue current treatment.  Patient currently not on CPAP.  The insurance company seem to cause a lot of problems and approving this.  We can always readdress this at a follow-up visit.

## 2025-03-20 NOTE — ASSESSMENT & PLAN NOTE
Currently tolerating atorvastatin 20 mg daily.  Orders:    Lipid Panel with Direct LDL reflex; Future

## 2025-03-20 NOTE — PROGRESS NOTES
Name: Cindy Ching      : 1955      MRN: [id number]  Encounter Provider: Augusto Singh DO  Encounter Date: 3/20/2025   Encounter department: [facility]  :  Assessment & Plan  Chronic diastolic (congestive) heart failure (HCC)  Wt Readings from Last 3 Encounters:   25 84.5 kg (186 lb 3.2 oz)   24 82.5 kg (181 lb 12.8 oz)   24 81.5 kg (179 lb 9.6 oz)     Currently compensated.  Continue current medications.               Essential hypertension  Currently controlled.  Continue current medications.  Orders:    CBC and differential; Future    Comprehensive metabolic panel; Future    TSH, 3rd generation with Free T4 reflex; Future    Migraine without aura and without status migrainosus, not intractable  Currently controlled.  Continue current medications.       Mild atherosclerosis of both carotid arteries  Continue risk factor modification.  Last carotid Doppler showed less than 50% stenosis.       Paroxysmal atrial fibrillation (HCC)  Currently on Eliquis 5 mg twice a day.  Rate is controlled.       EL (renal artery stenosis) ([facility])  Continue to monitor kidney function.  No ACE inhibitors.       Gastroesophageal reflux disease, unspecified whether esophagitis present  Controlled.  Continue current medications.       Impaired fasting blood sugar    Orders:    Hemoglobin A1C; Future    Age-related osteoporosis without current pathological fracture  On Prolia.  Need to discuss when next DEXA scan is due.  Orders:    DXA bone density spine hip and pelvis; Future    Vitamin D 25 hydroxy; Future    Anxiety  Controlled.  No depression.  No homicidal or suicidal ideation or plan.       Hyperlipidemia, unspecified hyperlipidemia type  Currently tolerating atorvastatin 20 mg daily.  Orders:    Lipid Panel with Direct LDL reflex; Future    KOSTA (obstructive sleep apnea)  Continue current treatment.  Patient currently not on CPAP.  The insurance company seem to cause a lot of problems  "and approving this.  We can always readdress this at a follow-up visit.       Bilateral hip pain  Orthopedics following.       Upper respiratory tract infection, unspecified type    Orders:    amoxicillin (AMOXIL) 500 mg capsule; Take 1 capsule (500 mg total) by mouth every 8 (eight) hours for 10 days    I really suspect the patient has a sinusitis infection.  I will prescribe amoxicillin 500 mg 3 times a day for 10 days.  Nasal saline is okay.  Sinus massage is okay.  Vicks is okay.  No over-the-counter oral decongestants.  May use Flonase as tolerated.       History of Present Illness   Patient is a pleasant 69-year-old female presents today for chronic disease management.  Additionally she is having upper respiratory tract symptoms    Sore Throat   Associated symptoms include congestion and coughing.     Review of Systems   Constitutional:  Positive for fatigue.   HENT:  Positive for congestion, postnasal drip, rhinorrhea, sinus pressure, sinus pain and sore throat.    Respiratory:  Positive for cough.    All other systems reviewed and are negative.      Objective   /68   Pulse 94   Temp 98.2 °F (36.8 °C)   Ht 5' 3\" (1.6 m)   Wt 84.5 kg (186 lb 3.2 oz)   SpO2 96%   BMI 32.98 kg/m²      Physical Exam  Vitals and nursing note reviewed.   Constitutional:       General: She is not in acute distress.     Appearance: She is well-developed. She is not ill-appearing, toxic-appearing or diaphoretic.   HENT:      Head: Normocephalic and atraumatic.      Right Ear: Tympanic membrane normal.      Left Ear: Tympanic membrane normal.      Mouth/Throat:      Mouth: Mucous membranes are moist.      Pharynx: Posterior oropharyngeal erythema present.   Eyes:      Conjunctiva/sclera: Conjunctivae normal.   Cardiovascular:      Rate and Rhythm: Normal rate and regular rhythm.      Heart sounds: Normal heart sounds. No murmur heard.  Pulmonary:      Effort: Pulmonary effort is normal. No respiratory distress.      Breath " sounds: Normal breath sounds.   Abdominal:      Palpations: Abdomen is soft.      Tenderness: There is no abdominal tenderness.   Musculoskeletal:         General: No swelling.      Cervical back: Neck supple.   Skin:     General: Skin is warm and dry.      Capillary Refill: Capillary refill takes less than 2 seconds.   Neurological:      Mental Status: She is alert.   Psychiatric:         Mood and Affect: Mood normal.

## 2025-03-20 NOTE — ASSESSMENT & PLAN NOTE
Currently controlled.  Continue current medications.  Orders:    CBC and differential; Future    Comprehensive metabolic panel; Future    TSH, 3rd generation with Free T4 reflex; Future

## 2025-03-20 NOTE — ASSESSMENT & PLAN NOTE
On Prolia.  Need to discuss when next DEXA scan is due.  Orders:    DXA bone density spine hip and pelvis; Future    Vitamin D 25 hydroxy; Future

## 2025-03-20 NOTE — ASSESSMENT & PLAN NOTE
Wt Readings from Last 3 Encounters:   03/20/25 84.5 kg (186 lb 3.2 oz)   09/16/24 82.5 kg (181 lb 12.8 oz)   07/25/24 81.5 kg (179 lb 9.6 oz)     Currently compensated.  Continue current medications.

## 2025-03-24 ENCOUNTER — TELEPHONE (OUTPATIENT)
Age: 70
End: 2025-03-24

## 2025-03-24 DIAGNOSIS — B00.1 COLD SORE: ICD-10-CM

## 2025-03-24 RX ORDER — VALACYCLOVIR HYDROCHLORIDE 1 G/1
2000 TABLET, FILM COATED ORAL 2 TIMES DAILY
Qty: 4 TABLET | Refills: 0 | Status: SHIPPED | OUTPATIENT
Start: 2025-03-24 | End: 2025-03-25

## 2025-03-24 NOTE — TELEPHONE ENCOUNTER
Patient called stating she gets cold sore in and around her nose when she has a cold and she would like to know if there is anything that can be prescribed to prevent this from happening. It makes it difficult to blow her nose.

## 2025-04-15 ENCOUNTER — HOSPITAL ENCOUNTER (OUTPATIENT)
Dept: BONE DENSITY | Facility: HOSPITAL | Age: 70
Discharge: HOME | End: 2025-04-15
Attending: FAMILY MEDICINE
Payer: COMMERCIAL

## 2025-04-15 DIAGNOSIS — M81.0 AGE-RELATED OSTEOPOROSIS WITHOUT CURRENT PATHOLOGICAL FRACTURE: ICD-10-CM

## 2025-04-15 PROCEDURE — 77080 DXA BONE DENSITY AXIAL: CPT

## 2025-04-21 ENCOUNTER — RESULTS FOLLOW-UP (OUTPATIENT)
Dept: FAMILY MEDICINE CLINIC | Facility: CLINIC | Age: 70
End: 2025-04-21

## 2025-04-22 ENCOUNTER — TELEPHONE (OUTPATIENT)
Dept: FAMILY MEDICINE CLINIC | Facility: CLINIC | Age: 70
End: 2025-04-22

## 2025-04-22 ENCOUNTER — TELEPHONE (OUTPATIENT)
Age: 70
End: 2025-04-22

## 2025-04-22 NOTE — TELEPHONE ENCOUNTER
aKley from [facility] called in regards to patient's prolia shot. Patient would like a call back to schedule her appointment to get her prolia shot. Prolia shot will be delivered on 04/25/25.    Please contact patient to advise

## 2025-04-22 NOTE — TELEPHONE ENCOUNTER
I spoke to jose de jesus White scheduled on 5/19/25 for Prolia shot. It is being delivered to our office on 4/25/25 per patient.

## 2025-05-08 ENCOUNTER — APPOINTMENT (OUTPATIENT)
Dept: LAB | Facility: CLINIC | Age: 70
End: 2025-05-08
Payer: COMMERCIAL

## 2025-05-08 DIAGNOSIS — M81.0 AGE-RELATED OSTEOPOROSIS WITHOUT CURRENT PATHOLOGICAL FRACTURE: ICD-10-CM

## 2025-05-08 DIAGNOSIS — I10 ESSENTIAL HYPERTENSION: ICD-10-CM

## 2025-05-08 DIAGNOSIS — E78.5 HYPERLIPIDEMIA, UNSPECIFIED HYPERLIPIDEMIA TYPE: ICD-10-CM

## 2025-05-08 DIAGNOSIS — R73.01 IMPAIRED FASTING BLOOD SUGAR: ICD-10-CM

## 2025-05-08 LAB
25(OH)D3 SERPL-MCNC: 32.3 NG/ML (ref 30–100)
ALBUMIN SERPL BCG-MCNC: 4.1 G/DL (ref 3.5–5)
ALP SERPL-CCNC: 61 U/L (ref 34–104)
ALT SERPL W P-5'-P-CCNC: 23 U/L (ref 7–52)
ANION GAP SERPL CALCULATED.3IONS-SCNC: 8 MMOL/L (ref 4–13)
AST SERPL W P-5'-P-CCNC: 16 U/L (ref 13–39)
BASOPHILS # BLD AUTO: 0.07 THOUSANDS/ÂΜL (ref 0–0.1)
BASOPHILS NFR BLD AUTO: 1 % (ref 0–1)
BILIRUB SERPL-MCNC: 1 MG/DL (ref 0.2–1)
BUN SERPL-MCNC: 21 MG/DL (ref 5–25)
CALCIUM SERPL-MCNC: 9.6 MG/DL (ref 8.4–10.2)
CHLORIDE SERPL-SCNC: 105 MMOL/L (ref 96–108)
CHOLEST SERPL-MCNC: 209 MG/DL (ref ?–200)
CO2 SERPL-SCNC: 28 MMOL/L (ref 21–32)
CREAT SERPL-MCNC: 1.03 MG/DL (ref 0.6–1.3)
EOSINOPHIL # BLD AUTO: 0.13 THOUSAND/ÂΜL (ref 0–0.61)
EOSINOPHIL NFR BLD AUTO: 2 % (ref 0–6)
ERYTHROCYTE [DISTWIDTH] IN BLOOD BY AUTOMATED COUNT: 13.2 % (ref 11.6–15.1)
EST. AVERAGE GLUCOSE BLD GHB EST-MCNC: 126 MG/DL
GFR SERPL CREATININE-BSD FRML MDRD: 54 ML/MIN/1.73SQ M
GLUCOSE P FAST SERPL-MCNC: 94 MG/DL (ref 65–99)
HBA1C MFR BLD: 6 %
HCT VFR BLD AUTO: 40 % (ref 34.8–46.1)
HDLC SERPL-MCNC: 58 MG/DL
HGB BLD-MCNC: 12.7 G/DL (ref 11.5–15.4)
IMM GRANULOCYTES # BLD AUTO: 0.02 THOUSAND/UL (ref 0–0.2)
IMM GRANULOCYTES NFR BLD AUTO: 0 % (ref 0–2)
LDLC SERPL CALC-MCNC: 121 MG/DL (ref 0–100)
LYMPHOCYTES # BLD AUTO: 2.23 THOUSANDS/ÂΜL (ref 0.6–4.47)
LYMPHOCYTES NFR BLD AUTO: 28 % (ref 14–44)
MCH RBC QN AUTO: 28.6 PG (ref 26.8–34.3)
MCHC RBC AUTO-ENTMCNC: 31.8 G/DL (ref 31.4–37.4)
MCV RBC AUTO: 90 FL (ref 82–98)
MONOCYTES # BLD AUTO: 0.51 THOUSAND/ÂΜL (ref 0.17–1.22)
MONOCYTES NFR BLD AUTO: 6 % (ref 4–12)
NEUTROPHILS # BLD AUTO: 5.03 THOUSANDS/ÂΜL (ref 1.85–7.62)
NEUTS SEG NFR BLD AUTO: 63 % (ref 43–75)
NRBC BLD AUTO-RTO: 0 /100 WBCS
PLATELET # BLD AUTO: 161 THOUSANDS/UL (ref 149–390)
PMV BLD AUTO: 12.1 FL (ref 8.9–12.7)
POTASSIUM SERPL-SCNC: 4.2 MMOL/L (ref 3.5–5.3)
PROT SERPL-MCNC: 7 G/DL (ref 6.4–8.4)
RBC # BLD AUTO: 4.44 MILLION/UL (ref 3.81–5.12)
SODIUM SERPL-SCNC: 141 MMOL/L (ref 135–147)
TRIGL SERPL-MCNC: 150 MG/DL (ref ?–150)
TSH SERPL DL<=0.05 MIU/L-ACNC: 2.06 UIU/ML (ref 0.45–4.5)
WBC # BLD AUTO: 7.99 THOUSAND/UL (ref 4.31–10.16)

## 2025-05-08 PROCEDURE — 82306 VITAMIN D 25 HYDROXY: CPT

## 2025-05-08 PROCEDURE — 85025 COMPLETE CBC W/AUTO DIFF WBC: CPT

## 2025-05-08 PROCEDURE — 80053 COMPREHEN METABOLIC PANEL: CPT

## 2025-05-08 PROCEDURE — 83036 HEMOGLOBIN GLYCOSYLATED A1C: CPT

## 2025-05-08 PROCEDURE — 80061 LIPID PANEL: CPT

## 2025-05-08 PROCEDURE — 84443 ASSAY THYROID STIM HORMONE: CPT

## 2025-05-08 PROCEDURE — 36415 COLL VENOUS BLD VENIPUNCTURE: CPT

## 2025-05-19 ENCOUNTER — CLINICAL SUPPORT (OUTPATIENT)
Dept: FAMILY MEDICINE CLINIC | Facility: CLINIC | Age: 70
End: 2025-05-19

## 2025-05-19 DIAGNOSIS — M81.0 OSTEOPOROSIS, UNSPECIFIED OSTEOPOROSIS TYPE, UNSPECIFIED PATHOLOGICAL FRACTURE PRESENCE: ICD-10-CM

## 2025-05-19 DIAGNOSIS — M81.0 AGE-RELATED OSTEOPOROSIS WITHOUT CURRENT PATHOLOGICAL FRACTURE: Primary | ICD-10-CM

## 2025-05-19 DIAGNOSIS — M81.0 OSTEOPOROSIS, UNSPECIFIED OSTEOPOROSIS TYPE, UNSPECIFIED PATHOLOGICAL FRACTURE PRESENCE: Primary | ICD-10-CM

## 2025-07-10 DIAGNOSIS — K21.9 GASTROESOPHAGEAL REFLUX DISEASE WITHOUT ESOPHAGITIS: ICD-10-CM

## 2025-07-10 RX ORDER — PANTOPRAZOLE SODIUM 40 MG/1
40 TABLET, DELAYED RELEASE ORAL 2 TIMES DAILY
Qty: 180 TABLET | Refills: 1 | Status: SHIPPED | OUTPATIENT
Start: 2025-07-10

## 2025-07-16 DIAGNOSIS — I10 ESSENTIAL HYPERTENSION: ICD-10-CM

## 2025-07-16 NOTE — TELEPHONE ENCOUNTER
Reason for call:   [x] Refill   [] Prior Auth  [] Other:     Office:   [x] PCP/Provider - Augusto Singh,    [] Specialty/Provider -     Medication: losartan 50 mg    Dose/Frequency: 1 tab daily    Quantity: 90 tabs    Pharmacy: [facility] [address] [facility]   Does the patient have enough for 3 days?   [] Yes   [] No - Send as HP to POD    Mail Away Pharmacy   Does the patient have enough for 10 days?   [x] Yes   [] No - Send as HP to POD

## 2025-07-18 RX ORDER — LOSARTAN POTASSIUM 50 MG/1
50 TABLET ORAL DAILY
Qty: 90 TABLET | Refills: 1 | Status: SHIPPED | OUTPATIENT
Start: 2025-07-18

## 2025-07-31 ENCOUNTER — TELEPHONE (OUTPATIENT)
Age: 70
End: 2025-07-31

## 2025-07-31 DIAGNOSIS — E78.00 HYPERCHOLESTEROLEMIA: Primary | ICD-10-CM

## 2025-07-31 RX ORDER — ROSUVASTATIN CALCIUM 10 MG/1
10 TABLET, COATED ORAL DAILY
Qty: 30 TABLET | Refills: 1 | Status: SHIPPED | OUTPATIENT
Start: 2025-07-31

## 2025-08-19 DIAGNOSIS — M81.0 AGE-RELATED OSTEOPOROSIS WITHOUT CURRENT PATHOLOGICAL FRACTURE: ICD-10-CM

## 2025-08-20 RX ORDER — DENOSUMAB 60 MG/ML
INJECTION SUBCUTANEOUS
Qty: 1 ML | Refills: 1 | Status: SHIPPED | OUTPATIENT
Start: 2025-08-20

## (undated) DEVICE — Device: Brand: SMARTABLATE

## (undated) DEVICE — NEEDLE 18 G X 1 1/2

## (undated) DEVICE — Device: Brand: WEBSTER CS

## (undated) DEVICE — Device: Brand: REFERENCE PATCH CARTO 3

## (undated) DEVICE — PINNACLE INTRODUCER SHEATH: Brand: PINNACLE

## (undated) DEVICE — Device: Brand: THERMOCOOL SMARTTOUCH SF

## (undated) DEVICE — Device: Brand: PROTRACK PIGTAIL WIRE

## (undated) DEVICE — FLEXIBLE ADHESIVE BANDAGE,X-LARGE: Brand: CURITY

## (undated) DEVICE — PLASTIC ADHESIVE BANDAGE: Brand: CURITY

## (undated) DEVICE — SYRINGE 10ML LL

## (undated) DEVICE — IV SET EXT SM BORE CARESITE 8IN

## (undated) DEVICE — CHLORAPREP HI-LITE 10.5ML ORANGE

## (undated) DEVICE — SYRINGE 5ML LL

## (undated) DEVICE — Device: Brand: VIZIGO

## (undated) DEVICE — CATH ULTRASOUND ACUNAV ICE 8FR 90CM GE VIVID-I

## (undated) DEVICE — NEEDLE TRANSSEPTAL BRK-1 98CM

## (undated) DEVICE — GLOVE INDICATOR PI UNDERGLOVE SZ 8.5 BLUE

## (undated) DEVICE — SYRINGE 3ML LL

## (undated) DEVICE — Device: Brand: PENTARAY NAV

## (undated) DEVICE — NEEDLE 25G X 1 1/2